# Patient Record
Sex: MALE | Race: WHITE | NOT HISPANIC OR LATINO | Employment: OTHER | URBAN - METROPOLITAN AREA
[De-identification: names, ages, dates, MRNs, and addresses within clinical notes are randomized per-mention and may not be internally consistent; named-entity substitution may affect disease eponyms.]

---

## 2022-10-13 ENCOUNTER — HOSPITAL ENCOUNTER (EMERGENCY)
Facility: HOSPITAL | Age: 82
Discharge: HOME | End: 2022-10-13
Attending: EMERGENCY MEDICINE | Admitting: EMERGENCY MEDICINE

## 2022-10-13 ENCOUNTER — APPOINTMENT (OUTPATIENT)
Dept: GENERAL RADIOLOGY | Facility: HOSPITAL | Age: 82
End: 2022-10-13

## 2022-10-13 VITALS
HEART RATE: 62 BPM | TEMPERATURE: 97.9 F | WEIGHT: 160 LBS | OXYGEN SATURATION: 94 % | HEIGHT: 65 IN | BODY MASS INDEX: 26.66 KG/M2 | RESPIRATION RATE: 20 BRPM | SYSTOLIC BLOOD PRESSURE: 175 MMHG | DIASTOLIC BLOOD PRESSURE: 88 MMHG

## 2022-10-13 DIAGNOSIS — M54.16 LUMBAR RADICULOPATHY: Primary | ICD-10-CM

## 2022-10-13 LAB
HOLD SPECIMEN: NORMAL
WHOLE BLOOD HOLD COAG: NORMAL
WHOLE BLOOD HOLD SPECIMEN: NORMAL

## 2022-10-13 PROCEDURE — 72110 X-RAY EXAM L-2 SPINE 4/>VWS: CPT

## 2022-10-13 PROCEDURE — 96375 TX/PRO/DX INJ NEW DRUG ADDON: CPT

## 2022-10-13 PROCEDURE — 25010000002 KETOROLAC TROMETHAMINE PER 15 MG: Performed by: EMERGENCY MEDICINE

## 2022-10-13 PROCEDURE — 25010000002 ONDANSETRON PER 1 MG: Performed by: EMERGENCY MEDICINE

## 2022-10-13 PROCEDURE — 25010000002 METHYLPREDNISOLONE PER 125 MG: Performed by: EMERGENCY MEDICINE

## 2022-10-13 PROCEDURE — 96374 THER/PROPH/DIAG INJ IV PUSH: CPT

## 2022-10-13 PROCEDURE — 25010000002 MORPHINE PER 10 MG: Performed by: EMERGENCY MEDICINE

## 2022-10-13 PROCEDURE — 99283 EMERGENCY DEPT VISIT LOW MDM: CPT

## 2022-10-13 RX ORDER — HYDROCHLOROTHIAZIDE 25 MG/1
25 TABLET ORAL DAILY
COMMUNITY
End: 2022-10-25 | Stop reason: SDUPTHER

## 2022-10-13 RX ORDER — ATORVASTATIN CALCIUM 80 MG/1
40 TABLET, FILM COATED ORAL DAILY
COMMUNITY

## 2022-10-13 RX ORDER — ATENOLOL 50 MG/1
50 TABLET ORAL DAILY
COMMUNITY
End: 2022-11-28 | Stop reason: SDUPTHER

## 2022-10-13 RX ORDER — MORPHINE SULFATE 4 MG/ML
4 INJECTION, SOLUTION INTRAMUSCULAR; INTRAVENOUS ONCE
Status: COMPLETED | OUTPATIENT
Start: 2022-10-13 | End: 2022-10-13

## 2022-10-13 RX ORDER — KETOROLAC TROMETHAMINE 15 MG/ML
15 INJECTION, SOLUTION INTRAMUSCULAR; INTRAVENOUS ONCE
Status: COMPLETED | OUTPATIENT
Start: 2022-10-13 | End: 2022-10-13

## 2022-10-13 RX ORDER — LIDOCAINE 50 MG/G
1 PATCH TOPICAL EVERY 24 HOURS
Qty: 6 PATCH | Refills: 0 | Status: SHIPPED | OUTPATIENT
Start: 2022-10-13 | End: 2023-02-08

## 2022-10-13 RX ORDER — ONDANSETRON 2 MG/ML
4 INJECTION INTRAMUSCULAR; INTRAVENOUS ONCE
Status: COMPLETED | OUTPATIENT
Start: 2022-10-13 | End: 2022-10-13

## 2022-10-13 RX ORDER — ASPIRIN 325 MG
325 TABLET ORAL DAILY
COMMUNITY
End: 2022-10-20

## 2022-10-13 RX ORDER — LISINOPRIL 40 MG/1
40 TABLET ORAL DAILY
COMMUNITY
End: 2022-11-28 | Stop reason: SDUPTHER

## 2022-10-13 RX ORDER — SUCRALFATE 1 G/1
1 TABLET ORAL 4 TIMES DAILY
COMMUNITY
End: 2023-02-08

## 2022-10-13 RX ORDER — ISOSORBIDE MONONITRATE 60 MG/1
60 TABLET, EXTENDED RELEASE ORAL DAILY
COMMUNITY
End: 2022-10-25 | Stop reason: SDUPTHER

## 2022-10-13 RX ORDER — SODIUM CHLORIDE 0.9 % (FLUSH) 0.9 %
10 SYRINGE (ML) INJECTION AS NEEDED
Status: DISCONTINUED | OUTPATIENT
Start: 2022-10-13 | End: 2022-10-13 | Stop reason: HOSPADM

## 2022-10-13 RX ORDER — METHYLPREDNISOLONE SODIUM SUCCINATE 125 MG/2ML
125 INJECTION, POWDER, LYOPHILIZED, FOR SOLUTION INTRAMUSCULAR; INTRAVENOUS ONCE
Status: COMPLETED | OUTPATIENT
Start: 2022-10-13 | End: 2022-10-13

## 2022-10-13 RX ORDER — CYCLOBENZAPRINE HCL 10 MG
10 TABLET ORAL 3 TIMES DAILY PRN
Qty: 15 TABLET | Refills: 0 | Status: SHIPPED | OUTPATIENT
Start: 2022-10-13 | End: 2022-10-19 | Stop reason: SDUPTHER

## 2022-10-13 RX ORDER — TAMSULOSIN HYDROCHLORIDE 0.4 MG/1
1 CAPSULE ORAL DAILY
COMMUNITY
End: 2023-02-08

## 2022-10-13 RX ORDER — HYDROCODONE BITARTRATE AND ACETAMINOPHEN 5; 325 MG/1; MG/1
1 TABLET ORAL EVERY 6 HOURS PRN
Qty: 15 TABLET | Refills: 0 | Status: SHIPPED | OUTPATIENT
Start: 2022-10-13 | End: 2022-10-19

## 2022-10-13 RX ADMIN — MORPHINE SULFATE 4 MG: 4 INJECTION, SOLUTION INTRAMUSCULAR; INTRAVENOUS at 07:39

## 2022-10-13 RX ADMIN — ONDANSETRON 4 MG: 2 INJECTION INTRAMUSCULAR; INTRAVENOUS at 07:39

## 2022-10-13 RX ADMIN — KETOROLAC TROMETHAMINE 15 MG: 15 INJECTION, SOLUTION INTRAMUSCULAR; INTRAVENOUS at 07:40

## 2022-10-13 RX ADMIN — METHYLPREDNISOLONE SODIUM SUCCINATE 125 MG: 125 INJECTION, POWDER, FOR SOLUTION INTRAMUSCULAR; INTRAVENOUS at 07:40

## 2022-10-13 NOTE — ED PROVIDER NOTES
Subjective   History of Present Illness  Pt is a 81 yo male presnting to ED with complaints of left leg pain. PMHx significant for CAD, DM (non insulin), HTN and HLD. Pt reports lower back pain radiating down left leg that began about 2 weeks ago prior to moving to [place] from [place]. He reports sharp shooting pain from left lower back down posterior left leg. Pain worse with sitting and standing. He denies weakness, numbness or loss of bladder / bowel. He denies swelling to legs and reports pain initially started prior to driving to [place].  He denies prior hx of back problems. He denies specific fall or injury. He hasn't taken any medications for pain. He has not established a PCP in [place] yet and will be living with family. He has not taken his BP meds today. He denies tobacco, drug or ETOH use.     History provided by:  Medical records and patient      Review of Systems   Constitutional: Negative for chills and fever.   HENT: Negative for congestion.    Eyes: Negative for visual disturbance.   Respiratory: Negative for cough and shortness of breath.    Cardiovascular: Negative for chest pain and leg swelling.   Gastrointestinal: Negative for abdominal pain, diarrhea, nausea and vomiting.   Genitourinary: Negative for difficulty urinating, dysuria and flank pain.   Musculoskeletal: Positive for back pain and myalgias. Negative for arthralgias and joint swelling.   Skin: Negative.  Negative for rash and wound.   Allergic/Immunologic: Negative.    Neurological: Negative for dizziness, syncope, weakness, numbness and headaches.   Psychiatric/Behavioral: Negative for confusion.   All other systems reviewed and are negative.      No past medical history on file.    No Known Allergies    No past surgical history on file.    No family history on file.    Social History     Socioeconomic History   • Marital status:            Objective   Physical Exam  Vitals and nursing note reviewed.   Constitutional:       Appearance:  He is well-developed.   HENT:      Head: Atraumatic.      Nose: Nose normal.   Eyes:      General: Lids are normal.      Conjunctiva/sclera: Conjunctivae normal.      Pupils: Pupils are equal, round, and reactive to light.   Cardiovascular:      Rate and Rhythm: Normal rate and regular rhythm.      Heart sounds: Normal heart sounds.   Pulmonary:      Effort: Pulmonary effort is normal.      Breath sounds: Normal breath sounds.   Abdominal:      General: There is no distension.      Palpations: Abdomen is soft.      Tenderness: There is no abdominal tenderness. There is no guarding or rebound.   Musculoskeletal:         General: No deformity.      Cervical back: Normal range of motion and neck supple. No tenderness. Normal range of motion.      Thoracic back: No tenderness. Normal range of motion.      Lumbar back: Tenderness present. Normal range of motion. Positive right straight leg raise test and positive left straight leg raise test.   Skin:     General: Skin is warm and dry.   Neurological:      Mental Status: He is alert and oriented to person, place, and time.      Sensory: Sensation is intact. No sensory deficit.      Motor: Motor function is intact.      Deep Tendon Reflexes:      Reflex Scores:       Patellar reflexes are 2+ on the right side and 2+ on the left side.  Psychiatric:         Behavior: Behavior normal.         Procedures           ED Course      Re-examined patient several times in ED. Pt resting and pain has improved. Discussed results and tx plan. Will dc home with course of Flexeril, Lidoderm patches and Norco. Discussed ice / heat. Provided info for him to establish a PCP. Discussed new / worse sx to return to ED.     Discussed patient with Dr. Rodriguez who is agreeable with ED course and tx plan.     Recent Results (from the past 24 hour(s))   Green Top (Gel)    Collection Time: 10/13/22  5:45 AM   Result Value Ref Range    Extra Tube Hold for add-ons.    Lavender Top    Collection Time:  10/13/22  5:45 AM   Result Value Ref Range    Extra Tube hold for add-on    Gold Top - SST    Collection Time: 10/13/22  5:45 AM   Result Value Ref Range    Extra Tube Hold for add-ons.    Gray Top    Collection Time: 10/13/22  5:45 AM   Result Value Ref Range    Extra Tube Hold for add-ons.    Light Blue Top    Collection Time: 10/13/22  5:45 AM   Result Value Ref Range    Extra Tube Hold for add-ons.      Note: In addition to lab results from this visit, the labs listed above may include labs taken at another facility or during a different encounter within the last 24 hours. Please correlate lab times with ED admission and discharge times for further clarification of the services performed during this visit.    XR Spine Lumbar Complete 4+VW   Final Result   Moderate to advanced multilevel degenerative changes of the spine       This report was finalized on 10/13/2022 8:16 AM by Hollis Soto.            Vitals:    10/13/22 0630 10/13/22 0729 10/13/22 0915 10/13/22 0930   BP: (!) 197/106 (!) 191/97 (!) 183/90 175/88   BP Location:       Patient Position:       Pulse:       Resp:       Temp:       TempSrc:       SpO2: 100% 97% 90% 94%   Weight:       Height:         Medications   methylPREDNISolone sodium succinate (SOLU-Medrol) injection 125 mg (125 mg Intravenous Given 10/13/22 0740)   ketorolac (TORADOL) injection 15 mg (15 mg Intravenous Given 10/13/22 0740)   ondansetron (ZOFRAN) injection 4 mg (4 mg Intravenous Given 10/13/22 0739)   Morphine sulfate (PF) injection 4 mg (4 mg Intravenous Given 10/13/22 0739)     ECG/EMG Results (last 24 hours)     ** No results found for the last 24 hours. **        No orders to display       DISCHARGE    Patient discharged in stable condition.    Reviewed implications of results, diagnosis, meds, responsibility to follow up, warning signs and symptoms of possible worsening, potential complications and reasons to return to ER.    Patient/Family voiced understanding of above  instructions.    Discussed plan for discharge, as there is no emergent indication for admission.  Pt/family is agreeable and understands need for follow up and possible repeat testing.  Pt/family is aware that discharge does not mean that nothing is wrong but that it indicates no emergency is currently present that requires admission and they must continue care with follow-up as given below or with a physician of their choice.     FOLLOW-UP  PATIENT CONNECTION - [place] [address]  [phone]    Call and establish a primary care doctor.    [facility] Emergency Department  [address] [address] [phone]    If symptoms worsen         Medication List      New Prescriptions    cyclobenzaprine 10 MG tablet  Commonly known as: FLEXERIL  Take 1 tablet by mouth 3 (Three) Times a Day As Needed for Muscle Spasms for up to 15 doses.     HYDROcodone-acetaminophen 5-325 MG per tablet  Commonly known as: NORCO  Take 1 tablet by mouth Every 6 (Six) Hours As Needed for Moderate Pain for up to 15 doses.     lidocaine 5 %  Commonly known as: LIDODERM  Place 1 patch on the skin as directed by provider Daily. Remove & Discard patch within 12 hours or as directed by MD           Where to Get Your Medications      These medications were sent to [facility] [id number] - [place], [place] - [address] - [phone]  - [phone]   [address] [address]    Phone: [phone]   · cyclobenzaprine 10 MG tablet  · HYDROcodone-acetaminophen 5-325 MG per tablet  · lidocaine 5 %                                    MIKHAIL reviewed by Jesse Rodriguez MD       [facility]    Final diagnoses:   Lumbar radiculopathy       ED Disposition  ED Disposition     ED Disposition   Discharge    Condition   Stable    Comment   --             PATIENT CONNECTION - [place] [address]  [phone]    Call and establish a primary  care doctor.    [facility] Emergency Department  [address] [address] [phone]    If symptoms worsen         Medication List      New Prescriptions    cyclobenzaprine 10 MG tablet  Commonly known as: FLEXERIL  Take 1 tablet by mouth 3 (Three) Times a Day As Needed for Muscle Spasms for up to 15 doses.     HYDROcodone-acetaminophen 5-325 MG per tablet  Commonly known as: NORCO  Take 1 tablet by mouth Every 6 (Six) Hours As Needed for Moderate Pain for up to 15 doses.     lidocaine 5 %  Commonly known as: LIDODERM  Place 1 patch on the skin as directed by provider Daily. Remove & Discard patch within 12 hours or as directed by MD           Where to Get Your Medications      These medications were sent to [facility] [id number] - [place], [place] - [address] [facility] - [phone]  - [phone] FX  [address] [address]    Phone: [phone]   · cyclobenzaprine 10 MG tablet  · HYDROcodone-acetaminophen 5-325 MG per tablet  · lidocaine 5 %          Maureen Colbert PA  10/13/22 6175

## 2022-10-15 ENCOUNTER — OFFICE VISIT (OUTPATIENT)
Dept: INTERNAL MEDICINE | Facility: CLINIC | Age: 82
End: 2022-10-15

## 2022-10-15 VITALS
OXYGEN SATURATION: 99 % | HEIGHT: 65 IN | TEMPERATURE: 96.9 F | SYSTOLIC BLOOD PRESSURE: 140 MMHG | BODY MASS INDEX: 25.83 KG/M2 | DIASTOLIC BLOOD PRESSURE: 80 MMHG | WEIGHT: 155 LBS | HEART RATE: 55 BPM

## 2022-10-15 DIAGNOSIS — I25.10 CAD, MULTIPLE VESSEL: ICD-10-CM

## 2022-10-15 DIAGNOSIS — M54.42 ACUTE LOW BACK PAIN WITH LEFT-SIDED SCIATICA, UNSPECIFIED BACK PAIN LATERALITY: ICD-10-CM

## 2022-10-15 DIAGNOSIS — Z76.89 ENCOUNTER TO ESTABLISH CARE: Primary | ICD-10-CM

## 2022-10-15 DIAGNOSIS — M19.90 ARTHRITIS: ICD-10-CM

## 2022-10-15 DIAGNOSIS — R21 RASH: ICD-10-CM

## 2022-10-15 PROCEDURE — 99204 OFFICE O/P NEW MOD 45 MIN: CPT | Performed by: NURSE PRACTITIONER

## 2022-10-15 NOTE — PROGRESS NOTES
Office Note     Name: Hal Byrnes    : 1940     MRN: [id number]     Chief Complaint  Hypertension, Establish Care, and Leg Pain (Left leg pain)    Subjective     History of Present Illness:  Hal Byrnes is a 82 y.o. male who presents today to establish care and for an ED follow-up.  Patient went to [facility] ED on 10/13 with complaints of lumbar radiculopathy with sciatica.  Lumbar spine x-ray showed diffuse osteopenia and moderate to advanced multilevel degenerative changes of the spine.  Patient was sent home with prescriptions for hydrocodone, cyclobenzaprine, and Lidoderm patches.  Patient did just relocated from [place].  He reports that his daughter drove him to [place] from [place] over a 3-day time span.  He reports the back pain is acute and started while they were traveling.  He denies previous back problems.  He does report radiation down the left lower extremity.  He was seeing a number of specialist in [place] and the language barrier does make collecting his health history information difficult.  He also presented alone today so there is no family to help with communication.  Also, I do not have records from his previous provider in [place] to review his past medical history.  It appears that he was seeing otolaryngology for throat and ear complaints, GI for pancreatitis, rheumatology for arthritis, cardiology for CAD and cardiac stents.  He was also seeing Ortho, nephrology, and dermatology at some point but he was unable to tell me why.  He does report a past medical history of hypertension, cardiac stents with coronary artery disease, hyperlipidemia, and pancreatitis.  He denies reports a 50-year history of tobacco use, but quit smoking about 10 years ago.  He does deny alcohol use, he admits to daily marijuana use.  He is up-to-date on his flu vaccine.  He denies further complaints at this time.    Review of Systems   Musculoskeletal: Positive for back pain.  "  Skin: Positive for rash.       Objective     Vital Signs  /80   Pulse 55   Temp 96.9 °F (36.1 °C)   Ht 165.1 cm (65\")   Wt 70.3 kg (155 lb)   SpO2 99%   BMI 25.79 kg/m²   Estimated body mass index is 25.79 kg/m² as calculated from the following:    Height as of this encounter: 165.1 cm (65\").    Weight as of this encounter: 70.3 kg (155 lb).          Physical Exam  Constitutional:       Appearance: Normal appearance.   HENT:      Head: Normocephalic and atraumatic.      Nose: Nose normal.   Eyes:      Extraocular Movements: Extraocular movements intact.      Conjunctiva/sclera: Conjunctivae normal.      Pupils: Pupils are equal, round, and reactive to light.   Cardiovascular:      Rate and Rhythm: Normal rate and regular rhythm.   Pulmonary:      Effort: Pulmonary effort is normal.      Breath sounds: Normal breath sounds.   Abdominal:      General: Abdomen is flat.      Palpations: Abdomen is soft.   Musculoskeletal:      Cervical back: Normal range of motion and neck supple.      Comments: Tenderness to low back, decreased range of motion noted to left lower extremity   Skin:     General: Skin is warm and dry.      Findings: Rash present.      Comments: Rash to bilateral hands   Neurological:      General: No focal deficit present.      Mental Status: He is alert and oriented to person, place, and time. Mental status is at baseline.   Psychiatric:         Mood and Affect: Mood normal.         Behavior: Behavior normal.         Thought Content: Thought content normal.         Judgment: Judgment normal.          Assessment and Plan     Diagnoses and all orders for this visit:    1. Encounter to establish care (Primary)  Patient just recently relocated from [place] to [place].  He presents to establish care with a new provider.  He is up-to-date on his flu vaccine.  He has been following with a number of specialist in [place] and is requesting referrals.  Encourage patient to schedule appointment " for annual physical exam with labs.  Did not discuss health maintenance with patient today due to extent of referrals requested.    2. Acute low back pain with left-sided sciatica, unspecified back pain laterality  May continue with Flexeril and hydrocodone as needed.  May continue with Lidoderm patches.  Will refer to Ortho for further evaluation and treatment.  Patient declined physical therapy.    -     Ambulatory Referral to Orthopedic Surgery    3. Arthritis  Patient was following with rheumatology in [place].  Will refer to rheumatology.    -     Ambulatory Referral to Rheumatology    4. CAD, multiple vessel  Patient is unsure whether he has 3 or 4 cardiac stents.  He does admit to a history of hyperlipidemia, CAD, and hypertension.  He was following with cardiology in [place] and requests a referral.    -     Ambulatory Referral to Cardiology    5. Rash  Dry, scaly rash noted to bilateral hands.  Patient reports rash is also to his back but declined to show me in office today.  Will refer to dermatology.    -     Ambulatory Referral to Dermatology        Follow Up  Return in about 2 months (around 12/15/2022) for Annual.    JOURDAN Vernon

## 2022-10-19 ENCOUNTER — OFFICE VISIT (OUTPATIENT)
Dept: INTERNAL MEDICINE | Facility: CLINIC | Age: 82
End: 2022-10-19

## 2022-10-19 VITALS
WEIGHT: 152 LBS | SYSTOLIC BLOOD PRESSURE: 160 MMHG | BODY MASS INDEX: 25.29 KG/M2 | TEMPERATURE: 97 F | OXYGEN SATURATION: 98 % | HEART RATE: 50 BPM | DIASTOLIC BLOOD PRESSURE: 78 MMHG

## 2022-10-19 DIAGNOSIS — Z79.4 TYPE 2 DIABETES MELLITUS WITH CHRONIC KIDNEY DISEASE, WITH LONG-TERM CURRENT USE OF INSULIN, UNSPECIFIED CKD STAGE: ICD-10-CM

## 2022-10-19 DIAGNOSIS — N18.32 STAGE 3B CHRONIC KIDNEY DISEASE: ICD-10-CM

## 2022-10-19 DIAGNOSIS — Z12.5 PROSTATE CANCER SCREENING: ICD-10-CM

## 2022-10-19 DIAGNOSIS — Z13.21 ENCOUNTER FOR VITAMIN DEFICIENCY SCREENING: ICD-10-CM

## 2022-10-19 DIAGNOSIS — Z09 FOLLOW-UP EXAM: Primary | ICD-10-CM

## 2022-10-19 DIAGNOSIS — I10 HYPERTENSION, UNSPECIFIED TYPE: ICD-10-CM

## 2022-10-19 DIAGNOSIS — M54.16 LUMBAR RADICULOPATHY: ICD-10-CM

## 2022-10-19 DIAGNOSIS — E78.5 HYPERLIPIDEMIA, UNSPECIFIED HYPERLIPIDEMIA TYPE: ICD-10-CM

## 2022-10-19 DIAGNOSIS — E55.9 VITAMIN D DEFICIENCY: ICD-10-CM

## 2022-10-19 DIAGNOSIS — Z79.899 ENCOUNTER FOR LONG-TERM (CURRENT) USE OF OTHER MEDICATIONS: ICD-10-CM

## 2022-10-19 DIAGNOSIS — E11.22 TYPE 2 DIABETES MELLITUS WITH CHRONIC KIDNEY DISEASE, WITH LONG-TERM CURRENT USE OF INSULIN, UNSPECIFIED CKD STAGE: ICD-10-CM

## 2022-10-19 PROCEDURE — 99214 OFFICE O/P EST MOD 30 MIN: CPT | Performed by: NURSE PRACTITIONER

## 2022-10-19 RX ORDER — HYDROCODONE BITARTRATE AND ACETAMINOPHEN 7.5; 325 MG/1; MG/1
1 TABLET ORAL EVERY 6 HOURS PRN
Qty: 48 TABLET | Refills: 0 | Status: SHIPPED | OUTPATIENT
Start: 2022-10-19 | End: 2022-10-31

## 2022-10-19 RX ORDER — HYDROCODONE BITARTRATE AND ACETAMINOPHEN 5; 325 MG/1; MG/1
1 TABLET ORAL EVERY 6 HOURS PRN
Qty: 15 TABLET | Refills: 0 | Status: CANCELLED | OUTPATIENT
Start: 2022-10-19

## 2022-10-19 RX ORDER — CYCLOBENZAPRINE HCL 10 MG
10 TABLET ORAL 3 TIMES DAILY PRN
Qty: 40 TABLET | Refills: 0 | Status: SHIPPED | OUTPATIENT
Start: 2022-10-19 | End: 2023-02-08

## 2022-10-20 PROBLEM — N40.0 BPH (BENIGN PROSTATIC HYPERPLASIA): Status: ACTIVE | Noted: 2022-10-20

## 2022-10-20 PROBLEM — G62.9 PERIPHERAL NEUROPATHY: Status: ACTIVE | Noted: 2022-10-20

## 2022-10-20 PROBLEM — I50.32 CHRONIC DIASTOLIC (CONGESTIVE) HEART FAILURE: Status: ACTIVE | Noted: 2022-10-20

## 2022-10-20 PROBLEM — M85.80 OSTEOPENIA: Status: ACTIVE | Noted: 2022-10-20

## 2022-10-20 PROBLEM — E11.22 TYPE 2 DIABETES MELLITUS WITH CHRONIC KIDNEY DISEASE: Status: ACTIVE | Noted: 2022-10-20

## 2022-10-20 PROBLEM — M47.816 LUMBAR SPONDYLOSIS: Status: ACTIVE | Noted: 2022-10-20

## 2022-10-20 PROBLEM — I20.8 STABLE ANGINA: Status: ACTIVE | Noted: 2022-10-20

## 2022-10-20 PROBLEM — I20.89 STABLE ANGINA: Status: ACTIVE | Noted: 2022-10-20

## 2022-10-20 PROBLEM — I35.0 AORTIC STENOSIS: Status: ACTIVE | Noted: 2022-10-20

## 2022-10-20 PROBLEM — I21.9 MYOCARDIAL INFARCTION: Status: ACTIVE | Noted: 2022-10-20

## 2022-10-20 PROBLEM — L40.9 PSORIASIS: Status: ACTIVE | Noted: 2022-10-20

## 2022-10-20 PROBLEM — I10 HYPERTENSION: Status: ACTIVE | Noted: 2022-10-20

## 2022-10-20 PROBLEM — K21.9 GERD (GASTROESOPHAGEAL REFLUX DISEASE): Status: ACTIVE | Noted: 2022-10-20

## 2022-10-20 PROBLEM — M47.812 CERVICAL SPONDYLOSIS: Status: ACTIVE | Noted: 2022-10-20

## 2022-10-20 PROBLEM — I25.10 CORONARY ARTERY DISEASE: Status: ACTIVE | Noted: 2022-10-20

## 2022-10-20 PROBLEM — K85.90 PANCREATITIS: Status: ACTIVE | Noted: 2022-10-20

## 2022-10-20 RX ORDER — CLOBETASOL PROPIONATE 0.5 MG/G
1 OINTMENT TOPICAL 2 TIMES DAILY
COMMUNITY

## 2022-10-20 RX ORDER — ERGOCALCIFEROL 1.25 MG/1
50000 CAPSULE ORAL WEEKLY
COMMUNITY

## 2022-10-20 RX ORDER — PREDNISONE 1 MG/1
5 TABLET ORAL DAILY
COMMUNITY
End: 2022-11-03

## 2022-10-20 RX ORDER — LACTULOSE 10 G/15ML
10 SOLUTION ORAL 2 TIMES DAILY PRN
COMMUNITY

## 2022-10-20 RX ORDER — OMEPRAZOLE 20 MG/1
20 CAPSULE, DELAYED RELEASE ORAL DAILY
COMMUNITY
End: 2022-11-03

## 2022-10-20 NOTE — PROGRESS NOTES
Office Note     Name: Hal Byrnes    : 1940     MRN: [id number]     Chief Complaint  Med Refill (Was in ER on thursday), Back Pain, and Knee Pain    Subjective     History of Present Illness:  Hal Byrnes is a 82 y.o. male who presents today for a follow-up visit.  Patient presented to the clinic on Saturday with no family present and past medical history and current medications were difficult to obtain.  Daughter is with the patient today and is able to give me some more information.  Patient went to [facility] ED on 10/13 with complaints of lumbar radiculopathy with sciatica.  Lumbar spine x-ray showed diffuse osteopenia and moderate to advanced multilevel degenerative changes of the spine.  Patient was sent home with prescriptions for hydrocodone, cyclobenzaprine, and Lidoderm patches.  Patient did just relocated from [place].  He reports that his daughter drove him to [place] from [place] over a 3-day time span.  Patient has ran out of medications provided by the ED and is still complaining of significant pain.  He does have an upcoming appointment with Ortho for evaluation of lumbar radiculopathy on .  Patient daughter requesting enough medication to get him to that appointment.  I would also like to try to get an MRI.  Past medical history and current home medications have been reviewed and updated.  His wife usually comes to the appointments with him but she is still living in [place] and will be relocating next week.  Referrals for Ortho, cardiology, rheumatology, and dermatology previously placed in progress.  Denies further complaints at this time.    Review of Systems   Musculoskeletal: Positive for back pain.       Past Medical History:   Diagnosis Date   • Arthritis    • Coronary artery disease    • Hyperlipidemia    • Hypertension    • Low back pain        Past Surgical History:   Procedure Laterality Date   • CORONARY STENT PLACEMENT         Social History      Socioeconomic History   • Marital status:    Tobacco Use   • Smoking status: Never   • Smokeless tobacco: Never   Substance and Sexual Activity   • Alcohol use: Never   • Drug use: Yes     Types: Marijuana         Current Outpatient Medications:   •  atenolol (TENORMIN) 25 MG tablet, Take 2 tablets by mouth Daily., Disp: , Rfl:   •  atorvastatin (LIPITOR) 80 MG tablet, Take 1 tablet by mouth Daily., Disp: , Rfl:   •  clobetasol (TEMOVATE) 0.05 % ointment, Apply 1 application topically to the appropriate area as directed 2 (Two) Times a Day., Disp: , Rfl:   •  cyclobenzaprine (FLEXERIL) 10 MG tablet, Take 1 tablet by mouth 3 (Three) Times a Day As Needed for Muscle Spasms., Disp: 40 tablet, Rfl: 0  •  hydroCHLOROthiazide (HYDRODIURIL) 25 MG tablet, Take 1 tablet by mouth Daily., Disp: , Rfl:   •  isosorbide mononitrate (IMDUR) 60 MG 24 hr tablet, Take 1 tablet by mouth Daily., Disp: , Rfl:   •  lactulose (CHRONULAC) 10 GM/15ML solution, Take 15 mL by mouth 2 (Two) Times a Day As Needed., Disp: , Rfl:   •  lidocaine (LIDODERM) 5 %, Place 1 patch on the skin as directed by provider Daily. Remove & Discard patch within 12 hours or as directed by MD, Disp: 6 patch, Rfl: 0  •  lisinopril (PRINIVIL,ZESTRIL) 40 MG tablet, Take 1 tablet by mouth Daily., Disp: , Rfl:   •  omeprazole (priLOSEC) 20 MG capsule, Take 1 capsule by mouth Daily., Disp: , Rfl:   •  Pancrelipase, Lip-Prot-Amyl, (ZENPEP PO), Take  by mouth., Disp: , Rfl:   •  predniSONE (DELTASONE) 5 MG tablet, Take 1 tablet by mouth Daily., Disp: , Rfl:   •  sucralfate (CARAFATE) 1 g tablet, Take 1 tablet by mouth 4 (Four) Times a Day., Disp: , Rfl:   •  tamsulosin (FLOMAX) 0.4 MG capsule 24 hr capsule, Take 1 capsule by mouth Daily., Disp: , Rfl:   •  vitamin D (ERGOCALCIFEROL) 1.25 MG (95064 UT) capsule capsule, Take 1 capsule by mouth 1 (One) Time Per Week., Disp: , Rfl:   •  HYDROcodone-acetaminophen (NORCO) 7.5-325 MG per tablet, Take 1 tablet by mouth  "Every 6 (Six) Hours As Needed for Moderate Pain for up to 12 days., Disp: 48 tablet, Rfl: 0    Objective     Vital Signs  /78   Pulse 50   Temp 97 °F (36.1 °C)   Wt 68.9 kg (152 lb)   SpO2 98%   BMI 25.29 kg/m²   Estimated body mass index is 25.29 kg/m² as calculated from the following:    Height as of 10/15/22: 165.1 cm (65\").    Weight as of this encounter: 68.9 kg (152 lb).    BMI is >= 25 and <30. (Overweight) The following options were offered after discussion;: Will address at follow-up appointment.      Physical Exam  Constitutional:       Appearance: Normal appearance.   HENT:      Head: Normocephalic and atraumatic.   Eyes:      Extraocular Movements: Extraocular movements intact.      Conjunctiva/sclera: Conjunctivae normal.      Pupils: Pupils are equal, round, and reactive to light.      Comments: Glasses in place   Cardiovascular:      Rate and Rhythm: Normal rate and regular rhythm.   Pulmonary:      Effort: Pulmonary effort is normal.      Breath sounds: Normal breath sounds.   Musculoskeletal:         General: Tenderness present.      Cervical back: Normal range of motion and neck supple.      Comments: Decreased range of motion to left lower extremity.  Tenderness to lumbar area to palpation.  Gait is slightly unsteady when ambulating.   Skin:     General: Skin is warm and dry.   Neurological:      General: No focal deficit present.      Mental Status: He is alert and oriented to person, place, and time. Mental status is at baseline.   Psychiatric:         Mood and Affect: Mood normal.         Behavior: Behavior normal.         Thought Content: Thought content normal.         Judgment: Judgment normal.          Assessment and Plan     Diagnoses and all orders for this visit:    1. Follow-up exam (Primary)  Patient presents for follow-up visit.    2. Lumbar radiculopathy  Patient has upcoming Ortho appointment on October 31.  He has ran out of the hydrocodone and cyclobenzaprine provided " by the ED.  Patient and daughter requesting enough medication to get him to his Ortho appointment.  Will obtain urine UDS in office today.  Unable to run a Saqib due to patient just relocating from [place].  I feel comfortable prescribing these medications as the patient is visibly in pain.  Patient did sign patient provider controlled substance agreement.  Patient and daughter informed that there will be no refills on these medications.  Will order MRI of lumbar spine.    -     cyclobenzaprine (FLEXERIL) 10 MG tablet; Take 1 tablet by mouth 3 (Three) Times a Day As Needed for Muscle Spasms.  Dispense: 40 tablet; Refill: 0  -     HYDROcodone-acetaminophen (NORCO) 7.5-325 MG per tablet; Take 1 tablet by mouth Every 6 (Six) Hours As Needed for Moderate Pain for up to 12 days.  Dispense: 48 tablet; Refill: 0    3. Encounter for long-term (current) use of other medications  -     Cancel: Compliance Drug Analysis, Ur - Urine, Clean Catch; Future  -     Compliance Drug Analysis, Ur - Urine, Clean Catch  -     Compliance Drug Analysis, Ur - Urine, Clean Catch; Future  -     Compliance Drug Analysis, Ur - Urine, Clean Catch    4. Hypertension, unspecified type  Blood pressure elevated in office today secondary to pain.  Continue with atenolol, isosorbide, lisinopril, and HCTZ.  Monitor blood pressure at home.  Low-sodium diet.    5. Type 2 diabetes mellitus with chronic kidney disease, with long-term current use of insulin, unspecified CKD stage (HCC)  No current diabetes medications on his home medicine list.  Will check renal panel, A1c, and urine protein to creatinine ratio.  Patient and daughter requesting referral to nephrology.  Will need to wait and see what GFR is prior to referral.    6. Hyperlipidemia, unspecified hyperlipidemia type  Currently on atorvastatin.  Will check fasting lipid panel.    7. Prostate cancer screening  Will check PSA.    8. Vitamin D deficiency  Patient on ergo 50,000 units  weekly.  Will check vitamin D level.    9. Encounter for vitamin deficiency screening  Will check B12 level.      Follow Up  Return in about 1 month (around 11/19/2022) for Recheck.    JOURDAN Vernon    Part of this note may be an electronic transcription/translation of spoken language to printed text using the Dragon Dictation System.

## 2022-10-21 ENCOUNTER — LAB (OUTPATIENT)
Dept: LAB | Facility: HOSPITAL | Age: 82
End: 2022-10-21

## 2022-10-21 DIAGNOSIS — E11.22 TYPE 2 DIABETES MELLITUS WITH CHRONIC KIDNEY DISEASE, WITH LONG-TERM CURRENT USE OF INSULIN, UNSPECIFIED CKD STAGE: ICD-10-CM

## 2022-10-21 DIAGNOSIS — Z13.21 ENCOUNTER FOR VITAMIN DEFICIENCY SCREENING: ICD-10-CM

## 2022-10-21 DIAGNOSIS — Z12.5 PROSTATE CANCER SCREENING: ICD-10-CM

## 2022-10-21 DIAGNOSIS — Z79.4 TYPE 2 DIABETES MELLITUS WITH CHRONIC KIDNEY DISEASE, WITH LONG-TERM CURRENT USE OF INSULIN, UNSPECIFIED CKD STAGE: ICD-10-CM

## 2022-10-21 DIAGNOSIS — E78.5 HYPERLIPIDEMIA, UNSPECIFIED HYPERLIPIDEMIA TYPE: ICD-10-CM

## 2022-10-21 DIAGNOSIS — E55.9 VITAMIN D DEFICIENCY: ICD-10-CM

## 2022-10-21 LAB
25(OH)D3 SERPL-MCNC: 54.7 NG/ML (ref 30–100)
ALBUMIN SERPL-MCNC: 4.2 G/DL (ref 3.5–5.2)
ANION GAP SERPL CALCULATED.3IONS-SCNC: 11 MMOL/L (ref 5–15)
BASOPHILS # BLD AUTO: 0.02 10*3/MM3 (ref 0–0.2)
BASOPHILS NFR BLD AUTO: 0.3 % (ref 0–1.5)
BUN SERPL-MCNC: 27 MG/DL (ref 8–23)
BUN/CREAT SERPL: 15 (ref 7–25)
CALCIUM SPEC-SCNC: 10.1 MG/DL (ref 8.6–10.5)
CHLORIDE SERPL-SCNC: 100 MMOL/L (ref 98–107)
CHOLEST SERPL-MCNC: 282 MG/DL (ref 0–200)
CO2 SERPL-SCNC: 28 MMOL/L (ref 22–29)
CREAT SERPL-MCNC: 1.8 MG/DL (ref 0.76–1.27)
DEPRECATED RDW RBC AUTO: 42.4 FL (ref 37–54)
EGFRCR SERPLBLD CKD-EPI 2021: 37.3 ML/MIN/1.73
EOSINOPHIL # BLD AUTO: 0.09 10*3/MM3 (ref 0–0.4)
EOSINOPHIL NFR BLD AUTO: 1.1 % (ref 0.3–6.2)
ERYTHROCYTE [DISTWIDTH] IN BLOOD BY AUTOMATED COUNT: 13.1 % (ref 12.3–15.4)
GLUCOSE SERPL-MCNC: 85 MG/DL (ref 65–99)
HBA1C MFR BLD: 5.7 % (ref 4.8–5.6)
HCT VFR BLD AUTO: 44.6 % (ref 37.5–51)
HDLC SERPL-MCNC: 41 MG/DL (ref 40–60)
HGB BLD-MCNC: 14.9 G/DL (ref 13–17.7)
IMM GRANULOCYTES # BLD AUTO: 0.04 10*3/MM3 (ref 0–0.05)
IMM GRANULOCYTES NFR BLD AUTO: 0.5 % (ref 0–0.5)
IRON 24H UR-MRATE: 107 MCG/DL (ref 59–158)
IRON SATN MFR SERPL: 27 % (ref 20–50)
LDLC SERPL CALC-MCNC: 213 MG/DL (ref 0–100)
LDLC/HDLC SERPL: 5.16 {RATIO}
LYMPHOCYTES # BLD AUTO: 2.27 10*3/MM3 (ref 0.7–3.1)
LYMPHOCYTES NFR BLD AUTO: 28.4 % (ref 19.6–45.3)
MCH RBC QN AUTO: 29.7 PG (ref 26.6–33)
MCHC RBC AUTO-ENTMCNC: 33.4 G/DL (ref 31.5–35.7)
MCV RBC AUTO: 89 FL (ref 79–97)
MONOCYTES # BLD AUTO: 0.63 10*3/MM3 (ref 0.1–0.9)
MONOCYTES NFR BLD AUTO: 7.9 % (ref 5–12)
NEUTROPHILS NFR BLD AUTO: 4.94 10*3/MM3 (ref 1.7–7)
NEUTROPHILS NFR BLD AUTO: 61.8 % (ref 42.7–76)
NRBC BLD AUTO-RTO: 0 /100 WBC (ref 0–0.2)
PHOSPHATE SERPL-MCNC: 3.3 MG/DL (ref 2.5–4.5)
PLATELET # BLD AUTO: 282 10*3/MM3 (ref 140–450)
PMV BLD AUTO: 10.4 FL (ref 6–12)
POTASSIUM SERPL-SCNC: 3.8 MMOL/L (ref 3.5–5.2)
PSA SERPL-MCNC: 0.83 NG/ML (ref 0–4)
RBC # BLD AUTO: 5.01 10*6/MM3 (ref 4.14–5.8)
SODIUM SERPL-SCNC: 139 MMOL/L (ref 136–145)
TIBC SERPL-MCNC: 392 MCG/DL (ref 298–536)
TRANSFERRIN SERPL-MCNC: 263 MG/DL (ref 200–360)
TRIGL SERPL-MCNC: 148 MG/DL (ref 0–150)
TSH SERPL DL<=0.05 MIU/L-ACNC: 1.6 UIU/ML (ref 0.27–4.2)
VIT B12 BLD-MCNC: 689 PG/ML (ref 211–946)
VLDLC SERPL-MCNC: 28 MG/DL (ref 5–40)
WBC NRBC COR # BLD: 7.99 10*3/MM3 (ref 3.4–10.8)

## 2022-10-21 PROCEDURE — 85025 COMPLETE CBC W/AUTO DIFF WBC: CPT

## 2022-10-21 PROCEDURE — 80061 LIPID PANEL: CPT

## 2022-10-21 PROCEDURE — 82306 VITAMIN D 25 HYDROXY: CPT

## 2022-10-21 PROCEDURE — 83036 HEMOGLOBIN GLYCOSYLATED A1C: CPT

## 2022-10-21 PROCEDURE — 80069 RENAL FUNCTION PANEL: CPT

## 2022-10-21 PROCEDURE — 82607 VITAMIN B-12: CPT

## 2022-10-21 PROCEDURE — 84443 ASSAY THYROID STIM HORMONE: CPT

## 2022-10-21 PROCEDURE — 83540 ASSAY OF IRON: CPT

## 2022-10-21 PROCEDURE — 36415 COLL VENOUS BLD VENIPUNCTURE: CPT

## 2022-10-21 PROCEDURE — 84466 ASSAY OF TRANSFERRIN: CPT

## 2022-10-21 PROCEDURE — G0103 PSA SCREENING: HCPCS

## 2022-10-25 ENCOUNTER — TELEPHONE (OUTPATIENT)
Dept: INTERNAL MEDICINE | Facility: CLINIC | Age: 82
End: 2022-10-25

## 2022-10-25 DIAGNOSIS — I10 HYPERTENSION, UNSPECIFIED TYPE: Primary | ICD-10-CM

## 2022-10-25 RX ORDER — ISOSORBIDE MONONITRATE 60 MG/1
60 TABLET, EXTENDED RELEASE ORAL DAILY
Qty: 30 TABLET | Refills: 2 | Status: SHIPPED | OUTPATIENT
Start: 2022-10-25 | End: 2023-02-08

## 2022-10-25 RX ORDER — HYDROCHLOROTHIAZIDE 25 MG/1
25 TABLET ORAL DAILY
Qty: 30 TABLET | Refills: 2 | Status: SHIPPED | OUTPATIENT
Start: 2022-10-25 | End: 2023-01-29

## 2022-10-25 NOTE — TELEPHONE ENCOUNTER
----- Message from JOURDAN Vernon sent at 10/25/2022 10:22 AM EDT -----  Please let the patient know I have reviewed his results.  Vitamin D and vitamin B12 levels are normal.  Prostate screening was normal.  No anemia or infection noted on blood count panel.  Iron levels are normal.  On his metabolic panel, his creatinine was 1.8 and GFR is 37, which does indicate some chronic kidney disease stage III.  Will refer to a nephrologist.  Total cholesterol is elevated at 282, which we would like to see under 200.  Triglycerides look good.  His bad cholesterol is elevated at 213, which we would like to see under 100.  Continue with atorvastatin 80 mg nightly at bedtime.  Would also recommend adding fish oil 1000 mg twice daily and to reduce high fat food intake in his diet.  Thyroid level is normal.  His hemoglobin A1c is 5.7, which is very good.  Please let me know if he has any questions.  ThanksHola  
Spoke with pt regarding his lab results.    Pt needs refill for:      isosorbide mononitrate (IMDUR) 60 MG 24 hr tablet     Last office visit              10/19/22  Next office visit              11/22/22    Lab completed in past 6 months? Yes  Labs completed in past year? N/A    Last refill Date:               N/A  Quantity:                         N/A  Prescribed by Historical    Pt stated he is taking two times a day.     hydroCHLOROthiazide (HYDRODIURIL) 25 MG tablet     Last refill Date:               N/A  Quantity:                         N/A  Prescribed by Historical  Pharmacy:     [facility] [id number] - [place], [place] - [address] [facility] [address] [facility] [address] [facility] - [phone]  - [phone] FX   [address] [address]   Phone: [phone] Fax: [phone]         Please review pended refill request for any changes needed on refills or quantities.  Thank you!              
170

## 2022-10-26 LAB — DRUGS UR: NORMAL

## 2022-11-02 PROBLEM — I10 ESSENTIAL HYPERTENSION: Status: ACTIVE | Noted: 2022-11-02

## 2022-11-02 PROBLEM — I21.9 MYOCARDIAL INFARCTION: Status: RESOLVED | Noted: 2022-10-20 | Resolved: 2022-11-02

## 2022-11-02 PROBLEM — E78.5 DYSLIPIDEMIA: Status: ACTIVE | Noted: 2022-11-02

## 2022-11-02 PROBLEM — I20.89 STABLE ANGINA: Status: RESOLVED | Noted: 2022-10-20 | Resolved: 2022-11-02

## 2022-11-02 PROBLEM — I10 HYPERTENSION: Status: RESOLVED | Noted: 2022-10-20 | Resolved: 2022-11-02

## 2022-11-02 PROBLEM — I20.8 STABLE ANGINA: Status: RESOLVED | Noted: 2022-10-20 | Resolved: 2022-11-02

## 2022-11-02 NOTE — PROGRESS NOTES
New Cardiology Patient Office Visit      Date: 2022  Patient Name: Hal Byrnes  : 1940   MRN: [id number]   PCP: Fariha Cruz APRN   Referring Provider: Fariha Cruz APRN     Chief Complaint:    Chief Complaint   Patient presents with   • Establish Care     CAD       History of Present Illness: Hal Byrnes is a 82 y.o. male who is here today for  ***      Problem List     1. CARDIAC  a. Coronary Artery Disease:   i. NSTEMI with C : PCI to RCA and left circumflex  ii. Repeat C with nonobstructive coronary artery disease in , , and   iii. Lexiscan : LV function at rest.  Normal myocardial perfusion.    b. Myocardium:   i. Stress echo : Normal EF.  Mild diastolic dysfunction.  Mild posterior wall hypokinesis.  ii. Echo : EF 70%, diastolic dysfunction, mildly dilated LA    c. Valvular:   i. Echo : Moderate AS, mean gradient 17, trace TR, trace MR  ii. Echo : Moderate AAS, mean gradient 19, mild MR, mild TR    d. Electrical:   i. No history    e. Percardium:   i. Normal on previous echoes      2. CARDIAC RISK FACTORS:  a.        Hypertension  b.        Dyslipidemia  c.        Physical Inactivity  d.        Obstructive Sleep Apnea    3. NON-CARDIAC:  a. GERD  b. Chronic pancreatitis  c. BPH  d. Osteopenia  e. Peripheral neuropathy  f. Cervical and lumbar spondylosis  g. Psoriasis    4. SURGERIES:  a. None reported          Subjective      Review of Systems:   Review of Systems    Medications:   Current Outpatient Medications   Medication Sig Dispense Refill   • aspirin 325 MG tablet Take 1 tablet by mouth Daily.     • atenolol (TENORMIN) 50 MG tablet Take 1 tablet by mouth Daily.     • atorvastatin (LIPITOR) 80 MG tablet Take 40 mg by mouth Daily.     • clobetasol (TEMOVATE) 0.05 % ointment Apply 1 application topically to the appropriate area as directed 2 (Two) Times a Day.     • cyclobenzaprine (FLEXERIL) 10 MG tablet Take 1 tablet by mouth 3 (Three)  "Times a Day As Needed for Muscle Spasms. 40 tablet 0   • hydroCHLOROthiazide (HYDRODIURIL) 25 MG tablet Take 1 tablet by mouth Daily. 30 tablet 2   • isosorbide mononitrate (IMDUR) 60 MG 24 hr tablet Take 1 tablet by mouth Daily. 30 tablet 2   • lactulose (CHRONULAC) 10 GM/15ML solution Take 15 mL by mouth 2 (Two) Times a Day As Needed.     • lidocaine (LIDODERM) 5 % Place 1 patch on the skin as directed by provider Daily. Remove & Discard patch within 12 hours or as directed by MD 6 patch 0   • lisinopril (PRINIVIL,ZESTRIL) 40 MG tablet Take 1 tablet by mouth Daily.     • Pancrelipase, Lip-Prot-Amyl, (ZENPEP PO) Take 2 capsules by mouth 2 (Two) Times a Day With Meals.     • sucralfate (CARAFATE) 1 g tablet Take 1 tablet by mouth 4 (Four) Times a Day.     • tamsulosin (FLOMAX) 0.4 MG capsule 24 hr capsule Take 1 capsule by mouth Daily.     • vitamin D (ERGOCALCIFEROL) 1.25 MG (96189 UT) capsule capsule Take 1 capsule by mouth 1 (One) Time Per Week.     • amLODIPine (NORVASC) 10 MG tablet Take 1 tablet by mouth Daily. 90 tablet 3   • ezetimibe (ZETIA) 10 MG tablet Take 1 tablet by mouth Daily. 90 tablet 3     No current facility-administered medications for this visit.       Allergies:   Allergies   Allergen Reactions   • Sulfa Antibiotics Irritability       The following portions of the patient's history were reviewed and updated as appropriate: allergies, current medications, past family history, past medical history, past social history, past surgical history and problem list.    Objective     Physical Exam:  Vital Signs:   Vitals:    11/03/22 0907 11/03/22 0913   BP: 172/98 180/80  Comment: Rechecked after EKG   BP Location: Right arm Right arm   Patient Position: Sitting Sitting   Cuff Size: Adult Adult   Pulse: 66    SpO2: 97%    Weight: 69.9 kg (154 lb)    Height: 165.1 cm (65\")      Body mass index is 25.63 kg/m².   Constitutional:       General: Not in acute distress.     Appearance: Healthy appearance. Not " in distress.   Pulmonary:      Effort: Pulmonary effort is normal.      Breath sounds: Normal breath sounds. No wheezing. No rhonchi. No rales.   Cardiovascular:      Normal rate. Regular rhythm. Normal S1. Normal S2.      Murmurs: There is no murmur.      No gallop. No click. No rub.   Abdominal:      General: Bowel sounds are normal.      Palpations: Abdomen is soft.      Tenderness: There is no abdominal tenderness.  Extremities:     Pulses     Intact distal pulses.     No Edema    Labs:  Lab Results   Component Value Date    GLUCOSE 85 10/21/2022    BUN 27 (H) 10/21/2022    CREATININE 1.80 (H) 10/21/2022    BCR 15.0 10/21/2022    K 3.8 10/21/2022    CO2 28.0 10/21/2022    CALCIUM 10.1 10/21/2022    ALBUMIN 4.20 10/21/2022     Lab Results   Component Value Date    WBC 7.99 10/21/2022    HGB 14.9 10/21/2022    HCT 44.6 10/21/2022    MCV 89.0 10/21/2022     10/21/2022     Lab Results   Component Value Date    CHOL 282 (H) 10/21/2022    TRIG 148 10/21/2022    HDL 41 10/21/2022     (H) 10/21/2022     Lab Results   Component Value Date    TSH 1.600 10/21/2022     Lab Results   Component Value Date    HGBA1C 5.70 (H) 10/21/2022           Procedures  Smoking Cessation:   {time:79374}    Assessment / Plan      Assessment:   Diagnosis Plan   1. Coronary artery disease involving native heart, unspecified vessel or lesion type, unspecified whether angina present  ECG 12 Lead    Lipid Panel    Lipoprotein A (LPA)      2. Dyslipidemia        3. Essential hypertension        4. Stage 3 chronic kidney disease, unspecified whether stage 3a or 3b CKD (HCC)             Plan:    ***          Follow Up:   Return in about 3 months (around 2/3/2023).    Scribed for Carl Sainz MD by Sofia Herring PA-C. 11/3/2022  09:58 EDT

## 2022-11-03 ENCOUNTER — OFFICE VISIT (OUTPATIENT)
Dept: CARDIOLOGY | Facility: CLINIC | Age: 82
End: 2022-11-03

## 2022-11-03 VITALS
HEART RATE: 66 BPM | OXYGEN SATURATION: 97 % | DIASTOLIC BLOOD PRESSURE: 80 MMHG | HEIGHT: 65 IN | SYSTOLIC BLOOD PRESSURE: 180 MMHG | BODY MASS INDEX: 25.66 KG/M2 | WEIGHT: 154 LBS

## 2022-11-03 DIAGNOSIS — I10 ESSENTIAL HYPERTENSION: ICD-10-CM

## 2022-11-03 DIAGNOSIS — N18.30 STAGE 3 CHRONIC KIDNEY DISEASE, UNSPECIFIED WHETHER STAGE 3A OR 3B CKD: ICD-10-CM

## 2022-11-03 DIAGNOSIS — E78.5 DYSLIPIDEMIA: ICD-10-CM

## 2022-11-03 DIAGNOSIS — I25.10 CORONARY ARTERY DISEASE INVOLVING NATIVE HEART, UNSPECIFIED VESSEL OR LESION TYPE, UNSPECIFIED WHETHER ANGINA PRESENT: Primary | ICD-10-CM

## 2022-11-03 PROCEDURE — 93000 ELECTROCARDIOGRAM COMPLETE: CPT | Performed by: INTERNAL MEDICINE

## 2022-11-03 PROCEDURE — 99204 OFFICE O/P NEW MOD 45 MIN: CPT | Performed by: INTERNAL MEDICINE

## 2022-11-03 RX ORDER — AMLODIPINE BESYLATE 10 MG/1
10 TABLET ORAL DAILY
Qty: 90 TABLET | Refills: 3 | Status: SHIPPED | OUTPATIENT
Start: 2022-11-03

## 2022-11-03 RX ORDER — EZETIMIBE 10 MG/1
10 TABLET ORAL DAILY
Qty: 90 TABLET | Refills: 3 | Status: SHIPPED | OUTPATIENT
Start: 2022-11-03

## 2022-11-03 RX ORDER — ASPIRIN 325 MG
325 TABLET ORAL DAILY
COMMUNITY

## 2022-11-03 NOTE — PROGRESS NOTES
New Patient Office Visit      Date: 2022  Patient Name: Hal Byrnes  : 1940   MRN: [id number]     Chief Complaint:    Chief Complaint   Patient presents with   • Establish Care     CAD       History of Present Illness: Hal Byrnes is a 82 y.o. male who is here today for evaluation of coronary artery disease and settled down here in the [place].  He had an extensive cardiac history in [place] which includes multiple stents along with multiple risk factors.  He starting to have occasional chest pain off and on and he had a stress test in 2021 which was normal.  Most of his problem I believe is related to his high blood pressure.      Problem List          1. CARDIAC  a. Coronary Artery Disease:   i. NSTEMI with [facility] : PCI to RCA and left circumflex  ii. Repeat [facility] with nonobstructive coronary artery disease in , , and   iii. Lexiscan : LV function at rest.  Normal myocardial perfusion.     b. Myocardium:   i. Stress echo : Normal EF.  Mild diastolic dysfunction.  Mild posterior wall hypokinesis.  ii. Echo : EF 70%, diastolic dysfunction, mildly dilated LA     c. Valvular:   i. Echo : Moderate AS, mean gradient 17, trace TR, trace MR  ii. Echo : Moderate AAS, mean gradient 19, mild MR, mild TR     d. Electrical:   i. No history     e. Percardium:   i. Normal on previous echoes       2. CARDIAC RISK FACTORS:  a.        Hypertension  b.        Dyslipidemia  c.        Physical Inactivity  d.        Obstructive Sleep Apnea     3. NON-CARDIAC:  a. GERD  b. Chronic pancreatitis  c. BPH  d. Osteopenia  e. Peripheral neuropathy  f. Cervical and lumbar spondylosis  g. Psoriasis     4. SURGERIES:  a. None reported    Subjective      Review of Systems:   Review of Systems   Respiratory: Positive for shortness of breath.    Cardiovascular: Positive for chest pain.       Medications:     Current Outpatient Medications:   •  aspirin 325 MG tablet, Take 1  tablet by mouth Daily., Disp: , Rfl:   •  atenolol (TENORMIN) 50 MG tablet, Take 1 tablet by mouth Daily., Disp: , Rfl:   •  atorvastatin (LIPITOR) 80 MG tablet, Take 40 mg by mouth Daily., Disp: , Rfl:   •  clobetasol (TEMOVATE) 0.05 % ointment, Apply 1 application topically to the appropriate area as directed 2 (Two) Times a Day., Disp: , Rfl:   •  cyclobenzaprine (FLEXERIL) 10 MG tablet, Take 1 tablet by mouth 3 (Three) Times a Day As Needed for Muscle Spasms., Disp: 40 tablet, Rfl: 0  •  hydroCHLOROthiazide (HYDRODIURIL) 25 MG tablet, Take 1 tablet by mouth Daily., Disp: 30 tablet, Rfl: 2  •  isosorbide mononitrate (IMDUR) 60 MG 24 hr tablet, Take 1 tablet by mouth Daily., Disp: 30 tablet, Rfl: 2  •  lactulose (CHRONULAC) 10 GM/15ML solution, Take 15 mL by mouth 2 (Two) Times a Day As Needed., Disp: , Rfl:   •  lidocaine (LIDODERM) 5 %, Place 1 patch on the skin as directed by provider Daily. Remove & Discard patch within 12 hours or as directed by MD, Disp: 6 patch, Rfl: 0  •  lisinopril (PRINIVIL,ZESTRIL) 40 MG tablet, Take 1 tablet by mouth Daily., Disp: , Rfl:   •  Pancrelipase, Lip-Prot-Amyl, (ZENPEP PO), Take 2 capsules by mouth 2 (Two) Times a Day With Meals., Disp: , Rfl:   •  sucralfate (CARAFATE) 1 g tablet, Take 1 tablet by mouth 4 (Four) Times a Day., Disp: , Rfl:   •  tamsulosin (FLOMAX) 0.4 MG capsule 24 hr capsule, Take 1 capsule by mouth Daily., Disp: , Rfl:   •  vitamin D (ERGOCALCIFEROL) 1.25 MG (66299 UT) capsule capsule, Take 1 capsule by mouth 1 (One) Time Per Week., Disp: , Rfl:   •  amLODIPine (NORVASC) 10 MG tablet, Take 1 tablet by mouth Daily., Disp: 90 tablet, Rfl: 3  •  ezetimibe (ZETIA) 10 MG tablet, Take 1 tablet by mouth Daily., Disp: 90 tablet, Rfl: 3    Allergies:   Allergies   Allergen Reactions   • Sulfa Antibiotics Irritability       Objective     Physical Exam:  Vital Signs:   Vitals:    11/03/22 0907 11/03/22 0913   BP: 172/98 180/80  Comment: Rechecked after EKG   BP  "Location: Right arm Right arm   Patient Position: Sitting Sitting   Cuff Size: Adult Adult   Pulse: 66    SpO2: 97%    Weight: 69.9 kg (154 lb)    Height: 165.1 cm (65\")      Body mass index is 25.63 kg/m².   Constitutional:       General: Not in acute distress.     Appearance: Healthy appearance. Not in distress.   Pulmonary:      Effort: Pulmonary effort is normal.      Breath sounds: Normal breath sounds. No wheezing. No rhonchi. No rales.   Cardiovascular:      Normal rate. Regular rhythm. Normal S1. Normal S2.      Murmurs: There is mild systolic murmur of aortic stenosis     No gallop. No click. No rub.   Abdominal:      General: Bowel sounds are normal.      Palpations: Abdomen is soft.      Tenderness: There is no abdominal tenderness.  Extremities:     Pulses     Intact distal pulses.     No Edema    Labs:  Lab Results   Component Value Date    GLUCOSE 85 10/21/2022    BUN 27 (H) 10/21/2022    CREATININE 1.80 (H) 10/21/2022    BCR 15.0 10/21/2022    K 3.8 10/21/2022    CO2 28.0 10/21/2022    CALCIUM 10.1 10/21/2022    ALBUMIN 4.20 10/21/2022     Lab Results   Component Value Date    WBC 7.99 10/21/2022    HGB 14.9 10/21/2022    HCT 44.6 10/21/2022    MCV 89.0 10/21/2022     10/21/2022     Lab Results   Component Value Date    CHOL 282 (H) 10/21/2022    TRIG 148 10/21/2022    HDL 41 10/21/2022     (H) 10/21/2022     Lab Results   Component Value Date    TSH 1.600 10/21/2022     Lab Results   Component Value Date    HGBA1C 5.70 (H) 10/21/2022             ECG 12 Lead    Date/Time: 11/3/2022 9:24 AM  Performed by: Carl Sainz MD  Authorized by: Carl Sainz MD   Previous ECG: no previous ECG available  Rhythm: sinus bradycardia              Assessment / Plan      Assessment:   Diagnosis Plan   1. Coronary artery disease involving native heart, unspecified vessel or lesion type, unspecified whether angina present  ECG 12 Lead    Lipid Panel    Lipoprotein A (LPA)      2. Dyslipidemia      "   3. Essential hypertension        4. Stage 3 chronic kidney disease, unspecified whether stage 3a or 3b CKD (HCC)             Plan:    1.  Patient has multiple medical problems and I believe most of his symptoms are related to high blood pressure.  We will add Norvasc 10 mg to his regimen and he is supposed to check his blood pressure and give us a call with blood pressure next numbers.    2.  His cholesterol is not under good control at all.  We will start him on Zetia.  I do not believe that will do him any good.  He will need PCSK9.    3.  If his blood pressure remains controlled and he continues to have occasional chest pain we will schedule him for a stress test.          Follow Up:   Return in about 3 months (around 2/3/2023).    Carl Sainz MD

## 2022-11-22 ENCOUNTER — OFFICE VISIT (OUTPATIENT)
Dept: INTERNAL MEDICINE | Facility: CLINIC | Age: 82
End: 2022-11-22

## 2022-11-22 VITALS
HEIGHT: 65 IN | BODY MASS INDEX: 26.33 KG/M2 | OXYGEN SATURATION: 98 % | HEART RATE: 56 BPM | SYSTOLIC BLOOD PRESSURE: 122 MMHG | WEIGHT: 158 LBS | DIASTOLIC BLOOD PRESSURE: 70 MMHG | TEMPERATURE: 97 F

## 2022-11-22 DIAGNOSIS — N40.1 BENIGN PROSTATIC HYPERPLASIA WITH LOWER URINARY TRACT SYMPTOMS, SYMPTOM DETAILS UNSPECIFIED: ICD-10-CM

## 2022-11-22 DIAGNOSIS — K86.1 CHRONIC PANCREATITIS, UNSPECIFIED PANCREATITIS TYPE: ICD-10-CM

## 2022-11-22 DIAGNOSIS — Z09 FOLLOW-UP EXAM: Primary | ICD-10-CM

## 2022-11-22 DIAGNOSIS — M85.80 OSTEOPENIA, UNSPECIFIED LOCATION: ICD-10-CM

## 2022-11-22 DIAGNOSIS — N18.32 STAGE 3B CHRONIC KIDNEY DISEASE: ICD-10-CM

## 2022-11-22 PROCEDURE — 99213 OFFICE O/P EST LOW 20 MIN: CPT | Performed by: NURSE PRACTITIONER

## 2022-11-22 RX ORDER — TAMSULOSIN HYDROCHLORIDE 0.4 MG/1
1 CAPSULE ORAL DAILY
Qty: 30 CAPSULE | Status: CANCELLED | OUTPATIENT
Start: 2022-11-22

## 2022-11-22 RX ORDER — PANCRELIPASE LIPASE, PANCRELIPASE PROTEASE, PANCRELIPASE AMYLASE 20000; 63000; 84000 [USP'U]/1; [USP'U]/1; [USP'U]/1
CAPSULE, DELAYED RELEASE ORAL
Status: CANCELLED | OUTPATIENT
Start: 2022-11-22

## 2022-11-22 RX ORDER — ERGOCALCIFEROL 1.25 MG/1
50000 CAPSULE ORAL WEEKLY
Qty: 5 CAPSULE | Status: CANCELLED | OUTPATIENT
Start: 2022-11-22

## 2022-11-26 NOTE — PROGRESS NOTES
Office Note     Name: Hal Byrnes    : 1940     MRN: [id number]     Chief Complaint  Hypertension, Diabetes, Hyperlipidemia (One month F/U), and Leg Pain (Left leg pain)    Subjective     History of Present Illness:  Hal Byrnes is a 82 y.o. male who presents today for a follow up visit for hypertension, CKD III, back pain, and medication refills. He has been seen by Ortho and had an MRI of his back. He has a referral from Ortho for physical therapy. He has also been seen by Cardiology. His blood pressure is much better controlled. He is now on norvasc. He has not yet been seen by Nephrology. He is requesting refills on flomax, Vit D, and zenpep. His back pain is improved along with his mobility. His wife is with him today. They have both relocated to [place] from [facility]. He denies new complaints or concerns at this time.    Review of Systems   Constitutional: Negative.    HENT: Positive for ear pain.    Respiratory: Negative.    Cardiovascular: Negative.    Gastrointestinal: Negative.    Musculoskeletal: Positive for back pain.       Past Medical History:   Diagnosis Date   • Arthritis    • Coronary artery disease    • Hyperlipidemia    • Hypertension    • Low back pain        Past Surgical History:   Procedure Laterality Date   • CORONARY STENT PLACEMENT         Social History     Socioeconomic History   • Marital status:    Tobacco Use   • Smoking status: Never   • Smokeless tobacco: Never   Vaping Use   • Vaping Use: Never used   Substance and Sexual Activity   • Alcohol use: Never   • Drug use: Yes     Types: Marijuana   • Sexual activity: Defer         Current Outpatient Medications:   •  amLODIPine (NORVASC) 10 MG tablet, Take 1 tablet by mouth Daily., Disp: 90 tablet, Rfl: 3  •  aspirin 325 MG tablet, Take 1 tablet by mouth Daily., Disp: , Rfl:   •  atenolol (TENORMIN) 50 MG tablet, Take 1 tablet by mouth Daily., Disp: , Rfl:   •  atorvastatin (LIPITOR) 80 MG tablet, Take 40 mg by mouth  "Daily., Disp: , Rfl:   •  clobetasol (TEMOVATE) 0.05 % ointment, Apply 1 application topically to the appropriate area as directed 2 (Two) Times a Day., Disp: , Rfl:   •  cyclobenzaprine (FLEXERIL) 10 MG tablet, Take 1 tablet by mouth 3 (Three) Times a Day As Needed for Muscle Spasms., Disp: 40 tablet, Rfl: 0  •  ezetimibe (ZETIA) 10 MG tablet, Take 1 tablet by mouth Daily., Disp: 90 tablet, Rfl: 3  •  hydroCHLOROthiazide (HYDRODIURIL) 25 MG tablet, Take 1 tablet by mouth Daily., Disp: 30 tablet, Rfl: 2  •  isosorbide mononitrate (IMDUR) 60 MG 24 hr tablet, Take 1 tablet by mouth Daily., Disp: 30 tablet, Rfl: 2  •  lactulose (CHRONULAC) 10 GM/15ML solution, Take 15 mL by mouth 2 (Two) Times a Day As Needed., Disp: , Rfl:   •  lidocaine (LIDODERM) 5 %, Place 1 patch on the skin as directed by provider Daily. Remove & Discard patch within 12 hours or as directed by MD, Disp: 6 patch, Rfl: 0  •  lisinopril (PRINIVIL,ZESTRIL) 40 MG tablet, Take 1 tablet by mouth Daily., Disp: , Rfl:   •  Pancrelipase, Lip-Prot-Amyl, (ZENPEP PO), Take 2 capsules by mouth 3 (Three) Times a Day With Meals. Lipase 20,000 units Protease 63,000 units Amylase 84,000 units, Disp: , Rfl:   •  sucralfate (CARAFATE) 1 g tablet, Take 1 tablet by mouth 4 (Four) Times a Day., Disp: , Rfl:   •  tamsulosin (FLOMAX) 0.4 MG capsule 24 hr capsule, Take 1 capsule by mouth Daily., Disp: , Rfl:   •  vitamin D (ERGOCALCIFEROL) 1.25 MG (27071 UT) capsule capsule, Take 1 capsule by mouth 1 (One) Time Per Week., Disp: , Rfl:     Objective     Vital Signs  /70   Pulse 56   Temp 97 °F (36.1 °C)   Ht 165.1 cm (65\")   Wt 71.7 kg (158 lb)   SpO2 98%   BMI 26.29 kg/m²   Estimated body mass index is 26.29 kg/m² as calculated from the following:    Height as of this encounter: 165.1 cm (65\").    Weight as of this encounter: 71.7 kg (158 lb).    BMI is >= 25 and <30. (Overweight) The following options were offered after discussion;: Not addressed at this " visit.      Physical Exam  Constitutional:       Appearance: Normal appearance.   HENT:      Head: Normocephalic and atraumatic.      Right Ear: Tympanic membrane, ear canal and external ear normal.      Left Ear: Tympanic membrane, ear canal and external ear normal.      Nose: Nose normal.   Eyes:      Extraocular Movements: Extraocular movements intact.      Conjunctiva/sclera: Conjunctivae normal.      Pupils: Pupils are equal, round, and reactive to light.   Cardiovascular:      Rate and Rhythm: Normal rate and regular rhythm.      Heart sounds: Murmur heard.   Pulmonary:      Effort: Pulmonary effort is normal.      Breath sounds: Normal breath sounds.   Musculoskeletal:         General: Normal range of motion.      Cervical back: Normal range of motion and neck supple.   Skin:     General: Skin is warm and dry.   Neurological:      General: No focal deficit present.      Mental Status: He is alert and oriented to person, place, and time. Mental status is at baseline.   Psychiatric:         Mood and Affect: Mood normal.         Behavior: Behavior normal.         Thought Content: Thought content normal.         Judgment: Judgment normal.          Assessment and Plan     Diagnoses and all orders for this visit:    1. Follow-up exam (Primary)    2. Chronic pancreatitis, unspecified pancreatitis type (HCC)    3. Benign prostatic hyperplasia with lower urinary tract symptoms, symptom details unspecified    4. Osteopenia, unspecified location    5. Stage 3b chronic kidney disease (HCC)    Plan:  Will reorder flomax, zenpep, and Vit D.  Will check on Nephrology referral.  Continue with PT recommendations per Ortho.  Restart atorvastatin.  Continue with current blood pressure medications.  Low sodium diet.  Keep scheduled follow up appointment.    Follow Up  No follow-ups on file.    JOURDAN Vernon    Part of this note may be an electronic transcription/translation of spoken language to printed text using the Dragon  Dictation System.

## 2022-11-29 RX ORDER — ATENOLOL 50 MG/1
50 TABLET ORAL DAILY
Qty: 30 TABLET | Refills: 2 | Status: SHIPPED | OUTPATIENT
Start: 2022-11-29 | End: 2022-12-29

## 2022-11-29 RX ORDER — LISINOPRIL 40 MG/1
40 TABLET ORAL DAILY
Qty: 30 TABLET | Refills: 2 | Status: SHIPPED | OUTPATIENT
Start: 2022-11-29 | End: 2023-02-08

## 2022-12-28 ENCOUNTER — TELEPHONE (OUTPATIENT)
Dept: INTERNAL MEDICINE | Facility: CLINIC | Age: 82
End: 2022-12-28

## 2022-12-28 NOTE — TELEPHONE ENCOUNTER
**HUB TO READ**    CALLING PATIENT TO RESCHEDULE THE FOLLOWING APPOINTMENT WITH DR. MCGOWAN:  01/16/2023 AT 12:45PM    NO ANSWER - NO VM - SENT LETTER    RESCHEDULED PATIENT WITH DR. MCGOWAN NEXT AVAILABLE EST CARE APPT ON 02/08/2023 AT 10:30AM.    PLEASE RESCHEDULE IF NEEDED.    THANKS

## 2023-01-28 DIAGNOSIS — I10 HYPERTENSION, UNSPECIFIED TYPE: ICD-10-CM

## 2023-01-29 RX ORDER — HYDROCHLOROTHIAZIDE 25 MG/1
TABLET ORAL
Qty: 30 TABLET | Refills: 2 | Status: SHIPPED | OUTPATIENT
Start: 2023-01-29

## 2023-02-03 NOTE — PROGRESS NOTES
Follow-up Visit      Date: 2023  Patient Name: Hal Byrnes  : 1940   MRN: [id number]     Chief Complaint:    Chief Complaint   Patient presents with   • Coronary Artery Disease       History of Present Illness: Hal Byrnes is a 82 y.o. male who is here today for follow-up for hypertension, hyperlipidemia, and coronary artery disease.  Last visit patient had uncontrolled hypertension and amlodipine 10 mg was added as well as Zetia was added for cholesterol.  Patient blood pressure is still fluctuating up and down.  On repeat blood pressure in the office it was 140/89.  Patient occasionally have chest discomfort.  Patient was started on Zetia and his cholesterol is much better but still not under good control.      Problem List     Problem list  CARDIAC    Coronary Artery Disease:   NSTEMI with LHC : PCI to RCA and left circumflex  Repeat LHC with nonobstructive coronary artery disease in , , and   Lexiscan : LV function at rest.  Normal myocardial perfusion.    Myocardium:   Stress echo : Normal EF.  Mild diastolic dysfunction.  Mild posterior wall hypokinesis.  Echo : EF 70%, diastolic dysfunction, mildly dilated LA    Valvular:   Echo : Moderate AS, mean gradient 17, trace TR, trace MR  Echo : Moderate AAS, mean gradient 19, mild MR, mild TR    Electrical:   No history    Percardium:   Normal on previous echoes      CARDIAC RISK FACTORS:  Hypertension  Dyslipidemia  Physical Inactivity  Obstructive Sleep Apnea    NON-CARDIAC:  GERD  Chronic pancreatitis  BPH  Osteopenia  Peripheral neuropathy  Cervical and lumbar spondylosis  Psoriasis    SURGERIES:          Subjective      Review of Systems:   Review of Systems   Respiratory: Positive for chest tightness.        Medications:     Current Outpatient Medications:   •  amLODIPine (NORVASC) 10 MG tablet, Take 1 tablet by mouth Daily., Disp: 90 tablet, Rfl: 3  •  aspirin 325 MG tablet, Take 1 tablet by  "mouth Daily., Disp: , Rfl:   •  atorvastatin (LIPITOR) 80 MG tablet, Take 40 mg by mouth Daily., Disp: , Rfl:   •  clobetasol (TEMOVATE) 0.05 % ointment, Apply 1 application topically to the appropriate area as directed 2 (Two) Times a Day., Disp: , Rfl:   •  ezetimibe (ZETIA) 10 MG tablet, Take 1 tablet by mouth Daily., Disp: 90 tablet, Rfl: 3  •  fluticasone (FLONASE) 50 MCG/ACT nasal spray, 1-2 sprays into the nostril(s) as directed by provider Daily., Disp: 16 g, Rfl: 5  •  hydroCHLOROthiazide (HYDRODIURIL) 25 MG tablet, TAKE ONE TABLET BY MOUTH DAILY, Disp: 30 tablet, Rfl: 2  •  isosorbide mononitrate (IMDUR) 60 MG 24 hr tablet, Take 1 tablet by mouth 2 (Two) Times a Day., Disp: 180 tablet, Rfl: 3  •  lactulose (CHRONULAC) 10 GM/15ML solution, Take 15 mL by mouth 2 (Two) Times a Day As Needed., Disp: , Rfl:   •  Pancrelipase, Lip-Prot-Amyl, (ZENPEP PO), Take 2 capsules by mouth 3 (Three) Times a Day With Meals. Lipase 20,000 units Protease 63,000 units Amylase 84,000 units, Disp: , Rfl:   •  vitamin D (ERGOCALCIFEROL) 1.25 MG (33014 UT) capsule capsule, Take 1 capsule by mouth 1 (One) Time Per Week., Disp: , Rfl:   •  lisinopril (PRINIVIL,ZESTRIL) 40 MG tablet, Take 1 tablet by mouth Daily for 30 days., Disp: 30 tablet, Rfl: 2    Allergies:   Allergies   Allergen Reactions   • Sulfa Antibiotics Irritability       Objective     Physical Exam:  Vitals:    02/09/23 0945   Pulse: 60   SpO2: 97%   Weight: 72.1 kg (159 lb)   Height: 165.1 cm (65\")     Body mass index is 26.46 kg/m².      Constitutional:       General: Not in acute distress.     Appearance: Healthy appearance. Not in distress.     Neck:     JVP: Not elevated     Carotid artery: No carotid bruit    Pulmonary:      Effort: Pulmonary effort is normal.      Breath sounds: Normal breath sounds. No wheezing. No rhonchi. No rales.     Cardiovascular:      Normal rate. Regular rhythm. Normal S1. Normal S2.      Murmurs: There is no murmur.      No gallop. No " click. No rub.     Abdominal:      General: Bowel sounds are normal.      Palpations: Abdomen is soft.      Tenderness: There is no abdominal tenderness.    Extremities:     Pulses: Good distal pulses     Edema: No edema    Smoking Cessation:  He never smoked    Lab Review:   Lab Results   Component Value Date    GLUCOSE 85 10/21/2022    BUN 27 (H) 10/21/2022    CREATININE 1.80 (H) 10/21/2022    BCR 15.0 10/21/2022    K 3.8 10/21/2022    CO2 28.0 10/21/2022    CALCIUM 10.1 10/21/2022    ALBUMIN 4.20 10/21/2022     Lab Results   Component Value Date    WBC 7.99 10/21/2022    HGB 14.9 10/21/2022    HCT 44.6 10/21/2022    MCV 89.0 10/21/2022     10/21/2022     Lab Results   Component Value Date    CHOL 219 (H) 02/07/2023    TRIG 169 (H) 02/07/2023    HDL 41 02/07/2023     (H) 02/07/2023     Lab Results   Component Value Date    TSH 1.600 10/21/2022     Lab Results   Component Value Date    HGBA1C 5.5 02/08/2023       Assessment / Plan      Assessment:   Diagnosis Plan   1. Primary hypertension        2. Coronary artery disease involving native coronary artery of native heart without angina pectoris        3. Dyslipidemia             Plan:  1.  For better control of his blood pressure we will stop atenolol at this time.  We will start him on Cardizem 120 mg daily.  I have also started him on spironolactone 12.5 mg every other day.  He is going check his BMP in a week or so.    2.  His cholesterol is still high we will start him on Cara.  We have sent a PA for that.      Follow Up:       Return in about 3 months (around 5/9/2023).    Carl Sainz MD

## 2023-02-07 ENCOUNTER — LAB (OUTPATIENT)
Dept: LAB | Facility: HOSPITAL | Age: 83
End: 2023-02-07
Payer: MEDICARE

## 2023-02-07 DIAGNOSIS — I25.10 CORONARY ARTERY DISEASE INVOLVING NATIVE HEART, UNSPECIFIED VESSEL OR LESION TYPE, UNSPECIFIED WHETHER ANGINA PRESENT: ICD-10-CM

## 2023-02-07 LAB
CHOLEST SERPL-MCNC: 219 MG/DL (ref 0–200)
HDLC SERPL-MCNC: 41 MG/DL (ref 40–60)
LDLC SERPL CALC-MCNC: 147 MG/DL (ref 0–100)
LDLC/HDLC SERPL: 3.52 {RATIO}
TRIGL SERPL-MCNC: 169 MG/DL (ref 0–150)
VLDLC SERPL-MCNC: 31 MG/DL (ref 5–40)

## 2023-02-07 PROCEDURE — 83695 ASSAY OF LIPOPROTEIN(A): CPT

## 2023-02-07 PROCEDURE — 80061 LIPID PANEL: CPT

## 2023-02-07 PROCEDURE — 36415 COLL VENOUS BLD VENIPUNCTURE: CPT

## 2023-02-08 ENCOUNTER — OFFICE VISIT (OUTPATIENT)
Dept: INTERNAL MEDICINE | Facility: CLINIC | Age: 83
End: 2023-02-08
Payer: MEDICARE

## 2023-02-08 VITALS
DIASTOLIC BLOOD PRESSURE: 66 MMHG | SYSTOLIC BLOOD PRESSURE: 148 MMHG | BODY MASS INDEX: 26.49 KG/M2 | OXYGEN SATURATION: 96 % | TEMPERATURE: 98.1 F | HEART RATE: 56 BPM | HEIGHT: 65 IN | WEIGHT: 159 LBS

## 2023-02-08 DIAGNOSIS — E78.5 DYSLIPIDEMIA: ICD-10-CM

## 2023-02-08 DIAGNOSIS — M54.16 LUMBAR RADICULOPATHY: ICD-10-CM

## 2023-02-08 DIAGNOSIS — K86.89 PANCREATIC INSUFFICIENCY: ICD-10-CM

## 2023-02-08 DIAGNOSIS — N18.32 STAGE 3B CHRONIC KIDNEY DISEASE: ICD-10-CM

## 2023-02-08 DIAGNOSIS — I35.0 AORTIC VALVE STENOSIS, ETIOLOGY OF CARDIAC VALVE DISEASE UNSPECIFIED: ICD-10-CM

## 2023-02-08 DIAGNOSIS — N40.1 BENIGN PROSTATIC HYPERPLASIA WITH LOWER URINARY TRACT SYMPTOMS, SYMPTOM DETAILS UNSPECIFIED: ICD-10-CM

## 2023-02-08 DIAGNOSIS — Q61.02 MULTIPLE RENAL CYSTS: ICD-10-CM

## 2023-02-08 DIAGNOSIS — H69.83 EUSTACHIAN TUBE DYSFUNCTION, BILATERAL: ICD-10-CM

## 2023-02-08 DIAGNOSIS — E08.22 DIABETES MELLITUS DUE TO UNDERLYING CONDITION WITH STAGE 3B CHRONIC KIDNEY DISEASE, WITHOUT LONG-TERM CURRENT USE OF INSULIN: Primary | ICD-10-CM

## 2023-02-08 DIAGNOSIS — I77.811 ECTATIC ABDOMINAL AORTA: ICD-10-CM

## 2023-02-08 DIAGNOSIS — L40.8 INVERSE PSORIASIS: ICD-10-CM

## 2023-02-08 DIAGNOSIS — I10 PRIMARY HYPERTENSION: ICD-10-CM

## 2023-02-08 DIAGNOSIS — L40.9 PSORIASIS: ICD-10-CM

## 2023-02-08 DIAGNOSIS — I25.10 CORONARY ARTERY DISEASE INVOLVING NATIVE CORONARY ARTERY OF NATIVE HEART WITHOUT ANGINA PECTORIS: ICD-10-CM

## 2023-02-08 DIAGNOSIS — I50.32 CHRONIC DIASTOLIC (CONGESTIVE) HEART FAILURE: ICD-10-CM

## 2023-02-08 DIAGNOSIS — N18.32 DIABETES MELLITUS DUE TO UNDERLYING CONDITION WITH STAGE 3B CHRONIC KIDNEY DISEASE, WITHOUT LONG-TERM CURRENT USE OF INSULIN: Primary | ICD-10-CM

## 2023-02-08 DIAGNOSIS — H61.21 IMPACTED CERUMEN OF RIGHT EAR: ICD-10-CM

## 2023-02-08 DIAGNOSIS — R09.82 POST-NASAL DRAINAGE: ICD-10-CM

## 2023-02-08 PROBLEM — K85.90 PANCREATITIS: Status: RESOLVED | Noted: 2022-10-20 | Resolved: 2023-02-08

## 2023-02-08 PROBLEM — M85.80 OSTEOPENIA: Status: RESOLVED | Noted: 2022-10-20 | Resolved: 2023-02-08

## 2023-02-08 PROBLEM — E08.9 DIABETES MELLITUS DUE TO UNDERLYING CONDITION, WITHOUT LONG-TERM CURRENT USE OF INSULIN: Status: ACTIVE | Noted: 2022-10-20

## 2023-02-08 LAB
EXPIRATION DATE: NORMAL
HBA1C MFR BLD: 5.5 %
LPA SERPL-SCNC: 62.8 NMOL/L
Lab: NORMAL

## 2023-02-08 PROCEDURE — 3044F HG A1C LEVEL LT 7.0%: CPT | Performed by: INTERNAL MEDICINE

## 2023-02-08 PROCEDURE — 83036 HEMOGLOBIN GLYCOSYLATED A1C: CPT | Performed by: INTERNAL MEDICINE

## 2023-02-08 PROCEDURE — 99214 OFFICE O/P EST MOD 30 MIN: CPT | Performed by: INTERNAL MEDICINE

## 2023-02-08 RX ORDER — ATENOLOL 50 MG/1
50 TABLET ORAL DAILY
COMMUNITY
End: 2023-02-09

## 2023-02-08 RX ORDER — ISOSORBIDE MONONITRATE 60 MG/1
60 TABLET, EXTENDED RELEASE ORAL 2 TIMES DAILY
Qty: 180 TABLET | Refills: 3 | Status: SHIPPED | OUTPATIENT
Start: 2023-02-08

## 2023-02-08 RX ORDER — FLUTICASONE PROPIONATE 50 MCG
1-2 SPRAY, SUSPENSION (ML) NASAL DAILY
Qty: 16 G | Refills: 5 | Status: SHIPPED | OUTPATIENT
Start: 2023-02-08

## 2023-02-09 ENCOUNTER — TELEPHONE (OUTPATIENT)
Dept: CARDIOLOGY | Facility: CLINIC | Age: 83
End: 2023-02-09

## 2023-02-09 ENCOUNTER — OFFICE VISIT (OUTPATIENT)
Dept: CARDIOLOGY | Facility: CLINIC | Age: 83
End: 2023-02-09
Payer: MEDICARE

## 2023-02-09 VITALS
HEART RATE: 60 BPM | OXYGEN SATURATION: 97 % | SYSTOLIC BLOOD PRESSURE: 140 MMHG | DIASTOLIC BLOOD PRESSURE: 89 MMHG | BODY MASS INDEX: 26.49 KG/M2 | HEIGHT: 65 IN | WEIGHT: 159 LBS

## 2023-02-09 DIAGNOSIS — I10 PRIMARY HYPERTENSION: Primary | ICD-10-CM

## 2023-02-09 DIAGNOSIS — E78.5 DYSLIPIDEMIA: ICD-10-CM

## 2023-02-09 DIAGNOSIS — I25.10 CORONARY ARTERY DISEASE INVOLVING NATIVE CORONARY ARTERY OF NATIVE HEART WITHOUT ANGINA PECTORIS: ICD-10-CM

## 2023-02-09 PROCEDURE — 99213 OFFICE O/P EST LOW 20 MIN: CPT | Performed by: INTERNAL MEDICINE

## 2023-02-09 RX ORDER — MEPERIDINE HYDROCHLORIDE 25 MG/ML
25 INJECTION INTRAMUSCULAR; INTRAVENOUS; SUBCUTANEOUS AS NEEDED
Status: CANCELLED | OUTPATIENT
Start: 2023-02-23

## 2023-02-09 RX ORDER — DILTIAZEM HYDROCHLORIDE 120 MG/1
120 CAPSULE, COATED, EXTENDED RELEASE ORAL DAILY
Qty: 30 CAPSULE | Refills: 11 | Status: SHIPPED | OUTPATIENT
Start: 2023-02-09

## 2023-02-09 RX ORDER — DIPHENHYDRAMINE HYDROCHLORIDE 50 MG/ML
50 INJECTION INTRAMUSCULAR; INTRAVENOUS AS NEEDED
Status: CANCELLED | OUTPATIENT
Start: 2023-02-23

## 2023-02-09 RX ORDER — FAMOTIDINE 10 MG/ML
20 INJECTION, SOLUTION INTRAVENOUS AS NEEDED
Status: CANCELLED | OUTPATIENT
Start: 2023-02-23

## 2023-02-09 RX ORDER — SPIRONOLACTONE 25 MG/1
12.5 TABLET ORAL EVERY OTHER DAY
Qty: 90 TABLET | Refills: 3 | Status: SHIPPED | OUTPATIENT
Start: 2023-02-09

## 2023-02-09 NOTE — TELEPHONE ENCOUNTER
Called pt and notified him, he verbalized understanding and asked me to send him a MyCVestiaget message with the details of the appt.     MyChart message sent.

## 2023-02-09 NOTE — TELEPHONE ENCOUNTER
Faxed Signed order form to [facility] for Leqvio.     Called [facility] to schedule pt for 2/23/23 1:30pm.

## 2023-02-13 ENCOUNTER — TELEPHONE (OUTPATIENT)
Dept: CARDIOLOGY | Facility: CLINIC | Age: 83
End: 2023-02-13
Payer: MEDICARE

## 2023-02-13 NOTE — TELEPHONE ENCOUNTER
Pt notified of high cholesterol per MA and to start Leqvio. Pt verbalized understanding and confirmed date and time of his Leqvio appt.

## 2023-02-13 NOTE — TELEPHONE ENCOUNTER
----- Message from Carl Sainz MD sent at 2/10/2023  4:42 PM EST -----  I think we already sent him for Leqvio

## 2023-02-22 ENCOUNTER — LAB (OUTPATIENT)
Dept: LAB | Facility: HOSPITAL | Age: 83
End: 2023-02-22
Payer: MEDICARE

## 2023-02-22 DIAGNOSIS — I25.10 CORONARY ARTERY DISEASE INVOLVING NATIVE CORONARY ARTERY OF NATIVE HEART WITHOUT ANGINA PECTORIS: ICD-10-CM

## 2023-02-22 DIAGNOSIS — I10 PRIMARY HYPERTENSION: ICD-10-CM

## 2023-02-22 PROCEDURE — 36415 COLL VENOUS BLD VENIPUNCTURE: CPT

## 2023-02-22 PROCEDURE — 80048 BASIC METABOLIC PNL TOTAL CA: CPT

## 2023-02-23 ENCOUNTER — INFUSION (OUTPATIENT)
Dept: ONCOLOGY | Facility: HOSPITAL | Age: 83
End: 2023-02-23
Payer: MEDICARE

## 2023-02-23 VITALS
HEART RATE: 90 BPM | RESPIRATION RATE: 24 BRPM | SYSTOLIC BLOOD PRESSURE: 155 MMHG | DIASTOLIC BLOOD PRESSURE: 85 MMHG | TEMPERATURE: 98 F

## 2023-02-23 DIAGNOSIS — E78.5 HYPERLIPIDEMIA, UNSPECIFIED HYPERLIPIDEMIA TYPE: Primary | ICD-10-CM

## 2023-02-23 LAB
ANION GAP SERPL CALCULATED.3IONS-SCNC: 7 MMOL/L (ref 5–15)
BUN SERPL-MCNC: 30 MG/DL (ref 8–23)
BUN/CREAT SERPL: 15.8 (ref 7–25)
CALCIUM SPEC-SCNC: 9.8 MG/DL (ref 8.6–10.5)
CHLORIDE SERPL-SCNC: 102 MMOL/L (ref 98–107)
CO2 SERPL-SCNC: 24 MMOL/L (ref 22–29)
CREAT SERPL-MCNC: 1.9 MG/DL (ref 0.76–1.27)
EGFRCR SERPLBLD CKD-EPI 2021: 35 ML/MIN/1.73
GLUCOSE SERPL-MCNC: 93 MG/DL (ref 65–99)
POTASSIUM SERPL-SCNC: 4.4 MMOL/L (ref 3.5–5.2)
SODIUM SERPL-SCNC: 133 MMOL/L (ref 136–145)

## 2023-02-23 PROCEDURE — 96372 THER/PROPH/DIAG INJ SC/IM: CPT

## 2023-02-23 PROCEDURE — 25010000002 INCLISIRAN SODIUM 284 MG/1.5ML SOLUTION PREFILLED SYRINGE: Performed by: INTERNAL MEDICINE

## 2023-02-23 RX ORDER — MEPERIDINE HYDROCHLORIDE 25 MG/ML
25 INJECTION INTRAMUSCULAR; INTRAVENOUS; SUBCUTANEOUS AS NEEDED
OUTPATIENT
Start: 2023-05-23

## 2023-02-23 RX ORDER — DIPHENHYDRAMINE HYDROCHLORIDE 50 MG/ML
50 INJECTION INTRAMUSCULAR; INTRAVENOUS AS NEEDED
OUTPATIENT
Start: 2023-05-23

## 2023-02-23 RX ORDER — FAMOTIDINE 10 MG/ML
20 INJECTION, SOLUTION INTRAVENOUS AS NEEDED
OUTPATIENT
Start: 2023-05-23

## 2023-02-23 RX ADMIN — INCLISIRAN 284 MG: 284 INJECTION, SOLUTION SUBCUTANEOUS at 13:37

## 2023-03-03 ENCOUNTER — TELEPHONE (OUTPATIENT)
Dept: CARDIOLOGY | Facility: CLINIC | Age: 83
End: 2023-03-03
Payer: MEDICARE

## 2023-03-03 NOTE — TELEPHONE ENCOUNTER
----- Message from Carl Sainz MD sent at 3/3/2023 11:56 AM EST -----  Let him know his kidney function has been stable as before

## 2023-03-09 ENCOUNTER — TELEPHONE (OUTPATIENT)
Dept: INTERNAL MEDICINE | Facility: CLINIC | Age: 83
End: 2023-03-09
Payer: MEDICARE

## 2023-03-09 DIAGNOSIS — H91.90 HEARING LOSS, UNSPECIFIED HEARING LOSS TYPE, UNSPECIFIED LATERALITY: Primary | ICD-10-CM

## 2023-03-09 NOTE — TELEPHONE ENCOUNTER
Caller: [facility]    Relationship:     Best call back number: [phone]; [phone] (FOR PATIENT)    What is the medical concern/diagnosis: FOR A HEARING EXAM    What specialty or service is being requested: ENT     What is the provider, practice or medical service name: PER PCP    What is the office location:     What is the office phone number:     Any additional details:

## 2023-05-01 DIAGNOSIS — I10 HYPERTENSION, UNSPECIFIED TYPE: ICD-10-CM

## 2023-05-01 RX ORDER — HYDROCHLOROTHIAZIDE 25 MG/1
TABLET ORAL
Qty: 30 TABLET | Refills: 0 | Status: SHIPPED | OUTPATIENT
Start: 2023-05-01

## 2023-05-08 ENCOUNTER — LAB (OUTPATIENT)
Dept: LAB | Facility: HOSPITAL | Age: 83
End: 2023-05-08
Payer: MEDICARE

## 2023-05-08 ENCOUNTER — OFFICE VISIT (OUTPATIENT)
Dept: INTERNAL MEDICINE | Facility: CLINIC | Age: 83
End: 2023-05-08
Payer: MEDICARE

## 2023-05-08 VITALS
HEIGHT: 65 IN | DIASTOLIC BLOOD PRESSURE: 64 MMHG | TEMPERATURE: 97.7 F | HEART RATE: 71 BPM | WEIGHT: 169.6 LBS | OXYGEN SATURATION: 93 % | SYSTOLIC BLOOD PRESSURE: 156 MMHG | BODY MASS INDEX: 28.26 KG/M2

## 2023-05-08 DIAGNOSIS — E78.5 DYSLIPIDEMIA: ICD-10-CM

## 2023-05-08 DIAGNOSIS — N18.32 DIABETES MELLITUS DUE TO UNDERLYING CONDITION WITH STAGE 3B CHRONIC KIDNEY DISEASE, WITHOUT LONG-TERM CURRENT USE OF INSULIN: ICD-10-CM

## 2023-05-08 DIAGNOSIS — I10 PRIMARY HYPERTENSION: Primary | ICD-10-CM

## 2023-05-08 DIAGNOSIS — Z79.4 TYPE 2 DIABETES MELLITUS WITH CHRONIC KIDNEY DISEASE, WITH LONG-TERM CURRENT USE OF INSULIN, UNSPECIFIED CKD STAGE: ICD-10-CM

## 2023-05-08 DIAGNOSIS — R25.1 TREMOR: ICD-10-CM

## 2023-05-08 DIAGNOSIS — J02.9 SORE THROAT: ICD-10-CM

## 2023-05-08 DIAGNOSIS — N18.32 STAGE 3B CHRONIC KIDNEY DISEASE: ICD-10-CM

## 2023-05-08 DIAGNOSIS — Z23 NEED FOR VACCINATION: ICD-10-CM

## 2023-05-08 DIAGNOSIS — E11.22 TYPE 2 DIABETES MELLITUS WITH CHRONIC KIDNEY DISEASE, WITH LONG-TERM CURRENT USE OF INSULIN, UNSPECIFIED CKD STAGE: ICD-10-CM

## 2023-05-08 DIAGNOSIS — K21.9 GASTROESOPHAGEAL REFLUX DISEASE WITHOUT ESOPHAGITIS: ICD-10-CM

## 2023-05-08 DIAGNOSIS — E08.22 DIABETES MELLITUS DUE TO UNDERLYING CONDITION WITH STAGE 3B CHRONIC KIDNEY DISEASE, WITHOUT LONG-TERM CURRENT USE OF INSULIN: ICD-10-CM

## 2023-05-08 LAB
A/C: >300
BACTERIA UR QL AUTO: NORMAL /HPF
BILIRUB UR QL STRIP: NEGATIVE
CLARITY UR: CLEAR
COLOR UR: YELLOW
CREAT UR-MCNC: 39.1 MG/DL
EXPIRATION DATE: NORMAL
GLUCOSE UR STRIP-MCNC: NEGATIVE MG/DL
HGB UR QL STRIP.AUTO: NEGATIVE
HYALINE CASTS UR QL AUTO: NORMAL /LPF
INTERNAL CONTROL: NORMAL
KETONES UR QL STRIP: NEGATIVE
LEUKOCYTE ESTERASE UR QL STRIP.AUTO: NEGATIVE
Lab: NORMAL
NITRITE UR QL STRIP: NEGATIVE
PH UR STRIP.AUTO: 5.5 [PH] (ref 5–8)
POC CREATININE URINE: 50
POC MICROALBUMIN URINE: 150
PROT ?TM UR-MCNC: 108.7 MG/DL
PROT UR QL STRIP: ABNORMAL
PROT/CREAT UR: 2780.1 MG/G CREA (ref 0–200)
RBC # UR STRIP: NORMAL /HPF
REF LAB TEST METHOD: NORMAL
S PYO AG THROAT QL: NEGATIVE
SP GR UR STRIP: 1.01 (ref 1–1.03)
SQUAMOUS #/AREA URNS HPF: NORMAL /HPF
UROBILINOGEN UR QL STRIP: ABNORMAL
WBC # UR STRIP: NORMAL /HPF

## 2023-05-08 PROCEDURE — 81001 URINALYSIS AUTO W/SCOPE: CPT

## 2023-05-08 PROCEDURE — 84156 ASSAY OF PROTEIN URINE: CPT

## 2023-05-08 PROCEDURE — 82607 VITAMIN B-12: CPT

## 2023-05-08 PROCEDURE — 82746 ASSAY OF FOLIC ACID SERUM: CPT

## 2023-05-08 PROCEDURE — 82570 ASSAY OF URINE CREATININE: CPT

## 2023-05-08 PROCEDURE — 84443 ASSAY THYROID STIM HORMONE: CPT

## 2023-05-08 RX ORDER — FAMOTIDINE 40 MG/1
40 TABLET, FILM COATED ORAL DAILY
Qty: 90 TABLET | Refills: 3 | Status: SHIPPED | OUTPATIENT
Start: 2023-05-08

## 2023-05-08 NOTE — PROGRESS NOTES
Internal Medicine Follow Up    Chief Complaint  Hal Byrnes is a 82 y.o. male who presents today for follow up of chronic medical conditions outlined below.    Chief Complaint   Patient presents with   • Diabetes   • Tremors   • Sore Throat     With a bitter taste        HPI  Mr. Byrnes comes in today for follow up. Has had several medication adjustments since last visit. Reports BP controlled on home readings. Has received leqvio infusion. He does not think he is taking his atorvastatin. He is not checking blood sugars. He notes sore throat with bad taste in his mouth. He also notes tremor for the last several months. Affects his ability to write. He did establish with nephrology but does not expand on outcomes of the visit. He does have follow up with cardiology and nephrology upcoming.     Diabetes  Hypoglycemia symptoms include tremors.   Sore Throat          Review of Systems  Review of Systems   Constitutional: Negative.    HENT: Positive for sore throat.    Respiratory: Negative.    Cardiovascular: Negative.    Gastrointestinal: Positive for indigestion. Negative for GERD.   Genitourinary: Negative.    Musculoskeletal: Positive for arthralgias and back pain.   Neurological: Positive for tremors.        Current Medications  Current Outpatient Medications on File Prior to Visit   Medication Sig Dispense Refill   • amLODIPine (NORVASC) 10 MG tablet Take 1 tablet by mouth Daily. 90 tablet 3   • aspirin 81 MG chewable tablet Chew 1 tablet Daily.     • clobetasol (TEMOVATE) 0.05 % ointment Apply 1 application topically to the appropriate area as directed 2 (Two) Times a Day.     • dilTIAZem CD (CARDIZEM CD) 120 MG 24 hr capsule Take 1 capsule by mouth Daily. 30 capsule 11   • ezetimibe (ZETIA) 10 MG tablet Take 1 tablet by mouth Daily. 90 tablet 3   • fluticasone (FLONASE) 50 MCG/ACT nasal spray 1-2 sprays into the nostril(s) as directed by provider Daily. 16 g 5   • hydroCHLOROthiazide (HYDRODIURIL) 25 MG  "tablet TAKE ONE TABLET BY MOUTH DAILY 30 tablet 0   • isosorbide mononitrate (IMDUR) 60 MG 24 hr tablet Take 1 tablet by mouth 2 (Two) Times a Day. 180 tablet 3   • lactulose (CHRONULAC) 10 GM/15ML solution Take 15 mL by mouth 2 (Two) Times a Day As Needed.     • Pancrelipase, Lip-Prot-Amyl, (ZENPEP PO) Take 2 capsules by mouth 3 (Three) Times a Day With Meals. Lipase 20,000 units Protease 63,000 units Amylase 84,000 units     • spironolactone (ALDACTONE) 25 MG tablet Take 0.5 tablets by mouth Every Other Day. 90 tablet 3   • vitamin D (ERGOCALCIFEROL) 1.25 MG (32125 UT) capsule capsule Take 1 capsule by mouth 1 (One) Time Per Week.     • lisinopril (PRINIVIL,ZESTRIL) 40 MG tablet Take 1 tablet by mouth Daily for 30 days. 30 tablet 2   • [DISCONTINUED] atorvastatin (LIPITOR) 80 MG tablet Take 40 mg by mouth Daily.       No current facility-administered medications on file prior to visit.       Allergies  Allergies   Allergen Reactions   • Sulfa Antibiotics Irritability       Objective  Visit Vitals  /64   Pulse 71   Temp 97.7 °F (36.5 °C)   Ht 165.1 cm (65\")   Wt 76.9 kg (169 lb 9.6 oz)   SpO2 93% Comment: ra   BMI 28.22 kg/m²        Physical Exam  Physical Exam  Vitals and nursing note reviewed.   Constitutional:       General: He is not in acute distress.     Appearance: He is well-developed.   HENT:      Head: Normocephalic and atraumatic.   Eyes:      Conjunctiva/sclera: Conjunctivae normal.   Cardiovascular:      Rate and Rhythm: Normal rate and regular rhythm.      Heart sounds: Murmur (systolic) heard.   Pulmonary:      Effort: Pulmonary effort is normal. No respiratory distress.      Breath sounds: Normal breath sounds.   Skin:     General: Skin is warm and dry.   Neurological:      Mental Status: He is alert and oriented to person, place, and time.      Gait: Gait normal.      Comments: Resting tremor         Results  Results for orders placed or performed in visit on 05/08/23   POCT rapid strep A    " Specimen: Swab   Result Value Ref Range    Rapid Strep A Screen Negative Negative, VALID, INVALID, Not Performed    Internal Control Passed Passed    Lot Number [id number]     Expiration Date 12/16/2025         Assessment and Plan  Diagnoses and all orders for this visit:    Primary hypertension  - BP above goal today in office but he reports home readings at goal  - on lisinopril 40mg daily, amlodipine 10mg daily, imdur 60mg BID, HCTZ 25mg daily, diltiazem 120mg daily, and aldactone 12.5mg every other day    Dyslipidemia   - uncontrolled despite both atorvastatin 80mg daily and zetia so cardiology started leqvio. He needs to clarify if still on atorvastatin.    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, without long-term current use of insulin  - has been diet controlled, too soon for A1c today but will plan to repeat next visit  - has nephropathy and follows with nephrology    Sore throat  Gastroesophageal reflux disease without esophagitis  - sore throat with bitter taste in mouth likely due to GERD  - strep negative  - will start on pepcid 40mg daily and discussed GERD diet    Tremor  - TSH, B12, folate ordered  - referral to neurology    Stage 3b chronic kidney disease  - gfr ~35, due to HTN and DM  - he has multiple renal cysts bilaterally   - established with nephrology    Health Maintenance  - Colonoscopy: discuss next visit  - Immunizations: COVID booster today. Discuss PCV20, tdap, shingrix next visit.  - Depression screening: negative 10/2022    Return in about 3 months (around 8/8/2023) for Follow up HTN, GERD, CKD, tremors 30 minutes.  Answers for HPI/ROS submitted by the patient on 5/7/2023  What is the primary reason for your visit?: Other  Please describe your symptoms.: Back pain and sore throat  Have you had these symptoms before?: Yes  How long have you been having these symptoms?: Greater than 2 weeks

## 2023-05-09 LAB
FOLATE SERPL-MCNC: 9.69 NG/ML (ref 4.78–24.2)
TSH SERPL DL<=0.05 MIU/L-ACNC: 2.01 UIU/ML (ref 0.27–4.2)
VIT B12 BLD-MCNC: 432 PG/ML (ref 211–946)

## 2023-05-11 ENCOUNTER — OFFICE VISIT (OUTPATIENT)
Dept: CARDIOLOGY | Facility: CLINIC | Age: 83
End: 2023-05-11
Payer: MEDICARE

## 2023-05-11 VITALS
WEIGHT: 168 LBS | SYSTOLIC BLOOD PRESSURE: 136 MMHG | HEIGHT: 65 IN | OXYGEN SATURATION: 97 % | BODY MASS INDEX: 27.99 KG/M2 | DIASTOLIC BLOOD PRESSURE: 72 MMHG | HEART RATE: 69 BPM

## 2023-05-11 DIAGNOSIS — I25.10 CORONARY ARTERY DISEASE INVOLVING NATIVE CORONARY ARTERY OF NATIVE HEART WITHOUT ANGINA PECTORIS: Primary | ICD-10-CM

## 2023-05-11 DIAGNOSIS — I10 PRIMARY HYPERTENSION: ICD-10-CM

## 2023-05-11 DIAGNOSIS — N18.30 STAGE 3 CHRONIC KIDNEY DISEASE, UNSPECIFIED WHETHER STAGE 3A OR 3B CKD: ICD-10-CM

## 2023-05-11 DIAGNOSIS — E78.2 MIXED HYPERLIPIDEMIA: ICD-10-CM

## 2023-05-11 NOTE — PROGRESS NOTES
Follow-up Visit      Date: 2023  Patient Name: Hal Byrnes  : 1940   MRN: [id number]     Chief Complaint:    Chief Complaint   Patient presents with   • Primary hypertension   • Coronary Artery Disease       History of Present Illness: Hal Byrnes is a 82 y.o. male who is here today for follow-up on his coronary artery disease.  Patient has been doing fine but his muscle pain is getting worse.  We tried the last results that he and he cannot tolerate that also.  He had kidney problems also for which he sees his nephrologist.  He denies any shortness of breath any lower extremity edema or any other symptoms at this time.      Problem List     Problem list  CARDIAC    Coronary Artery Disease:   NSTEMI with C : PCI to RCA and left circumflex  Repeat C with nonobstructive coronary artery disease in , , and   Lexiscan : LV function at rest.  Normal myocardial perfusion.    Myocardium:   Stress echo : Normal EF.  Mild diastolic dysfunction.  Mild posterior wall hypokinesis.  Echo : EF 70%, diastolic dysfunction, mildly dilated LA    Valvular:   Echo : Moderate AS, mean gradient 17, trace TR, trace MR  Echo : Moderate AAS, mean gradient 19, mild MR, mild TR    Electrical:   No history    Percardium:   Normal on previous echoes      CARDIAC RISK FACTORS:  Hypertension  Dyslipidemia  Physical Inactivity  Obstructive Sleep Apnea    NON-CARDIAC:  GERD  Chronic pancreatitis  BPH  Osteopenia  Peripheral neuropathy  Cervical and lumbar spondylosis  Psoriasis    SURGERIES:  None reported      Subjective      Review of Systems:   Review of Systems   Musculoskeletal: Positive for myalgias.       Medications:     Current Outpatient Medications:   •  amLODIPine (NORVASC) 10 MG tablet, Take 1 tablet by mouth Daily., Disp: 90 tablet, Rfl: 3  •  aspirin 81 MG chewable tablet, Chew 1 tablet Daily., Disp: , Rfl:   •  clobetasol (TEMOVATE) 0.05 % ointment, Apply 1  "application topically to the appropriate area as directed 2 (Two) Times a Day., Disp: , Rfl:   •  dilTIAZem CD (CARDIZEM CD) 120 MG 24 hr capsule, Take 1 capsule by mouth Daily., Disp: 30 capsule, Rfl: 11  •  famotidine (Pepcid) 40 MG tablet, Take 1 tablet by mouth Daily., Disp: 90 tablet, Rfl: 3  •  fluticasone (FLONASE) 50 MCG/ACT nasal spray, 1-2 sprays into the nostril(s) as directed by provider Daily., Disp: 16 g, Rfl: 5  •  hydroCHLOROthiazide (HYDRODIURIL) 25 MG tablet, TAKE ONE TABLET BY MOUTH DAILY, Disp: 30 tablet, Rfl: 0  •  isosorbide mononitrate (IMDUR) 60 MG 24 hr tablet, Take 1 tablet by mouth 2 (Two) Times a Day., Disp: 180 tablet, Rfl: 3  •  lactulose (CHRONULAC) 10 GM/15ML solution, Take 15 mL by mouth 2 (Two) Times a Day As Needed., Disp: , Rfl:   •  lisinopril (PRINIVIL,ZESTRIL) 40 MG tablet, Take 1 tablet by mouth Daily for 30 days., Disp: 30 tablet, Rfl: 2  •  Pancrelipase, Lip-Prot-Amyl, (ZENPEP PO), Take 2 capsules by mouth 3 (Three) Times a Day With Meals. Lipase 20,000 units Protease 63,000 units Amylase 84,000 units, Disp: , Rfl:   •  spironolactone (ALDACTONE) 25 MG tablet, Take 0.5 tablets by mouth Every Other Day., Disp: 90 tablet, Rfl: 3  •  vitamin D (ERGOCALCIFEROL) 1.25 MG (11930 UT) capsule capsule, Take 1 capsule by mouth 1 (One) Time Per Week., Disp: , Rfl:     Allergies:   Allergies   Allergen Reactions   • Sulfa Antibiotics Irritability       Objective     Physical Exam:  Vitals:    05/11/23 1439   BP: 136/72   BP Location: Right arm   Patient Position: Sitting   Pulse: 69   SpO2: 97%   Weight: 76.2 kg (168 lb)   Height: 165.1 cm (65\")     Body mass index is 27.96 kg/m².      Constitutional:       General: Not in acute distress.     Appearance: Healthy appearance. Not in distress.     Neck:     JVP: Not elevated     Carotid artery: No carotid bruit    Pulmonary:      Effort: Pulmonary effort is normal.      Breath sounds: Normal breath sounds. No wheezing. No rhonchi. No rales. "     Cardiovascular:      Normal rate. Regular rhythm. Normal S1. Normal S2.      Murmurs: There is mild systolic murmur.      No gallop. No click. No rub.     Abdominal:      General: Bowel sounds are normal.      Palpations: Abdomen is soft.      Tenderness: There is no abdominal tenderness.    Extremities:     Pulses: Good distal pulses     Edema: No edema    Smoking Cessation:   He never smoked    Lab Review:   Lab Results   Component Value Date    GLUCOSE 93 02/22/2023    BUN 30 (H) 02/22/2023    CREATININE 1.90 (H) 02/22/2023    BCR 15.8 02/22/2023    K 4.4 02/22/2023    CO2 24.0 02/22/2023    CALCIUM 9.8 02/22/2023    ALBUMIN 4.20 10/21/2022     Lab Results   Component Value Date    WBC 7.99 10/21/2022    HGB 14.9 10/21/2022    HCT 44.6 10/21/2022    MCV 89.0 10/21/2022     10/21/2022     Lab Results   Component Value Date    CHOL 219 (H) 02/07/2023    TRIG 169 (H) 02/07/2023    HDL 41 02/07/2023     (H) 02/07/2023     Lab Results   Component Value Date    TSH 2.010 05/08/2023     Lab Results   Component Value Date    HGBA1C 5.5 02/08/2023            Assessment / Plan      Assessment:   Diagnosis Plan   1. Coronary artery disease involving native coronary artery of native heart without angina pectoris        2. Primary hypertension        3. Stage 3 chronic kidney disease, unspecified whether stage 3a or 3b CKD        4. Mixed hyperlipidemia             Plan:  1.  Patient has been stable on his coronary artery disease.  We will continue him on his current medications.    2.  He is unable to tolerate any statins or Zetia.  We will try to enroll him for Leqvio.    3.  His blood pressure has been under good control.    4.  Patient kidney is being followed by his nephrologist and he is on the lisinopril hydrochlorothiazide and spironolactone for his blood pressure control.      Follow Up:       Return in about 6 months (around 11/11/2023).    Carl Sainz MD

## 2023-05-23 ENCOUNTER — INFUSION (OUTPATIENT)
Dept: ONCOLOGY | Facility: HOSPITAL | Age: 83
End: 2023-05-23
Payer: MEDICARE

## 2023-05-23 VITALS
HEART RATE: 75 BPM | SYSTOLIC BLOOD PRESSURE: 131 MMHG | DIASTOLIC BLOOD PRESSURE: 73 MMHG | RESPIRATION RATE: 18 BRPM | TEMPERATURE: 97.9 F

## 2023-05-23 DIAGNOSIS — E78.5 HYPERLIPIDEMIA, UNSPECIFIED HYPERLIPIDEMIA TYPE: Primary | ICD-10-CM

## 2023-05-23 PROCEDURE — 96372 THER/PROPH/DIAG INJ SC/IM: CPT

## 2023-05-23 PROCEDURE — 25010000002 INCLISIRAN SODIUM 284 MG/1.5ML SOLUTION PREFILLED SYRINGE: Performed by: INTERNAL MEDICINE

## 2023-05-23 RX ORDER — DIPHENHYDRAMINE HYDROCHLORIDE 50 MG/ML
50 INJECTION INTRAMUSCULAR; INTRAVENOUS AS NEEDED
OUTPATIENT
Start: 2023-11-19

## 2023-05-23 RX ORDER — FAMOTIDINE 10 MG/ML
20 INJECTION, SOLUTION INTRAVENOUS AS NEEDED
OUTPATIENT
Start: 2023-11-19

## 2023-05-23 RX ORDER — MEPERIDINE HYDROCHLORIDE 25 MG/ML
25 INJECTION INTRAMUSCULAR; INTRAVENOUS; SUBCUTANEOUS AS NEEDED
OUTPATIENT
Start: 2023-11-19

## 2023-05-23 RX ADMIN — INCLISIRAN 284 MG: 284 INJECTION, SOLUTION SUBCUTANEOUS at 13:32

## 2023-05-31 DIAGNOSIS — I10 HYPERTENSION, UNSPECIFIED TYPE: ICD-10-CM

## 2023-05-31 RX ORDER — HYDROCHLOROTHIAZIDE 25 MG/1
25 TABLET ORAL DAILY
Qty: 30 TABLET | Refills: 0 | Status: SHIPPED | OUTPATIENT
Start: 2023-05-31

## 2023-06-06 ENCOUNTER — TELEPHONE (OUTPATIENT)
Dept: INTERNAL MEDICINE | Facility: CLINIC | Age: 83
End: 2023-06-06
Payer: MEDICARE

## 2023-06-06 DIAGNOSIS — K86.89 PANCREATIC INSUFFICIENCY: Primary | ICD-10-CM

## 2023-06-06 RX ORDER — PANCRELIPASE LIPASE, PANCRELIPASE PROTEASE, PANCRELIPASE AMYLASE 20000; 63000; 84000 [USP'U]/1; [USP'U]/1; [USP'U]/1
2 CAPSULE, DELAYED RELEASE ORAL
Qty: 540 CAPSULE | Refills: 3 | Status: SHIPPED | OUTPATIENT
Start: 2023-06-06

## 2023-06-06 NOTE — TELEPHONE ENCOUNTER
Caller: FitzHal    Relationship: Self    Best call back number: [phone]    Requested Prescriptions: Pancrelipase, Lip-Prot-Amyl, (ZENPEP PO)       Pharmacy where request should be sent:      [facility] [id number] - [phone]  - [phone] FX     Last office visit with prescribing clinician: 5/8/2023   Last telemedicine visit with prescribing clinician: Visit date not found   Next office visit with prescribing clinician: 8/9/2023     Additional details provided by patient:     Does the patient have less than a 3 day supply:  [x] Yes  [] No    Would you like a call back once the refill request has been completed: [x] Yes [] No    If the office needs to give you a call back, can they leave a voicemail: [x] Yes [] No    Jessie Guerrero   06/06/23 09:47 EDT

## 2023-06-06 NOTE — TELEPHONE ENCOUNTER
It is listed as historical provider. When I click the renew option it gives me a list to choose from for dosages. Please review for refill.      Pancrelipase, Lip-Prot-Amyl, (ZENPEP PO)

## 2023-06-06 NOTE — TELEPHONE ENCOUNTER
Pt states his Zenpep is 20,000 units, and he takes 2 capsules TID. He states that the capsules are  half white and half green.

## 2023-08-03 ENCOUNTER — OFFICE VISIT (OUTPATIENT)
Dept: NEUROLOGY | Facility: CLINIC | Age: 83
End: 2023-08-03
Payer: MEDICARE

## 2023-08-03 VITALS
OXYGEN SATURATION: 95 % | SYSTOLIC BLOOD PRESSURE: 128 MMHG | BODY MASS INDEX: 28.32 KG/M2 | HEIGHT: 65 IN | DIASTOLIC BLOOD PRESSURE: 78 MMHG | WEIGHT: 170 LBS | HEART RATE: 71 BPM

## 2023-08-03 DIAGNOSIS — R25.1 TREMOR: Primary | ICD-10-CM

## 2023-08-03 PROCEDURE — 3078F DIAST BP <80 MM HG: CPT | Performed by: PSYCHIATRY & NEUROLOGY

## 2023-08-03 PROCEDURE — 99204 OFFICE O/P NEW MOD 45 MIN: CPT | Performed by: PSYCHIATRY & NEUROLOGY

## 2023-08-03 PROCEDURE — 3074F SYST BP LT 130 MM HG: CPT | Performed by: PSYCHIATRY & NEUROLOGY

## 2023-08-03 PROCEDURE — 1159F MED LIST DOCD IN RCRD: CPT | Performed by: PSYCHIATRY & NEUROLOGY

## 2023-08-03 PROCEDURE — 1160F RVW MEDS BY RX/DR IN RCRD: CPT | Performed by: PSYCHIATRY & NEUROLOGY

## 2023-08-03 RX ORDER — DILTIAZEM HYDROCHLORIDE 240 MG/1
CAPSULE, EXTENDED RELEASE ORAL
COMMUNITY
Start: 2023-07-06

## 2023-08-03 RX ORDER — PRIMIDONE 50 MG/1
50 TABLET ORAL NIGHTLY
Qty: 30 TABLET | Refills: 11 | Status: SHIPPED | OUTPATIENT
Start: 2023-08-03 | End: 2024-08-02

## 2023-08-03 RX ORDER — PERMETHRIN 50 MG/G
CREAM TOPICAL
COMMUNITY
Start: 2023-07-18

## 2023-08-09 ENCOUNTER — OFFICE VISIT (OUTPATIENT)
Dept: INTERNAL MEDICINE | Facility: CLINIC | Age: 83
End: 2023-08-09
Payer: MEDICARE

## 2023-08-09 VITALS
TEMPERATURE: 97.2 F | HEART RATE: 76 BPM | HEIGHT: 65 IN | OXYGEN SATURATION: 96 % | WEIGHT: 171 LBS | SYSTOLIC BLOOD PRESSURE: 118 MMHG | DIASTOLIC BLOOD PRESSURE: 64 MMHG | BODY MASS INDEX: 28.49 KG/M2

## 2023-08-09 DIAGNOSIS — E78.5 HYPERLIPIDEMIA, UNSPECIFIED HYPERLIPIDEMIA TYPE: ICD-10-CM

## 2023-08-09 DIAGNOSIS — N18.32 DIABETES MELLITUS DUE TO UNDERLYING CONDITION WITH STAGE 3B CHRONIC KIDNEY DISEASE, WITHOUT LONG-TERM CURRENT USE OF INSULIN: Primary | ICD-10-CM

## 2023-08-09 DIAGNOSIS — I10 PRIMARY HYPERTENSION: ICD-10-CM

## 2023-08-09 DIAGNOSIS — K59.04 CHRONIC IDIOPATHIC CONSTIPATION: ICD-10-CM

## 2023-08-09 DIAGNOSIS — N18.32 STAGE 3B CHRONIC KIDNEY DISEASE: ICD-10-CM

## 2023-08-09 DIAGNOSIS — E08.22 DIABETES MELLITUS DUE TO UNDERLYING CONDITION WITH STAGE 3B CHRONIC KIDNEY DISEASE, WITHOUT LONG-TERM CURRENT USE OF INSULIN: Primary | ICD-10-CM

## 2023-08-09 DIAGNOSIS — L30.9 DERMATITIS: ICD-10-CM

## 2023-08-09 LAB
EXPIRATION DATE: NORMAL
HBA1C MFR BLD: 6 %
Lab: NORMAL

## 2023-08-09 RX ORDER — CLOBETASOL PROPIONATE 0.5 MG/G
1 OINTMENT TOPICAL 2 TIMES DAILY
Qty: 60 G | Refills: 0 | Status: SHIPPED | OUTPATIENT
Start: 2023-08-09

## 2023-08-09 RX ORDER — ATENOLOL 50 MG/1
50 TABLET ORAL DAILY
COMMUNITY

## 2023-08-09 RX ORDER — LACTULOSE 10 G/15ML
10 SOLUTION ORAL 2 TIMES DAILY PRN
Qty: 473 ML | Refills: 0 | Status: SHIPPED | OUTPATIENT
Start: 2023-08-09

## 2023-08-09 RX ORDER — SPIRONOLACTONE 25 MG/1
12.5 TABLET ORAL DAILY
COMMUNITY

## 2023-08-09 NOTE — PROGRESS NOTES
Internal Medicine Follow Up    Chief Complaint  Hal Byrnes is a 83 y.o. male who presents today for follow up of chronic medical conditions outlined below.    Chief Complaint   Patient presents with    Hypertension    Heartburn    Chronic Kidney Disease    Rash     18        HPI  Mr. Byrnes comes in today for follow up. He is here with his wife. Has had several medication adjustments since last visit. Reports BP controlled. Has received leqvio infusion but this cost $700. Next in November. Now on primidone with improved tremor. Has been constipated. Does not have lactulose at home which he has used in the past. Notes rash on lower abdomen which is painful and pruritic. Has had this for some time. He used premethrin cream previously but no improvement. Back is pruritic as well but no rash noted by wife.    Hypertension    Heartburn    Chronic Kidney Disease  Associated symptoms include a rash.   Rash    Diabetes  Hypoglycemia symptoms include tremors.   Sore Throat        Review of Systems  Review of Systems   Constitutional:  Positive for unexpected weight gain.   Respiratory: Negative.     Cardiovascular: Negative.    Gastrointestinal:  Positive for constipation.   Skin:  Positive for rash.   Neurological:  Positive for tremors.      Current Medications  Current Outpatient Medications on File Prior to Visit   Medication Sig Dispense Refill    aspirin 81 MG chewable tablet Chew 1 tablet Daily.      atenolol (TENORMIN) 50 MG tablet Take 1 tablet by mouth Daily.      dilTIAZem (TIAZAC) 240 MG 24 hr capsule       famotidine (Pepcid) 40 MG tablet Take 1 tablet by mouth Daily. 90 tablet 3    fluticasone (FLONASE) 50 MCG/ACT nasal spray 1-2 sprays into the nostril(s) as directed by provider Daily. 16 g 5    hydroCHLOROthiazide (HYDRODIURIL) 25 MG tablet TAKE 1 TABLET BY MOUTH DAILY 90 tablet 1    isosorbide mononitrate (IMDUR) 60 MG 24 hr tablet Take 1 tablet by mouth 2 (Two) Times a Day. 180 tablet 3    lactulose  "(CHRONULAC) 10 GM/15ML solution Take 15 mL by mouth 2 (Two) Times a Day As Needed.      lisinopril (PRINIVIL,ZESTRIL) 40 MG tablet Take 1 tablet by mouth Daily for 30 days. 30 tablet 2    Pancrelipase, Lip-Prot-Amyl, (Zenpep) 26512-29931 units capsule delayed-release particles Take 2 capsules by mouth 3 (Three) Times a Day With Meals. 540 capsule 3    primidone (MYSOLINE) 50 MG tablet Take 1 tablet by mouth Every Night. 30 tablet 11    spironolactone (ALDACTONE) 25 MG tablet Take 0.5 tablets by mouth Daily.      vitamin D (ERGOCALCIFEROL) 1.25 MG (09578 UT) capsule capsule Take 1 capsule by mouth 1 (One) Time Per Week.      [DISCONTINUED] permethrin (ELIMITE) 5 % cream       [DISCONTINUED] spironolactone (ALDACTONE) 25 MG tablet Take 0.5 tablets by mouth Every Other Day. 90 tablet 3     No current facility-administered medications on file prior to visit.       Allergies  Allergies   Allergen Reactions    Sulfa Antibiotics Irritability       Objective  Visit Vitals  /64   Pulse 76   Temp 97.2 øF (36.2 øC)   Ht 165.1 cm (65\")   Wt 77.6 kg (171 lb)   SpO2 96%   BMI 28.46 kg/mý        Physical Exam  Physical Exam  Vitals and nursing note reviewed.   Constitutional:       General: He is not in acute distress.     Appearance: He is well-developed. He is not ill-appearing or toxic-appearing.   HENT:      Head: Normocephalic and atraumatic.   Eyes:      Conjunctiva/sclera: Conjunctivae normal.   Cardiovascular:      Rate and Rhythm: Normal rate and regular rhythm.      Heart sounds: Murmur (systolic) heard.   Pulmonary:      Effort: Pulmonary effort is normal. No respiratory distress.      Breath sounds: Normal breath sounds.   Musculoskeletal:      Right lower leg: No edema.      Left lower leg: No edema.   Skin:     General: Skin is warm and dry.      Findings: Rash (annular erythematous patches on R lower abdoment with thick scale) present.   Neurological:      Mental Status: He is alert and oriented to person, " place, and time. Mental status is at baseline.      Gait: Gait normal.      Comments: No tremor noted.       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, without long-term current use of insulin  - A1c 6.0, diet controlled    Dermatitis  - clobetasol ointment BID and referral to dermatology    Chronic idiopathic constipation  - likely worse due to lasix  - lactulose PRN refilled    Stage 3b chronic kidney disease  - gfr ~35, due to HTN and DM  - he has multiple renal cysts bilaterally   - established with nephrology    Primary hypertension  - BP at goal on lisinopril 40mg daily, imdur 60mg BID, HCTZ 25mg daily, diltiazem 240mg daily, lasix 40mg daily, and aldactone 12.5mg daily    Hyperlipidemia, unspecified hyperlipidemia type  - previously uncontrolled on high intensity statin and zetia so now receiving leqvio injections through cardiology    Health Maintenance  - Colonoscopy: discuss next visit  - Immunizations: COVID UTD. Discuss PCV20, tdap, shingrix next visit.  - Depression screening: negative 10/2022    Return for Next scheduled follow up.

## 2023-10-04 ENCOUNTER — LAB (OUTPATIENT)
Dept: LAB | Facility: HOSPITAL | Age: 83
End: 2023-10-04
Payer: MEDICARE

## 2023-10-04 ENCOUNTER — OFFICE VISIT (OUTPATIENT)
Dept: INTERNAL MEDICINE | Facility: CLINIC | Age: 83
End: 2023-10-04
Payer: MEDICARE

## 2023-10-04 ENCOUNTER — TRANSCRIBE ORDERS (OUTPATIENT)
Dept: LAB | Facility: HOSPITAL | Age: 83
End: 2023-10-04
Payer: MEDICARE

## 2023-10-04 VITALS
HEIGHT: 65 IN | BODY MASS INDEX: 29.16 KG/M2 | DIASTOLIC BLOOD PRESSURE: 72 MMHG | OXYGEN SATURATION: 96 % | HEART RATE: 78 BPM | WEIGHT: 175 LBS | TEMPERATURE: 97.9 F | SYSTOLIC BLOOD PRESSURE: 144 MMHG

## 2023-10-04 DIAGNOSIS — Z00.00 MEDICARE ANNUAL WELLNESS VISIT, SUBSEQUENT: Primary | ICD-10-CM

## 2023-10-04 DIAGNOSIS — L40.9 PSORIASIS: ICD-10-CM

## 2023-10-04 DIAGNOSIS — N18.32 DIABETES MELLITUS DUE TO UNDERLYING CONDITION WITH STAGE 3B CHRONIC KIDNEY DISEASE, WITHOUT LONG-TERM CURRENT USE OF INSULIN: ICD-10-CM

## 2023-10-04 DIAGNOSIS — N40.1 BENIGN PROSTATIC HYPERPLASIA WITH LOWER URINARY TRACT SYMPTOMS, SYMPTOM DETAILS UNSPECIFIED: ICD-10-CM

## 2023-10-04 DIAGNOSIS — I10 PRIMARY HYPERTENSION: ICD-10-CM

## 2023-10-04 DIAGNOSIS — I25.10 CORONARY ARTERY DISEASE INVOLVING NATIVE CORONARY ARTERY OF NATIVE HEART WITHOUT ANGINA PECTORIS: ICD-10-CM

## 2023-10-04 DIAGNOSIS — K86.89 PANCREATIC INSUFFICIENCY: ICD-10-CM

## 2023-10-04 DIAGNOSIS — Q61.02 MULTIPLE RENAL CYSTS: ICD-10-CM

## 2023-10-04 DIAGNOSIS — N18.32 STAGE 3B CHRONIC KIDNEY DISEASE: ICD-10-CM

## 2023-10-04 DIAGNOSIS — I50.32 CHRONIC DIASTOLIC (CONGESTIVE) HEART FAILURE: ICD-10-CM

## 2023-10-04 DIAGNOSIS — R25.1 TREMOR: Chronic | ICD-10-CM

## 2023-10-04 DIAGNOSIS — I35.0 AORTIC VALVE STENOSIS, ETIOLOGY OF CARDIAC VALVE DISEASE UNSPECIFIED: ICD-10-CM

## 2023-10-04 DIAGNOSIS — E08.22 DIABETES MELLITUS DUE TO UNDERLYING CONDITION WITH STAGE 3B CHRONIC KIDNEY DISEASE, WITHOUT LONG-TERM CURRENT USE OF INSULIN: ICD-10-CM

## 2023-10-04 DIAGNOSIS — M47.816 LUMBAR SPONDYLOSIS: ICD-10-CM

## 2023-10-04 DIAGNOSIS — L40.8 INVERSE PSORIASIS: ICD-10-CM

## 2023-10-04 DIAGNOSIS — E78.5 DYSLIPIDEMIA: ICD-10-CM

## 2023-10-04 DIAGNOSIS — K21.9 GASTROESOPHAGEAL REFLUX DISEASE WITHOUT ESOPHAGITIS: ICD-10-CM

## 2023-10-04 DIAGNOSIS — I77.811 ECTATIC ABDOMINAL AORTA: ICD-10-CM

## 2023-10-04 DIAGNOSIS — Z23 NEED FOR INFLUENZA VACCINATION: ICD-10-CM

## 2023-10-04 DIAGNOSIS — N18.32 CHRONIC KIDNEY DISEASE (CKD) STAGE G3B/A1, MODERATELY DECREASED GLOMERULAR FILTRATION RATE (GFR) BETWEEN 30-44 ML/MIN/1.73 SQUARE METER AND ALBUMINURIA CREATININE RATIO LESS THAN 30 MG/G (CMS/H*: Primary | ICD-10-CM

## 2023-10-04 DIAGNOSIS — N18.32 CHRONIC KIDNEY DISEASE (CKD) STAGE G3B/A1, MODERATELY DECREASED GLOMERULAR FILTRATION RATE (GFR) BETWEEN 30-44 ML/MIN/1.73 SQUARE METER AND ALBUMINURIA CREATININE RATIO LESS THAN 30 MG/G (CMS/H*: ICD-10-CM

## 2023-10-04 LAB
25(OH)D3 SERPL-MCNC: 72 NG/ML (ref 30–100)
BASOPHILS # BLD AUTO: 0.02 10*3/MM3 (ref 0–0.2)
BASOPHILS NFR BLD AUTO: 0.3 % (ref 0–1.5)
DEPRECATED RDW RBC AUTO: 44.3 FL (ref 37–54)
EOSINOPHIL # BLD AUTO: 0.14 10*3/MM3 (ref 0–0.4)
EOSINOPHIL NFR BLD AUTO: 1.8 % (ref 0.3–6.2)
ERYTHROCYTE [DISTWIDTH] IN BLOOD BY AUTOMATED COUNT: 13.2 % (ref 12.3–15.4)
HCT VFR BLD AUTO: 39.5 % (ref 37.5–51)
HGB BLD-MCNC: 13.4 G/DL (ref 13–17.7)
IMM GRANULOCYTES # BLD AUTO: 0.05 10*3/MM3 (ref 0–0.05)
IMM GRANULOCYTES NFR BLD AUTO: 0.6 % (ref 0–0.5)
LYMPHOCYTES # BLD AUTO: 2.17 10*3/MM3 (ref 0.7–3.1)
LYMPHOCYTES NFR BLD AUTO: 27.6 % (ref 19.6–45.3)
MCH RBC QN AUTO: 31.4 PG (ref 26.6–33)
MCHC RBC AUTO-ENTMCNC: 33.9 G/DL (ref 31.5–35.7)
MCV RBC AUTO: 92.5 FL (ref 79–97)
MONOCYTES # BLD AUTO: 0.65 10*3/MM3 (ref 0.1–0.9)
MONOCYTES NFR BLD AUTO: 8.3 % (ref 5–12)
NEUTROPHILS NFR BLD AUTO: 4.83 10*3/MM3 (ref 1.7–7)
NEUTROPHILS NFR BLD AUTO: 61.4 % (ref 42.7–76)
NRBC BLD AUTO-RTO: 0 /100 WBC (ref 0–0.2)
PLATELET # BLD AUTO: 295 10*3/MM3 (ref 140–450)
PMV BLD AUTO: 10.3 FL (ref 6–12)
RBC # BLD AUTO: 4.27 10*6/MM3 (ref 4.14–5.8)
WBC NRBC COR # BLD: 7.86 10*3/MM3 (ref 3.4–10.8)

## 2023-10-04 PROCEDURE — 36415 COLL VENOUS BLD VENIPUNCTURE: CPT

## 2023-10-04 PROCEDURE — 80069 RENAL FUNCTION PANEL: CPT

## 2023-10-04 PROCEDURE — 82306 VITAMIN D 25 HYDROXY: CPT

## 2023-10-04 PROCEDURE — 85025 COMPLETE CBC W/AUTO DIFF WBC: CPT

## 2023-10-04 RX ORDER — FUROSEMIDE 20 MG/1
20 TABLET ORAL DAILY
COMMUNITY

## 2023-10-04 NOTE — PROGRESS NOTES
The ABCs of the Annual Wellness Visit  Subsequent Medicare Wellness Visit    Chief Complaint   Patient presents with    Medicare Wellness-subsequent       Subjective   History of Present Illness:  Hal Byrnes is a 83 y.o. male who presents for a Subsequent Medicare Wellness Visit.    HEALTH RISK ASSESSMENT    Recent Hospitalizations:  No hospitalization(s) within the last year.    Current Medical Providers:  Patient Care Team:  Pia De La Garza MD as PCP - General (Internal Medicine)  Carl Sainz MD as Cardiologist (Cardiology)    Smoking Status:  Social History     Tobacco Use   Smoking Status Never    Passive exposure: Never   Smokeless Tobacco Never       Alcohol Consumption:  Social History     Substance and Sexual Activity   Alcohol Use Never       Depression Screen:       10/4/2023     8:21 AM   PHQ-2/PHQ-9 Depression Screening   Little Interest or Pleasure in Doing Things 0-->not at all   Feeling Down, Depressed or Hopeless 0-->not at all   PHQ-9: Brief Depression Severity Measure Score 0       Fall Risk Screen:  REN Fall Risk Assessment was completed, and patient is at LOW risk for falls.Assessment completed on:10/4/2023    Health Habits and Functional and Cognitive Screening:      10/4/2023     8:21 AM   Functional & Cognitive Status   Do you have difficulty preparing food and eating? No   Do you have difficulty bathing yourself, getting dressed or grooming yourself? No   Do you have difficulty using the toilet? No   Do you have difficulty moving around from place to place? No   Do you have trouble with steps or getting out of a bed or a chair? No   Current Diet Well Balanced Diet   Dental Exam Not up to date   Eye Exam Not up to date   Exercise (times per week) 0 times per week   Current Exercises Include No Regular Exercise   Do you need help using the phone?  No   Are you deaf or do you have serious difficulty hearing?  No   Do you need help to go to places out of walking distance? No    Do you need help shopping? No   Do you need help preparing meals?  No   Do you need help with housework?  No   Do you need help with laundry? No   Do you need help taking your medications? No   Do you need help managing money? No   Do you ever drive or ride in a car without wearing a seat belt? No   Have you felt unusual stress, anger or loneliness in the last month? No   Who do you live with? Alone   If you need help, do you have trouble finding someone available to you? No   Have you been bothered in the last four weeks by sexual problems? No   Do you have difficulty concentrating, remembering or making decisions? No         Does the patient have evidence of cognitive impairment? No    Asprin use counseling:Taking ASA appropriately as indicated    Age-appropriate Screening Schedule:  Refer to the list below for future screening recommendations based on patient's age, sex and/or medical conditions. Orders for these recommended tests are listed in the plan section. The patient has been provided with a written plan.    Health Maintenance   Topic Date Due    DXA SCAN  Never done    Pneumococcal Vaccine 65+ (1 - PCV) Never done    TDAP/TD VACCINES (1 - Tdap) Never done    ZOSTER VACCINE (1 of 2) Never done    DIABETIC EYE EXAM  Never done    INFLUENZA VACCINE  Never done    COVID-19 Vaccine (5 - 2023-24 season) 09/01/2023    LIPID PANEL  02/07/2024    HEMOGLOBIN A1C  02/09/2024    BMI FOLLOWUP  06/26/2024    URINE MICROALBUMIN  06/29/2024    ANNUAL WELLNESS VISIT  10/04/2024          The following portions of the patient's history were reviewed and updated as appropriate: allergies, current medications, past family history, past medical history, past social history, past surgical history, and problem list.    Outpatient Medications Prior to Visit   Medication Sig Dispense Refill    aspirin 81 MG chewable tablet Chew 1 tablet Daily.      atenolol (TENORMIN) 50 MG tablet Take 1 tablet by mouth Daily.      clobetasol  (TEMOVATE) 0.05 % ointment Apply 1 application  topically to the appropriate area as directed 2 (Two) Times a Day. 60 g 0    dilTIAZem (TIAZAC) 240 MG 24 hr capsule       famotidine (Pepcid) 40 MG tablet Take 1 tablet by mouth Daily. 90 tablet 3    fluticasone (FLONASE) 50 MCG/ACT nasal spray 1-2 sprays into the nostril(s) as directed by provider Daily. 16 g 5    hydroCHLOROthiazide (HYDRODIURIL) 25 MG tablet TAKE 1 TABLET BY MOUTH DAILY 90 tablet 1    isosorbide mononitrate (IMDUR) 60 MG 24 hr tablet Take 1 tablet by mouth 2 (Two) Times a Day. 180 tablet 3    lactulose (CHRONULAC) 10 GM/15ML solution Take 15 mL by mouth 2 (Two) Times a Day As Needed (constipation). 473 mL 0    Pancrelipase, Lip-Prot-Amyl, (Zenpep) 69633-01976 units capsule delayed-release particles Take 2 capsules by mouth 3 (Three) Times a Day With Meals. 540 capsule 3    primidone (MYSOLINE) 50 MG tablet Take 1 tablet by mouth Every Night. 30 tablet 11    spironolactone (ALDACTONE) 25 MG tablet Take 0.5 tablets by mouth Daily.      vitamin D (ERGOCALCIFEROL) 1.25 MG (98820 UT) capsule capsule Take 1 capsule by mouth 1 (One) Time Per Week.      lisinopril (PRINIVIL,ZESTRIL) 40 MG tablet Take 1 tablet by mouth Daily for 30 days. 30 tablet 2     No facility-administered medications prior to visit.       Patient Active Problem List   Diagnosis    Primary hypertension    Coronary artery disease    GERD (gastroesophageal reflux disease)    Diabetes mellitus due to underlying condition, without long-term current use of insulin    Benign prostatic hyperplasia with lower urinary tract symptoms    Peripheral neuropathy    Chronic diastolic (congestive) heart failure    Psoriasis    Aortic stenosis    Lumbar spondylosis    Cervical spondylosis    Dyslipidemia    Inverse psoriasis    Ectatic abdominal aorta    Pancreatic insufficiency    Stage 3b chronic kidney disease    Lumbar radiculopathy    Multiple renal cysts    Hyperlipemia    Tremor       Advanced  "Care Planning:  ACP discussion was held with the patient during this visit. Patient has an advance directive (not in EMR), copy requested.    Review of Systems   Constitutional:  Positive for fatigue and unexpected weight gain.   Eyes:  Positive for visual disturbance.   Musculoskeletal:  Positive for back pain and myalgias (LLE).   All other systems reviewed and are negative.    Compared to one year ago, the patient feels his physical health is worse. Increase in generalized fatigue.  Compared to one year ago, the patient feels his mental health is the same.    Reviewed chart for potential of high risk medication in the elderly: yes  Reviewed chart for potential of harmful drug interactions in the elderly:yes    Objective         Vitals:    10/04/23 0815   BP: 144/72   Pulse: 78   Temp: 97.9 °F (36.6 °C)   SpO2: 96%   Weight: 79.4 kg (175 lb)   Height: 165.1 cm (65\")       Body mass index is 29.12 kg/m².  Discussed the patient's BMI with him. The BMI is above average; BMI management plan is completed.    Physical Exam  Vitals and nursing note reviewed.   Constitutional:       General: He is not in acute distress.     Appearance: He is well-developed. He is not ill-appearing, toxic-appearing or diaphoretic.   HENT:      Head: Normocephalic and atraumatic.      Right Ear: Tympanic membrane, ear canal and external ear normal.      Left Ear: Tympanic membrane, ear canal and external ear normal.      Nose: Nose normal.   Eyes:      General: No scleral icterus.     Conjunctiva/sclera: Conjunctivae normal.   Cardiovascular:      Rate and Rhythm: Normal rate and regular rhythm.      Heart sounds: Normal heart sounds.   Pulmonary:      Effort: Pulmonary effort is normal. No respiratory distress.      Breath sounds: Normal breath sounds.   Abdominal:      General: There is no distension.      Palpations: Abdomen is soft. There is no mass.      Tenderness: There is no abdominal tenderness.   Musculoskeletal:         General: " No deformity.      Cervical back: Neck supple.      Right lower leg: No edema.      Left lower leg: No edema.   Skin:     General: Skin is warm and dry.      Findings: No rash.   Neurological:      Mental Status: He is alert and oriented to person, place, and time. Mental status is at baseline.      Gait: Gait normal.   Psychiatric:         Mood and Affect: Mood normal.         Behavior: Behavior normal.         Thought Content: Thought content normal.         Judgment: Judgment normal.       Lab Results   Component Value Date    HGBA1C 6.0 08/09/2023        Assessment & Plan   Medicare Risks and Personalized Health Plan  CMS Preventative Services Quick Reference  Advance Directive Discussion  Chronic Pain   Immunizations Discussed/Encouraged (specific immunizations; Tdap, Influenza, Prevnar 20 (Pneumococcal 20-valent conjugate), Shingrix, COVID19, and RSV )  Inactivity/Sedentary  Obesity/Overweight     The above risks/problems have been discussed with the patient.  Pertinent information has been shared with the patient in the After Visit Summary.  Follow up plans and orders are seen below in the Assessment/Plan Section.    Diagnoses and all orders for this visit:    Medicare annual wellness visit, subsequent  - Counseling was given to patient for the following topics:  appropriate exercise, disease prevention, and importance of immunizations, including risks and benefits. Also discussed the importance of regular dental and vision care, as well recommendation for a yearly screening skin exam.    Benign prostatic hyperplasia with lower urinary tract symptoms, symptom details unspecified   - s/p minor prostate surgery (TURP, greenlight laser?)   - does not require any medications    Chronic diastolic (congestive) heart failure  - on antihypertensives and diuretics as noted  - euvolemic    Coronary artery disease involving native coronary artery of native heart without angina pectoris  - following with cardiology  - hx  of NSTEMI in 2002 with PCI to RCA and LCx. Had LHC in 2005, 2009, 2018 with nonobstructive disease  - Lexiscan stress test 2021 normal.  - on imdur 60mg BID, ASA. HLD managed with leqvio.    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, without long-term current use of insulin  - A1c 6.0 in August, too soon to repeat  - diet controlled  - discussed getting eye exam    Dyslipidemia  - previously uncontrolled on high intensity statin and zetia so now receiving leqvio injections through cardiology. Notes high cost. Encouraged him to discuss cost and alternatives with cardiology at upcoming appt.    Ectatic abdominal aorta  - monitoring per cardiology    Gastroesophageal reflux disease without esophagitis  - no longer on pepcid, asymptomatic    Primary hypertension  - BP elevated today but reported to be at goal on home readings.  - continue lisinopril 40mg daily, imdur 60mg BID, HCTZ 25mg daily, diltiazem 240mg daily, lasix 20mg daily, and aldactone 25mg daily     Stage 3b chronic kidney disease  Multiple renal cysts  - gfr ~35, due to HTN and DM  - he has multiple renal cysts bilaterally   - following with nephrology and will have labs today prior to appt next week    Pancreatic insufficiency  - per patient secondary to pancreatitis  - on zenpep    Psoriasis  Inverse psoriasis  - following with dermatology, using topical steroid cream PRN    Lumbar spondylosis  - had MRI showing multilevel DDD  - Had injection by Dr. Kincaid and completed PT with improvement but pain returning. Advised to set up an appt for reassessment with Dr. Kincaid.    Tremor  - following with neurology. Was prescribed primidone however found it ineffective so discontinued.    Aortic valve stenosis, etiology of cardiac valve disease unspecified  - moderate on most recent echo  - asymptomatic  - on antihypertensives as noted     Health Maintenance  - Colonoscopy: discuss next visit  - Immunizations: Flu today.  COVID, RSV, PCV20, tdap, shingrix discussed.  - Depression screening: negative 10/2023    Follow Up:  Return in about 4 months (around 2/4/2024) for Follow up diabetes, 1 year for wellness 45 minutes.     An After Visit Summary and PPPS were given to the patient.

## 2023-10-05 LAB
ALBUMIN SERPL-MCNC: 4.4 G/DL (ref 3.5–5.2)
ANION GAP SERPL CALCULATED.3IONS-SCNC: 14.3 MMOL/L (ref 5–15)
BUN SERPL-MCNC: 45 MG/DL (ref 8–23)
BUN/CREAT SERPL: 17.6 (ref 7–25)
CALCIUM SPEC-SCNC: 10 MG/DL (ref 8.6–10.5)
CHLORIDE SERPL-SCNC: 101 MMOL/L (ref 98–107)
CO2 SERPL-SCNC: 22.7 MMOL/L (ref 22–29)
CREAT SERPL-MCNC: 2.55 MG/DL (ref 0.76–1.27)
EGFRCR SERPLBLD CKD-EPI 2021: 24.4 ML/MIN/1.73
GLUCOSE SERPL-MCNC: 76 MG/DL (ref 65–99)
PHOSPHATE SERPL-MCNC: 3.4 MG/DL (ref 2.5–4.5)
POTASSIUM SERPL-SCNC: 5.1 MMOL/L (ref 3.5–5.2)
SODIUM SERPL-SCNC: 138 MMOL/L (ref 136–145)

## 2023-10-27 RX ORDER — AMLODIPINE BESYLATE 10 MG/1
10 TABLET ORAL DAILY
Qty: 90 TABLET | Refills: 3 | OUTPATIENT
Start: 2023-10-27

## 2023-10-27 RX ORDER — EZETIMIBE 10 MG/1
10 TABLET ORAL DAILY
Qty: 90 TABLET | Refills: 3 | OUTPATIENT
Start: 2023-10-27

## 2023-11-16 ENCOUNTER — OFFICE VISIT (OUTPATIENT)
Dept: CARDIOLOGY | Facility: CLINIC | Age: 83
End: 2023-11-16
Payer: MEDICARE

## 2023-11-16 VITALS
OXYGEN SATURATION: 96 % | DIASTOLIC BLOOD PRESSURE: 70 MMHG | SYSTOLIC BLOOD PRESSURE: 142 MMHG | WEIGHT: 178 LBS | BODY MASS INDEX: 32.76 KG/M2 | HEIGHT: 62 IN | HEART RATE: 73 BPM

## 2023-11-16 DIAGNOSIS — N18.30 STAGE 3 CHRONIC KIDNEY DISEASE, UNSPECIFIED WHETHER STAGE 3A OR 3B CKD: ICD-10-CM

## 2023-11-16 DIAGNOSIS — E78.5 DYSLIPIDEMIA: ICD-10-CM

## 2023-11-16 DIAGNOSIS — I35.0 NONRHEUMATIC AORTIC VALVE STENOSIS: Primary | ICD-10-CM

## 2023-11-16 DIAGNOSIS — I10 PRIMARY HYPERTENSION: ICD-10-CM

## 2023-11-16 RX ORDER — LISINOPRIL 40 MG/1
40 TABLET ORAL DAILY
COMMUNITY

## 2023-11-16 NOTE — PROGRESS NOTES
Follow-up Visit      Date: 2023  Patient Name: Hal Byrnes  : 1940   MRN: [id number]     Chief Complaint:    Chief Complaint   Patient presents with    Coronary Artery Disease       History of Present Illness: Hal Byrnes is a 83 y.o. male who is here today for evaluation of his coronary artery disease and heart problem.  Sometime he did have chest discomfort off and on.  His main problem is his kidney function.  He is able to do most of his activities.  Most of the time he stays at home.  His kidney function has deteriorated a little bit.    He denies any weight gain or weight loss.  He denies any paroxysmal nocturnal dyspnea.  He does not have any lower extremity edema.      Problem List     CARDIAC:  Coronary Artery Disease:   NSTEMI with LHC : PCI to RCA and left circumflex  Repeat LHC with nonobstructive coronary artery disease in , , and   Lexiscan : LV function at rest.  Normal myocardial perfusion.    Myocardium:   Stress echo : Normal EF.  Mild diastolic dysfunction.  Mild posterior wall hypokinesis.  Echo : EF 70%, diastolic dysfunction, mildly dilated LA    Valvular:   Echo : Moderate AS, mean gradient 17, trace TR, trace MR  Echo : Moderate AS, mean gradient 19, mild MR, mild TR    Electrical:   Sinus rhythm    Percardium:   Normal       CARDIAC RISK FACTORS:  Hypertension  Dyslipidemia  2023   HDL 41   Physical Inactivity  Obstructive Sleep Apnea    NON-CARDIAC:  GERD  Chronic pancreatitis  BPH  Osteopenia  Peripheral neuropathy  Cervical and lumbar spondylosis  Psoriasis  Chronic kidney disease.    SURGERIES:  None reported      Subjective      Review of Systems:   Review of Systems   Cardiovascular:  Positive for palpitations.       Medications:     Current Outpatient Medications:     aspirin 81 MG chewable tablet, Chew 1 tablet Daily., Disp: , Rfl:     clobetasol (TEMOVATE) 0.05 % ointment, Apply 1 application   "topically to the appropriate area as directed 2 (Two) Times a Day., Disp: 60 g, Rfl: 0    dilTIAZem (TIAZAC) 240 MG 24 hr capsule, , Disp: , Rfl:     fluticasone (FLONASE) 50 MCG/ACT nasal spray, 1-2 sprays into the nostril(s) as directed by provider Daily., Disp: 16 g, Rfl: 5    furosemide (LASIX) 20 MG tablet, Take 1 tablet by mouth Daily., Disp: , Rfl:     hydroCHLOROthiazide (HYDRODIURIL) 25 MG tablet, TAKE 1 TABLET BY MOUTH DAILY, Disp: 90 tablet, Rfl: 1    isosorbide mononitrate (IMDUR) 60 MG 24 hr tablet, Take 1 tablet by mouth 2 (Two) Times a Day., Disp: 180 tablet, Rfl: 3    lisinopril (PRINIVIL,ZESTRIL) 40 MG tablet, Take 1 tablet by mouth Daily., Disp: , Rfl:     Pancrelipase, Lip-Prot-Amyl, (Zenpep) 38412-15540 units capsule delayed-release particles, Take 2 capsules by mouth 3 (Three) Times a Day With Meals., Disp: 540 capsule, Rfl: 3    spironolactone (ALDACTONE) 25 MG tablet, Take 1 tablet by mouth Daily., Disp: , Rfl:     vitamin D (ERGOCALCIFEROL) 1.25 MG (76995 UT) capsule capsule, Take 1 capsule by mouth 1 (One) Time Per Week., Disp: , Rfl:     Inclisiran Sodium 284 MG/1.5ML solution prefilled syringe, Inject 1.5 mL under the skin into the appropriate area as directed Take As Directed., Disp: , Rfl:     Allergies:   Allergies   Allergen Reactions    Sulfa Antibiotics Irritability       Objective     Physical Exam:  Vitals:    11/16/23 1532   BP: 142/70   BP Location: Right arm   Patient Position: Sitting   Cuff Size: Adult   Pulse: 73   SpO2: 96%   Weight: 80.7 kg (178 lb)   Height: 157.5 cm (62\")     Body mass index is 32.56 kg/m².      Constitutional:       General: Not in acute distress.     Appearance: Healthy appearance. Not in distress.     Neck:     JVP: Not elevated     Carotid artery: No carotid bruit    Pulmonary:      Effort: Pulmonary effort is normal.      Breath sounds: Normal breath sounds. No wheezing. No rhonchi. No rales.     Cardiovascular:      Normal rate. Regular rhythm. " Normal S1. Normal S2.      Murmurs: There is 3/6 systolic murmur.      No gallop. No click. No rub.     Abdominal:      General: Bowel sounds are normal.      Palpations: Abdomen is soft.      Tenderness: There is no abdominal tenderness.    Extremities:     Pulses: Good distal pulses     Edema: No edema    Smoking Cessation:   He never smoked    Lab Review:   Lab Results   Component Value Date    GLUCOSE 76 10/04/2023    BUN 45 (H) 10/04/2023    CREATININE 2.55 (H) 10/04/2023    BCR 17.6 10/04/2023    K 5.1 10/04/2023    CO2 22.7 10/04/2023    CALCIUM 10.0 10/04/2023    ALBUMIN 4.4 10/04/2023     Lab Results   Component Value Date    WBC 7.86 10/04/2023    HGB 13.4 10/04/2023    HCT 39.5 10/04/2023    MCV 92.5 10/04/2023     10/04/2023     Lab Results   Component Value Date    CHOL 219 (H) 02/07/2023    TRIG 169 (H) 02/07/2023    HDL 41 02/07/2023     (H) 02/07/2023     Lab Results   Component Value Date    TSH 2.010 05/08/2023     Lab Results   Component Value Date    HGBA1C 6.0 08/09/2023         ECG 12 Lead    Date/Time: 11/16/2023 4:41 PM  Performed by: Carl Sainz MD    Authorized by: Carl Sainz MD  Comparison: compared with previous ECG from 11/3/2022  Comparison to previous ECG: Sinus bradycardia has been replaced with sinus rhythm  Rhythm: sinus rhythm  QRS axis: left    Clinical impression: abnormal EKG           Assessment / Plan      Assessment:   Diagnosis Plan   1. Nonrheumatic aortic valve stenosis  ECG 12 Lead      2. Primary hypertension        3. Stage 3 chronic kidney disease, unspecified whether stage 3a or 3b CKD        4. Dyslipidemia             Plan:  His aortic valve has been stable.  We are going to continue to monitor his echo on a regular basis.  His cholesterol has been running high he is can get his third dose.  We will repeat his lipid profile soon for his next injection.  He continues to follow-up with his primary care for his kidney function.  His blood  pressure is borderline high.  We will keep his blood pressure around 120/80.      Follow Up:       Return in about 6 months (around 5/16/2024).    Carl Sainz MD

## 2023-11-20 ENCOUNTER — TELEPHONE (OUTPATIENT)
Dept: CARDIOLOGY | Facility: CLINIC | Age: 83
End: 2023-11-20
Payer: MEDICARE

## 2023-11-20 DIAGNOSIS — E78.5 DYSLIPIDEMIA: Primary | ICD-10-CM

## 2023-11-20 NOTE — TELEPHONE ENCOUNTER
Patient has had 2 doses of Leqvio, they had to cancel his appointment tomorrow because he has not had lipid panel drawn since starting the medication.     Lipid panel ordered.    Patient going to get blood work tomorrow, [facility] should be calling to reschedule appointment.     Patient verbalizes understanding.

## 2023-11-21 ENCOUNTER — LAB (OUTPATIENT)
Dept: LAB | Facility: HOSPITAL | Age: 83
End: 2023-11-21
Payer: MEDICARE

## 2023-11-21 DIAGNOSIS — E78.5 DYSLIPIDEMIA: ICD-10-CM

## 2023-11-21 LAB
CHOLEST SERPL-MCNC: 133 MG/DL (ref 0–200)
HDLC SERPL-MCNC: 41 MG/DL (ref 40–60)
LDLC SERPL CALC-MCNC: 64 MG/DL (ref 0–100)
LDLC/HDLC SERPL: 1.43 {RATIO}
TRIGL SERPL-MCNC: 167 MG/DL (ref 0–150)
VLDLC SERPL-MCNC: 28 MG/DL (ref 5–40)

## 2023-11-21 PROCEDURE — 36415 COLL VENOUS BLD VENIPUNCTURE: CPT

## 2023-11-21 PROCEDURE — 80061 LIPID PANEL: CPT

## 2023-11-22 ENCOUNTER — TELEPHONE (OUTPATIENT)
Dept: CARDIOLOGY | Facility: CLINIC | Age: 83
End: 2023-11-22
Payer: MEDICARE

## 2023-11-22 NOTE — TELEPHONE ENCOUNTER
Spoke with patient regarding results per Dr. Sainz. No questions at this time and agreeable to plan.

## 2023-11-22 NOTE — TELEPHONE ENCOUNTER
----- Message from Carl Sainz MD sent at 11/22/2023 10:18 AM EST -----  Please call the patient regarding his abnormal result.  Let him know about his results.  Send it also to [facility] so they can approve his next injection please

## 2023-12-04 ENCOUNTER — OFFICE VISIT (OUTPATIENT)
Dept: NEUROLOGY | Facility: CLINIC | Age: 83
End: 2023-12-04
Payer: MEDICARE

## 2023-12-04 VITALS
OXYGEN SATURATION: 94 % | DIASTOLIC BLOOD PRESSURE: 70 MMHG | BODY MASS INDEX: 32.74 KG/M2 | HEART RATE: 88 BPM | WEIGHT: 177.91 LBS | HEIGHT: 62 IN | SYSTOLIC BLOOD PRESSURE: 138 MMHG

## 2023-12-04 DIAGNOSIS — R25.1 TREMOR: Primary | Chronic | ICD-10-CM

## 2023-12-04 PROCEDURE — 3075F SYST BP GE 130 - 139MM HG: CPT | Performed by: PSYCHIATRY & NEUROLOGY

## 2023-12-04 PROCEDURE — 99213 OFFICE O/P EST LOW 20 MIN: CPT | Performed by: PSYCHIATRY & NEUROLOGY

## 2023-12-04 PROCEDURE — 1159F MED LIST DOCD IN RCRD: CPT | Performed by: PSYCHIATRY & NEUROLOGY

## 2023-12-04 PROCEDURE — 3078F DIAST BP <80 MM HG: CPT | Performed by: PSYCHIATRY & NEUROLOGY

## 2023-12-04 PROCEDURE — 1160F RVW MEDS BY RX/DR IN RCRD: CPT | Performed by: PSYCHIATRY & NEUROLOGY

## 2023-12-04 RX ORDER — TOPIRAMATE 25 MG/1
TABLET ORAL
Qty: 120 TABLET | Refills: 3 | Status: SHIPPED | OUTPATIENT
Start: 2023-12-04

## 2023-12-04 NOTE — PROGRESS NOTES
"Chief Complaint  Tremors    Subjective        Hal Byrnes presents to [facility] NEUROLOGY  History of Present Illness    83 y.o. male returns in follow up.  Last visit on 8/3/23 rx Primidone.     Primidone not improvement in tremors.       Left greater than right tremor in hands.      Trouble writing, using utensils, eating with a spoon and drinking.       Reviewed medical records:     Increasing tremor in hands for months.  Difficulty writing       TSH 2.01   B12 432  Cr 1.9   Objective   Vital Signs:  /70   Pulse 88   Ht 157.5 cm (62.01\")   Wt 80.7 kg (177 lb 14.6 oz)   SpO2 94%   BMI 32.53 kg/m²   Estimated body mass index is 32.53 kg/m² as calculated from the following:    Height as of this encounter: 157.5 cm (62.01\").    Weight as of this encounter: 80.7 kg (177 lb 14.6 oz).           Neurologic Exam     Mental Status   Oriented to person, place, and time.   Speech: speech is normal   Level of consciousness: alert  Knowledge: good and consistent with education.   Normal comprehension.     Cranial Nerves   Cranial nerves II through XII intact.     CN II   Visual fields full to confrontation.   Visual acuity: normal  Right visual field deficit: none  Left visual field deficit: none     CN III, IV, VI   Pupils are equal, round, and reactive to light.  Extraocular motions are normal.   Nystagmus: none   Diplopia: none  Ophthalmoparesis: none  Upgaze: normal  Downgaze: normal  Conjugate gaze: present    CN V   Facial sensation intact.   Right corneal reflex: normal  Left corneal reflex: normal    CN VII   Right facial weakness: none  Left facial weakness: none    CN VIII   Hearing: intact    CN IX, X   Palate: symmetric  Right gag reflex: normal  Left gag reflex: normal    CN XI   Right sternocleidomastoid strength: normal  Left sternocleidomastoid strength: normal    CN XII   Tongue: not atrophic  Fasciculations: absent  Tongue deviation: none    Motor Exam   Muscle bulk: " normal  Overall muscle tone: normal    Strength   Strength 5/5 throughout.     Sensory Exam   Light touch normal.     Gait, Coordination, and Reflexes     Gait  Gait: normal    Tremor   Resting tremor: absent  Intention tremor: absent  Action tremor: absent    Reflexes   Reflexes 2+ except as noted.        Physical Exam  Eyes:      Extraocular Movements: EOM normal.      Pupils: Pupils are equal, round, and reactive to light.   Neurological:      Mental Status: He is oriented to person, place, and time.      Cranial Nerves: Cranial nerves 2-12 are intact.      Motor: Motor strength is normal.     Gait: Gait is intact.   Psychiatric:         Speech: Speech normal.        Result Review :  The following data was reviewed by: Khanh Christian MD on 12/04/2023:  Common labs          8/9/2023    13:30 10/4/2023    14:53 11/21/2023    08:24   Common Labs   Glucose  76     BUN  45     Creatinine  2.55     Sodium  138     Potassium  5.1     Chloride  101     Calcium  10.0     Albumin  4.4     WBC  7.86     Hemoglobin  13.4     Hematocrit  39.5     Platelets  295     Total Cholesterol   133    Triglycerides   167    HDL Cholesterol   41    LDL Cholesterol    64    Hemoglobin A1C 6.0                     Assessment and Plan   Diagnoses and all orders for this visit:    1. Tremor (Primary)  Assessment & Plan:  Stop Primidone     Start TPM      Other orders  -     topiramate (Topamax) 25 MG tablet; Take one tablet twice a day for one week, then two tablets twice a day  Dispense: 120 tablet; Refill: 3             Follow Up   No follow-ups on file.  Patient was given instructions and counseling regarding his condition or for health maintenance advice. Please see specific information pulled into the AVS if appropriate.

## 2023-12-13 ENCOUNTER — INFUSION (OUTPATIENT)
Dept: ONCOLOGY | Facility: HOSPITAL | Age: 83
End: 2023-12-13
Payer: MEDICARE

## 2023-12-13 VITALS
HEART RATE: 74 BPM | SYSTOLIC BLOOD PRESSURE: 167 MMHG | DIASTOLIC BLOOD PRESSURE: 79 MMHG | RESPIRATION RATE: 18 BRPM | TEMPERATURE: 97.3 F

## 2023-12-13 DIAGNOSIS — E78.5 HYPERLIPIDEMIA, UNSPECIFIED HYPERLIPIDEMIA TYPE: Primary | ICD-10-CM

## 2023-12-13 PROCEDURE — 25010000002 INCLISIRAN SODIUM 284 MG/1.5ML SOLUTION PREFILLED SYRINGE: Performed by: INTERNAL MEDICINE

## 2023-12-13 PROCEDURE — 96372 THER/PROPH/DIAG INJ SC/IM: CPT

## 2023-12-13 RX ORDER — MEPERIDINE HYDROCHLORIDE 25 MG/ML
25 INJECTION INTRAMUSCULAR; INTRAVENOUS; SUBCUTANEOUS AS NEEDED
OUTPATIENT
Start: 2023-12-13

## 2023-12-13 RX ORDER — DIPHENHYDRAMINE HYDROCHLORIDE 50 MG/ML
50 INJECTION INTRAMUSCULAR; INTRAVENOUS AS NEEDED
OUTPATIENT
Start: 2023-12-13

## 2023-12-13 RX ORDER — FAMOTIDINE 10 MG/ML
20 INJECTION, SOLUTION INTRAVENOUS AS NEEDED
OUTPATIENT
Start: 2023-12-13

## 2023-12-13 RX ADMIN — INCLISIRAN 284 MG: 284 INJECTION, SOLUTION SUBCUTANEOUS at 10:02

## 2024-01-03 ENCOUNTER — OFFICE VISIT (OUTPATIENT)
Dept: INTERNAL MEDICINE | Facility: CLINIC | Age: 84
End: 2024-01-03
Payer: MEDICARE

## 2024-01-03 VITALS
OXYGEN SATURATION: 95 % | HEART RATE: 76 BPM | DIASTOLIC BLOOD PRESSURE: 78 MMHG | WEIGHT: 179 LBS | BODY MASS INDEX: 32.94 KG/M2 | TEMPERATURE: 97 F | HEIGHT: 62 IN | SYSTOLIC BLOOD PRESSURE: 154 MMHG

## 2024-01-03 DIAGNOSIS — K59.03 DRUG INDUCED CONSTIPATION: Primary | ICD-10-CM

## 2024-01-03 DIAGNOSIS — J06.9 VIRAL URI WITH COUGH: ICD-10-CM

## 2024-01-03 DIAGNOSIS — K86.89 PANCREATIC INSUFFICIENCY: ICD-10-CM

## 2024-01-03 LAB
EXPIRATION DATE: NORMAL
EXPIRATION DATE: NORMAL
FLUAV AG UPPER RESP QL IA.RAPID: NOT DETECTED
FLUBV AG UPPER RESP QL IA.RAPID: NOT DETECTED
INTERNAL CONTROL: NORMAL
INTERNAL CONTROL: NORMAL
Lab: NORMAL
Lab: NORMAL
S PYO AG THROAT QL: NEGATIVE
SARS-COV-2 AG UPPER RESP QL IA.RAPID: NOT DETECTED

## 2024-01-03 PROCEDURE — 3078F DIAST BP <80 MM HG: CPT | Performed by: INTERNAL MEDICINE

## 2024-01-03 PROCEDURE — 99214 OFFICE O/P EST MOD 30 MIN: CPT | Performed by: INTERNAL MEDICINE

## 2024-01-03 PROCEDURE — 87428 SARSCOV & INF VIR A&B AG IA: CPT | Performed by: INTERNAL MEDICINE

## 2024-01-03 PROCEDURE — 3077F SYST BP >= 140 MM HG: CPT | Performed by: INTERNAL MEDICINE

## 2024-01-03 PROCEDURE — 87880 STREP A ASSAY W/OPTIC: CPT | Performed by: INTERNAL MEDICINE

## 2024-01-03 NOTE — PROGRESS NOTES
Internal Medicine Acute Visit    Chief Complaint   Patient presents with    Cough    Sore Throat    Constipation        HPI  Mr. Byrnes comes in today due to constipation. He does also have cough, congestion x1 week but reports this is not an issue and is resolving. He is mostly concerned with constipation. Notes that this is a side effect of zenpep which he takes for pancreatic insufficiency. Constipation has been ongoing for months. He has tried miralax and OTC laxatives. He has a small nonbloody BM once every 3-4 days. He describes it as hard and difficult to pass. He denies abdominal pain. Reports colonoscopy last 2-3 years ago. Would like to see a pancreas specialist.    Cough  Associated symptoms include a sore throat.   Sore Throat   Associated symptoms include coughing. Pertinent negatives include no abdominal pain.   Constipation  Pertinent negatives include no abdominal pain.        Review of Systems  Review of Systems   Constitutional:  Positive for unexpected weight gain.   HENT:  Positive for sore throat.    Respiratory:  Positive for cough.    Gastrointestinal:  Positive for constipation and indigestion. Negative for abdominal pain, anal bleeding and blood in stool.        Medications  Current Outpatient Medications on File Prior to Visit   Medication Sig Dispense Refill    aspirin 81 MG chewable tablet Chew 1 tablet Daily.      dilTIAZem (TIAZAC) 240 MG 24 hr capsule       furosemide (LASIX) 20 MG tablet Take 1 tablet by mouth Daily.      hydroCHLOROthiazide (HYDRODIURIL) 25 MG tablet TAKE 1 TABLET BY MOUTH DAILY 90 tablet 1    Inclisiran Sodium 284 MG/1.5ML solution prefilled syringe Inject 1.5 mL under the skin into the appropriate area as directed Take As Directed.      isosorbide mononitrate (IMDUR) 60 MG 24 hr tablet Take 1 tablet by mouth 2 (Two) Times a Day. 180 tablet 3    lisinopril (PRINIVIL,ZESTRIL) 40 MG tablet Take 1 tablet by mouth Daily.      Pancrelipase, Lip-Prot-Amyl, (Zenpep)  "85522-82259 units capsule delayed-release particles Take 2 capsules by mouth 3 (Three) Times a Day With Meals. 540 capsule 3    spironolactone (ALDACTONE) 25 MG tablet Take 1 tablet by mouth Daily.      vitamin D (ERGOCALCIFEROL) 1.25 MG (28275 UT) capsule capsule Take 1 capsule by mouth 1 (One) Time Per Week.       No current facility-administered medications on file prior to visit.        Allergies  Allergies   Allergen Reactions    Sulfa Antibiotics Irritability       PMH  Past Medical History:   Diagnosis Date    Arthritis     Coronary artery disease     Difficulty walking     Hyperlipidemia     Hypertension     Low back pain        Objective  Visit Vitals  /78   Pulse 76   Temp 97 °F (36.1 °C)   Ht 157.5 cm (62\")   Wt 81.2 kg (179 lb)   SpO2 95%   BMI 32.74 kg/m²        Physical Exam  Physical Exam  Vitals and nursing note reviewed.   Constitutional:       General: He is not in acute distress.     Appearance: He is well-developed. He is obese. He is not ill-appearing or toxic-appearing.   HENT:      Head: Normocephalic and atraumatic.   Eyes:      Conjunctiva/sclera: Conjunctivae normal.   Pulmonary:      Effort: Pulmonary effort is normal. No respiratory distress.   Abdominal:      General: Bowel sounds are decreased. There is distension (obese).      Palpations: Abdomen is soft.      Tenderness: There is no abdominal tenderness. There is no guarding or rebound.   Skin:     General: Skin is warm and dry.   Neurological:      Mental Status: He is alert and oriented to person, place, and time. Mental status is at baseline.      Gait: Gait normal.         Results  Results for orders placed or performed in visit on 01/03/24   POCT rapid strep A    Specimen: Swab   Result Value Ref Range    Rapid Strep A Screen Negative Negative, VALID, INVALID, Not Performed    Internal Control Passed Passed    Lot Number [id number]     Expiration Date 1/18/2026    POCT SARS-CoV-2 Antigen RUBIO + Flu    Specimen: Swab   Result " Value Ref Range    SARS Antigen Not Detected Not Detected, Presumptive Negative    Influenza A Antigen RUBIO Not Detected Not Detected    Influenza B Antigen RUBIO Not Detected Not Detected    Internal Control Passed Passed    Lot Number [id number]     Expiration Date 11/10/2024         Assessment and Plan  Diagnoses and all orders for this visit:    Drug induced constipation  - likely side effect of zenpep  - tried OTC laxatives with no relief  - recommend bisacodyl suppository. Prefer this to enema given his CKD.  - will start linzess 72mcg daily    Viral URI with cough  - POC COVID, flu , strep negative  - reports sxs resolving and does not desire further workup or treatment    Pancreatic insufficiency  - per patient secondary to pancreatitis  - on zenpep  - referral to GI     Health Maintenance  - Colonoscopy: reports done within last 2-3 years (data deficit)  - Immunizations: Flu UTD. COVID UTD. RSV complete. PCV20, tdap, shingrix discussed.  - Depression screening: negative 10/2023    Return for Next scheduled follow up.

## 2024-01-04 ENCOUNTER — TRANSCRIBE ORDERS (OUTPATIENT)
Dept: LAB | Facility: HOSPITAL | Age: 84
End: 2024-01-04
Payer: MEDICARE

## 2024-01-04 ENCOUNTER — LAB (OUTPATIENT)
Dept: LAB | Facility: HOSPITAL | Age: 84
End: 2024-01-04
Payer: MEDICARE

## 2024-01-04 DIAGNOSIS — N18.32 CHRONIC KIDNEY DISEASE (CKD) STAGE G3B/A1, MODERATELY DECREASED GLOMERULAR FILTRATION RATE (GFR) BETWEEN 30-44 ML/MIN/1.73 SQUARE METER AND ALBUMINURIA CREATININE RATIO LESS THAN 30 MG/G (CMS/H*: ICD-10-CM

## 2024-01-04 DIAGNOSIS — E55.9 AVITAMINOSIS D: ICD-10-CM

## 2024-01-04 DIAGNOSIS — E13.69 OTHER SPECIFIED DIABETES MELLITUS WITH OTHER SPECIFIED COMPLICATION, UNSPECIFIED WHETHER LONG TERM INSULIN USE: ICD-10-CM

## 2024-01-04 DIAGNOSIS — N18.32 CHRONIC KIDNEY DISEASE (CKD) STAGE G3B/A1, MODERATELY DECREASED GLOMERULAR FILTRATION RATE (GFR) BETWEEN 30-44 ML/MIN/1.73 SQUARE METER AND ALBUMINURIA CREATININE RATIO LESS THAN 30 MG/G (CMS/H*: Primary | ICD-10-CM

## 2024-01-04 LAB
25(OH)D3 SERPL-MCNC: 89.1 NG/ML (ref 30–100)
HBA1C MFR BLD: 5.9 % (ref 4.8–5.6)
HCT VFR BLD AUTO: 39.5 % (ref 37.5–51)
HGB BLD-MCNC: 13.4 G/DL (ref 13–17.7)
PTH-INTACT SERPL-MCNC: 64.6 PG/ML (ref 15–65)
URATE SERPL-MCNC: 11 MG/DL (ref 3.4–7)

## 2024-01-04 PROCEDURE — 82570 ASSAY OF URINE CREATININE: CPT

## 2024-01-04 PROCEDURE — 82306 VITAMIN D 25 HYDROXY: CPT

## 2024-01-04 PROCEDURE — 83036 HEMOGLOBIN GLYCOSYLATED A1C: CPT

## 2024-01-04 PROCEDURE — 85018 HEMOGLOBIN: CPT

## 2024-01-04 PROCEDURE — 84550 ASSAY OF BLOOD/URIC ACID: CPT

## 2024-01-04 PROCEDURE — 36415 COLL VENOUS BLD VENIPUNCTURE: CPT

## 2024-01-04 PROCEDURE — 84156 ASSAY OF PROTEIN URINE: CPT

## 2024-01-04 PROCEDURE — 83970 ASSAY OF PARATHORMONE: CPT

## 2024-01-04 PROCEDURE — 80069 RENAL FUNCTION PANEL: CPT

## 2024-01-04 PROCEDURE — 85014 HEMATOCRIT: CPT

## 2024-01-05 LAB
ALBUMIN SERPL-MCNC: 4.4 G/DL (ref 3.5–5.2)
ANION GAP SERPL CALCULATED.3IONS-SCNC: 17.1 MMOL/L (ref 5–15)
BUN SERPL-MCNC: 54 MG/DL (ref 8–23)
BUN/CREAT SERPL: 21.3 (ref 7–25)
CALCIUM SPEC-SCNC: 9.6 MG/DL (ref 8.6–10.5)
CHLORIDE SERPL-SCNC: 100 MMOL/L (ref 98–107)
CO2 SERPL-SCNC: 18.9 MMOL/L (ref 22–29)
CREAT SERPL-MCNC: 2.53 MG/DL (ref 0.76–1.27)
CREAT UR-MCNC: 36.9 MG/DL
EGFRCR SERPLBLD CKD-EPI 2021: 24.7 ML/MIN/1.73
GLUCOSE SERPL-MCNC: 120 MG/DL (ref 65–99)
PHOSPHATE SERPL-MCNC: 3.7 MG/DL (ref 2.5–4.5)
POTASSIUM SERPL-SCNC: 4.7 MMOL/L (ref 3.5–5.2)
PROT ?TM UR-MCNC: 70.1 MG/DL
PROT/CREAT UR: 1899.7 MG/G CREA (ref 0–200)
SODIUM SERPL-SCNC: 136 MMOL/L (ref 136–145)

## 2024-01-11 DIAGNOSIS — I10 HYPERTENSION, UNSPECIFIED TYPE: ICD-10-CM

## 2024-01-11 RX ORDER — HYDROCHLOROTHIAZIDE 25 MG/1
25 TABLET ORAL DAILY
Qty: 90 TABLET | Refills: 1 | Status: SHIPPED | OUTPATIENT
Start: 2024-01-11

## 2024-01-30 DIAGNOSIS — I25.10 CORONARY ARTERY DISEASE INVOLVING NATIVE CORONARY ARTERY OF NATIVE HEART WITHOUT ANGINA PECTORIS: ICD-10-CM

## 2024-01-30 DIAGNOSIS — I10 PRIMARY HYPERTENSION: ICD-10-CM

## 2024-01-30 RX ORDER — ISOSORBIDE MONONITRATE 60 MG/1
60 TABLET, EXTENDED RELEASE ORAL 2 TIMES DAILY
Qty: 180 TABLET | Refills: 3 | Status: SHIPPED | OUTPATIENT
Start: 2024-01-30

## 2024-02-05 ENCOUNTER — LAB (OUTPATIENT)
Dept: LAB | Facility: HOSPITAL | Age: 84
End: 2024-02-05
Payer: MEDICARE

## 2024-02-05 ENCOUNTER — OFFICE VISIT (OUTPATIENT)
Dept: INTERNAL MEDICINE | Facility: CLINIC | Age: 84
End: 2024-02-05
Payer: MEDICARE

## 2024-02-05 VITALS
WEIGHT: 175.4 LBS | HEART RATE: 88 BPM | SYSTOLIC BLOOD PRESSURE: 122 MMHG | HEIGHT: 62 IN | DIASTOLIC BLOOD PRESSURE: 62 MMHG | OXYGEN SATURATION: 95 % | BODY MASS INDEX: 32.28 KG/M2

## 2024-02-05 DIAGNOSIS — E08.22 DIABETES MELLITUS DUE TO UNDERLYING CONDITION WITH STAGE 3B CHRONIC KIDNEY DISEASE, WITHOUT LONG-TERM CURRENT USE OF INSULIN: ICD-10-CM

## 2024-02-05 DIAGNOSIS — R53.83 FATIGUE, UNSPECIFIED TYPE: ICD-10-CM

## 2024-02-05 DIAGNOSIS — N18.32 DIABETES MELLITUS DUE TO UNDERLYING CONDITION WITH STAGE 3B CHRONIC KIDNEY DISEASE, WITHOUT LONG-TERM CURRENT USE OF INSULIN: ICD-10-CM

## 2024-02-05 DIAGNOSIS — N18.32 STAGE 3B CHRONIC KIDNEY DISEASE: Primary | ICD-10-CM

## 2024-02-05 DIAGNOSIS — K59.03 DRUG INDUCED CONSTIPATION: ICD-10-CM

## 2024-02-05 DIAGNOSIS — I10 PRIMARY HYPERTENSION: ICD-10-CM

## 2024-02-05 LAB
TSH SERPL DL<=0.05 MIU/L-ACNC: 1.78 UIU/ML (ref 0.27–4.2)
VIT B12 BLD-MCNC: 443 PG/ML (ref 211–946)

## 2024-02-05 PROCEDURE — 1160F RVW MEDS BY RX/DR IN RCRD: CPT | Performed by: INTERNAL MEDICINE

## 2024-02-05 PROCEDURE — 99214 OFFICE O/P EST MOD 30 MIN: CPT | Performed by: INTERNAL MEDICINE

## 2024-02-05 PROCEDURE — 1159F MED LIST DOCD IN RCRD: CPT | Performed by: INTERNAL MEDICINE

## 2024-02-05 PROCEDURE — 84443 ASSAY THYROID STIM HORMONE: CPT

## 2024-02-05 PROCEDURE — 3078F DIAST BP <80 MM HG: CPT | Performed by: INTERNAL MEDICINE

## 2024-02-05 PROCEDURE — 82607 VITAMIN B-12: CPT

## 2024-02-05 PROCEDURE — 3074F SYST BP LT 130 MM HG: CPT | Performed by: INTERNAL MEDICINE

## 2024-02-05 RX ORDER — FAMOTIDINE 40 MG/1
40 TABLET, FILM COATED ORAL DAILY
COMMUNITY
Start: 2024-01-30

## 2024-02-05 RX ORDER — TOPIRAMATE 25 MG/1
25 TABLET ORAL 2 TIMES DAILY
COMMUNITY
Start: 2024-01-30

## 2024-02-21 ENCOUNTER — OFFICE VISIT (OUTPATIENT)
Dept: GASTROENTEROLOGY | Facility: CLINIC | Age: 84
End: 2024-02-21
Payer: MEDICARE

## 2024-02-21 VITALS
HEIGHT: 62 IN | TEMPERATURE: 97.3 F | WEIGHT: 178 LBS | DIASTOLIC BLOOD PRESSURE: 70 MMHG | BODY MASS INDEX: 32.76 KG/M2 | SYSTOLIC BLOOD PRESSURE: 122 MMHG | HEART RATE: 64 BPM | OXYGEN SATURATION: 95 %

## 2024-02-21 DIAGNOSIS — K59.00 CONSTIPATION, UNSPECIFIED CONSTIPATION TYPE: Primary | ICD-10-CM

## 2024-02-21 PROCEDURE — 99204 OFFICE O/P NEW MOD 45 MIN: CPT | Performed by: INTERNAL MEDICINE

## 2024-02-21 PROCEDURE — 1159F MED LIST DOCD IN RCRD: CPT | Performed by: INTERNAL MEDICINE

## 2024-02-21 PROCEDURE — 1160F RVW MEDS BY RX/DR IN RCRD: CPT | Performed by: INTERNAL MEDICINE

## 2024-02-21 PROCEDURE — 3078F DIAST BP <80 MM HG: CPT | Performed by: INTERNAL MEDICINE

## 2024-02-21 PROCEDURE — 3074F SYST BP LT 130 MM HG: CPT | Performed by: INTERNAL MEDICINE

## 2024-02-21 NOTE — PROGRESS NOTES
PCP: Pia De La Garza MD    Chief Complaint   Patient presents with    Constipation    Diarrhea        History of Present Illness:   Hal Byrnes is a 83 y.o. male who presents to the GI clinic as a consultation for constipation.  He has gained wt and is not exercising. His last colonoscopy was in [place] around 3 years ago, [facility].  Constipation began around 6 months ago. Given linzess 72 ug which helped but caused liquid stool in the morning. 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 Given zenpep, 2 with meals.  Reports sore throat and bitterness in back of throat. No gib loss.    Past Medical History:   Diagnosis Date    Arthritis     Coronary artery disease     Difficulty walking     Hyperlipidemia     Hypertension     Low back pain        Past Surgical History:   Procedure Laterality Date    APPENDECTOMY      CAROTID STENT      CATARACT EXTRACTION, BILATERAL Bilateral     CORONARY STENT PLACEMENT      PROSTATE SURGERY      for BPH         Current Outpatient Medications:     aspirin 81 MG chewable tablet, Chew 1 tablet Daily., Disp: , Rfl:     dilTIAZem (TIAZAC) 240 MG 24 hr capsule, , Disp: , Rfl:     hydroCHLOROthiazide (HYDRODIURIL) 25 MG tablet, TAKE 1 TABLET BY MOUTH DAILY, Disp: 90 tablet, Rfl: 1    isosorbide mononitrate (IMDUR) 60 MG 24 hr tablet, TAKE ONE TABLET BY MOUTH TWICE A DAY, Disp: 180 tablet, Rfl: 3    lisinopril (PRINIVIL,ZESTRIL) 40 MG tablet, Take 0.5 tablets by mouth Daily., Disp: , Rfl:     Pancrelipase, Lip-Prot-Amyl, (Zenpep) 29081-94273 units capsule delayed-release particles, Take 2 capsules by mouth 3 (Three) Times a Day With Meals., Disp: 540 capsule, Rfl: 3    spironolactone (ALDACTONE) 25 MG tablet, Take 1 tablet by mouth Daily., Disp: , Rfl:     topiramate (TOPAMAX) 25 MG tablet, Take 1 tablet by mouth 2 (Two) Times a Day., Disp: , Rfl:     famotidine (PEPCID) 40 MG tablet, Take 1 tablet by mouth Daily. (Patient not  taking: Reported on 2024), Disp: , Rfl:     linaclotide (Linzess) 290 MCG capsule capsule, Take 1 capsule by mouth Every Morning Before Breakfast. Take 1 capsule by mouth 30 minutes before breakfast daily., Disp: 30 capsule, Rfl: 3    vitamin D (ERGOCALCIFEROL) 1.25 MG (55161 UT) capsule capsule, Take 1 capsule by mouth 1 (One) Time Per Week. (Patient not taking: Reported on 2024), Disp: , Rfl:     Allergies   Allergen Reactions    Sulfa Antibiotics Irritability       Family History   Problem Relation Age of Onset    Heart attack Mother     Brain cancer Sister     Heart attack Brother     Hepatitis Brother        Social History     Socioeconomic History    Marital status:    Tobacco Use    Smoking status: Former     Packs/day: 1.50     Years: 10.00     Additional pack years: 0.00     Total pack years: 15.00     Types: Cigarettes     Quit date: 2011     Years since quittin.8     Passive exposure: Never    Smokeless tobacco: Never   Vaping Use    Vaping Use: Never used   Substance and Sexual Activity    Alcohol use: Never    Drug use: Not Currently     Types: Marijuana     Comment: longstanding smoking, stopped 2 weeks ago    Sexual activity: Not Currently       Review of Systems  All other systems reviewed and are negative.    Vitals:    24 1503   BP: 122/70   Pulse: 64   Temp: 97.3 °F (36.3 °C)   SpO2: 95%       Physical Exam  General Appearance:  Vitals as above. no acute distress  Head/face:  Normocephalic, atraumatic  Eyes:   EOMI, no conjunctivitis or icterus   Nose/Sinuses:  Nares patent bilaterally without discharge or lesions  Mouth/Throat:  Normal oral movements without dyskinesia  Neck:  trachea is midline, no thyromegaly  Lungs:  Normal work of breathing effort, no overt rales  Heart:  No overt JVD or type VI murmur  Abdomen:  Nondistended, no guarding or rebound tenderness  Neurologic:  Alert; no focal deficits; age appropriate behavior and speech  Psychiatric: mood and  affect are congruent  Vascular: extremities without edema  Skin: no rash or cyanosis.      Assessment/Plan  1) Constipation, change in bowel habits  Discussed colonoscopy. Patient would like to avoid for now and try medicine change.  Plan:  Reduce zenpep to 1 with meals  Continue linzess  Increase exercise/hydration    2.) Dizziness with falls  Recommend to see cardiology    3.) GERD with bitter taste  Suspect bilious reflux  Recommend egd after cardiology assessment    4.) HCM  Acquire last colonoscopy from [facility]. Future consideration to proceed to colonoscopy     Len Linda MD  2/21/2024

## 2024-03-08 RX ORDER — SPIRONOLACTONE 25 MG/1
12.5 TABLET ORAL EVERY OTHER DAY
Qty: 30 TABLET | Refills: 0 | Status: SHIPPED | OUTPATIENT
Start: 2024-03-08

## 2024-03-08 NOTE — TELEPHONE ENCOUNTER
Lab Results   Component Value Date    CREATININE 2.53 (H) 01/04/2024    BUN 54 (H) 01/04/2024     01/04/2024    K 4.7 01/04/2024     01/04/2024    CO2 18.9 (L) 01/04/2024

## 2024-03-08 NOTE — TELEPHONE ENCOUNTER
Lab Results   Component Value Date    GLUCOSE 120 (H) 01/04/2024    CALCIUM 9.6 01/04/2024     01/04/2024    K 4.7 01/04/2024    CO2 18.9 (L) 01/04/2024     01/04/2024    BUN 54 (H) 01/04/2024    CREATININE 2.53 (H) 01/04/2024    EGFR 24.7 (L) 01/04/2024    BCR 21.3 01/04/2024    ANIONGAP 17.1 (H) 01/04/2024

## 2024-04-03 ENCOUNTER — LAB (OUTPATIENT)
Dept: LAB | Facility: HOSPITAL | Age: 84
End: 2024-04-03
Payer: MEDICARE

## 2024-04-03 ENCOUNTER — TRANSCRIBE ORDERS (OUTPATIENT)
Dept: LAB | Facility: HOSPITAL | Age: 84
End: 2024-04-03
Payer: MEDICARE

## 2024-04-03 DIAGNOSIS — I10 ESSENTIAL HYPERTENSION, MALIGNANT: ICD-10-CM

## 2024-04-03 DIAGNOSIS — N18.32 CHRONIC KIDNEY DISEASE (CKD) STAGE G3B/A1, MODERATELY DECREASED GLOMERULAR FILTRATION RATE (GFR) BETWEEN 30-44 ML/MIN/1.73 SQUARE METER AND ALBUMINURIA CREATININE RATIO LESS THAN 30 MG/G (CMS/H*: Primary | ICD-10-CM

## 2024-04-03 DIAGNOSIS — E55.9 AVITAMINOSIS D: ICD-10-CM

## 2024-04-03 DIAGNOSIS — N18.32 CHRONIC KIDNEY DISEASE (CKD) STAGE G3B/A1, MODERATELY DECREASED GLOMERULAR FILTRATION RATE (GFR) BETWEEN 30-44 ML/MIN/1.73 SQUARE METER AND ALBUMINURIA CREATININE RATIO LESS THAN 30 MG/G (CMS/H*: ICD-10-CM

## 2024-04-03 LAB
ALBUMIN SERPL-MCNC: 4.4 G/DL (ref 3.5–5.2)
ANION GAP SERPL CALCULATED.3IONS-SCNC: 12.2 MMOL/L (ref 5–15)
BACTERIA UR QL AUTO: NORMAL /HPF
BASOPHILS # BLD AUTO: 0.03 10*3/MM3 (ref 0–0.2)
BASOPHILS NFR BLD AUTO: 0.3 % (ref 0–1.5)
BILIRUB UR QL STRIP: NEGATIVE
BUN SERPL-MCNC: 47 MG/DL (ref 8–23)
BUN/CREAT SERPL: 19.2 (ref 7–25)
CALCIUM SPEC-SCNC: 9.9 MG/DL (ref 8.6–10.5)
CHLORIDE SERPL-SCNC: 104 MMOL/L (ref 98–107)
CLARITY UR: CLEAR
CO2 SERPL-SCNC: 22.8 MMOL/L (ref 22–29)
COLOR UR: YELLOW
CREAT SERPL-MCNC: 2.45 MG/DL (ref 0.76–1.27)
DEPRECATED RDW RBC AUTO: 45.5 FL (ref 37–54)
EGFRCR SERPLBLD CKD-EPI 2021: 25.6 ML/MIN/1.73
EOSINOPHIL # BLD AUTO: 0.14 10*3/MM3 (ref 0–0.4)
EOSINOPHIL NFR BLD AUTO: 1.4 % (ref 0.3–6.2)
ERYTHROCYTE [DISTWIDTH] IN BLOOD BY AUTOMATED COUNT: 13.4 % (ref 12.3–15.4)
GLUCOSE SERPL-MCNC: 100 MG/DL (ref 65–99)
GLUCOSE UR STRIP-MCNC: NEGATIVE MG/DL
HCT VFR BLD AUTO: 41.4 % (ref 37.5–51)
HGB BLD-MCNC: 13.9 G/DL (ref 13–17.7)
HGB UR QL STRIP.AUTO: NEGATIVE
HYALINE CASTS UR QL AUTO: NORMAL /LPF
IMM GRANULOCYTES # BLD AUTO: 0.11 10*3/MM3 (ref 0–0.05)
IMM GRANULOCYTES NFR BLD AUTO: 1.1 % (ref 0–0.5)
KETONES UR QL STRIP: NEGATIVE
LEUKOCYTE ESTERASE UR QL STRIP.AUTO: NEGATIVE
LYMPHOCYTES # BLD AUTO: 2.57 10*3/MM3 (ref 0.7–3.1)
LYMPHOCYTES NFR BLD AUTO: 25.9 % (ref 19.6–45.3)
MCH RBC QN AUTO: 30.8 PG (ref 26.6–33)
MCHC RBC AUTO-ENTMCNC: 33.6 G/DL (ref 31.5–35.7)
MCV RBC AUTO: 91.8 FL (ref 79–97)
MONOCYTES # BLD AUTO: 0.79 10*3/MM3 (ref 0.1–0.9)
MONOCYTES NFR BLD AUTO: 8 % (ref 5–12)
NEUTROPHILS NFR BLD AUTO: 6.29 10*3/MM3 (ref 1.7–7)
NEUTROPHILS NFR BLD AUTO: 63.3 % (ref 42.7–76)
NITRITE UR QL STRIP: NEGATIVE
NRBC BLD AUTO-RTO: 0 /100 WBC (ref 0–0.2)
PH UR STRIP.AUTO: 5.5 [PH] (ref 5–8)
PHOSPHATE SERPL-MCNC: 3.7 MG/DL (ref 2.5–4.5)
PLATELET # BLD AUTO: 298 10*3/MM3 (ref 140–450)
PMV BLD AUTO: 10.7 FL (ref 6–12)
POTASSIUM SERPL-SCNC: 4.5 MMOL/L (ref 3.5–5.2)
PROT UR QL STRIP: ABNORMAL
RBC # BLD AUTO: 4.51 10*6/MM3 (ref 4.14–5.8)
RBC # UR STRIP: NORMAL /HPF
REF LAB TEST METHOD: NORMAL
SODIUM SERPL-SCNC: 139 MMOL/L (ref 136–145)
SP GR UR STRIP: 1.01 (ref 1–1.03)
SQUAMOUS #/AREA URNS HPF: NORMAL /HPF
URATE SERPL-MCNC: 10.5 MG/DL (ref 3.4–7)
UROBILINOGEN UR QL STRIP: ABNORMAL
WBC # UR STRIP: NORMAL /HPF
WBC NRBC COR # BLD AUTO: 9.93 10*3/MM3 (ref 3.4–10.8)

## 2024-04-03 PROCEDURE — 85025 COMPLETE CBC W/AUTO DIFF WBC: CPT

## 2024-04-03 PROCEDURE — 84550 ASSAY OF BLOOD/URIC ACID: CPT

## 2024-04-03 PROCEDURE — 80069 RENAL FUNCTION PANEL: CPT

## 2024-04-03 PROCEDURE — 81001 URINALYSIS AUTO W/SCOPE: CPT

## 2024-04-03 PROCEDURE — 36415 COLL VENOUS BLD VENIPUNCTURE: CPT

## 2024-04-08 ENCOUNTER — TELEPHONE (OUTPATIENT)
Dept: INTERNAL MEDICINE | Facility: CLINIC | Age: 84
End: 2024-04-08
Payer: MEDICARE

## 2024-04-08 RX ORDER — TOPIRAMATE 50 MG/1
50 TABLET, FILM COATED ORAL 2 TIMES DAILY
Qty: 180 TABLET | Refills: 3 | Status: SHIPPED | OUTPATIENT
Start: 2024-04-08

## 2024-04-08 NOTE — TELEPHONE ENCOUNTER
Rx Refill Note  Requested Prescriptions     Pending Prescriptions Disp Refills    topiramate (TOPAMAX) 25 MG tablet [Pharmacy Med Name: TOPIRAMATE 25 MG TABLET] 120 tablet      Sig: TAKE 1 TABLET BY MOUTH TWICE A DAY FOR 1 WEEK THEN INCREASE TO TAKE 2 TABLETS BY MOUTH TWICE A DAY      Last filled:  12/04/23 w/3  Last office visit with prescribing clinician: 12/4/2023      Next office visit with prescribing clinician: 6/4/2024     Shruthi Mccullough MA  04/08/24, 08:03 EDT

## 2024-04-08 NOTE — TELEPHONE ENCOUNTER
[facility] CALLED ASKING FOR LAST OFFICE NOTE OF PATIENT TO BE FAXED TO THEM.     FAX NUMBER: [phone]

## 2024-04-29 RX ORDER — FAMOTIDINE 40 MG/1
40 TABLET, FILM COATED ORAL DAILY
Qty: 90 TABLET | OUTPATIENT
Start: 2024-04-29

## 2024-04-29 NOTE — TELEPHONE ENCOUNTER
Reported not taking 02-  Prescribed by historical provider    Last office visit  Next office visit

## 2024-05-09 ENCOUNTER — TELEPHONE (OUTPATIENT)
Dept: GASTROENTEROLOGY | Facility: CLINIC | Age: 84
End: 2024-05-09
Payer: MEDICARE

## 2024-05-15 NOTE — PROGRESS NOTES
Follow-up Visit      Date: 2024  Patient Name: Hal Byrnes  : 1940   MRN: [id number]     Chief Complaint:    Chief Complaint   Patient presents with    Coronary artery disease involving native coronary artery of    Primary hypertension    Nonrheumatic aortic valve stenosis       History of Present Illness: Hal Byrnes is a 83 y.o. male who is here today for follow-up on his coronary artery disease and hyperlipidemia.  Patient has been doing much better.  Patient denies any chest pain and shortness of breath any dizziness any palpitations or any other symptoms.  He does not do a whole lot activities but what ever do does not give him any symptoms.    Patient denies any lower extremity edema.  Patient denies any process for nocturnal dyspnea or any bleeding.      Problem List     CARDIAC  Coronary Artery Disease:   NSTEMI with LHC : PCI to RCA and left circumflex  Repeat C with nonobstructive coronary artery disease in , , and   Lexiscan : LV function at rest.  Normal myocardial perfusion.    Myocardium:   Stress echo : Normal EF.  Mild diastolic dysfunction.  Mild posterior wall hypokinesis.  Echo : EF 70%, diastolic dysfunction, mildly dilated LA    Valvular:   Echo : Moderate AS, mean gradient 17, trace TR, trace MR  Echo : Moderate AS, mean gradient 19, mild MR, mild TR    Electrical:   Sinus rhythm    Percardium:   Normal       CARDIAC RISK FACTORS  Hypertension  Dyslipidemia  2023   HDL 41   2023   HDL 41 LDL 64  Physical Inactivity  Obstructive Sleep Apnea    NON-CARDIAC  GERD  Chronic pancreatitis  BPH  Osteopenia  Peripheral neuropathy  Cervical and lumbar spondylosis  Psoriasis  Chronic kidney disease.    SURGERIES  None reported      Subjective      Review of Systems:   Review of Systems   Respiratory:  Positive for apnea. Negative for cough, choking, chest tightness, shortness of breath, wheezing and  "stridor.    Cardiovascular:  Negative for chest pain, palpitations and leg swelling.       Medications:     Current Outpatient Medications:     aspirin 81 MG chewable tablet, Chew 1 tablet Daily., Disp: , Rfl:     dilTIAZem (TIAZAC) 240 MG 24 hr capsule, , Disp: , Rfl:     hydroCHLOROthiazide (HYDRODIURIL) 25 MG tablet, TAKE 1 TABLET BY MOUTH DAILY, Disp: 90 tablet, Rfl: 1    isosorbide mononitrate (IMDUR) 60 MG 24 hr tablet, TAKE ONE TABLET BY MOUTH TWICE A DAY, Disp: 180 tablet, Rfl: 3    linaclotide (Linzess) 72 MCG capsule capsule, Take 1 capsule by mouth Every Morning Before Breakfast., Disp: 30 capsule, Rfl: 3    lisinopril (PRINIVIL,ZESTRIL) 40 MG tablet, Take 0.5 tablets by mouth Daily., Disp: , Rfl:     Pancrelipase, Lip-Prot-Amyl, (Zenpep) 12468-10948 units capsule delayed-release particles, Take 2 capsules by mouth 3 (Three) Times a Day With Meals., Disp: 540 capsule, Rfl: 3    spironolactone (ALDACTONE) 25 MG tablet, Take 0.5 tablets by mouth Every Other Day., Disp: 30 tablet, Rfl: 0    topiramate (TOPAMAX) 50 MG tablet, Take 1 tablet by mouth 2 (Two) Times a Day., Disp: 180 tablet, Rfl: 3    vitamin D (ERGOCALCIFEROL) 1.25 MG (55419 UT) capsule capsule, Take 1 capsule by mouth Every 30 (Thirty) Days., Disp: , Rfl:     Allergies:   Allergies   Allergen Reactions    Sulfa Antibiotics Irritability       Objective     Physical Exam:  Vitals:    05/16/24 1400   BP: 152/76   BP Location: Left arm   Patient Position: Sitting   Cuff Size: Adult   Pulse: 81   SpO2: 96%   Weight: 80.1 kg (176 lb 9.6 oz)   Height: 157.5 cm (62.01\")     Body mass index is 32.29 kg/m².    Constitutional:       General: Not in acute distress.     Appearance: Healthy appearance. Not in distress.     Neck:     JVP:Not elevated     Carotid artery: Normal    Pulmonary:      Effort: Pulmonary effort is normal.      Breath sounds: Normal breath sounds. No wheezing. No rhonchi. No rales.     Cardiovascular:      Normal rate. Regular rhythm. " Normal S1. Normal S2.      Murmurs: There is 3/6 systolic murmur.      No gallop. No click. No rub.     Abdominal:      General: Bowel sounds are normal.      Palpations: Abdomen is soft.      Tenderness: There is no abdominal tenderness.    Extremities:     Pulses:Normal radial and pedal pulses     Edema:no edema    Smoking Cessation:   Tobacco Product History : Patient quit smoking in 2011 after 15 pack years.     Lab Review:   Lab Results   Component Value Date    GLUCOSE 100 (H) 04/03/2024    BUN 47 (H) 04/03/2024    CREATININE 2.45 (H) 04/03/2024    BCR 19.2 04/03/2024    K 4.5 04/03/2024    CO2 22.8 04/03/2024    CALCIUM 9.9 04/03/2024    ALBUMIN 4.4 04/03/2024     Lab Results   Component Value Date    WBC 9.93 04/03/2024    HGB 13.9 04/03/2024    HCT 41.4 04/03/2024    MCV 91.8 04/03/2024     04/03/2024     Lab Results   Component Value Date    TSH 1.780 02/05/2024             Date/Time: 5/16/2024 2:29 PM  Performed by: Carl Sainz MD    Authorized by: Carl Sainz MD  Comparison: compared with previous ECG from 11/16/2023  Similar to previous ECG  Rhythm: sinus rhythm  Other findings: non-specific ST-T wave changes    Clinical impression: normal ECG           Assessment / Plan      Assessment:   Diagnosis Plan   1. Coronary artery disease involving native coronary artery of native heart without angina pectoris  11/16/2023      2. Essential hypertension        3. Dyslipidemia        4. Nonrheumatic aortic valve stenosis  Adult Transthoracic Echo Complete W/ Cont if Necessary Per Protocol           Plan:  Patient has been stable on his coronary artery disease.  Will continue current medications.  His cholesterol has been under very good control with Leqvio.  His next dose is in June.  His murmur has become a little worse we will go ahead and do echocardiogram for evaluation of aortic stenosis.      Follow Up:       Return in about 6 months (around 11/16/2024).    Carl Sainz MD

## 2024-05-16 ENCOUNTER — OFFICE VISIT (OUTPATIENT)
Dept: CARDIOLOGY | Facility: CLINIC | Age: 84
End: 2024-05-16
Payer: MEDICARE

## 2024-05-16 VITALS
OXYGEN SATURATION: 96 % | BODY MASS INDEX: 32.5 KG/M2 | HEIGHT: 62 IN | WEIGHT: 176.6 LBS | HEART RATE: 81 BPM | DIASTOLIC BLOOD PRESSURE: 76 MMHG | SYSTOLIC BLOOD PRESSURE: 152 MMHG

## 2024-05-16 DIAGNOSIS — I25.10 CORONARY ARTERY DISEASE INVOLVING NATIVE CORONARY ARTERY OF NATIVE HEART WITHOUT ANGINA PECTORIS: Primary | ICD-10-CM

## 2024-05-16 DIAGNOSIS — I35.0 NONRHEUMATIC AORTIC VALVE STENOSIS: ICD-10-CM

## 2024-05-16 DIAGNOSIS — I10 ESSENTIAL HYPERTENSION: ICD-10-CM

## 2024-05-16 DIAGNOSIS — E78.5 DYSLIPIDEMIA: ICD-10-CM

## 2024-05-16 PROCEDURE — 3078F DIAST BP <80 MM HG: CPT | Performed by: INTERNAL MEDICINE

## 2024-05-16 PROCEDURE — 3077F SYST BP >= 140 MM HG: CPT | Performed by: INTERNAL MEDICINE

## 2024-05-16 PROCEDURE — 93000 ELECTROCARDIOGRAM COMPLETE: CPT | Performed by: INTERNAL MEDICINE

## 2024-05-16 PROCEDURE — 99214 OFFICE O/P EST MOD 30 MIN: CPT | Performed by: INTERNAL MEDICINE

## 2024-05-16 RX ORDER — ALLOPURINOL 100 MG/1
TABLET ORAL
COMMUNITY
Start: 2024-04-11 | End: 2024-05-16

## 2024-06-04 DIAGNOSIS — E78.5 HYPERLIPIDEMIA, UNSPECIFIED HYPERLIPIDEMIA TYPE: Primary | ICD-10-CM

## 2024-06-04 RX ORDER — DIPHENHYDRAMINE HYDROCHLORIDE 50 MG/ML
50 INJECTION INTRAMUSCULAR; INTRAVENOUS AS NEEDED
Status: DISCONTINUED | OUTPATIENT
Start: 2024-06-04 | End: 2024-06-04 | Stop reason: HOSPADM

## 2024-06-04 RX ORDER — MEPERIDINE HYDROCHLORIDE 25 MG/ML
25 INJECTION INTRAMUSCULAR; INTRAVENOUS; SUBCUTANEOUS AS NEEDED
OUTPATIENT
Start: 2024-06-04

## 2024-06-04 RX ORDER — MEPERIDINE HYDROCHLORIDE 25 MG/ML
25 INJECTION INTRAMUSCULAR; INTRAVENOUS; SUBCUTANEOUS AS NEEDED
Status: DISCONTINUED | OUTPATIENT
Start: 2024-06-04 | End: 2024-06-04 | Stop reason: HOSPADM

## 2024-06-04 RX ORDER — FAMOTIDINE 10 MG/ML
20 INJECTION, SOLUTION INTRAVENOUS AS NEEDED
OUTPATIENT
Start: 2024-06-04

## 2024-06-04 RX ORDER — DIPHENHYDRAMINE HYDROCHLORIDE 50 MG/ML
50 INJECTION INTRAMUSCULAR; INTRAVENOUS AS NEEDED
OUTPATIENT
Start: 2024-06-04

## 2024-06-04 RX ORDER — FAMOTIDINE 10 MG/ML
20 INJECTION, SOLUTION INTRAVENOUS AS NEEDED
Status: DISCONTINUED | OUTPATIENT
Start: 2024-06-04 | End: 2024-06-04 | Stop reason: HOSPADM

## 2024-06-05 ENCOUNTER — OFFICE VISIT (OUTPATIENT)
Dept: INTERNAL MEDICINE | Facility: CLINIC | Age: 84
End: 2024-06-05
Payer: MEDICARE

## 2024-06-05 VITALS
HEIGHT: 62 IN | DIASTOLIC BLOOD PRESSURE: 80 MMHG | TEMPERATURE: 97.8 F | SYSTOLIC BLOOD PRESSURE: 142 MMHG | WEIGHT: 176 LBS | OXYGEN SATURATION: 95 % | BODY MASS INDEX: 32.39 KG/M2 | HEART RATE: 78 BPM

## 2024-06-05 DIAGNOSIS — I10 PRIMARY HYPERTENSION: ICD-10-CM

## 2024-06-05 DIAGNOSIS — K59.00 CONSTIPATION, UNSPECIFIED CONSTIPATION TYPE: ICD-10-CM

## 2024-06-05 DIAGNOSIS — I35.0 NONRHEUMATIC AORTIC VALVE STENOSIS: ICD-10-CM

## 2024-06-05 DIAGNOSIS — E08.22 DIABETES MELLITUS DUE TO UNDERLYING CONDITION WITH STAGE 3B CHRONIC KIDNEY DISEASE, WITHOUT LONG-TERM CURRENT USE OF INSULIN: Primary | ICD-10-CM

## 2024-06-05 DIAGNOSIS — N18.32 DIABETES MELLITUS DUE TO UNDERLYING CONDITION WITH STAGE 3B CHRONIC KIDNEY DISEASE, WITHOUT LONG-TERM CURRENT USE OF INSULIN: Primary | ICD-10-CM

## 2024-06-05 DIAGNOSIS — N18.32 STAGE 3B CHRONIC KIDNEY DISEASE: ICD-10-CM

## 2024-06-05 LAB
EXPIRATION DATE: ABNORMAL
HBA1C MFR BLD: 6.2 % (ref 4.5–5.7)
Lab: ABNORMAL

## 2024-06-05 PROCEDURE — G0009 ADMIN PNEUMOCOCCAL VACCINE: HCPCS | Performed by: INTERNAL MEDICINE

## 2024-06-05 PROCEDURE — 1126F AMNT PAIN NOTED NONE PRSNT: CPT | Performed by: INTERNAL MEDICINE

## 2024-06-05 PROCEDURE — 83036 HEMOGLOBIN GLYCOSYLATED A1C: CPT | Performed by: INTERNAL MEDICINE

## 2024-06-05 PROCEDURE — 99214 OFFICE O/P EST MOD 30 MIN: CPT | Performed by: INTERNAL MEDICINE

## 2024-06-05 PROCEDURE — 3077F SYST BP >= 140 MM HG: CPT | Performed by: INTERNAL MEDICINE

## 2024-06-05 PROCEDURE — 3079F DIAST BP 80-89 MM HG: CPT | Performed by: INTERNAL MEDICINE

## 2024-06-05 PROCEDURE — 3044F HG A1C LEVEL LT 7.0%: CPT | Performed by: INTERNAL MEDICINE

## 2024-06-05 PROCEDURE — 90677 PCV20 VACCINE IM: CPT | Performed by: INTERNAL MEDICINE

## 2024-06-05 NOTE — PROGRESS NOTES
"Internal Medicine Follow Up    Chief Complaint  Hal Byrnes is a 83 y.o. male who presents today for follow up of chronic medical conditions outlined below.    Chief Complaint   Patient presents with    Chronic Kidney Disease    Hypertension        HPI  Mr. Byrnes comes in today for follow up. He notes that there have been medication changes made by nephrology and cardiology but forgot his updated medication list. He does report that on home readings his BP remains controlled. No edema. He denies decrease in urination. He is scheduled for updated echo for AS next week. Renal function recently assessed by nephrology and stable. He did see GI for his constipation and tried linzess without improvement so did a bowel prep which did \"clean him out\" but subsequently he became constipated again. He describes stools as small and hard.         Review of Systems  Review of Systems   Constitutional: Negative.    Respiratory: Negative.     Cardiovascular: Negative.    Gastrointestinal:  Positive for constipation and indigestion. Negative for anal bleeding, blood in stool and diarrhea.   Genitourinary:  Negative for decreased urine volume and difficulty urinating.        Current Medications  Current Outpatient Medications on File Prior to Visit   Medication Sig Dispense Refill    aspirin 81 MG chewable tablet Chew 1 tablet Daily.      dilTIAZem (TIAZAC) 240 MG 24 hr capsule       hydroCHLOROthiazide (HYDRODIURIL) 25 MG tablet TAKE 1 TABLET BY MOUTH DAILY 90 tablet 1    isosorbide mononitrate (IMDUR) 60 MG 24 hr tablet TAKE ONE TABLET BY MOUTH TWICE A  tablet 3    lisinopril (PRINIVIL,ZESTRIL) 40 MG tablet Take 0.5 tablets by mouth Daily.      Pancrelipase, Lip-Prot-Amyl, (Zenpep) 58752-38463 units capsule delayed-release particles Take 2 capsules by mouth 3 (Three) Times a Day With Meals. 540 capsule 3    spironolactone (ALDACTONE) 25 MG tablet Take 0.5 tablets by mouth Every Other Day. 30 tablet 0    topiramate " "(TOPAMAX) 50 MG tablet Take 1 tablet by mouth 2 (Two) Times a Day. 180 tablet 3    vitamin D (ERGOCALCIFEROL) 1.25 MG (68646 UT) capsule capsule Take 1 capsule by mouth Every 30 (Thirty) Days.      [DISCONTINUED] linaclotide (Linzess) 72 MCG capsule capsule Take 1 capsule by mouth Every Morning Before Breakfast. 30 capsule 3     Current Facility-Administered Medications on File Prior to Visit   Medication Dose Route Frequency Provider Last Rate Last Admin    [DISCONTINUED] diphenhydrAMINE (BENADRYL) injection 50 mg  50 mg Intravenous PRN Carl Sainz MD        [DISCONTINUED] famotidine (PEPCID) injection 20 mg  20 mg Intravenous PRN Carl Sainz MD        [DISCONTINUED] Hydrocortisone Sod Suc (PF) (Solu-CORTEF) injection 100 mg  100 mg Intravenous PRN Carl Sainz MD        [DISCONTINUED] meperidine (DEMEROL) injection 25 mg  25 mg Intravenous PRN Carl Sainz MD           Allergies  Allergies   Allergen Reactions    Sulfa Antibiotics Irritability       Objective  Visit Vitals  /80   Pulse 78   Temp 97.8 °F (36.6 °C) (Infrared)   Ht 157.5 cm (62.01\")   Wt 79.8 kg (176 lb)   SpO2 95%   BMI 32.18 kg/m²        Physical Exam  Physical Exam  Vitals and nursing note reviewed.   Constitutional:       General: He is not in acute distress.     Appearance: He is well-developed. He is obese. He is not ill-appearing or toxic-appearing.   HENT:      Head: Normocephalic and atraumatic.   Eyes:      Conjunctiva/sclera: Conjunctivae normal.   Cardiovascular:      Rate and Rhythm: Normal rate and regular rhythm.      Heart sounds: Murmur (systolic) heard.   Pulmonary:      Effort: Pulmonary effort is normal. No respiratory distress.      Breath sounds: Normal breath sounds.   Musculoskeletal:      Right lower leg: No edema.      Left lower leg: No edema.   Skin:     General: Skin is warm and dry.   Neurological:      Mental Status: He is alert and oriented to person, place, and time. Mental status is at " baseline.   Psychiatric:         Mood and Affect: Mood normal.         Behavior: Behavior normal.         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 For now will presume he is taking lisinopril 20mg daily, imdur 60mg BID, HCTZ 25mg daily, diltiazem 240mg daily, and aldactone 12.5mg every other day    Stage 3b chronic kidney disease  - gfr stable ~25  - he has multiple renal cysts bilaterally   - following with nephrology    Nonrheumatic aortic valve stenosis  - following with cardiology, asymptomatic  - will have echo next week    Constipation, unspecified constipation type  - potential side effect of zenpep but did not improve with reduced dose  - did not improve with linzess and even completed bowel prep  - needs to contact GI for next best steps, likely colonoscopy    Health Maintenance  - Colonoscopy: reports done within last 2-3 years (data deficit)  - Immunizations: COVID UTD. RSV complete. PCV20 today. tdap, shingrix discussed.  - Depression screening: negative 2/2024    Return for Next scheduled follow up.

## 2024-06-12 DIAGNOSIS — E78.5 DYSLIPIDEMIA: Primary | ICD-10-CM

## 2024-06-13 ENCOUNTER — INFUSION (OUTPATIENT)
Dept: ONCOLOGY | Facility: HOSPITAL | Age: 84
End: 2024-06-13
Payer: MEDICARE

## 2024-06-13 VITALS
SYSTOLIC BLOOD PRESSURE: 156 MMHG | RESPIRATION RATE: 18 BRPM | TEMPERATURE: 97.8 F | DIASTOLIC BLOOD PRESSURE: 72 MMHG | HEART RATE: 72 BPM

## 2024-06-13 DIAGNOSIS — E78.5 DYSLIPIDEMIA: Primary | ICD-10-CM

## 2024-06-13 PROCEDURE — 96372 THER/PROPH/DIAG INJ SC/IM: CPT

## 2024-06-13 PROCEDURE — 25010000002 INCLISIRAN SODIUM 284 MG/1.5ML SOLUTION PREFILLED SYRINGE: Performed by: INTERNAL MEDICINE

## 2024-06-13 RX ADMIN — INCLISIRAN 284 MG: 284 INJECTION, SOLUTION SUBCUTANEOUS at 13:37

## 2024-06-18 ENCOUNTER — HOSPITAL ENCOUNTER (OUTPATIENT)
Dept: CARDIOLOGY | Facility: HOSPITAL | Age: 84
Discharge: HOME | End: 2024-06-18
Admitting: INTERNAL MEDICINE
Payer: MEDICARE

## 2024-06-18 VITALS
DIASTOLIC BLOOD PRESSURE: 72 MMHG | HEIGHT: 62 IN | SYSTOLIC BLOOD PRESSURE: 146 MMHG | BODY MASS INDEX: 32.39 KG/M2 | WEIGHT: 176 LBS

## 2024-06-18 DIAGNOSIS — I35.0 NONRHEUMATIC AORTIC VALVE STENOSIS: ICD-10-CM

## 2024-06-18 LAB
BH CV ECHO MEAS - AO MAX PG: 47.5 MMHG
BH CV ECHO MEAS - AO MEAN PG: 23.2 MMHG
BH CV ECHO MEAS - AO ROOT DIAM: 3.4 CM
BH CV ECHO MEAS - AO V2 MAX: 344.3 CM/SEC
BH CV ECHO MEAS - AO V2 VTI: 72.2 CM
BH CV ECHO MEAS - AVA(I,D): 1.6 CM2
BH CV ECHO MEAS - EDV(CUBED): 59.1 ML
BH CV ECHO MEAS - EF(MOD-BP): 61.1 %
BH CV ECHO MEAS - EF(MOD-SP2): 59.4 %
BH CV ECHO MEAS - EF(MOD-SP4): 64.6 %
BH CV ECHO MEAS - ESV(CUBED): 73.1 ML
BH CV ECHO MEAS - FS: -7.3 %
BH CV ECHO MEAS - IVS/LVPW: 0.99 CM
BH CV ECHO MEAS - IVSD: 1.11 CM
BH CV ECHO MEAS - LA DIMENSION: 3.5 CM
BH CV ECHO MEAS - LAT PEAK E' VEL: 10.5 CM/SEC
BH CV ECHO MEAS - LV MASS(C)D: 141.9 GRAMS
BH CV ECHO MEAS - LV MAX PG: 7.4 MMHG
BH CV ECHO MEAS - LV MEAN PG: 3.6 MMHG
BH CV ECHO MEAS - LV V1 MAX: 135.9 CM/SEC
BH CV ECHO MEAS - LV V1 VTI: 37.4 CM
BH CV ECHO MEAS - LVIDD: 3.9 CM
BH CV ECHO MEAS - LVIDS: 4.2 CM
BH CV ECHO MEAS - LVOT AREA: 3.1 CM2
BH CV ECHO MEAS - LVOT DIAM: 1.98 CM
BH CV ECHO MEAS - LVPWD: 1.12 CM
BH CV ECHO MEAS - MED PEAK E' VEL: 7.3 CM/SEC
BH CV ECHO MEAS - MV A MAX VEL: 104.9 CM/SEC
BH CV ECHO MEAS - MV DEC TIME: 0.4 SEC
BH CV ECHO MEAS - MV E MAX VEL: 86.3 CM/SEC
BH CV ECHO MEAS - MV E/A: 0.82
BH CV ECHO MEAS - PA ACC SLOPE: 966.3 CM/SEC2
BH CV ECHO MEAS - PA ACC TIME: 0.07 SEC
BH CV ECHO MEAS - SV(LVOT): 115.6 ML
BH CV ECHO MEAS - SVI(LVOT): 63.7 ML/M2
BH CV ECHO MEAS - TAPSE (>1.6): 2.24 CM
BH CV ECHO MEASUREMENTS AVERAGE E/E' RATIO: 9.7
BH CV VAS BP LEFT ARM: NORMAL MMHG
BH CV XLRA - RV BASE: 3.5 CM
BH CV XLRA - RV LENGTH: 7.7 CM
BH CV XLRA - RV MID: 2.8 CM
LEFT ATRIUM VOLUME INDEX: 50 ML/M2

## 2024-06-18 PROCEDURE — 93306 TTE W/DOPPLER COMPLETE: CPT | Performed by: INTERNAL MEDICINE

## 2024-06-18 PROCEDURE — 93306 TTE W/DOPPLER COMPLETE: CPT

## 2024-06-22 DIAGNOSIS — K86.89 PANCREATIC INSUFFICIENCY: ICD-10-CM

## 2024-06-24 RX ORDER — PANCRELIPASE LIPASE, PANCRELIPASE PROTEASE, PANCRELIPASE AMYLASE 20000; 63000; 84000 [USP'U]/1; [USP'U]/1; [USP'U]/1
CAPSULE, DELAYED RELEASE ORAL
Qty: 540 CAPSULE | Refills: 1 | Status: SHIPPED | OUTPATIENT
Start: 2024-06-24

## 2024-06-26 ENCOUNTER — TELEPHONE (OUTPATIENT)
Dept: CARDIOLOGY | Facility: CLINIC | Age: 84
End: 2024-06-26
Payer: MEDICARE

## 2024-06-26 NOTE — TELEPHONE ENCOUNTER
----- Message from Carl Sainz sent at 6/26/2024 12:41 PM EDT -----  Please call the patient regarding his abnormal result.  His heart function is okay.  He has moderate aortic stenosis.  Will keep on watching it

## 2024-07-01 RX ORDER — SPIRONOLACTONE 25 MG/1
12.5 TABLET ORAL EVERY OTHER DAY
Qty: 90 TABLET | Refills: 0 | Status: SHIPPED | OUTPATIENT
Start: 2024-07-01

## 2024-07-09 ENCOUNTER — TRANSCRIBE ORDERS (OUTPATIENT)
Dept: LAB | Facility: HOSPITAL | Age: 84
End: 2024-07-09
Payer: MEDICARE

## 2024-07-09 ENCOUNTER — LAB (OUTPATIENT)
Dept: LAB | Facility: HOSPITAL | Age: 84
End: 2024-07-09
Payer: MEDICARE

## 2024-07-09 DIAGNOSIS — N18.4 CHRONIC KIDNEY DISEASE, STAGE IV (SEVERE): ICD-10-CM

## 2024-07-09 DIAGNOSIS — E55.9 AVITAMINOSIS D: ICD-10-CM

## 2024-07-09 DIAGNOSIS — N18.4 CHRONIC KIDNEY DISEASE, STAGE IV (SEVERE): Primary | ICD-10-CM

## 2024-07-09 DIAGNOSIS — I10 ESSENTIAL HYPERTENSION, MALIGNANT: ICD-10-CM

## 2024-07-09 LAB
BACTERIA UR QL AUTO: NORMAL /HPF
BILIRUB UR QL STRIP: NEGATIVE
CLARITY UR: CLEAR
COLOR UR: YELLOW
GLUCOSE UR STRIP-MCNC: NEGATIVE MG/DL
HGB UR QL STRIP.AUTO: NEGATIVE
HYALINE CASTS UR QL AUTO: NORMAL /LPF
KETONES UR QL STRIP: NEGATIVE
LEUKOCYTE ESTERASE UR QL STRIP.AUTO: NEGATIVE
NITRITE UR QL STRIP: NEGATIVE
PH UR STRIP.AUTO: 6 [PH] (ref 5–8)
PROT UR QL STRIP: ABNORMAL
PTH-INTACT SERPL-MCNC: 79 PG/ML (ref 15–65)
RBC # UR STRIP: NORMAL /HPF
REF LAB TEST METHOD: NORMAL
SP GR UR STRIP: 1.01 (ref 1–1.03)
SQUAMOUS #/AREA URNS HPF: NORMAL /HPF
URATE SERPL-MCNC: 8 MG/DL (ref 3.4–7)
UROBILINOGEN UR QL STRIP: ABNORMAL
WBC # UR STRIP: NORMAL /HPF

## 2024-07-09 PROCEDURE — 36415 COLL VENOUS BLD VENIPUNCTURE: CPT

## 2024-07-09 PROCEDURE — 87086 URINE CULTURE/COLONY COUNT: CPT

## 2024-07-09 PROCEDURE — 83970 ASSAY OF PARATHORMONE: CPT

## 2024-07-09 PROCEDURE — 84550 ASSAY OF BLOOD/URIC ACID: CPT

## 2024-07-09 PROCEDURE — 81001 URINALYSIS AUTO W/SCOPE: CPT

## 2024-07-09 PROCEDURE — 82306 VITAMIN D 25 HYDROXY: CPT

## 2024-07-09 PROCEDURE — 80069 RENAL FUNCTION PANEL: CPT

## 2024-07-10 LAB
25(OH)D3 SERPL-MCNC: 43.8 NG/ML (ref 30–100)
ALBUMIN SERPL-MCNC: 4.1 G/DL (ref 3.5–5.2)
ANION GAP SERPL CALCULATED.3IONS-SCNC: 13.7 MMOL/L (ref 5–15)
BUN SERPL-MCNC: 37 MG/DL (ref 8–23)
BUN/CREAT SERPL: 15.4 (ref 7–25)
CALCIUM SPEC-SCNC: 9.1 MG/DL (ref 8.6–10.5)
CHLORIDE SERPL-SCNC: 101 MMOL/L (ref 98–107)
CO2 SERPL-SCNC: 20.3 MMOL/L (ref 22–29)
CREAT SERPL-MCNC: 2.41 MG/DL (ref 0.76–1.27)
EGFRCR SERPLBLD CKD-EPI 2021: 26 ML/MIN/1.73
GLUCOSE SERPL-MCNC: 102 MG/DL (ref 65–99)
PHOSPHATE SERPL-MCNC: 4.1 MG/DL (ref 2.5–4.5)
POTASSIUM SERPL-SCNC: 5.3 MMOL/L (ref 3.5–5.2)
SODIUM SERPL-SCNC: 135 MMOL/L (ref 136–145)

## 2024-07-11 LAB — BACTERIA SPEC AEROBE CULT: NO GROWTH

## 2024-09-03 ENCOUNTER — OFFICE VISIT (OUTPATIENT)
Dept: GASTROENTEROLOGY | Facility: CLINIC | Age: 84
End: 2024-09-03
Payer: MEDICARE

## 2024-09-03 VITALS
SYSTOLIC BLOOD PRESSURE: 124 MMHG | HEIGHT: 62 IN | OXYGEN SATURATION: 95 % | BODY MASS INDEX: 32.31 KG/M2 | TEMPERATURE: 97.1 F | HEART RATE: 71 BPM | WEIGHT: 175.6 LBS | DIASTOLIC BLOOD PRESSURE: 78 MMHG

## 2024-09-03 DIAGNOSIS — K59.00 CONSTIPATION, UNSPECIFIED CONSTIPATION TYPE: Primary | ICD-10-CM

## 2024-09-03 PROCEDURE — 3074F SYST BP LT 130 MM HG: CPT | Performed by: INTERNAL MEDICINE

## 2024-09-03 PROCEDURE — 99214 OFFICE O/P EST MOD 30 MIN: CPT | Performed by: INTERNAL MEDICINE

## 2024-09-03 PROCEDURE — G2211 COMPLEX E/M VISIT ADD ON: HCPCS | Performed by: INTERNAL MEDICINE

## 2024-09-03 PROCEDURE — 3078F DIAST BP <80 MM HG: CPT | Performed by: INTERNAL MEDICINE

## 2024-09-03 PROCEDURE — 1160F RVW MEDS BY RX/DR IN RCRD: CPT | Performed by: INTERNAL MEDICINE

## 2024-09-03 PROCEDURE — 1159F MED LIST DOCD IN RCRD: CPT | Performed by: INTERNAL MEDICINE

## 2024-09-03 RX ORDER — LINACLOTIDE 290 UG/1
CAPSULE, GELATIN COATED ORAL
COMMUNITY
Start: 2024-07-25

## 2024-09-03 NOTE — PROGRESS NOTES
PCP: Pia De La Garza MD    Chief Complaint   Patient presents with    Follow-up    Constipation       History of Present Illness:   Hal Byrnes is a 84 y.o. male who presents to GI clinic as a follow up for constipation. His last colonoscopy was in [place] around 3 years ago, [facility].  Constipation began around a year ago. Given linzess 72 ug which helped but caused liquid stool in the morning. I increased him to 290 ug and he goes every day but does strain to have a bm. Takes zenpep for epi.     Past Medical History:   Diagnosis Date    Arthritis     Coronary artery disease     Difficulty walking     Hyperlipidemia     Hypertension     Low back pain        Past Surgical History:   Procedure Laterality Date    APPENDECTOMY      CAROTID STENT      CATARACT EXTRACTION, BILATERAL Bilateral     CORONARY STENT PLACEMENT      PROSTATE SURGERY      for BPH         Current Outpatient Medications:     aspirin 81 MG chewable tablet, Chew 1 tablet Daily., Disp: , Rfl:     dilTIAZem (TIAZAC) 240 MG 24 hr capsule, , Disp: , Rfl:     hydroCHLOROthiazide (HYDRODIURIL) 25 MG tablet, TAKE 1 TABLET BY MOUTH DAILY, Disp: 90 tablet, Rfl: 1    isosorbide mononitrate (IMDUR) 60 MG 24 hr tablet, TAKE ONE TABLET BY MOUTH TWICE A DAY, Disp: 180 tablet, Rfl: 3    lisinopril (PRINIVIL,ZESTRIL) 40 MG tablet, Take 0.5 tablets by mouth Daily., Disp: , Rfl:     Pancrelipase, Lip-Prot-Amyl, (Zenpep) 80918-14220 units capsule delayed-release particles, TAKE TWO CAPSULES BY MOUTH THREE TIMES A DAY WITH MEALS, Disp: 540 capsule, Rfl: 1    spironolactone (ALDACTONE) 25 MG tablet, TAKE 1/2 TABLET BY MOUTH EVERY OTHER DAY, Disp: 90 tablet, Rfl: 0    topiramate (TOPAMAX) 50 MG tablet, Take 1 tablet by mouth 2 (Two) Times a Day., Disp: 180 tablet, Rfl: 3    vitamin D (ERGOCALCIFEROL) 1.25 MG (43102 UT) capsule capsule, Take 1 capsule by mouth Every 30 (Thirty) Days., Disp: , Rfl:     Current Facility-Administered Medications:      Inclisiran Sodium solution prefilled syringe 284 mg, 1.5 mL, Subcutaneous, Q6 Months, Carl Sainz MD    Allergies   Allergen Reactions    Sulfa Antibiotics Irritability       Family History   Problem Relation Age of Onset    Heart attack Mother     Brain cancer Sister     Heart attack Brother     Hepatitis Brother        Social History     Socioeconomic History    Marital status:    Tobacco Use    Smoking status: Former     Current packs/day: 0.00     Average packs/day: 1.5 packs/day for 10.0 years (15.0 ttl pk-yrs)     Types: Cigarettes     Start date: 2001     Quit date: 2011     Years since quittin.3     Passive exposure: Never    Smokeless tobacco: Never   Vaping Use    Vaping status: Never Used   Substance and Sexual Activity    Alcohol use: Never    Drug use: Not Currently     Types: Marijuana     Comment: longstanding smoking, stopped 2 weeks ago    Sexual activity: Not Currently       Review of Systems  A complete 12 point ros was asked and is negative except for that mentioned above.  In particular:  No fever  No rash  No increased arthralgias  No worsening edema  No cough  No dyspnea  No chest pain      There were no vitals filed for this visit.    Physical Exam  General: well developed, well nourished  A+O x 3 NAD  HEENT: NCAT, pupils equal appearing, sclera appear white  NECK: full ROM  Respiratory: symmetric chest rise, normal effort, normal work of breathing, no overt rales  Abdomen: non-distended  Skin: normal color, no jaundice  Neuro: no tremor, no facial droop  Psych: normal mood and affect      Assessment/Plan  1) Constipation, change in bowel habits  Discussed colonoscopy. Patient would like to avoid for now and try medicine change.  Plan:  Reduce zenpep to 1 with meals  Continue linzess  Start miralax: 1/2 capful with glass of water  Increase exercise/hydration  - due to minimal resolution, schedule colonoscopy    2.) GERD with bitter taste  Suspect bilious  reflux  Recommend egd    3.) EPI  Patient is on zenpep from previous GI, will continue  Future consideration: ct a/p with iv contrast      Len Linda MD  9/3/2024

## 2024-10-03 RX ORDER — SODIUM PICOSULFATE, MAGNESIUM OXIDE, AND ANHYDROUS CITRIC ACID 12; 3.5; 1 G/175ML; G/175ML; MG/175ML
350 LIQUID ORAL TAKE AS DIRECTED
Qty: 350 ML | Refills: 0 | Status: SHIPPED | OUTPATIENT
Start: 2024-10-03 | End: 2024-10-04

## 2024-10-09 ENCOUNTER — LAB (OUTPATIENT)
Dept: LAB | Facility: HOSPITAL | Age: 84
End: 2024-10-09
Payer: MEDICARE

## 2024-10-09 ENCOUNTER — OFFICE VISIT (OUTPATIENT)
Dept: INTERNAL MEDICINE | Facility: CLINIC | Age: 84
End: 2024-10-09
Payer: MEDICARE

## 2024-10-09 VITALS
WEIGHT: 174.4 LBS | SYSTOLIC BLOOD PRESSURE: 162 MMHG | BODY MASS INDEX: 32.09 KG/M2 | OXYGEN SATURATION: 95 % | HEIGHT: 62 IN | DIASTOLIC BLOOD PRESSURE: 82 MMHG | HEART RATE: 76 BPM

## 2024-10-09 DIAGNOSIS — I25.10 CORONARY ARTERY DISEASE INVOLVING NATIVE CORONARY ARTERY OF NATIVE HEART WITHOUT ANGINA PECTORIS: ICD-10-CM

## 2024-10-09 DIAGNOSIS — I50.32 CHRONIC DIASTOLIC (CONGESTIVE) HEART FAILURE: ICD-10-CM

## 2024-10-09 DIAGNOSIS — L40.8 INVERSE PSORIASIS: ICD-10-CM

## 2024-10-09 DIAGNOSIS — Q61.02 MULTIPLE RENAL CYSTS: ICD-10-CM

## 2024-10-09 DIAGNOSIS — I10 PRIMARY HYPERTENSION: ICD-10-CM

## 2024-10-09 DIAGNOSIS — R42 VERTIGO: ICD-10-CM

## 2024-10-09 DIAGNOSIS — I77.811 ECTATIC ABDOMINAL AORTA: ICD-10-CM

## 2024-10-09 DIAGNOSIS — H61.22 IMPACTED CERUMEN OF LEFT EAR: ICD-10-CM

## 2024-10-09 DIAGNOSIS — N18.32 DIABETES MELLITUS DUE TO UNDERLYING CONDITION WITH STAGE 3B CHRONIC KIDNEY DISEASE, WITHOUT LONG-TERM CURRENT USE OF INSULIN: ICD-10-CM

## 2024-10-09 DIAGNOSIS — L40.9 PSORIASIS: ICD-10-CM

## 2024-10-09 DIAGNOSIS — E08.22 DIABETES MELLITUS DUE TO UNDERLYING CONDITION WITH STAGE 3B CHRONIC KIDNEY DISEASE, WITHOUT LONG-TERM CURRENT USE OF INSULIN: ICD-10-CM

## 2024-10-09 DIAGNOSIS — K59.00 CONSTIPATION, UNSPECIFIED CONSTIPATION TYPE: ICD-10-CM

## 2024-10-09 DIAGNOSIS — D64.9 NORMOCYTIC ANEMIA: ICD-10-CM

## 2024-10-09 DIAGNOSIS — N18.32 STAGE 3B CHRONIC KIDNEY DISEASE: ICD-10-CM

## 2024-10-09 DIAGNOSIS — N40.1 BENIGN PROSTATIC HYPERPLASIA WITH LOWER URINARY TRACT SYMPTOMS, SYMPTOM DETAILS UNSPECIFIED: ICD-10-CM

## 2024-10-09 DIAGNOSIS — K86.89 PANCREATIC INSUFFICIENCY: ICD-10-CM

## 2024-10-09 DIAGNOSIS — R25.1 TREMOR: Chronic | ICD-10-CM

## 2024-10-09 DIAGNOSIS — M47.816 LUMBAR SPONDYLOSIS: ICD-10-CM

## 2024-10-09 DIAGNOSIS — E78.5 DYSLIPIDEMIA: ICD-10-CM

## 2024-10-09 DIAGNOSIS — Z23 NEED FOR INFLUENZA VACCINATION: ICD-10-CM

## 2024-10-09 DIAGNOSIS — Z00.00 MEDICARE ANNUAL WELLNESS VISIT, SUBSEQUENT: Primary | ICD-10-CM

## 2024-10-09 DIAGNOSIS — I35.0 NONRHEUMATIC AORTIC VALVE STENOSIS: ICD-10-CM

## 2024-10-09 DIAGNOSIS — K21.9 GASTROESOPHAGEAL REFLUX DISEASE WITHOUT ESOPHAGITIS: ICD-10-CM

## 2024-10-09 LAB
DEPRECATED RDW RBC AUTO: 44.5 FL (ref 37–54)
ERYTHROCYTE [DISTWIDTH] IN BLOOD BY AUTOMATED COUNT: 13.1 % (ref 12.3–15.4)
EXPIRATION DATE: ABNORMAL
HBA1C MFR BLD: 5.9 % (ref 4.5–5.7)
HCT VFR BLD AUTO: 43.6 % (ref 37.5–51)
HGB BLD-MCNC: 14.4 G/DL (ref 13–17.7)
Lab: ABNORMAL
MCH RBC QN AUTO: 31 PG (ref 26.6–33)
MCHC RBC AUTO-ENTMCNC: 33 G/DL (ref 31.5–35.7)
MCV RBC AUTO: 93.8 FL (ref 79–97)
PLATELET # BLD AUTO: 290 10*3/MM3 (ref 140–450)
PMV BLD AUTO: 10.3 FL (ref 6–12)
RBC # BLD AUTO: 4.65 10*6/MM3 (ref 4.14–5.8)
WBC NRBC COR # BLD AUTO: 8.99 10*3/MM3 (ref 3.4–10.8)

## 2024-10-09 PROCEDURE — 85027 COMPLETE CBC AUTOMATED: CPT

## 2024-10-09 PROCEDURE — 90662 IIV NO PRSV INCREASED AG IM: CPT | Performed by: INTERNAL MEDICINE

## 2024-10-09 PROCEDURE — G0008 ADMIN INFLUENZA VIRUS VAC: HCPCS | Performed by: INTERNAL MEDICINE

## 2024-10-09 RX ORDER — FAMOTIDINE 40 MG/1
40 TABLET, FILM COATED ORAL DAILY
COMMUNITY
End: 2024-10-09

## 2024-10-09 NOTE — PROGRESS NOTES
The ABCs of the Annual Wellness Visit  Subsequent Medicare Wellness Visit    Chief Complaint   Patient presents with    Dizziness     Pt has noticed after he eating or shifting positions he become dizzy, it did resulted in him going to the ER once before.    Medicare Wellness-subsequent       Subjective   History of Present Illness:  Hal Byrnes is a 84 y.o. male who presents for a Subsequent Medicare Wellness Visit.    HEALTH RISK ASSESSMENT    Recent Hospitalizations:  No hospitalization(s) within the last year.    Current Medical Providers:  Patient Care Team:  Pai De La Garza MD as PCP - General (Internal Medicine)  Carl Sainz MD as Cardiologist (Cardiology)  Nilay Kincaid MD as Consulting Physician (Orthopedic Surgery)  Khanh Christian MD as Consulting Physician (Neurology)  Len Linda MD as Consulting Physician (Gastroenterology)  Mary Beth Vicente MD as Consulting Physician (Nephrology)    Smoking Status:  Social History     Tobacco Use   Smoking Status Former    Current packs/day: 0.00    Average packs/day: 1.5 packs/day for 10.0 years (15.0 ttl pk-yrs)    Types: Cigarettes    Start date: 2001    Quit date: 2011    Years since quittin.4    Passive exposure: Never   Smokeless Tobacco Never       Alcohol Consumption:  Social History     Substance and Sexual Activity   Alcohol Use Never       Depression Screen:       10/9/2024    12:54 PM   PHQ-2/PHQ-9 Depression Screening   Little Interest or Pleasure in Doing Things 0-->not at all   Feeling Down, Depressed or Hopeless 0-->not at all   PHQ-9: Brief Depression Severity Measure Score 0       Fall Risk Screen:  REN Fall Risk Assessment was completed, and patient is at HIGH risk for falls. Assessment completed on:10/9/2024    Health Habits and Functional and Cognitive Screening:      10/9/2024    12:53 PM   Functional & Cognitive Status   Do you have difficulty preparing food and eating? No   Do you have  difficulty bathing yourself, getting dressed or grooming yourself? No   Do you have difficulty using the toilet? No   Do you have difficulty moving around from place to place? No   Do you have trouble with steps or getting out of a bed or a chair? No   Current Diet Well Balanced Diet   Dental Exam Up to date   Eye Exam Up to date   Current Exercises Include No Regular Exercise   Do you need help using the phone?  No   Are you deaf or do you have serious difficulty hearing?  No   Do you need help to go to places out of walking distance? No   Do you need help shopping? No   Do you need help preparing meals?  No   Do you need help with housework?  No   Do you need help with laundry? No   Do you need help taking your medications? No   Do you need help managing money? No   Do you ever drive or ride in a car without wearing a seat belt? No   Who do you live with? Spouse   If you need help, do you have trouble finding someone available to you? No   Have you been bothered in the last four weeks by sexual problems? No   Do you have difficulty concentrating, remembering or making decisions? No         Does the patient have evidence of cognitive impairment? No CDT normal, 0/3 recall.    Asprin use counseling:Taking ASA appropriately as indicated    Age-appropriate Screening Schedule:  Refer to the list below for future screening recommendations based on patient's age, sex and/or medical conditions. Orders for these recommended tests are listed in the plan section. The patient has been provided with a written plan.    Health Maintenance   Topic Date Due    DXA SCAN  Never done    DIABETIC EYE EXAM  Never done    TDAP/TD VACCINES (1 - Tdap) Never done    ZOSTER VACCINE (1 of 2) Never done    COVID-19 Vaccine (6 - 2023-24 season) 09/01/2024    ANNUAL WELLNESS VISIT  10/04/2024    LIPID PANEL  11/21/2024    URINE MICROALBUMIN  01/04/2025    HEMOGLOBIN A1C  04/09/2025    BMI FOLLOWUP  10/09/2025    RSV Vaccine - Adults  Completed     INFLUENZA VACCINE  Completed    Pneumococcal Vaccine 65+  Completed          The following portions of the patient's history were reviewed and updated as appropriate: allergies, current medications, past family history, past medical history, past social history, past surgical history, and problem list.    Outpatient Medications Prior to Visit   Medication Sig Dispense Refill    aspirin 81 MG chewable tablet Chew 1 tablet Daily.      dilTIAZem (TIAZAC) 240 MG 24 hr capsule       hydroCHLOROthiazide (HYDRODIURIL) 25 MG tablet TAKE 1 TABLET BY MOUTH DAILY 90 tablet 1    isosorbide mononitrate (IMDUR) 60 MG 24 hr tablet TAKE ONE TABLET BY MOUTH TWICE A  tablet 3    Linzess 290 MCG capsule capsule       lisinopril (PRINIVIL,ZESTRIL) 40 MG tablet Take 0.5 tablets by mouth Daily.      Pancrelipase, Lip-Prot-Amyl, (Zenpep) 16662-82036 units capsule delayed-release particles TAKE TWO CAPSULES BY MOUTH THREE TIMES A DAY WITH MEALS 540 capsule 1    spironolactone (ALDACTONE) 25 MG tablet TAKE 1/2 TABLET BY MOUTH EVERY OTHER DAY 90 tablet 0    topiramate (TOPAMAX) 50 MG tablet Take 1 tablet by mouth 2 (Two) Times a Day. 180 tablet 3    vitamin D (ERGOCALCIFEROL) 1.25 MG (30697 UT) capsule capsule Take 1 capsule by mouth Every 30 (Thirty) Days.      famotidine (PEPCID) 40 MG tablet Take 1 tablet by mouth Daily.       Facility-Administered Medications Prior to Visit   Medication Dose Route Frequency Provider Last Rate Last Admin    Inclisiran Sodium solution prefilled syringe 284 mg  1.5 mL Subcutaneous Q6 Months Carl Sainz MD           Patient Active Problem List   Diagnosis    Primary hypertension    Coronary artery disease    GERD (gastroesophageal reflux disease)    Diabetes mellitus due to underlying condition, without long-term current use of insulin    Benign prostatic hyperplasia with lower urinary tract symptoms    Peripheral neuropathy    Chronic diastolic (congestive) heart failure    Psoriasis     "Aortic stenosis    Lumbar spondylosis    Cervical spondylosis    Dyslipidemia    Inverse psoriasis    Ectatic abdominal aorta    Pancreatic insufficiency    Stage 3b chronic kidney disease    Lumbar radiculopathy    Multiple renal cysts    Hyperlipemia    Tremor    Drug induced constipation       Advanced Care Planning:  ACP discussion was held with the patient during this visit. Patient has an advance directive (not in EMR), copy requested.    Review of Systems   Constitutional:  Positive for fatigue and unexpected weight gain.   Respiratory: Negative.     Cardiovascular: Negative.    Gastrointestinal:  Positive for abdominal distention, constipation and indigestion. Negative for abdominal pain, anal bleeding, blood in stool and diarrhea.        Gas   Genitourinary: Negative.    Musculoskeletal: Negative.    Skin: Negative.    Neurological:  Positive for dizziness.   Psychiatric/Behavioral: Negative.         Compared to one year ago, the patient feels his physical health is worse. Recently with vertigo leading to falls.  Compared to one year ago, the patient feels his mental health is the same.    Reviewed chart for potential of high risk medication in the elderly: yes  Reviewed chart for potential of harmful drug interactions in the elderly:yes      No opioid medication identified on active medication list. I have reviewed chart for other potential high risk medication/s and harmful drug interactions in the elderly.      Objective         Vitals:    10/09/24 1231   BP: 142/62   Pulse: 76   SpO2: 95%   Weight: 79.1 kg (174 lb 6.4 oz)   Height: 157.5 cm (62.01\")   PainSc: 0-No pain       Body mass index is 31.89 kg/m².  Discussed the patient's BMI with him. The BMI is above average; BMI management plan is completed.    Physical Exam  Vitals and nursing note reviewed.   Constitutional:       General: He is not in acute distress.     Appearance: He is well-developed. He is obese. He is not ill-appearing, " toxic-appearing or diaphoretic.   HENT:      Head: Normocephalic and atraumatic.      Right Ear: Tympanic membrane, ear canal and external ear normal.      Left Ear: External ear normal. There is impacted cerumen.   Eyes:      General: No scleral icterus.     Conjunctiva/sclera: Conjunctivae normal.   Cardiovascular:      Rate and Rhythm: Normal rate and regular rhythm.      Heart sounds: Normal heart sounds. Murmur (systolic) heard.   Pulmonary:      Effort: Pulmonary effort is normal. No respiratory distress.      Breath sounds: Normal breath sounds.   Abdominal:      General: There is no distension.      Palpations: Abdomen is soft. There is no mass.      Tenderness: There is no abdominal tenderness.   Musculoskeletal:         General: No deformity.      Cervical back: Neck supple.      Right lower leg: No edema.      Left lower leg: No edema.   Skin:     General: Skin is warm and dry.      Findings: No rash.   Neurological:      Mental Status: He is alert and oriented to person, place, and time. Mental status is at baseline.      Gait: Gait abnormal (slow, cautious).   Psychiatric:         Mood and Affect: Mood normal.         Behavior: Behavior normal.         Thought Content: Thought content normal.         Judgment: Judgment normal.         Lab Results   Component Value Date    HGBA1C 5.9 (A) 10/09/2024      Ear Cerumen Removal    Date/Time: 10/9/2024 2:03 PM    Performed by: Pia De La Garza MD  Authorized by: Pia De La Garza MD  Consent: Verbal consent obtained. Written consent not obtained.  Risks and benefits: risks, benefits and alternatives were discussed  Consent given by: patient  Patient understanding: patient states understanding of the procedure being performed  Patient identity confirmed: verbally with patient    Anesthesia:  Local Anesthetic: none  Location details: left ear and right ear  Patient tolerance: patient tolerated the procedure well with no immediate complications (Unable to  get majority of cerumen)  Procedure type: instrumentation, irrigation   Sedation:  Patient sedated: no              Additional E&M Note during same encounter follows:  Patient has multiple medical problems which are significant and separately identifiable that require additional work above and beyond the Medicare Wellness Visit.      Experiencing daily vertigo most often when turning over in bed. Also when standing up in morning. Has fallen as a result twice. No SOA, chest pain, edema. Has issues with cerumen. No tinnitus.    See exam and ROS above.    Assessment & Plan   Medicare Risks and Personalized Health Plan  CMS Preventative Services Quick Reference  Advance Directive Discussion  Colon Cancer Screening  Fall Risk  Immunizations Discussed/Encouraged (specific immunizations; Tdap, Influenza, Shingrix, and COVID19 )    The above risks/problems have been discussed with the patient.  Pertinent information has been shared with the patient in the After Visit Summary.  Follow up plans and orders are seen below in the Assessment/Plan Section.    Diagnoses and all orders for this visit:    Medicare annual wellness visit, subsequent  - Counseling was given to patient for the following topics:  importance of immunizations, including risks and benefits. Also discussed the importance of regular dental and vision care.  -     POC Glycosylated Hemoglobin (Hb A1C)    Benign prostatic hyperplasia with lower urinary tract symptoms, symptom details unspecified  - s/p minor prostate surgery (TURP, greenlight laser?)   - does not require any medications    Chronic diastolic (congestive) heart failure  - on antihypertensives and diuretics as noted  - euvolemic    Coronary artery disease involving native coronary artery of native heart without angina pectoris  - following with cardiology  - hx of NSTEMI in 2002 with PCI to RCA and LCx. Had [facility] in 2005, 2009, 2018 with nonobstructive disease  - Lexiscan stress test 2021 normal.  -  on imdur 60mg BID, ASA. HLD managed with leqvio.    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, without long-term current use of insulin  - A1c 5.9  - diet controlled    Dyslipidemia  - previously uncontrolled on high intensity statin and zetia so now receiving leqvio injections through cardiology.      Ectatic abdominal aorta  - monitoring per cardiology     Gastroesophageal reflux disease without esophagitis  - has been experiencing indigestion and gas. Recommend resuming OTC pepcid.    Primary hypertension  - BP at goal on home readings  - on lisinopril 20mg daily, imdur 60mg BID, HCTZ 25mg daily, diltiazem 240mg daily, and aldactone 12.5mg every other day    Stage 3b chronic kidney disease  Multiple renal cysts  - gfr stable ~25  - he has multiple renal cysts bilaterally   - following with nephrology    Pancreatic insufficiency  - per patient secondary to pancreatitis  - on zenpep    Psoriasis  Inverse psoriasis  - following with dermatology, using topical steroid cream PRN     Lumbar spondylosis  - had MRI showing multilevel DDD  - Has followed with Dr. Kincaid PRN for injections    Tremor  - following with neurology. Was prescribed primidone however found it ineffective so discontinued. On topamax as alternative. Has not kept follow up.    Nonrheumatic aortic valve stenosis  - following with cardiology, moderate AS on echo 6/2024  -  has appt in December    Normocytic anemia  - noted on labs in September at outside hospital  - repeat CBC today    Vertigo  - clinically suspect BPPV. Does not seem to have symptomatic AS. Anemia was mild and unless significantly worse on repeat labs do not suspect it is causing him to feel vertigo. On several BP medications and diuretics which is another consideration however not orthostatic today.  - ENT referral given need to have L ear cleaned as well    Impacted cerumen of left ear   - attempted to remove in office but unable to get  majority of cerumen  - referring to ENT    Constipation, unspecified constipation type  - potential side effect of zenpep but did not improve with reduced dose  - did not improve with linzess and even completed bowel prep  - will be having colonoscopy     Health Maintenance  - Colonoscopy: upcoming  - Immunizations: Flu today. COVID, tdap, shingrix discussed. RSV complete. PCV20 complete.  - Depression screening: negative 10/2024    Follow Up:  Return in about 4 months (around 2/9/2025) for Follow up, 1 year for wellness 45 minutes, Labs today.     An After Visit Summary and PPPS were given to the patient.

## 2024-10-11 ENCOUNTER — TRANSCRIBE ORDERS (OUTPATIENT)
Dept: LAB | Facility: HOSPITAL | Age: 84
End: 2024-10-11
Payer: MEDICARE

## 2024-10-11 ENCOUNTER — LAB (OUTPATIENT)
Dept: LAB | Facility: HOSPITAL | Age: 84
End: 2024-10-11
Payer: MEDICARE

## 2024-10-11 DIAGNOSIS — I10 ESSENTIAL HYPERTENSION, MALIGNANT: ICD-10-CM

## 2024-10-11 DIAGNOSIS — N18.4 CHRONIC KIDNEY DISEASE, STAGE IV (SEVERE): Primary | ICD-10-CM

## 2024-10-11 DIAGNOSIS — N18.4 CHRONIC KIDNEY DISEASE, STAGE IV (SEVERE): ICD-10-CM

## 2024-10-11 LAB
ALBUMIN SERPL-MCNC: 4.2 G/DL (ref 3.5–5.2)
ANION GAP SERPL CALCULATED.3IONS-SCNC: 10 MMOL/L (ref 5–15)
BACTERIA UR QL AUTO: NORMAL /HPF
BASOPHILS # BLD AUTO: 0.03 10*3/MM3 (ref 0–0.2)
BASOPHILS NFR BLD AUTO: 0.4 % (ref 0–1.5)
BILIRUB UR QL STRIP: NEGATIVE
BUN SERPL-MCNC: 37 MG/DL (ref 8–23)
BUN/CREAT SERPL: 15 (ref 7–25)
CALCIUM SPEC-SCNC: 9.6 MG/DL (ref 8.6–10.5)
CHLORIDE SERPL-SCNC: 104 MMOL/L (ref 98–107)
CLARITY UR: CLEAR
CO2 SERPL-SCNC: 22 MMOL/L (ref 22–29)
COLOR UR: YELLOW
CREAT SERPL-MCNC: 2.47 MG/DL (ref 0.76–1.27)
DEPRECATED RDW RBC AUTO: 44.7 FL (ref 37–54)
EGFRCR SERPLBLD CKD-EPI 2021: 25.2 ML/MIN/1.73
EOSINOPHIL # BLD AUTO: 0.11 10*3/MM3 (ref 0–0.4)
EOSINOPHIL NFR BLD AUTO: 1.6 % (ref 0.3–6.2)
ERYTHROCYTE [DISTWIDTH] IN BLOOD BY AUTOMATED COUNT: 13 % (ref 12.3–15.4)
GLUCOSE SERPL-MCNC: 96 MG/DL (ref 65–99)
GLUCOSE UR STRIP-MCNC: NEGATIVE MG/DL
HCT VFR BLD AUTO: 42.7 % (ref 37.5–51)
HGB BLD-MCNC: 13.6 G/DL (ref 13–17.7)
HGB UR QL STRIP.AUTO: NEGATIVE
HYALINE CASTS UR QL AUTO: NORMAL /LPF
IMM GRANULOCYTES # BLD AUTO: 0.04 10*3/MM3 (ref 0–0.05)
IMM GRANULOCYTES NFR BLD AUTO: 0.6 % (ref 0–0.5)
KETONES UR QL STRIP: NEGATIVE
LEUKOCYTE ESTERASE UR QL STRIP.AUTO: NEGATIVE
LYMPHOCYTES # BLD AUTO: 1.97 10*3/MM3 (ref 0.7–3.1)
LYMPHOCYTES NFR BLD AUTO: 29.3 % (ref 19.6–45.3)
MCH RBC QN AUTO: 29.8 PG (ref 26.6–33)
MCHC RBC AUTO-ENTMCNC: 31.9 G/DL (ref 31.5–35.7)
MCV RBC AUTO: 93.4 FL (ref 79–97)
MONOCYTES # BLD AUTO: 0.46 10*3/MM3 (ref 0.1–0.9)
MONOCYTES NFR BLD AUTO: 6.8 % (ref 5–12)
NEUTROPHILS NFR BLD AUTO: 4.11 10*3/MM3 (ref 1.7–7)
NEUTROPHILS NFR BLD AUTO: 61.3 % (ref 42.7–76)
NITRITE UR QL STRIP: NEGATIVE
NRBC BLD AUTO-RTO: 0 /100 WBC (ref 0–0.2)
PH UR STRIP.AUTO: 6 [PH] (ref 5–8)
PHOSPHATE SERPL-MCNC: 3.3 MG/DL (ref 2.5–4.5)
PLATELET # BLD AUTO: 244 10*3/MM3 (ref 140–450)
PMV BLD AUTO: 10.3 FL (ref 6–12)
POTASSIUM SERPL-SCNC: 5.1 MMOL/L (ref 3.5–5.2)
PROT UR QL STRIP: ABNORMAL
RBC # BLD AUTO: 4.57 10*6/MM3 (ref 4.14–5.8)
RBC # UR STRIP: NORMAL /HPF
REF LAB TEST METHOD: NORMAL
SODIUM SERPL-SCNC: 136 MMOL/L (ref 136–145)
SP GR UR STRIP: 1.01 (ref 1–1.03)
SQUAMOUS #/AREA URNS HPF: NORMAL /HPF
URATE SERPL-MCNC: 8.1 MG/DL (ref 3.4–7)
UROBILINOGEN UR QL STRIP: ABNORMAL
WBC # UR STRIP: NORMAL /HPF
WBC NRBC COR # BLD AUTO: 6.72 10*3/MM3 (ref 3.4–10.8)

## 2024-10-11 PROCEDURE — 85025 COMPLETE CBC W/AUTO DIFF WBC: CPT

## 2024-10-11 PROCEDURE — 36415 COLL VENOUS BLD VENIPUNCTURE: CPT

## 2024-10-11 PROCEDURE — 80069 RENAL FUNCTION PANEL: CPT

## 2024-10-11 PROCEDURE — 84550 ASSAY OF BLOOD/URIC ACID: CPT

## 2024-10-11 PROCEDURE — 81001 URINALYSIS AUTO W/SCOPE: CPT

## 2024-10-15 ENCOUNTER — TELEPHONE (OUTPATIENT)
Dept: INTERNAL MEDICINE | Facility: CLINIC | Age: 84
End: 2024-10-15
Payer: MEDICARE

## 2024-10-15 NOTE — TELEPHONE ENCOUNTER
----- Message from Pia De La Garza sent at 10/15/2024 10:33 AM EDT -----  Patient has not viewed [employer] message regarding their lab results, please mail them a letter with results and prior result note.

## 2024-10-16 ENCOUNTER — TELEPHONE (OUTPATIENT)
Dept: GASTROENTEROLOGY | Facility: CLINIC | Age: 84
End: 2024-10-16
Payer: MEDICARE

## 2024-10-16 RX ORDER — POLYETHYLENE GLYCOL-3350 AND ELECTROLYTES 236; 6.74; 5.86; 2.97; 22.74 G/274.31G; G/274.31G; G/274.31G; G/274.31G; G/274.31G
POWDER, FOR SOLUTION ORAL
Qty: 4000 ML | Refills: 0 | Status: SHIPPED | OUTPATIENT
Start: 2024-10-16

## 2024-10-16 NOTE — TELEPHONE ENCOUNTER
I spoke with Mr Hong. Informed patient that his Clenpiq bowel prep was sent on 10/3/2024. I advised patient to call his pharmacy to get prep ready for him. More than likely they put the bowel prep back on the shelf.

## 2024-10-18 ENCOUNTER — OUTSIDE FACILITY SERVICE (OUTPATIENT)
Dept: GASTROENTEROLOGY | Facility: CLINIC | Age: 84
End: 2024-10-18
Payer: MEDICARE

## 2024-10-18 PROCEDURE — 45385 COLONOSCOPY W/LESION REMOVAL: CPT | Performed by: INTERNAL MEDICINE

## 2024-10-18 PROCEDURE — 88305 TISSUE EXAM BY PATHOLOGIST: CPT | Performed by: INTERNAL MEDICINE

## 2024-10-18 PROCEDURE — 43239 EGD BIOPSY SINGLE/MULTIPLE: CPT | Performed by: INTERNAL MEDICINE

## 2024-10-18 PROCEDURE — 45380 COLONOSCOPY AND BIOPSY: CPT | Performed by: INTERNAL MEDICINE

## 2024-10-18 RX ORDER — PANTOPRAZOLE SODIUM 40 MG/1
40 TABLET, DELAYED RELEASE ORAL DAILY
Qty: 90 TABLET | Refills: 3 | Status: SHIPPED | OUTPATIENT
Start: 2024-10-18

## 2024-10-21 ENCOUNTER — LAB REQUISITION (OUTPATIENT)
Dept: LAB | Facility: HOSPITAL | Age: 84
End: 2024-10-21
Payer: MEDICARE

## 2024-10-21 DIAGNOSIS — K64.8 OTHER HEMORRHOIDS: ICD-10-CM

## 2024-10-21 DIAGNOSIS — K57.30 DIVERTICULOSIS OF LARGE INTESTINE WITHOUT PERFORATION OR ABSCESS WITHOUT BLEEDING: ICD-10-CM

## 2024-10-21 DIAGNOSIS — Z12.11 ENCOUNTER FOR SCREENING FOR MALIGNANT NEOPLASM OF COLON: ICD-10-CM

## 2024-10-21 DIAGNOSIS — K44.9 DIAPHRAGMATIC HERNIA WITHOUT OBSTRUCTION OR GANGRENE: ICD-10-CM

## 2024-10-21 DIAGNOSIS — K31.89 OTHER DISEASES OF STOMACH AND DUODENUM: ICD-10-CM

## 2024-10-21 DIAGNOSIS — D12.3 BENIGN NEOPLASM OF TRANSVERSE COLON: ICD-10-CM

## 2024-10-21 DIAGNOSIS — K21.00 GASTRO-ESOPHAGEAL REFLUX DISEASE WITH ESOPHAGITIS, WITHOUT BLEEDING: ICD-10-CM

## 2024-10-21 DIAGNOSIS — K59.00 CONSTIPATION, UNSPECIFIED: ICD-10-CM

## 2024-10-22 LAB
CYTO UR: NORMAL
LAB AP CASE REPORT: NORMAL
LAB AP CLINICAL INFORMATION: NORMAL
PATH REPORT.FINAL DX SPEC: NORMAL
PATH REPORT.GROSS SPEC: NORMAL

## 2024-12-10 NOTE — PROGRESS NOTES
Follow-up Visit      Date: 2024  Patient Name: Hal Byrnes  : 1940   MRN: [id number]     Chief Complaint:    Chief Complaint   Patient presents with    Coronary artery disease involving native coronary artery of     6-Mo F/U       History of Present Illness: Hal Byrnes is a 84 y.o. male who is here today for follow-up on his multiple medical problems.    Patient has been having occasional chest discomfort but no significant symptoms reported.  Patient denies any lower extremity edema.  Patient denies any lower extremity edema.  Patient denies any paroxysmal nocturnal dyspnea.  Patient denies any palpitations.  Patient denies any other significant weight loss or weight gain.  Patient denies any passing out.      Problem List     CARDIAC  Coronary Artery Disease:   NSTEMI with LHC : PCI to RCA and left circumflex  Repeat LHC with nonobstructive coronary artery disease in , , and   Lexiscan : LV function at rest.  Normal myocardial perfusion.    Myocardium:   Stress echo : Normal EF.  Mild diastolic dysfunction.  Mild posterior wall hypokinesis.  Echo : EF 70%, diastolic dysfunction, mildly dilated LA    Valvular:   Echo : Moderate AS, mean gradient 17, trace TR, trace MR  Echo : Moderate AS, mean gradient 19, mild MR, mild TR    Electrical:   Sinus rhythm    Percardium:   Normal       CARDIAC RISK FACTORS  Hypertension  Dyslipidemia  2023   HDL 41   2023   HDL 41 LDL 64  Physical Inactivity  Obstructive Sleep Apnea    NON-CARDIAC  GERD  Chronic pancreatitis  BPH  Osteopenia  Peripheral neuropathy  Cervical and lumbar spondylosis  Psoriasis  Chronic kidney disease.    SURGERIES  None reported      Subjective      Review of Systems:   Review of Systems   Respiratory:  Negative for apnea, cough, choking, chest tightness, shortness of breath, wheezing and stridor.    Cardiovascular:  Positive for chest pain. Negative  "for palpitations and leg swelling.       Medications:     Current Outpatient Medications:     aspirin 81 MG chewable tablet, Chew 1 tablet Daily., Disp: , Rfl:     dilTIAZem XR (DILACOR XR) 240 MG 24 hr capsule, , Disp: , Rfl:     hydroCHLOROthiazide (HYDRODIURIL) 25 MG tablet, TAKE 1 TABLET BY MOUTH DAILY, Disp: 90 tablet, Rfl: 1    isosorbide mononitrate (IMDUR) 60 MG 24 hr tablet, TAKE ONE TABLET BY MOUTH TWICE A DAY, Disp: 180 tablet, Rfl: 3    Linzess 290 MCG capsule capsule, , Disp: , Rfl:     lisinopril (PRINIVIL,ZESTRIL) 40 MG tablet, Take 0.5 tablets by mouth Daily., Disp: , Rfl:     Pancrelipase, Lip-Prot-Amyl, (Zenpep) 03343-76548 units capsule delayed-release particles, TAKE TWO CAPSULES BY MOUTH THREE TIMES A DAY WITH MEALS, Disp: 540 capsule, Rfl: 1    pantoprazole (PROTONIX) 40 MG EC tablet, Take 1 tablet by mouth Daily., Disp: 90 tablet, Rfl: 3    polyethylene glycol (GaviLyte-G) 236 g solution, FOLLOW INSTRUCTIONS MAILED TO YOUR HOME., Disp: 4000 mL, Rfl: 0    spironolactone (ALDACTONE) 25 MG tablet, TAKE 1/2 TABLET BY MOUTH EVERY OTHER DAY, Disp: 90 tablet, Rfl: 0    topiramate (TOPAMAX) 50 MG tablet, Take 1 tablet by mouth 2 (Two) Times a Day., Disp: 180 tablet, Rfl: 3    vitamin D (ERGOCALCIFEROL) 1.25 MG (29025 UT) capsule capsule, Take 1 capsule by mouth Every 30 (Thirty) Days., Disp: , Rfl:     dilTIAZem (TIAZAC) 240 MG 24 hr capsule, , Disp: , Rfl:     Current Facility-Administered Medications:     Inclisiran Sodium solution prefilled syringe 284 mg, 1.5 mL, Subcutaneous, Q6 Months, Carl Sainz MD    Allergies:   Allergies   Allergen Reactions    Sulfa Antibiotics Irritability       Objective     Physical Exam:  Vitals:    12/12/24 1326   BP: 142/60   Pulse: 63   SpO2: 94%   Weight: 80.1 kg (176 lb 9.6 oz)   Height: 160 cm (63\")     Body mass index is 31.28 kg/m².    Constitutional:       General: Not in acute distress.     Appearance: Healthy appearance. Not in distress.     Neck:     " JVP:Not elevated     Carotid artery: Normal    Pulmonary:      Effort: Pulmonary effort is normal.      Breath sounds: Normal breath sounds. No wheezing. No rhonchi. No rales.     Cardiovascular:      Normal rate. Regular rhythm. Normal S1. Normal S2.      Murmurs: There is 2/6 systolic murmur.      No gallop. No click. No rub.     Abdominal:      General: Bowel sounds are normal.      Palpations: Abdomen is soft.      Tenderness: There is no abdominal tenderness.    Extremities:     Pulses:Normal radial and pedal pulses     Edema:no edema    Smoking Cessation:   Tobacco Product History : Patient quit smoking in 2011 after 15 pack years     Lab Review:   Lab Results   Component Value Date    GLUCOSE 96 10/11/2024    BUN 37 (H) 10/11/2024    CREATININE 2.47 (H) 10/11/2024    BCR 15.0 10/11/2024    K 5.1 10/11/2024    CO2 22.0 10/11/2024    CALCIUM 9.6 10/11/2024    ALBUMIN 4.2 10/11/2024     Lab Results   Component Value Date    WBC 6.72 10/11/2024    HGB 13.6 10/11/2024    HCT 42.7 10/11/2024    MCV 93.4 10/11/2024     10/11/2024     Lab Results   Component Value Date    TSH 1.780 02/05/2024           ECG 12 Lead    Date/Time: 12/12/2024 1:49 PM  Performed by: Carl Sainz MD    Authorized by: Carl Sainz MD  Comparison: compared with previous ECG from 5/16/2024  Similar to previous ECG  Rhythm: sinus rhythm    Clinical impression: normal ECG           Assessment / Plan      Assessment:   Diagnosis Plan   1. Primary hypertension  ECG 12 Lead      2. Hyperlipidemia, unspecified hyperlipidemia type  Non-HDL Cholesterol Panel    Hepatic Function Panel    Lipid Panel    High Sensitivity CRP    Microalbumin / Creatinine Urine Ratio - Urine, Clean Catch      3. Nonrheumatic aortic valve stenosis             Plan:  Patient blood pressure has been under good control and is going to continue with his current medications.  Patient cholesterol has not been checked for a year and he has been on Leqvio  injections.  Will go ahead and check his blood work today.  His aortic stenosis murmur has not changed over a year.  Will keep following with echo if needed when he becomes symptomatic.      Follow Up:       Return in about 1 year (around 12/12/2025).    Carl Sainz MD

## 2024-12-12 ENCOUNTER — OFFICE VISIT (OUTPATIENT)
Dept: CARDIOLOGY | Facility: CLINIC | Age: 84
End: 2024-12-12
Payer: MEDICARE

## 2024-12-12 ENCOUNTER — INFUSION (OUTPATIENT)
Dept: ONCOLOGY | Facility: HOSPITAL | Age: 84
End: 2024-12-12
Payer: MEDICARE

## 2024-12-12 ENCOUNTER — LAB (OUTPATIENT)
Dept: LAB | Facility: HOSPITAL | Age: 84
End: 2024-12-12
Payer: MEDICARE

## 2024-12-12 VITALS
HEIGHT: 63 IN | DIASTOLIC BLOOD PRESSURE: 60 MMHG | HEART RATE: 63 BPM | WEIGHT: 176.6 LBS | SYSTOLIC BLOOD PRESSURE: 142 MMHG | BODY MASS INDEX: 31.29 KG/M2 | OXYGEN SATURATION: 94 %

## 2024-12-12 VITALS
HEART RATE: 86 BPM | TEMPERATURE: 97.7 F | DIASTOLIC BLOOD PRESSURE: 78 MMHG | RESPIRATION RATE: 24 BRPM | SYSTOLIC BLOOD PRESSURE: 167 MMHG

## 2024-12-12 DIAGNOSIS — E78.5 DYSLIPIDEMIA: Primary | ICD-10-CM

## 2024-12-12 DIAGNOSIS — I10 PRIMARY HYPERTENSION: Primary | ICD-10-CM

## 2024-12-12 DIAGNOSIS — I35.0 NONRHEUMATIC AORTIC VALVE STENOSIS: ICD-10-CM

## 2024-12-12 DIAGNOSIS — E78.5 HYPERLIPIDEMIA, UNSPECIFIED HYPERLIPIDEMIA TYPE: ICD-10-CM

## 2024-12-12 LAB
ALBUMIN SERPL-MCNC: 3.8 G/DL (ref 3.5–5.2)
ALBUMIN UR-MCNC: 228.6 MG/DL
ALP SERPL-CCNC: 74 U/L (ref 39–117)
ALT SERPL W P-5'-P-CCNC: 27 U/L (ref 1–41)
AST SERPL-CCNC: 21 U/L (ref 1–40)
BILIRUB CONJ SERPL-MCNC: <0.2 MG/DL (ref 0–0.3)
BILIRUB INDIRECT SERPL-MCNC: NORMAL MG/DL
BILIRUB SERPL-MCNC: <0.2 MG/DL (ref 0–1.2)
CHOLEST SERPL-MCNC: 147 MG/DL (ref 0–200)
CHOLEST SERPL-MCNC: 148 MG/DL (ref 0–200)
CREAT UR-MCNC: 104.1 MG/DL
CRP SERPL-MCNC: 0.83 MG/DL (ref 0.01–0.5)
HDLC SERPL-MCNC: 44 MG/DL (ref 40–60)
HDLC SERPL-MCNC: 47 MG/DL (ref 40–60)
LDLC SERPL CALC-MCNC: 61 MG/DL (ref 0–100)
LDLC SERPL CALC-MCNC: 67 MG/DL (ref 0–100)
LDLC/HDLC SERPL: 1.1 {RATIO}
LDLC/HDLC SERPL: 1.34 {RATIO}
MICROALBUMIN/CREAT UR: 2196 MG/G (ref 0–29)
PROT SERPL-MCNC: 7.3 G/DL (ref 6–8.5)
TRIGL SERPL-MCNC: 225 MG/DL (ref 0–150)
TRIGL SERPL-MCNC: 241 MG/DL (ref 0–150)
VLDLC SERPL-MCNC: 37 MG/DL (ref 5–40)
VLDLC SERPL-MCNC: 39 MG/DL (ref 5–40)

## 2024-12-12 PROCEDURE — 82570 ASSAY OF URINE CREATININE: CPT

## 2024-12-12 PROCEDURE — 86141 C-REACTIVE PROTEIN HS: CPT

## 2024-12-12 PROCEDURE — 25010000002 INCLISIRAN SODIUM 284 MG/1.5ML SOLUTION PREFILLED SYRINGE: Performed by: INTERNAL MEDICINE

## 2024-12-12 PROCEDURE — 3078F DIAST BP <80 MM HG: CPT | Performed by: INTERNAL MEDICINE

## 2024-12-12 PROCEDURE — 80076 HEPATIC FUNCTION PANEL: CPT

## 2024-12-12 PROCEDURE — 36415 COLL VENOUS BLD VENIPUNCTURE: CPT

## 2024-12-12 PROCEDURE — 99214 OFFICE O/P EST MOD 30 MIN: CPT | Performed by: INTERNAL MEDICINE

## 2024-12-12 PROCEDURE — 96372 THER/PROPH/DIAG INJ SC/IM: CPT

## 2024-12-12 PROCEDURE — 82043 UR ALBUMIN QUANTITATIVE: CPT

## 2024-12-12 PROCEDURE — 93000 ELECTROCARDIOGRAM COMPLETE: CPT | Performed by: INTERNAL MEDICINE

## 2024-12-12 PROCEDURE — 80061 LIPID PANEL: CPT

## 2024-12-12 PROCEDURE — 3077F SYST BP >= 140 MM HG: CPT | Performed by: INTERNAL MEDICINE

## 2024-12-12 RX ORDER — DILTIAZEM HYDROCHLORIDE 240 MG/1
CAPSULE, EXTENDED RELEASE ORAL
COMMUNITY
Start: 2024-10-31

## 2024-12-12 RX ADMIN — INCLISIRAN 284 MG: 284 INJECTION, SOLUTION SUBCUTANEOUS at 10:59

## 2024-12-13 LAB
CHOLEST SERPL-MCNC: 150 MG/DL (ref 100–199)
HDLC SERPL-MCNC: 43 MG/DL
LABORATORY COMMENT REPORT: NORMAL
NONHDLC SERPL-MCNC: 107 MG/DL (ref 0–129)

## 2024-12-16 RX ORDER — LINACLOTIDE 290 UG/1
290 CAPSULE, GELATIN COATED ORAL
Qty: 90 CAPSULE | Refills: 3 | Status: SHIPPED | OUTPATIENT
Start: 2024-12-16

## 2024-12-16 NOTE — TELEPHONE ENCOUNTER
Rx Refill Note  Pending Prescriptions:                       Disp   Refills    Linzess 290 MCG capsule capsule [Pharmacy *30 cap*         Sig: TAKE 1 CAPSULE BY MOUTH EVERY MORNING 30 MINUTES           BEFORE BREAKFAST    Last office visit with prescribing clinician: 9/3/2024   Last telemedicine visit with prescribing clinician: Visit date not found   Next office visit with prescribing clinician: 3/3/2025         Kristin Carmona MA  12/16/24, 10:59 EST

## 2024-12-18 ENCOUNTER — TELEPHONE (OUTPATIENT)
Dept: CARDIOLOGY | Facility: CLINIC | Age: 84
End: 2024-12-18
Payer: MEDICARE

## 2024-12-18 DIAGNOSIS — I10 PRIMARY HYPERTENSION: Primary | ICD-10-CM

## 2024-12-18 DIAGNOSIS — L30.9 DERMATITIS: ICD-10-CM

## 2024-12-18 RX ORDER — CLOBETASOL PROPIONATE 0.5 MG/G
OINTMENT TOPICAL
Qty: 60 G | Refills: 0 | OUTPATIENT
Start: 2024-12-18

## 2024-12-18 NOTE — TELEPHONE ENCOUNTER
Called patient regarding his abnormal result per Dr. Sainz- let him know Dr. Sainz wants to repeat his microalbumin urea if it is still high then he needs to let his nephrologist know. Informed him that per Dr. Sainz the rest of his labs are okay including high-sensitivity CRP.  Informed him the orders are in and he can have this lab done at any [facility] facility. Patient verbalizes understanding and is agreeable.

## 2024-12-19 ENCOUNTER — LAB (OUTPATIENT)
Dept: LAB | Facility: HOSPITAL | Age: 84
End: 2024-12-19
Payer: MEDICARE

## 2024-12-19 DIAGNOSIS — E78.5 DYSLIPIDEMIA: ICD-10-CM

## 2024-12-19 LAB
CHOLEST SERPL-MCNC: 146 MG/DL (ref 0–200)
HDLC SERPL-MCNC: 46 MG/DL (ref 40–60)
LDLC SERPL CALC-MCNC: 66 MG/DL (ref 0–100)
LDLC/HDLC SERPL: 1.29 {RATIO}
TRIGL SERPL-MCNC: 204 MG/DL (ref 0–150)
VLDLC SERPL-MCNC: 34 MG/DL (ref 5–40)

## 2024-12-19 PROCEDURE — 80061 LIPID PANEL: CPT

## 2024-12-19 PROCEDURE — 36415 COLL VENOUS BLD VENIPUNCTURE: CPT

## 2024-12-23 ENCOUNTER — TELEPHONE (OUTPATIENT)
Dept: CARDIOLOGY | Facility: CLINIC | Age: 84
End: 2024-12-23
Payer: MEDICARE

## 2024-12-23 NOTE — TELEPHONE ENCOUNTER
----- Message from Carl Sainz sent at 12/20/2024  2:12 PM EST -----  Please call the patient regarding his abnormal result.  Keep watching your triglycerides rest of the labs are good      Pt called made aware of the above results, verbalized understanding

## 2025-01-08 DIAGNOSIS — K86.89 PANCREATIC INSUFFICIENCY: ICD-10-CM

## 2025-01-08 RX ORDER — PANCRELIPASE LIPASE, PANCRELIPASE PROTEASE, PANCRELIPASE AMYLASE 20000; 63000; 84000 [USP'U]/1; [USP'U]/1; [USP'U]/1
2 CAPSULE, DELAYED RELEASE ORAL 3 TIMES DAILY
Qty: 540 CAPSULE | Refills: 1 | Status: SHIPPED | OUTPATIENT
Start: 2025-01-08

## 2025-01-09 ENCOUNTER — TRANSCRIBE ORDERS (OUTPATIENT)
Dept: LAB | Facility: HOSPITAL | Age: 85
End: 2025-01-09
Payer: MEDICARE

## 2025-01-09 ENCOUNTER — LAB (OUTPATIENT)
Dept: LAB | Facility: HOSPITAL | Age: 85
End: 2025-01-09
Payer: MEDICARE

## 2025-01-09 DIAGNOSIS — I10 ESSENTIAL HYPERTENSION, MALIGNANT: ICD-10-CM

## 2025-01-09 DIAGNOSIS — E55.9 AVITAMINOSIS D: ICD-10-CM

## 2025-01-09 DIAGNOSIS — N18.4 CHRONIC KIDNEY DISEASE, STAGE IV (SEVERE): ICD-10-CM

## 2025-01-09 DIAGNOSIS — N18.4 CHRONIC KIDNEY DISEASE, STAGE IV (SEVERE): Primary | ICD-10-CM

## 2025-01-09 LAB
BACTERIA UR QL AUTO: NORMAL /HPF
BILIRUB UR QL STRIP: NEGATIVE
CLARITY UR: CLEAR
COLOR UR: YELLOW
CREAT UR-MCNC: 60.4 MG/DL
DEPRECATED RDW RBC AUTO: 41.7 FL (ref 37–54)
ERYTHROCYTE [DISTWIDTH] IN BLOOD BY AUTOMATED COUNT: 12.7 % (ref 12.3–15.4)
GLUCOSE UR STRIP-MCNC: NEGATIVE MG/DL
HCT VFR BLD AUTO: 40.9 % (ref 37.5–51)
HGB BLD-MCNC: 14 G/DL (ref 13–17.7)
HGB UR QL STRIP.AUTO: NEGATIVE
HYALINE CASTS UR QL AUTO: NORMAL /LPF
KETONES UR QL STRIP: NEGATIVE
LEUKOCYTE ESTERASE UR QL STRIP.AUTO: NEGATIVE
MCH RBC QN AUTO: 31.3 PG (ref 26.6–33)
MCHC RBC AUTO-ENTMCNC: 34.2 G/DL (ref 31.5–35.7)
MCV RBC AUTO: 91.3 FL (ref 79–97)
NITRITE UR QL STRIP: NEGATIVE
PH UR STRIP.AUTO: 5.5 [PH] (ref 5–8)
PLATELET # BLD AUTO: 290 10*3/MM3 (ref 140–450)
PMV BLD AUTO: 10 FL (ref 6–12)
PROT ?TM UR-MCNC: 195.7 MG/DL
PROT UR QL STRIP: ABNORMAL
PROT/CREAT UR: 3240.1 MG/G CREA (ref 0–200)
RBC # BLD AUTO: 4.48 10*6/MM3 (ref 4.14–5.8)
RBC # UR STRIP: NORMAL /HPF
REF LAB TEST METHOD: NORMAL
SP GR UR STRIP: 1.01 (ref 1–1.03)
SQUAMOUS #/AREA URNS HPF: NORMAL /HPF
UROBILINOGEN UR QL STRIP: ABNORMAL
WBC # UR STRIP: NORMAL /HPF
WBC NRBC COR # BLD AUTO: 8.58 10*3/MM3 (ref 3.4–10.8)

## 2025-01-09 PROCEDURE — 80069 RENAL FUNCTION PANEL: CPT

## 2025-01-09 PROCEDURE — 83970 ASSAY OF PARATHORMONE: CPT

## 2025-01-09 PROCEDURE — 36415 COLL VENOUS BLD VENIPUNCTURE: CPT

## 2025-01-09 PROCEDURE — 84156 ASSAY OF PROTEIN URINE: CPT

## 2025-01-09 PROCEDURE — 82570 ASSAY OF URINE CREATININE: CPT

## 2025-01-09 PROCEDURE — 85027 COMPLETE CBC AUTOMATED: CPT

## 2025-01-09 PROCEDURE — 84550 ASSAY OF BLOOD/URIC ACID: CPT

## 2025-01-09 PROCEDURE — 82306 VITAMIN D 25 HYDROXY: CPT

## 2025-01-09 PROCEDURE — 81001 URINALYSIS AUTO W/SCOPE: CPT

## 2025-01-10 LAB
25(OH)D3 SERPL-MCNC: 43.4 NG/ML (ref 30–100)
ALBUMIN SERPL-MCNC: 4.2 G/DL (ref 3.5–5.2)
ANION GAP SERPL CALCULATED.3IONS-SCNC: 12.8 MMOL/L (ref 5–15)
BUN SERPL-MCNC: 43 MG/DL (ref 8–23)
BUN/CREAT SERPL: 15.1 (ref 7–25)
CALCIUM SPEC-SCNC: 9.8 MG/DL (ref 8.6–10.5)
CHLORIDE SERPL-SCNC: 113 MMOL/L (ref 98–107)
CO2 SERPL-SCNC: 21.2 MMOL/L (ref 22–29)
CREAT SERPL-MCNC: 2.85 MG/DL (ref 0.76–1.27)
EGFRCR SERPLBLD CKD-EPI 2021: 21.3 ML/MIN/1.73
GLUCOSE SERPL-MCNC: 103 MG/DL (ref 65–99)
PHOSPHATE SERPL-MCNC: 3.6 MG/DL (ref 2.5–4.5)
POTASSIUM SERPL-SCNC: 6.1 MMOL/L (ref 3.5–5.2)
PTH-INTACT SERPL-MCNC: 74.4 PG/ML (ref 15–65)
SODIUM SERPL-SCNC: 147 MMOL/L (ref 136–145)
URATE SERPL-MCNC: 8.4 MG/DL (ref 3.4–7)

## 2025-01-15 RX ORDER — DILTIAZEM HYDROCHLORIDE 240 MG/1
CAPSULE, EXTENDED RELEASE ORAL
Status: CANCELLED | OUTPATIENT
Start: 2025-01-15

## 2025-01-25 DIAGNOSIS — I25.10 CORONARY ARTERY DISEASE INVOLVING NATIVE CORONARY ARTERY OF NATIVE HEART WITHOUT ANGINA PECTORIS: ICD-10-CM

## 2025-01-25 DIAGNOSIS — I10 PRIMARY HYPERTENSION: ICD-10-CM

## 2025-01-26 RX ORDER — ISOSORBIDE MONONITRATE 60 MG/1
60 TABLET, EXTENDED RELEASE ORAL 2 TIMES DAILY
Qty: 180 TABLET | Refills: 3 | Status: SHIPPED | OUTPATIENT
Start: 2025-01-26

## 2025-02-25 ENCOUNTER — APPOINTMENT (OUTPATIENT)
Dept: GENERAL RADIOLOGY | Facility: HOSPITAL | Age: 85
End: 2025-02-25
Payer: MEDICARE

## 2025-02-25 ENCOUNTER — HOSPITAL ENCOUNTER (EMERGENCY)
Facility: HOSPITAL | Age: 85
Discharge: HOME | End: 2025-02-25
Attending: EMERGENCY MEDICINE | Admitting: EMERGENCY MEDICINE
Payer: MEDICARE

## 2025-02-25 ENCOUNTER — APPOINTMENT (OUTPATIENT)
Dept: CARDIOLOGY | Facility: HOSPITAL | Age: 85
End: 2025-02-25
Payer: MEDICARE

## 2025-02-25 VITALS
TEMPERATURE: 98.8 F | OXYGEN SATURATION: 93 % | SYSTOLIC BLOOD PRESSURE: 156 MMHG | HEIGHT: 64 IN | WEIGHT: 169.97 LBS | BODY MASS INDEX: 29.02 KG/M2 | RESPIRATION RATE: 18 BRPM | DIASTOLIC BLOOD PRESSURE: 86 MMHG | HEART RATE: 58 BPM

## 2025-02-25 DIAGNOSIS — I35.0 NONRHEUMATIC AORTIC VALVE STENOSIS: ICD-10-CM

## 2025-02-25 DIAGNOSIS — I50.32 CHRONIC DIASTOLIC (CONGESTIVE) HEART FAILURE: ICD-10-CM

## 2025-02-25 DIAGNOSIS — R07.9 CHEST PAIN IN ADULT: Primary | ICD-10-CM

## 2025-02-25 LAB
ALBUMIN SERPL-MCNC: 3.7 G/DL (ref 3.5–5.2)
ALBUMIN/GLOB SERPL: 1.4 G/DL
ALP SERPL-CCNC: 79 U/L (ref 39–117)
ALT SERPL W P-5'-P-CCNC: 34 U/L (ref 1–41)
ANION GAP SERPL CALCULATED.3IONS-SCNC: 10 MMOL/L (ref 5–15)
AST SERPL-CCNC: 31 U/L (ref 1–40)
AV MEAN PRESS GRAD SYS DOP V1V2: 23.3 MMHG
AV VMAX SYS DOP: 325.3 CM/SEC
BASOPHILS # BLD AUTO: 0.02 10*3/MM3 (ref 0–0.2)
BASOPHILS NFR BLD AUTO: 0.3 % (ref 0–1.5)
BH CV ECHO MEAS - AO MAX PG: 42.3 MMHG
BH CV ECHO MEAS - AO ROOT DIAM: 3.3 CM
BH CV ECHO MEAS - AO V2 VTI: 77.7 CM
BH CV ECHO MEAS - AVA(I,D): 1.14 CM2
BH CV ECHO MEAS - EDV(CUBED): 68.9 ML
BH CV ECHO MEAS - EDV(MOD-SP2): 72.4 ML
BH CV ECHO MEAS - EDV(MOD-SP4): 82.9 ML
BH CV ECHO MEAS - EF(MOD-SP2): 70.9 %
BH CV ECHO MEAS - EF(MOD-SP4): 53.6 %
BH CV ECHO MEAS - ESV(CUBED): 24.4 ML
BH CV ECHO MEAS - ESV(MOD-SP2): 21.1 ML
BH CV ECHO MEAS - ESV(MOD-SP4): 38.5 ML
BH CV ECHO MEAS - FS: 29.3 %
BH CV ECHO MEAS - IVS/LVPW: 1 CM
BH CV ECHO MEAS - IVSD: 1.3 CM
BH CV ECHO MEAS - LA DIMENSION: 3.4 CM
BH CV ECHO MEAS - LAT PEAK E' VEL: 7.1 CM/SEC
BH CV ECHO MEAS - LV MASS(C)D: 193.5 GRAMS
BH CV ECHO MEAS - LV MAX PG: 4.8 MMHG
BH CV ECHO MEAS - LV MEAN PG: 2.5 MMHG
BH CV ECHO MEAS - LV V1 MAX: 109.5 CM/SEC
BH CV ECHO MEAS - LV V1 VTI: 28.1 CM
BH CV ECHO MEAS - LVIDD: 4.1 CM
BH CV ECHO MEAS - LVIDS: 2.9 CM
BH CV ECHO MEAS - LVOT AREA: 3.1 CM2
BH CV ECHO MEAS - LVOT DIAM: 2 CM
BH CV ECHO MEAS - LVPWD: 1.3 CM
BH CV ECHO MEAS - MED PEAK E' VEL: 5.3 CM/SEC
BH CV ECHO MEAS - MR MAX PG: 38.5 MMHG
BH CV ECHO MEAS - MR MAX VEL: 306.5 CM/SEC
BH CV ECHO MEAS - MV A MAX VEL: 133 CM/SEC
BH CV ECHO MEAS - MV DEC SLOPE: 405 CM/SEC2
BH CV ECHO MEAS - MV DEC TIME: 0.26 SEC
BH CV ECHO MEAS - MV E MAX VEL: 88 CM/SEC
BH CV ECHO MEAS - MV E/A: 0.66
BH CV ECHO MEAS - MV MAX PG: 6.8 MMHG
BH CV ECHO MEAS - MV MEAN PG: 3 MMHG
BH CV ECHO MEAS - MV P1/2T: 81.7 MSEC
BH CV ECHO MEAS - MV V2 VTI: 41.6 CM
BH CV ECHO MEAS - MVA(P1/2T): 2.7 CM2
BH CV ECHO MEAS - MVA(VTI): 2.12 CM2
BH CV ECHO MEAS - PA ACC TIME: 0.11 SEC
BH CV ECHO MEAS - PA V2 MAX: 109 CM/SEC
BH CV ECHO MEAS - PI END-D VEL: 122 CM/SEC
BH CV ECHO MEAS - RAP SYSTOLE: 3 MMHG
BH CV ECHO MEAS - RVSP: 20 MMHG
BH CV ECHO MEAS - SV(LVOT): 88.3 ML
BH CV ECHO MEAS - SV(MOD-SP2): 51.3 ML
BH CV ECHO MEAS - SV(MOD-SP4): 44.4 ML
BH CV ECHO MEAS - TAPSE (>1.6): 1.9 CM
BH CV ECHO MEAS - TR MAX PG: 17 MMHG
BH CV ECHO MEAS - TR MAX VEL: 205 CM/SEC
BH CV ECHO MEASUREMENTS AVERAGE E/E' RATIO: 14.19
BH CV XLRA - RV BASE: 2.9 CM
BH CV XLRA - RV LENGTH: 8.1 CM
BH CV XLRA - RV MID: 2.4 CM
BH CV XLRA - TDI S': 11.2 CM/SEC
BILIRUB SERPL-MCNC: 0.3 MG/DL (ref 0–1.2)
BUN SERPL-MCNC: 31 MG/DL (ref 8–23)
BUN/CREAT SERPL: 13.2 (ref 7–25)
CALCIUM SPEC-SCNC: 9 MG/DL (ref 8.6–10.5)
CHLORIDE SERPL-SCNC: 104 MMOL/L (ref 98–107)
CO2 SERPL-SCNC: 26 MMOL/L (ref 22–29)
CREAT SERPL-MCNC: 2.35 MG/DL (ref 0.76–1.27)
DEPRECATED RDW RBC AUTO: 43.1 FL (ref 37–54)
EGFRCR SERPLBLD CKD-EPI 2021: 26.8 ML/MIN/1.73
EOSINOPHIL # BLD AUTO: 0.18 10*3/MM3 (ref 0–0.4)
EOSINOPHIL NFR BLD AUTO: 2.3 % (ref 0.3–6.2)
ERYTHROCYTE [DISTWIDTH] IN BLOOD BY AUTOMATED COUNT: 13 % (ref 12.3–15.4)
GEN 5 1HR TROPONIN T REFLEX: 62 NG/L
GLOBULIN UR ELPH-MCNC: 2.7 GM/DL
GLUCOSE SERPL-MCNC: 117 MG/DL (ref 65–99)
HCT VFR BLD AUTO: 39.5 % (ref 37.5–51)
HGB BLD-MCNC: 12.9 G/DL (ref 13–17.7)
HOLD SPECIMEN: NORMAL
IMM GRANULOCYTES # BLD AUTO: 0.05 10*3/MM3 (ref 0–0.05)
IMM GRANULOCYTES NFR BLD AUTO: 0.6 % (ref 0–0.5)
LEFT ATRIUM VOLUME INDEX: 39.6 ML/M2
LIPASE SERPL-CCNC: 41 U/L (ref 13–60)
LV EF BIPLANE MOD: 62.5 %
LYMPHOCYTES # BLD AUTO: 1.91 10*3/MM3 (ref 0.7–3.1)
LYMPHOCYTES NFR BLD AUTO: 23.9 % (ref 19.6–45.3)
MCH RBC QN AUTO: 29.8 PG (ref 26.6–33)
MCHC RBC AUTO-ENTMCNC: 32.7 G/DL (ref 31.5–35.7)
MCV RBC AUTO: 91.2 FL (ref 79–97)
MONOCYTES # BLD AUTO: 0.64 10*3/MM3 (ref 0.1–0.9)
MONOCYTES NFR BLD AUTO: 8 % (ref 5–12)
NEUTROPHILS NFR BLD AUTO: 5.2 10*3/MM3 (ref 1.7–7)
NEUTROPHILS NFR BLD AUTO: 64.9 % (ref 42.7–76)
NRBC BLD AUTO-RTO: 0 /100 WBC (ref 0–0.2)
NT-PROBNP SERPL-MCNC: 473.7 PG/ML (ref 0–1800)
PLATELET # BLD AUTO: 248 10*3/MM3 (ref 140–450)
PMV BLD AUTO: 9.4 FL (ref 6–12)
POTASSIUM SERPL-SCNC: 3.8 MMOL/L (ref 3.5–5.2)
PROT SERPL-MCNC: 6.4 G/DL (ref 6–8.5)
QT INTERVAL: 354 MS
QT INTERVAL: 396 MS
QTC INTERVAL: 408 MS
QTC INTERVAL: 421 MS
RBC # BLD AUTO: 4.33 10*6/MM3 (ref 4.14–5.8)
SODIUM SERPL-SCNC: 140 MMOL/L (ref 136–145)
TROPONIN T % DELTA: 2
TROPONIN T NUMERIC DELTA: 1 NG/L
TROPONIN T SERPL HS-MCNC: 61 NG/L
WBC NRBC COR # BLD AUTO: 8 10*3/MM3 (ref 3.4–10.8)
WHOLE BLOOD HOLD COAG: NORMAL
WHOLE BLOOD HOLD SPECIMEN: NORMAL

## 2025-02-25 PROCEDURE — 85025 COMPLETE CBC W/AUTO DIFF WBC: CPT

## 2025-02-25 PROCEDURE — 93306 TTE W/DOPPLER COMPLETE: CPT | Performed by: INTERNAL MEDICINE

## 2025-02-25 PROCEDURE — 93306 TTE W/DOPPLER COMPLETE: CPT

## 2025-02-25 PROCEDURE — 93005 ELECTROCARDIOGRAM TRACING: CPT | Performed by: EMERGENCY MEDICINE

## 2025-02-25 PROCEDURE — 96374 THER/PROPH/DIAG INJ IV PUSH: CPT

## 2025-02-25 PROCEDURE — 80053 COMPREHEN METABOLIC PANEL: CPT | Performed by: EMERGENCY MEDICINE

## 2025-02-25 PROCEDURE — 83690 ASSAY OF LIPASE: CPT | Performed by: EMERGENCY MEDICINE

## 2025-02-25 PROCEDURE — 93005 ELECTROCARDIOGRAM TRACING: CPT

## 2025-02-25 PROCEDURE — 83880 ASSAY OF NATRIURETIC PEPTIDE: CPT | Performed by: EMERGENCY MEDICINE

## 2025-02-25 PROCEDURE — 84484 ASSAY OF TROPONIN QUANT: CPT | Performed by: EMERGENCY MEDICINE

## 2025-02-25 PROCEDURE — 71045 X-RAY EXAM CHEST 1 VIEW: CPT

## 2025-02-25 PROCEDURE — 36415 COLL VENOUS BLD VENIPUNCTURE: CPT

## 2025-02-25 PROCEDURE — 99284 EMERGENCY DEPT VISIT MOD MDM: CPT

## 2025-02-25 RX ORDER — LABETALOL HYDROCHLORIDE 5 MG/ML
10 INJECTION, SOLUTION INTRAVENOUS ONCE
Status: COMPLETED | OUTPATIENT
Start: 2025-02-25 | End: 2025-02-25

## 2025-02-25 RX ORDER — NITROGLYCERIN 0.4 MG/1
0.4 TABLET SUBLINGUAL
Status: DISCONTINUED | OUTPATIENT
Start: 2025-02-25 | End: 2025-02-25 | Stop reason: HOSPADM

## 2025-02-25 RX ORDER — HYDRALAZINE HYDROCHLORIDE 50 MG/1
50 TABLET, FILM COATED ORAL 3 TIMES DAILY
Qty: 90 TABLET | Refills: 3 | Status: SHIPPED | OUTPATIENT
Start: 2025-02-25

## 2025-02-25 RX ORDER — ASPIRIN 81 MG/1
324 TABLET, CHEWABLE ORAL ONCE
Status: COMPLETED | OUTPATIENT
Start: 2025-02-25 | End: 2025-02-25

## 2025-02-25 RX ORDER — SODIUM CHLORIDE 0.9 % (FLUSH) 0.9 %
10 SYRINGE (ML) INJECTION AS NEEDED
Status: DISCONTINUED | OUTPATIENT
Start: 2025-02-25 | End: 2025-02-25 | Stop reason: HOSPADM

## 2025-02-25 RX ADMIN — ASPIRIN 81 MG CHEWABLE TABLET 243 MG: 81 TABLET CHEWABLE at 11:26

## 2025-02-25 RX ADMIN — Medication 10 MG: at 14:58

## 2025-02-25 RX ADMIN — NITROGLYCERIN 0.4 MG: 0.4 TABLET SUBLINGUAL at 14:52

## 2025-02-25 NOTE — DISCHARGE INSTRUCTIONS
Keep outpatient follow-up with cardiology.    Return to the ER with any further concern.   MEDICATIONS  (STANDING):  buMETAnide Injectable 2 milliGRAM(s) IV Push once  calcium acetate 667 milliGRAM(s) Oral four times a day with meals  chlorhexidine 2% Cloths 1 Application(s) Topical <User Schedule>  cholecalciferol 1000 Unit(s) Oral daily  cloNIDine 0.2 milliGRAM(s) Oral every 12 hours  famotidine    Tablet 20 milliGRAM(s) Oral daily  insulin lispro (HumaLOG) corrective regimen sliding scale   SubCutaneous every 4 hours  methylPREDNISolone sodium succinate Injectable 60 milliGRAM(s) IV Push two times a day  metoprolol tartrate 50 milliGRAM(s) Oral two times a day  mycophenolate mofetil 1 Gram(s) Oral <User Schedule>  nystatin    Suspension 209444 Unit(s) Swish and Swallow four times a day  polyethylene glycol 3350 17 Gram(s) Oral daily  senna 2 Tablet(s) Oral at bedtime  tacrolimus ER Tablet (ENVARSUS XR) 4 milliGRAM(s) Oral <User Schedule>  tamoxifen 20 milliGRAM(s) Oral at bedtime  trimethoprim   80 mG/sulfamethoxazole 400 mG 1 Tablet(s) Oral daily  valGANciclovir 450 milliGRAM(s) Oral daily

## 2025-02-25 NOTE — Clinical Note
[facility] EMERGENCY DEPARTMENT  [address]  [facility] [address]  Phone: [phone]    Hal Byrnes was seen and treated in our emergency department on 2/25/2025.  He may return to work on 02/27/2025.         Thank you for choosing [facility].    Albania Grant MD

## 2025-02-25 NOTE — CONSULTS
[facility] Cardiology, emergency department consult  Date of Hospital Visit: 25  Encounter Provider: Sofia Herring PA-C    Place of Service: [facility]  Patient Name: Hal Byrnes  :1940  MRN: [id number]     Primary Care Provider: Pia De La Garza MD    Chief complaint: Chest pain, elevated blood pressure    PROBLEM LIST  CARDIAC  Coronary Artery Disease:   NSTEMI with C : PCI to RCA and left circumflex  Repeat C with nonobstructive coronary artery disease in , , and   Lexiscan : LV function at rest.  Normal myocardial perfusion.    Myocardium:   Stress echo : Normal EF.  Mild diastolic dysfunction.  Mild posterior wall hypokinesis.  Echo : EF 70%, diastolic dysfunction, mildly dilated LA    Valvular:   Echo : Moderate AS, mean gradient 17, trace TR, trace MR  Echo : Moderate AS, mean gradient 19, mild MR, mild TR    Electrical:   Sinus rhythm    Percardium:   Normal       CARDIAC RISK FACTORS  Hypertension  Dyslipidemia  2023   HDL 41   2023   HDL 41 LDL 64  Physical Inactivity  Obstructive Sleep Apnea    NON-CARDIAC  GERD  Chronic pancreatitis  BPH  Osteopenia  Peripheral neuropathy  Cervical and lumbar spondylosis  Psoriasis  Chronic kidney disease.    SURGERIES  None reported    History of Present Illness:  Patient is an 84-year-old history of CAD s/p stents,moderate aortic stenosis, hypertension, CKD who presents to the emergency department with chest pain and elevated blood pressure.  Patient states over the last several days he has noticed his blood pressure becoming more elevated.  Last evening he started to develop some chest discomfort and  it has been constant since that time.  Patient has also noticed some mild swelling to his legs.  Over the last couple of weeks with his blood pressure being elevated he is noticed the swelling and shortness of breath.  He was last seen by  "his nephrologist about a month ago and he cannot recall any changes being made to his medications.  He has been taking his medications as prescribed with no issue.  Patient did receive aspirin upon arrival and wife states once he was actually in her room he started to feel better with no chest discomfort and blood pressure has come down.  He does state as he age she gets a little anxious regarding his health.            I reviewed patient's past medical history, surgical history, family history and social history.    Current Outpatient Medications   Medication Instructions    aspirin 81 mg, Daily    dilTIAZem XR (DILACOR XR) 240 MG 24 hr capsule     hydroCHLOROthiazide 25 mg, Oral, Daily    isosorbide mononitrate (IMDUR) 60 mg, Oral, 2 Times Daily    Linzess 290 mcg, Oral, Every Morning Before Breakfast    lisinopril (PRINIVIL,ZESTRIL) 20 mg, Daily    Pancrelipase, Lip-Prot-Amyl, (Zenpep) 19388-08364 units capsule delayed-release particles 2 capsules, Oral, 3 Times Daily    pantoprazole (PROTONIX) 40 mg, Oral, Daily    polyethylene glycol (GaviLyte-G) 236 g solution FOLLOW INSTRUCTIONS MAILED TO YOUR HOME.    spironolactone (ALDACTONE) 12.5 mg, Oral, Every Other Day    topiramate (TOPAMAX) 50 mg, Oral, 2 Times Daily    vitamin D (ERGOCALCIFEROL) 50,000 Units, Every 30 Days          Scheduled Meds:Inclisiran Sodium, 1.5 mL, Subcutaneous, Q6 Months  labetalol, 10 mg, Intravenous, Once      Continuous Infusions:       Review of Systems            Objective:     Vitals:    02/25/25 1110 02/25/25 1347 02/25/25 1430   BP: 179/96 (!) 197/94 179/85   BP Location: Left arm     Patient Position: Sitting     Pulse: 93 64 65   Resp: 18     Temp: 98.8 °F (37.1 °C)     TempSrc: Oral     SpO2: 95% 95% 95%   Weight: 77.1 kg (170 lb)     Height: 162.6 cm (64\")         Body mass index is 29.18 kg/m².  Flowsheet Rows      Flowsheet Row First Filed Value   Admission Height 162.6 cm (64\") Documented at 02/25/2025 1110   Admission Weight " 77.1 kg (170 lb) Documented at 02/25/2025 1110          No intake or output data in the 24 hours ending 02/25/25 1456    Constitutional:       Appearance: Not in distress.   Pulmonary:      Comments: Clear to auscultation bilaterally  Cardiovascular:      Normal rate. Regular rhythm.      Murmurs: There is a systolic murmur.   Edema:     Comments: Trace bilateral lower extremity edema  Abdominal:      General: There is no distension.   Neurological:      Mental Status: Alert.           Lab Review:                CBC:      Lab 02/25/25  1124   WBC 8.00   HEMOGLOBIN 12.9*   HEMATOCRIT 39.5   PLATELETS 248   NEUTROS ABS 5.20   IMMATURE GRANS (ABS) 0.05   LYMPHS ABS 1.91   MONOS ABS 0.64   EOS ABS 0.18   MCV 91.2     CMP:        Lab 02/25/25  1124   SODIUM 140   POTASSIUM 3.8   CHLORIDE 104   CO2 26.0   ANION GAP 10.0   BUN 31*   CREATININE 2.35*   EGFR 26.8*   GLUCOSE 117*   CALCIUM 9.0   TOTAL PROTEIN 6.4   ALBUMIN 3.7   GLOBULIN 2.7   ALT (SGPT) 34   AST (SGOT) 31   BILIRUBIN 0.3   ALK PHOS 79   LIPASE 41         Lab Results   Component Value Date    HGBA1C 5.9 (A) 10/09/2024     Estimated Creatinine Clearance: 22.4 mL/min (A) (by C-G formula based on SCr of 2.35 mg/dL (H)).          Lab 02/25/25  1324 02/25/25  1124   PROBNP  --  473.7   HSTROP T 62* 61*       Lab Results   Component Value Date    CHOL 146 12/19/2024    CHLPL 150 12/12/2024    TRIG 204 (H) 12/19/2024    HDL 46 12/19/2024    LDL 66 12/19/2024         XR Chest 1 View    Result Date: 2/25/2025  Impression: 1.No acute radiographic abnormality is identified. Electronically Signed: Franki Shelton MD  2/25/2025 11:52 AM EST  Workstation ID: [id number]      Results for orders placed during the hospital encounter of 02/25/25    Adult Transthoracic Echo Complete W/ Cont if Necessary Per Protocol    Interpretation Summary    Left ventricular systolic function is normal. Calculated left ventricular EF = 62.5%    Left ventricular wall thickness is consistent with  mild concentric hypertrophy.  Mild gradient is seen in the LV cavity.    The left atrial cavity is dilated.    Left atrial volume is mildly increased.    Moderate aortic valve stenosis is present.    Aortic valve mean pressure gradient is 23 mmHg with good stroke-volume.    Estimated right ventricular systolic pressure from tricuspid regurgitation is normal (<35 mmHg).       EKG: Normal sinus rhythm, heart rate 64 bpm, nonspecific ST segment changes.    Result Review:  I have personally reviewed the results from the time of admission and agree with these findings:  [x]  Laboratory  [x]  Radiology  [x]  EKG/Telemetry   []  Pathology  []  Old records  []  Other:             Assessment:   Chest pain, flat troponins 61-62  , no MI  History of CAD and previous stents  States that this is not similar to his anginal equivalent  Hypertension, currently uncontrolled   Chronic HFpEF/diastolic heart failure      Plan:   Patient had echocardiogram done in the emergency department.  There is no wall motion abnormality. Patient does have an EF of 62% with mild LVH.  Patient still has moderate aortic stenosis.  Patient has flat troponins and no signs of ACS.  Chest pain likely related to uncontrolled hypertension.  Patient is encouraged to return if he has recurrent symptoms that are his anginal equivalent.  He can also contact our office if needed.  Patient was given labetalol in the emergency department with some improvement of his hypertension and his symptoms.  Will add hydralazine 50 mg 3 times daily to patient's antihypertensive regimen.  Currently patient is on diltiazem 240 mg daily, Imdur 120 mg daily, and spironolactone 12.5 mg daily. I did discuss with the patient and wife if his blood pressure is dropping too low he can cut the hydralazine in half for 25 mg 3 times daily.  I spoke to Dr. Sainz and he is concerned about possible amyloidosis in the setting of patient's symptoms.  As an outpatient we will order a PYP scan  as well as amyloid blood work and urine sample.  He will follow-up in 4 weeks for further evaluation and treatment.        Sofia Herring PA-C

## 2025-02-26 NOTE — ED PROVIDER NOTES
Subjective   History of Present Illness  84-year-old male who presents for evaluation of chest pain.  The patient reports pain in the center of his chest with radiation into the center of his back for the last 2 days.  He describes it as relatively constant.  He does report having a significant elevated blood pressure yesterday.  He has a previous history of stent intervention and is followed by Dr. Sainz of cardiology.  He denies radiation of symptoms to the neck shoulders or arms.  No trauma to his chest.  He does state that the pain seems to be exacerbated with activity.  No abdominal pain.  No nausea or vomiting.  No diaphoresis.  No change evaluated function.  He overall appears well nontoxic and in no acute distress.      Review of Systems   Constitutional:  Negative for chills, fatigue and fever.   HENT:  Negative for congestion, ear pain, postnasal drip, sinus pressure and sore throat.    Eyes:  Negative for pain, redness and visual disturbance.   Respiratory:  Negative for cough, chest tightness and shortness of breath.    Cardiovascular:  Positive for chest pain. Negative for palpitations and leg swelling.   Gastrointestinal:  Negative for abdominal pain, anal bleeding, blood in stool, diarrhea, nausea and vomiting.   Endocrine: Negative for polydipsia and polyuria.   Genitourinary:  Negative for difficulty urinating, dysuria, frequency and urgency.   Musculoskeletal:  Negative for arthralgias, back pain and neck pain.   Skin:  Negative for pallor and rash.   Allergic/Immunologic: Negative for environmental allergies and immunocompromised state.   Neurological:  Negative for dizziness, weakness and headaches.   Hematological:  Negative for adenopathy.   Psychiatric/Behavioral:  Negative for confusion, self-injury and suicidal ideas. The patient is not nervous/anxious.    All other systems reviewed and are negative.      Past Medical History:   Diagnosis Date    Arthritis     Coronary artery disease      Difficulty walking     Hyperlipidemia     Hypertension     Low back pain        Allergies   Allergen Reactions    Sulfa Antibiotics Irritability       Past Surgical History:   Procedure Laterality Date    APPENDECTOMY      CAROTID STENT      CATARACT EXTRACTION, BILATERAL Bilateral     COLONOSCOPY  2024    CORONARY STENT PLACEMENT      PROSTATE SURGERY      for BPH       Family History   Problem Relation Age of Onset    Heart attack Mother     Brain cancer Sister     Heart attack Brother     Hepatitis Brother        Social History     Socioeconomic History    Marital status:    Tobacco Use    Smoking status: Former     Current packs/day: 0.00     Average packs/day: 1.5 packs/day for 10.0 years (15.0 ttl pk-yrs)     Types: Cigarettes     Start date: 2001     Quit date: 2011     Years since quittin.8     Passive exposure: Never    Smokeless tobacco: Never   Vaping Use    Vaping status: Never Used   Substance and Sexual Activity    Alcohol use: Never    Drug use: Not Currently     Types: Marijuana     Comment: longstanding smoking, stopped 2 weeks ago    Sexual activity: Not Currently           Objective   Physical Exam  Vitals and nursing note reviewed.   Constitutional:       General: He is not in acute distress.     Appearance: Normal appearance. He is well-developed. He is not toxic-appearing or diaphoretic.   HENT:      Head: Normocephalic and atraumatic.      Right Ear: External ear normal.      Left Ear: External ear normal.      Nose: Nose normal.   Eyes:      General: Lids are normal.      Pupils: Pupils are equal, round, and reactive to light.   Neck:      Trachea: No tracheal deviation.   Cardiovascular:      Rate and Rhythm: Normal rate and regular rhythm.      Pulses: No decreased pulses.      Heart sounds: Normal heart sounds. No murmur heard.     No friction rub. No gallop.   Pulmonary:      Effort: Pulmonary effort is normal. No respiratory distress.      Breath sounds: Normal  breath sounds. No decreased breath sounds, wheezing, rhonchi or rales.   Abdominal:      General: Bowel sounds are normal.      Palpations: Abdomen is soft.      Tenderness: There is no abdominal tenderness. There is no guarding or rebound.   Musculoskeletal:         General: No deformity. Normal range of motion.      Cervical back: Normal range of motion and neck supple.   Lymphadenopathy:      Cervical: No cervical adenopathy.   Skin:     General: Skin is warm and dry.      Findings: No rash.   Neurological:      Mental Status: He is alert and oriented to person, place, and time.      Cranial Nerves: No cranial nerve deficit.      Sensory: No sensory deficit.   Psychiatric:         Speech: Speech normal.         Behavior: Behavior normal.         Thought Content: Thought content normal.         Judgment: Judgment normal.         Procedures           ED Course                  HEART Score: 8   Shared Decision Making  I discussed the findings with the patient/patient representative who is in agreement with the treatment plan and the final disposition.  Risks and benefits of discharge and/or observation/admission were discussed: Yes                                      Medical Decision Making  Differential includes acute coronary syndrome, chest wall pain, pleurisy, pneumonia, other unspecified etiology.    Troponin was elevated, but remained stable on repeat evaluation.  Normal white count, normal lipase, normal BNP, baseline elevated creatinine consistent with known chronic renal insufficiency, no acute electrolyte abnormalities.    Chest x-ray shows no acute disease.    Cardiology service evaluated the patient here in the ER and has recommended discharge and outpatient follow-up.    On repeat evaluation the patient does desire to go home, and denies any chest pain at this time.    He will be discharged with referral to the heart valve clinic and advised to return to the ER with any further concern.    Problems  Addressed:  Chest pain in adult: complicated acute illness or injury with systemic symptoms    Amount and/or Complexity of Data Reviewed  External Data Reviewed: labs, radiology and ECG.  Labs: ordered. Decision-making details documented in ED Course.  Radiology: ordered and independent interpretation performed. Decision-making details documented in ED Course.  ECG/medicine tests: ordered and independent interpretation performed. Decision-making details documented in ED Course.     Details: EKG performed 2/25/2025 at 1115 shows sinus rhythm, left axis deviation, normal QTc, normal QRS, no acute ischemic changes.    Risk  OTC drugs.        Final diagnoses:   Chest pain in adult       ED Disposition  ED Disposition       ED Disposition   Discharge    Condition   Stable    Comment   --               [facility]  [address] [place] [address] [address]  [facility] [address] [phone]  Schedule an appointment as soon as possible for a visit            Medication List        New Prescriptions      hydrALAZINE 50 MG tablet  Commonly known as: APRESOLINE  Take 1 tablet by mouth 3 (Three) Times a Day.               Where to Get Your Medications        These medications were sent to [facility] [id number] - [place], [place] - [address] [facility] - [phone]  - [phone]  [address] [facility] [address]      Phone: [phone]   hydrALAZINE 50 MG tablet            Albania Grant MD  02/25/25 2132

## 2025-03-03 ENCOUNTER — OFFICE VISIT (OUTPATIENT)
Dept: GASTROENTEROLOGY | Facility: CLINIC | Age: 85
End: 2025-03-03
Payer: MEDICARE

## 2025-03-03 VITALS
WEIGHT: 180.2 LBS | TEMPERATURE: 98.2 F | HEART RATE: 85 BPM | BODY MASS INDEX: 30.77 KG/M2 | OXYGEN SATURATION: 96 % | SYSTOLIC BLOOD PRESSURE: 170 MMHG | RESPIRATION RATE: 18 BRPM | DIASTOLIC BLOOD PRESSURE: 102 MMHG | HEIGHT: 64 IN

## 2025-03-03 DIAGNOSIS — K59.00 CONSTIPATION, UNSPECIFIED CONSTIPATION TYPE: Primary | ICD-10-CM

## 2025-03-03 PROCEDURE — 3077F SYST BP >= 140 MM HG: CPT | Performed by: INTERNAL MEDICINE

## 2025-03-03 PROCEDURE — 99214 OFFICE O/P EST MOD 30 MIN: CPT | Performed by: INTERNAL MEDICINE

## 2025-03-03 PROCEDURE — 1160F RVW MEDS BY RX/DR IN RCRD: CPT | Performed by: INTERNAL MEDICINE

## 2025-03-03 PROCEDURE — G2211 COMPLEX E/M VISIT ADD ON: HCPCS | Performed by: INTERNAL MEDICINE

## 2025-03-03 PROCEDURE — 3080F DIAST BP >= 90 MM HG: CPT | Performed by: INTERNAL MEDICINE

## 2025-03-03 PROCEDURE — 1159F MED LIST DOCD IN RCRD: CPT | Performed by: INTERNAL MEDICINE

## 2025-03-03 NOTE — PROGRESS NOTES
PCP: Pia De La Garza MD    Chief Complaint   Patient presents with    Constipation     Medication is not helping       History of Present Illness:   Hal Byrnes is a 84 y.o. male who presents to GI clinic as a follow up for constipation. Linzess doesn't seem to be working as well. More bloated.    Past Medical History:   Diagnosis Date    Arthritis     Coronary artery disease     Difficulty walking     Hyperlipidemia     Hypertension     Low back pain        Past Surgical History:   Procedure Laterality Date    APPENDECTOMY      CAROTID STENT      CATARACT EXTRACTION, BILATERAL Bilateral     COLONOSCOPY  11/2024    CORONARY STENT PLACEMENT      PROSTATE SURGERY      for BPH         Current Outpatient Medications:     aspirin 81 MG chewable tablet, Chew 1 tablet Daily., Disp: , Rfl:     dilTIAZem XR (DILACOR XR) 240 MG 24 hr capsule, , Disp: , Rfl:     hydrALAZINE (APRESOLINE) 50 MG tablet, Take 1 tablet by mouth 3 (Three) Times a Day., Disp: 90 tablet, Rfl: 3    isosorbide mononitrate (IMDUR) 60 MG 24 hr tablet, TAKE 1 TABLET BY MOUTH TWICE A DAY, Disp: 180 tablet, Rfl: 3    linaclotide (Linzess) 290 MCG capsule capsule, TAKE 1 CAPSULE BY MOUTH EVERY MORNING 30 MINUTES BEFORE BREAKFAST, Disp: 90 capsule, Rfl: 3    lisinopril (PRINIVIL,ZESTRIL) 40 MG tablet, Take 0.5 tablets by mouth Daily., Disp: , Rfl:     Pancrelipase, Lip-Prot-Amyl, (Zenpep) 41741-32539 units capsule delayed-release particles, Take 2 capsules by mouth 3 (Three) Times a Day., Disp: 540 capsule, Rfl: 1    pantoprazole (PROTONIX) 40 MG EC tablet, Take 1 tablet by mouth Daily., Disp: 90 tablet, Rfl: 3    spironolactone (ALDACTONE) 25 MG tablet, TAKE 1/2 TABLET BY MOUTH EVERY OTHER DAY, Disp: 90 tablet, Rfl: 0    vitamin D (ERGOCALCIFEROL) 1.25 MG (34968 UT) capsule capsule, Take 1 capsule by mouth Every 30 (Thirty) Days., Disp: , Rfl:     hydroCHLOROthiazide (HYDRODIURIL) 25 MG tablet, TAKE 1 TABLET BY MOUTH DAILY, Disp: 90 tablet, Rfl:  1    polyethylene glycol (GaviLyte-G) 236 g solution, FOLLOW INSTRUCTIONS MAILED TO YOUR HOME., Disp: 4000 mL, Rfl: 0    topiramate (TOPAMAX) 50 MG tablet, Take 1 tablet by mouth 2 (Two) Times a Day., Disp: 180 tablet, Rfl: 3    Current Facility-Administered Medications:     Inclisiran Sodium solution prefilled syringe 284 mg, 1.5 mL, Subcutaneous, Q6 Months, Carl Sainz MD    Allergies   Allergen Reactions    Sulfa Antibiotics Irritability       Family History   Problem Relation Age of Onset    Heart attack Mother     Brain cancer Sister     Heart attack Brother     Hepatitis Brother        Social History     Socioeconomic History    Marital status:    Tobacco Use    Smoking status: Former     Current packs/day: 0.00     Average packs/day: 1.5 packs/day for 10.0 years (15.0 ttl pk-yrs)     Types: Cigarettes     Start date: 2001     Quit date: 2011     Years since quittin.8     Passive exposure: Never    Smokeless tobacco: Never   Vaping Use    Vaping status: Never Used   Substance and Sexual Activity    Alcohol use: Never    Drug use: Not Currently     Types: Marijuana     Comment: longstanding smoking, stopped 2 weeks ago    Sexual activity: Not Currently       Review of Systems  A complete 12 point ros was asked and is negative except for that mentioned above.  In particular:  No fever  No rash  No increased arthralgias  No worsening edema  No cough  No dyspnea  No chest pain      Vitals:    25 1428   BP: (!) 170/102   Pulse: 85   Resp: 18   Temp: 98.2 °F (36.8 °C)   SpO2: 96%       Physical Exam  General: well developed, well nourished  A+O x 3 NAD  HEENT: NCAT, pupils equal appearing, sclera appear white  NECK: full ROM  Respiratory: symmetric chest rise, normal effort, normal work of breathing, no overt rales  Abdomen: non-distended  Skin: normal color, no jaundice  Neuro: no tremor, no facial droop  Psych: normal mood and affect      Assessment/Plan  Colonoscopy 10/2024:  adequate prep, 7 mm polyp, normal TI , sigmoid diverticulosis, repeat colonoscopy in       1) Constipation, change in bowel habits, bloating  Workup: colonoscopy.  Plan:  Reduce zenpep to 1 with meals  Continue linzess, take with meals  Increase miralax: 1 and 1/2 capful with glass of water  Increase exercise/hydration  Stop fiber supplement  Future consideration: adding motegrity, mag oxide, dulcolax    2.) GERD with bitter taste  Suspect bilious reflux  Workup: egd 10/2024: la grade a esophagitis, small hiatal hernia  - continue ppi    3.) EPI  Patient is on zenpep from previous GI, will continue  Future consideration: ct a/p with iv contrast        Len Linda MD  3/3/2025

## 2025-03-04 ENCOUNTER — LAB (OUTPATIENT)
Dept: LAB | Facility: HOSPITAL | Age: 85
End: 2025-03-04
Payer: MEDICARE

## 2025-03-04 ENCOUNTER — OFFICE VISIT (OUTPATIENT)
Dept: INTERNAL MEDICINE | Facility: CLINIC | Age: 85
End: 2025-03-04
Payer: MEDICARE

## 2025-03-04 VITALS
HEIGHT: 64 IN | TEMPERATURE: 98 F | BODY MASS INDEX: 30.77 KG/M2 | HEART RATE: 80 BPM | OXYGEN SATURATION: 96 % | SYSTOLIC BLOOD PRESSURE: 178 MMHG | DIASTOLIC BLOOD PRESSURE: 86 MMHG | WEIGHT: 180.2 LBS

## 2025-03-04 DIAGNOSIS — R07.9 CHEST PAIN, UNSPECIFIED TYPE: ICD-10-CM

## 2025-03-04 DIAGNOSIS — E08.22 DIABETES MELLITUS DUE TO UNDERLYING CONDITION WITH STAGE 3B CHRONIC KIDNEY DISEASE, WITHOUT LONG-TERM CURRENT USE OF INSULIN: Primary | ICD-10-CM

## 2025-03-04 DIAGNOSIS — K59.00 CONSTIPATION, UNSPECIFIED CONSTIPATION TYPE: ICD-10-CM

## 2025-03-04 DIAGNOSIS — N18.32 DIABETES MELLITUS DUE TO UNDERLYING CONDITION WITH STAGE 3B CHRONIC KIDNEY DISEASE, WITHOUT LONG-TERM CURRENT USE OF INSULIN: Primary | ICD-10-CM

## 2025-03-04 DIAGNOSIS — I35.0 NONRHEUMATIC AORTIC VALVE STENOSIS: ICD-10-CM

## 2025-03-04 DIAGNOSIS — I10 PRIMARY HYPERTENSION: ICD-10-CM

## 2025-03-04 DIAGNOSIS — I50.32 CHRONIC DIASTOLIC (CONGESTIVE) HEART FAILURE: ICD-10-CM

## 2025-03-04 DIAGNOSIS — R07.9 CHEST PAIN IN ADULT: ICD-10-CM

## 2025-03-04 DIAGNOSIS — N18.32 STAGE 3B CHRONIC KIDNEY DISEASE: ICD-10-CM

## 2025-03-04 DIAGNOSIS — M54.16 LUMBAR RADICULOPATHY: ICD-10-CM

## 2025-03-04 LAB
EXPIRATION DATE: ABNORMAL
HBA1C MFR BLD: 5.8 % (ref 4.5–5.7)
Lab: ABNORMAL

## 2025-03-04 PROCEDURE — 36415 COLL VENOUS BLD VENIPUNCTURE: CPT

## 2025-03-04 PROCEDURE — 86335 IMMUNFIX E-PHORSIS/URINE/CSF: CPT

## 2025-03-04 PROCEDURE — 3044F HG A1C LEVEL LT 7.0%: CPT | Performed by: INTERNAL MEDICINE

## 2025-03-04 PROCEDURE — 1126F AMNT PAIN NOTED NONE PRSNT: CPT | Performed by: INTERNAL MEDICINE

## 2025-03-04 PROCEDURE — 82043 UR ALBUMIN QUANTITATIVE: CPT

## 2025-03-04 PROCEDURE — 86334 IMMUNOFIX E-PHORESIS SERUM: CPT

## 2025-03-04 PROCEDURE — 99214 OFFICE O/P EST MOD 30 MIN: CPT | Performed by: INTERNAL MEDICINE

## 2025-03-04 PROCEDURE — 82570 ASSAY OF URINE CREATININE: CPT

## 2025-03-04 PROCEDURE — 83036 HEMOGLOBIN GLYCOSYLATED A1C: CPT | Performed by: INTERNAL MEDICINE

## 2025-03-04 PROCEDURE — 3077F SYST BP >= 140 MM HG: CPT | Performed by: INTERNAL MEDICINE

## 2025-03-04 PROCEDURE — 3079F DIAST BP 80-89 MM HG: CPT | Performed by: INTERNAL MEDICINE

## 2025-03-04 PROCEDURE — 83521 IG LIGHT CHAINS FREE EACH: CPT

## 2025-03-04 PROCEDURE — 82784 ASSAY IGA/IGD/IGG/IGM EACH: CPT

## 2025-03-04 RX ORDER — FUROSEMIDE 20 MG/1
TABLET ORAL
COMMUNITY
Start: 2025-02-13

## 2025-03-04 RX ORDER — TERAZOSIN 2 MG/1
2 CAPSULE ORAL NIGHTLY
Qty: 90 CAPSULE | Refills: 1 | Status: SHIPPED | OUTPATIENT
Start: 2025-03-04

## 2025-03-04 RX ORDER — TOPIRAMATE 50 MG/1
50 TABLET, FILM COATED ORAL 2 TIMES DAILY
COMMUNITY

## 2025-03-04 NOTE — PROGRESS NOTES
Internal Medicine Follow Up    Chief Complaint  Hal Brynes is a 84 y.o. male who presents today for follow up of chronic medical conditions outlined below.    Chief Complaint   Patient presents with    Diabetes    Shortness of Breath    Chest Pain     Has recurrent CP. Feels okay today. Went to ER omn 2/25/25        HPI  Mr. Byrnes comes in today for follow up. He was recently in ED due to chest pain. BP elevated. Saw cardiology. Added hydralazine. He had only been taking this daily but realized it was TID and has been taking it correctly for about 2 days.  this morning. He is having chest pain on and off. Cardiology notes concern for cardiac amyloid. He will complete labs today. Has further testing this month. Notably stopped lisinopril and aldactone about 2 months ago. Reports these were stopped by nephrology. He notes pain in RLE radiating from low back. Plans to follow up with Dr. Alexander.    Diabetes  Associated symptoms include chest pain.   Shortness of Breath  Associated symptoms include chest pain.   Chest Pain   Associated symptoms include back pain and shortness of breath.        Review of Systems  Review of Systems   Respiratory:  Positive for shortness of breath.    Cardiovascular:  Positive for chest pain.   Musculoskeletal:  Positive for back pain and myalgias.        Current Medications  Current Outpatient Medications on File Prior to Visit   Medication Sig Dispense Refill    aspirin 81 MG chewable tablet Chew 1 tablet Daily.      dilTIAZem XR (DILACOR XR) 240 MG 24 hr capsule       furosemide (LASIX) 20 MG tablet       hydrALAZINE (APRESOLINE) 50 MG tablet Take 1 tablet by mouth 3 (Three) Times a Day. 90 tablet 3    isosorbide mononitrate (IMDUR) 60 MG 24 hr tablet TAKE 1 TABLET BY MOUTH TWICE A  tablet 3    linaclotide (Linzess) 290 MCG capsule capsule TAKE 1 CAPSULE BY MOUTH EVERY MORNING 30 MINUTES BEFORE BREAKFAST 90 capsule 3    Pancrelipase, Lip-Prot-Amyl, (Zenpep)  "89045-43045 units capsule delayed-release particles Take 2 capsules by mouth 3 (Three) Times a Day. 540 capsule 1    pantoprazole (PROTONIX) 40 MG EC tablet Take 1 tablet by mouth Daily. 90 tablet 3    topiramate (TOPAMAX) 50 MG tablet Take 1 tablet by mouth 2 (Two) Times a Day.      vitamin D (ERGOCALCIFEROL) 1.25 MG (01562 UT) capsule capsule Take 1 capsule by mouth Every 30 (Thirty) Days.      [DISCONTINUED] hydroCHLOROthiazide (HYDRODIURIL) 25 MG tablet TAKE 1 TABLET BY MOUTH DAILY 90 tablet 1    [DISCONTINUED] lisinopril (PRINIVIL,ZESTRIL) 40 MG tablet Take 0.5 tablets by mouth Daily. (Patient not taking: Reported on 3/4/2025)      [DISCONTINUED] polyethylene glycol (GaviLyte-G) 236 g solution FOLLOW INSTRUCTIONS MAILED TO YOUR HOME. 4000 mL 0    [DISCONTINUED] spironolactone (ALDACTONE) 25 MG tablet TAKE 1/2 TABLET BY MOUTH EVERY OTHER DAY (Patient not taking: Reported on 3/4/2025) 90 tablet 0    [DISCONTINUED] topiramate (TOPAMAX) 50 MG tablet Take 1 tablet by mouth 2 (Two) Times a Day. 180 tablet 3     Current Facility-Administered Medications on File Prior to Visit   Medication Dose Route Frequency Provider Last Rate Last Admin    Inclisiran Sodium solution prefilled syringe 284 mg  1.5 mL Subcutaneous Q6 Months Carl Sainz MD           Allergies  Allergies   Allergen Reactions    Sulfa Antibiotics Irritability       Objective  Visit Vitals  /86 (BP Location: Left arm, Patient Position: Sitting)   Pulse 80   Temp 98 °F (36.7 °C) (Temporal)   Ht 162.6 cm (64\")   Wt 81.7 kg (180 lb 3.2 oz)   SpO2 96%   BMI 30.93 kg/m²        Physical Exam  Physical Exam  Vitals and nursing note reviewed.   Constitutional:       General: He is not in acute distress.     Appearance: He is well-developed. He is obese. He is not ill-appearing or toxic-appearing.   HENT:      Head: Normocephalic and atraumatic.   Eyes:      Conjunctiva/sclera: Conjunctivae normal.   Cardiovascular:      Rate and Rhythm: Normal rate and " regular rhythm.      Heart sounds: Murmur (systolic) heard.   Pulmonary:      Effort: Pulmonary effort is normal. No respiratory distress.      Breath sounds: Normal breath sounds.   Skin:     General: Skin is warm and dry.   Neurological:      Mental Status: He is alert and oriented to person, place, and time. Mental status is at baseline.   Psychiatric:         Mood and Affect: Mood normal.         Behavior: Behavior normal.         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 Nephrology has notably stopped lisinopril and aldactone.  - on imdur 60mg BID, diltiazem 240mg daily, lasix 20mg daily, and hydralazine 50mg TID added during ED visit  - will add terazosin 2mg qhs    Constipation, unspecified constipation type  - following with GI, on linzess    Chest pain, unspecified type  - workup in ED negative, likely related to elevated BP  - has upcoming testing per cardiology    Lumbar radiculopathy  - had MRI showing multilevel DDD  - Has radicular sxs in LLE  - plans to follow up with Dr. Kincaid for injections    Chronic diastolic (congestive) heart failure  - on antihypertensives and lasix 20mg daily  - euvolemic  - has labs and imaging ordered for possible cardiac amyloid    Health Maintenance  - Colonoscopy: 10/2024, possible repeat 5y  - Immunizations: Flu UTD.  COVID, tdap, shingrix discussed. RSV complete. PCV20 complete.  - Depression screening: negative 10/2024    Return in about 3 months (around 6/4/2025) for Follow up HTN, DM. Medicare Wellness 45 minutes in 10/2025., Labs today.

## 2025-03-05 LAB
ALBUMIN UR-MCNC: 316.4 MG/DL
CREAT UR-MCNC: 86.6 MG/DL
MICROALBUMIN/CREAT UR: 3653.6 MG/G (ref 0–29)

## 2025-03-06 LAB
KAPPA LC FREE SER-MCNC: 65.8 MG/L (ref 3.3–19.4)
KAPPA LC FREE/LAMBDA FREE SER: 1.81 {RATIO} (ref 0.26–1.65)
LAMBDA LC FREE SERPL-MCNC: 36.4 MG/L (ref 5.7–26.3)

## 2025-03-07 ENCOUNTER — TELEPHONE (OUTPATIENT)
Dept: CARDIOLOGY | Facility: CLINIC | Age: 85
End: 2025-03-07
Payer: MEDICARE

## 2025-03-07 DIAGNOSIS — R76.8 ELEVATED SERUM IMMUNOGLOBULIN FREE LIGHT CHAIN LEVEL: Primary | ICD-10-CM

## 2025-03-07 LAB
IGA SERPL-MCNC: 234 MG/DL (ref 61–437)
IGG SERPL-MCNC: 914 MG/DL (ref 603–1613)
IGM SERPL-MCNC: 63 MG/DL (ref 15–143)
INTERPRETATION UR IFE-IMP: NORMAL
PROT PATTERN SERPL IFE-IMP: NORMAL

## 2025-03-07 RX ORDER — AMLODIPINE BESYLATE 5 MG/1
5 TABLET ORAL DAILY
Qty: 90 TABLET | Refills: 3 | Status: SHIPPED | OUTPATIENT
Start: 2025-03-07

## 2025-03-07 NOTE — TELEPHONE ENCOUNTER
----- Message from Carl Sainz sent at 3/7/2025  8:12 AM EST -----  Can you please refer him to hematology.  ----- Message -----  From: Sofia Herring PA-C  Sent: 3/7/2025   8:08 AM EST  To: Carl Sainz MD    Amyloid studies

## 2025-03-07 NOTE — TELEPHONE ENCOUNTER
Spoke with patient regarding lab work. Patient verbalizes understanding and referral sent.      Asked patient how his BP was doing, his PCP started him on terazosin 2 mg nightly on 3/4/2025.    Patient states his BP is still ranging from 160-190 systolic. He is taking   Terazosin 2 mg nightly  Hydralazine 50 mg TID  Lasix 20 mg daily  Imdur 60 mg BID  Diltiazem 240 mg daily    Of note he also said he has been dealing with constipation for 6 months and just saw  this week.     Please advise,

## 2025-03-19 ENCOUNTER — APPOINTMENT (OUTPATIENT)
Dept: CT IMAGING | Facility: HOSPITAL | Age: 85
End: 2025-03-19
Payer: MEDICARE

## 2025-03-19 ENCOUNTER — HOSPITAL ENCOUNTER (EMERGENCY)
Facility: HOSPITAL | Age: 85
Discharge: HOME | End: 2025-03-19
Attending: EMERGENCY MEDICINE
Payer: MEDICARE

## 2025-03-19 VITALS
OXYGEN SATURATION: 95 % | DIASTOLIC BLOOD PRESSURE: 61 MMHG | SYSTOLIC BLOOD PRESSURE: 166 MMHG | HEIGHT: 64 IN | WEIGHT: 170 LBS | RESPIRATION RATE: 18 BRPM | HEART RATE: 91 BPM | TEMPERATURE: 97.8 F | BODY MASS INDEX: 29.02 KG/M2

## 2025-03-19 DIAGNOSIS — N18.9 CHRONIC KIDNEY DISEASE, UNSPECIFIED CKD STAGE: ICD-10-CM

## 2025-03-19 DIAGNOSIS — K59.00 CONSTIPATION, UNSPECIFIED CONSTIPATION TYPE: Primary | ICD-10-CM

## 2025-03-19 DIAGNOSIS — N28.1 RENAL CYST: ICD-10-CM

## 2025-03-19 LAB
ALBUMIN SERPL-MCNC: 3.7 G/DL (ref 3.5–5.2)
ALBUMIN/GLOB SERPL: 1.5 G/DL
ALP SERPL-CCNC: 74 U/L (ref 39–117)
ALT SERPL W P-5'-P-CCNC: 30 U/L (ref 1–41)
ANION GAP SERPL CALCULATED.3IONS-SCNC: 13 MMOL/L (ref 5–15)
AST SERPL-CCNC: 30 U/L (ref 1–40)
BASOPHILS # BLD AUTO: 0.01 10*3/MM3 (ref 0–0.2)
BASOPHILS NFR BLD AUTO: 0.1 % (ref 0–1.5)
BILIRUB SERPL-MCNC: 0.2 MG/DL (ref 0–1.2)
BUN SERPL-MCNC: 39 MG/DL (ref 8–23)
BUN/CREAT SERPL: 14 (ref 7–25)
CALCIUM SPEC-SCNC: 8.7 MG/DL (ref 8.6–10.5)
CHLORIDE SERPL-SCNC: 99 MMOL/L (ref 98–107)
CO2 SERPL-SCNC: 22 MMOL/L (ref 22–29)
CREAT SERPL-MCNC: 2.79 MG/DL (ref 0.76–1.27)
D-LACTATE SERPL-SCNC: 1.5 MMOL/L (ref 0.5–2)
DEPRECATED RDW RBC AUTO: 43.3 FL (ref 37–54)
EGFRCR SERPLBLD CKD-EPI 2021: 21.8 ML/MIN/1.73
EOSINOPHIL # BLD AUTO: 0 10*3/MM3 (ref 0–0.4)
EOSINOPHIL NFR BLD AUTO: 0 % (ref 0.3–6.2)
ERYTHROCYTE [DISTWIDTH] IN BLOOD BY AUTOMATED COUNT: 13.2 % (ref 12.3–15.4)
GLOBULIN UR ELPH-MCNC: 2.5 GM/DL
GLUCOSE SERPL-MCNC: 133 MG/DL (ref 65–99)
HCT VFR BLD AUTO: 36.3 % (ref 37.5–51)
HGB BLD-MCNC: 12.1 G/DL (ref 13–17.7)
HOLD SPECIMEN: NORMAL
IMM GRANULOCYTES # BLD AUTO: 0.11 10*3/MM3 (ref 0–0.05)
IMM GRANULOCYTES NFR BLD AUTO: 0.9 % (ref 0–0.5)
LIPASE SERPL-CCNC: 29 U/L (ref 13–60)
LYMPHOCYTES # BLD AUTO: 0.69 10*3/MM3 (ref 0.7–3.1)
LYMPHOCYTES NFR BLD AUTO: 5.4 % (ref 19.6–45.3)
MCH RBC QN AUTO: 30 PG (ref 26.6–33)
MCHC RBC AUTO-ENTMCNC: 33.3 G/DL (ref 31.5–35.7)
MCV RBC AUTO: 89.9 FL (ref 79–97)
MONOCYTES # BLD AUTO: 0.75 10*3/MM3 (ref 0.1–0.9)
MONOCYTES NFR BLD AUTO: 5.8 % (ref 5–12)
NEUTROPHILS NFR BLD AUTO: 11.27 10*3/MM3 (ref 1.7–7)
NEUTROPHILS NFR BLD AUTO: 87.8 % (ref 42.7–76)
NRBC BLD AUTO-RTO: 0 /100 WBC (ref 0–0.2)
PLATELET # BLD AUTO: 239 10*3/MM3 (ref 140–450)
PMV BLD AUTO: 9.4 FL (ref 6–12)
POTASSIUM SERPL-SCNC: 3.9 MMOL/L (ref 3.5–5.2)
PROT SERPL-MCNC: 6.2 G/DL (ref 6–8.5)
RBC # BLD AUTO: 4.04 10*6/MM3 (ref 4.14–5.8)
SODIUM SERPL-SCNC: 134 MMOL/L (ref 136–145)
WBC NRBC COR # BLD AUTO: 12.83 10*3/MM3 (ref 3.4–10.8)
WHOLE BLOOD HOLD COAG: NORMAL
WHOLE BLOOD HOLD SPECIMEN: NORMAL

## 2025-03-19 PROCEDURE — 83690 ASSAY OF LIPASE: CPT | Performed by: PHYSICIAN ASSISTANT

## 2025-03-19 PROCEDURE — 83605 ASSAY OF LACTIC ACID: CPT | Performed by: PHYSICIAN ASSISTANT

## 2025-03-19 PROCEDURE — 99284 EMERGENCY DEPT VISIT MOD MDM: CPT

## 2025-03-19 PROCEDURE — 85025 COMPLETE CBC W/AUTO DIFF WBC: CPT | Performed by: PHYSICIAN ASSISTANT

## 2025-03-19 PROCEDURE — 80053 COMPREHEN METABOLIC PANEL: CPT | Performed by: PHYSICIAN ASSISTANT

## 2025-03-19 PROCEDURE — 74176 CT ABD & PELVIS W/O CONTRAST: CPT

## 2025-03-19 RX ORDER — MAG HYDROX/ALUMINUM HYD/SIMETH 400-400-40
1 SUSPENSION, ORAL (FINAL DOSE FORM) ORAL AS NEEDED
Qty: 25 EACH | Refills: 0 | Status: ON HOLD | OUTPATIENT
Start: 2025-03-19

## 2025-03-19 RX ORDER — SODIUM CHLORIDE 0.9 % (FLUSH) 0.9 %
10 SYRINGE (ML) INJECTION AS NEEDED
Status: DISCONTINUED | OUTPATIENT
Start: 2025-03-19 | End: 2025-03-19 | Stop reason: HOSPADM

## 2025-03-19 NOTE — ED PROVIDER NOTES
Subjective  History of Present Illness:    Chief Complaint: Constipation, abdominal swelling  History of Present Illness: 84-year-old male with constipation and abdominal swelling.  He states has been going on for several months.  He is currently on Linzess send it does not appear to be working he also takes multiple other constipation medications that are over-the-counter with no relief.  He does not go regularly, and when he does have a bowel movement it is very small amounts.  He states lately his abdomen has started to swell.  He has significant history for arthritis, coronary disease, hypertension, hyperlipidemia.  Onset: Gradual  Duration: Several months  Exacerbating / Alleviating factors: Patient has been taking Linzess, and over-the-counter medication to relieve  Associated symptoms: Abdominal distention and pain      Nurses Notes reviewed and agree, including vitals, allergies, social history and prior medical history.     REVIEW OF SYSTEMS: All systems reviewed and not pertinent unless noted.    Review of Systems   Gastrointestinal:  Positive for abdominal distention, abdominal pain and constipation.   All other systems reviewed and are negative.      Past Medical History:   Diagnosis Date    Arthritis     Coronary artery disease     Difficulty walking     Hyperlipidemia     Hypertension     Low back pain        Allergies:    Sulfa antibiotics      Past Surgical History:   Procedure Laterality Date    APPENDECTOMY      CAROTID STENT      CATARACT EXTRACTION, BILATERAL Bilateral     COLONOSCOPY  2024    CORONARY STENT PLACEMENT      PROSTATE SURGERY      for BPH         Social History     Socioeconomic History    Marital status:    Tobacco Use    Smoking status: Former     Current packs/day: 0.00     Average packs/day: 1.5 packs/day for 10.0 years (15.0 ttl pk-yrs)     Types: Cigarettes     Start date: 2001     Quit date: 2011     Years since quittin.9     Passive exposure: Never  "   Smokeless tobacco: Never   Vaping Use    Vaping status: Never Used   Substance and Sexual Activity    Alcohol use: Never    Drug use: Not Currently     Types: Marijuana     Comment: longstanding smoking, stopped 2 weeks ago    Sexual activity: Not Currently         Family History   Problem Relation Age of Onset    Heart attack Mother     Brain cancer Sister     Heart attack Brother     Hepatitis Brother        Objective  Physical Exam:  /61 (BP Location: Left arm, Patient Position: Sitting)   Pulse 91   Temp 97.8 °F (36.6 °C) (Oral)   Resp 18   Ht 162.6 cm (64\")   Wt 77.1 kg (170 lb)   SpO2 95%   BMI 29.18 kg/m²      Physical Exam  Vitals and nursing note reviewed.   Constitutional:       Appearance: He is well-developed.   HENT:      Head: Normocephalic and atraumatic.      Mouth/Throat:      Mouth: Mucous membranes are moist.   Eyes:      Extraocular Movements: Extraocular movements intact.   Cardiovascular:      Rate and Rhythm: Normal rate and regular rhythm.   Pulmonary:      Effort: Pulmonary effort is normal.      Breath sounds: Normal breath sounds.   Abdominal:      General: There is distension.      Palpations: Abdomen is soft.   Musculoskeletal:         General: Normal range of motion.      Cervical back: Normal range of motion.   Skin:     General: Skin is warm and dry.   Neurological:      Mental Status: He is alert and oriented to person, place, and time.   Psychiatric:         Behavior: Behavior normal.         Thought Content: Thought content normal.         Judgment: Judgment normal.           Procedures    ED Course:    ED Course as of 03/19/25 2241   Wed Mar 19, 2025   1553 Review of previous  non ED visits, prior labs, prior imaging, available notes from prior evaluations or visits with specialists, medication list, allergies, past medical history, past surgical history  Review of recent GI note on March 3, 2024 for constipation, including evaluation, plan of care and recent lab " [CS]   1905 Updated patient and family on all lab results and CT scan results, discussed the renal cyst, recommended follow-up with nephrology he states he has never been told that he has renal cyst, his BUN/creatinine and GFR are similar to his baseline, consistent with his chronic kidney disease. [CS]      ED Course User Index  [CS] Juan Pablo Trejo Jr., SAM       Lab Results (last 24 hours)       Procedure Component Value Units Date/Time    CBC & Differential [[id number]]  (Abnormal) Collected: 03/19/25 1704    Specimen: Blood Updated: 03/19/25 1722    Narrative:      The following orders were created for panel order CBC & Differential.  Procedure                               Abnormality         Status                     ---------                               -----------         ------                     CBC Auto Differential[[id number]]        Abnormal            Final result                 Please view results for these tests on the individual orders.    Comprehensive Metabolic Panel [[id number]]  (Abnormal) Collected: 03/19/25 1704    Specimen: Blood Updated: 03/19/25 1738     Glucose 133 mg/dL      BUN 39 mg/dL      Creatinine 2.79 mg/dL      Sodium 134 mmol/L      Potassium 3.9 mmol/L      Comment: Slight hemolysis detected by analyzer. Result may be falsely elevated.        Chloride 99 mmol/L      CO2 22.0 mmol/L      Calcium 8.7 mg/dL      Total Protein 6.2 g/dL      Albumin 3.7 g/dL      ALT (SGPT) 30 U/L      AST (SGOT) 30 U/L      Alkaline Phosphatase 74 U/L      Total Bilirubin 0.2 mg/dL      Globulin 2.5 gm/dL      Comment: Calculated Result        A/G Ratio 1.5 g/dL      BUN/Creatinine Ratio 14.0     Anion Gap 13.0 mmol/L      eGFR 21.8 mL/min/1.73     Narrative:      GFR Categories in Chronic Kidney Disease (CKD)      GFR Category          GFR (mL/min/1.73)    Interpretation  G1                     90 or greater         Normal or high (1)  G2                      60-89                Mild  decrease (1)  G3a                   45-59                Mild to moderate decrease  G3b                   30-44                Moderate to severe decrease  G4                    15-29                Severe decrease  G5                    14 or less           Kidney failure          (1)In the absence of evidence of kidney disease, neither GFR category G1 or G2 fulfill the criteria for CKD.    eGFR calculation 2021 CKD-EPI creatinine equation, which does not include race as a factor    Lipase [[id number]]  (Normal) Collected: 03/19/25 1704    Specimen: Blood Updated: 03/19/25 1738     Lipase 29 U/L     Lactic Acid, Plasma [[id number]]  (Normal) Collected: 03/19/25 1704    Specimen: Blood Updated: 03/19/25 1738     Lactate 1.5 mmol/L      Comment: Falsely depressed results may occur on samples drawn from patients receiving N-Acetylcysteine (NAC) or Metamizole.       CBC Auto Differential [[id number]]  (Abnormal) Collected: 03/19/25 1704    Specimen: Blood Updated: 03/19/25 1722     WBC 12.83 10*3/mm3      RBC 4.04 10*6/mm3      Hemoglobin 12.1 g/dL      Hematocrit 36.3 %      MCV 89.9 fL      MCH 30.0 pg      MCHC 33.3 g/dL      RDW 13.2 %      RDW-SD 43.3 fl      MPV 9.4 fL      Platelets 239 10*3/mm3      Neutrophil % 87.8 %      Lymphocyte % 5.4 %      Monocyte % 5.8 %      Eosinophil % 0.0 %      Basophil % 0.1 %      Immature Grans % 0.9 %      Neutrophils, Absolute 11.27 10*3/mm3      Lymphocytes, Absolute 0.69 10*3/mm3      Monocytes, Absolute 0.75 10*3/mm3      Eosinophils, Absolute 0.00 10*3/mm3      Basophils, Absolute 0.01 10*3/mm3      Immature Grans, Absolute 0.11 10*3/mm3      nRBC 0.0 /100 WBC              CT Abdomen Pelvis Without Contrast  Result Date: 3/19/2025  CT ABDOMEN PELVIS WO CONTRAST Date of Exam: 3/19/2025 4:27 PM EDT Indication: constipation, distended abdomen. Comparison: None available. Technique: Axial CT images were obtained of the abdomen and pelvis without the administration of contrast.  Reconstructed coronal and sagittal images were also obtained. Automated exposure control and iterative construction methods were used. FINDINGS: Lung bases: Trace bilateral pleural fluid. Atheromatous disease of the coronary vessels. Liver:No masses. No intrahepatic biliary ductal dilatation. Spleen:No masses. No perisplenic hematoma. Pancreas:No pancreatic masses. No evidence of pancreatitis. Gallbladder and common bile duct:No evidence of cholelithiasis. No evidence of cholecystitis. Adrenal glands:No adrenal masses Kidneys and ureters: Innumerable bilateral renal cysts. No comparison imaging is available. The majority of these are renal cysts and fluid attenuating measuring up to 8.5 cm on the right. Persistent hyperdensities measuring up to approximately 0.9 cm likely represent hemorrhagic or proteinaceous cyst. There is an exophytic isodensity measuring  1.3 cm x 0.8 cm along the posterior right kidney on image #36 series 2 which is indeterminant. Hypodensities noted involving the left kidney measuring up to 4.6 cm. Urinary bladder:No urinary bladder wall thickening. No bladder masses. Small bowel:Normal caliber small bowel. Large bowel: Colonic diverticulosis without evidence of diverticulitis. Appendix: The appendix is not identified. There is soft tissue in the right lower quadrant at the approximate expected location of the appendix measuring 3.5 cm x 2.1 cm with multiple surgical clips noted in the region. This is best seen on image #106 series 2. Surgical clips within the right inguinal canal. GENITOURINARY: Prostatic calcifications. Ascites or pneumoperitoneum:None. Adenopathy:None present Osseous structures: The pubic bones are intact. The proximal femurs are intact. Mild degenerative changes of the hips. The sacrum and sacroiliac joints are normal. Degenerative changes of the lumbar spine. Other findings: Atheromatous disease of the abdominal aorta and visualized branches.     Impression: 1.No acute  findings within the abdomen or pelvis. 2.Colonic diverticulosis without evidence of diverticulitis. 3.Innumerable bilateral renal cysts as described above. There is an exophytic isodensity measuring 1.3 cm x 0.8 cm along the posterior right kidney which is indeterminant. This may represent a complex cyst. Recommend correlation with renal mass protocol CT of the abdomen and pelvis. 4.The appendix is not identified. There is soft tissue in the right lower quadrant at the approximate expected location of the appendix of the inguinal canal measuring 3.5 cm x 2.1 cm with multiple surgical clips noted in the region. This may represent postsurgical changes. Correlate with surgical history. Correlation with prior imaging recommended. Underlying mass not excluded. GI consult recommended. 5.Trace bilateral pleural fluid. Electronically Signed: Khanh King MD  3/19/2025 4:53 PM EDT  Workstation ID: [id number]                                                                    Medical Decision Making  Patient Presentation 84-year-old male with constipation    DDX constipation, dehydration, hypokalemia, electrolyte abnormality, urinary tract infection, bowel obstruction, fecal impaction    Data Review/ Non ED Records /Analysis/Ordering unique tests  Review of previous  non ED visits, prior labs, prior imaging, available notes from prior evaluations or visits with specialists, medication list, allergies, past medical history, past surgical history        Independent Review Studies  I Personally reviewed all laboratory studies performed in the emergency department     Intervention/Re-evaluation no acute intervention on reevaluation patient remained stable    Independent Clinician discussed the case with my supervising physician Dr. Corley    Risk Stratification tools/clinical decision rules patient presented with constipation I was concerned about electrolyte abnormality, dehydration, possibly urinary tract infection.  Also  considered an obstruction or fecal impaction.  Labs were drawn and a CT scan was performed, no acute emergent findings, chronic changes on the CT scan.  He is on appropriate bowel regimen, added suppositories and recommended follow-up with GI minimal risk    Shared Decision Making discussed this with patient and family they were agreeable    Disposition patient stable for    Problems Addressed:  Chronic kidney disease, unspecified CKD stage: complicated acute illness or injury  Constipation, unspecified constipation type: complicated acute illness or injury  Renal cyst: complicated acute illness or injury    Amount and/or Complexity of Data Reviewed  External Data Reviewed: labs, radiology and notes.  Labs: ordered. Decision-making details documented in ED Course.  Radiology: ordered. Decision-making details documented in ED Course.    Risk  OTC drugs.  Prescription drug management.          Final diagnoses:   Constipation, unspecified constipation type   Chronic kidney disease, unspecified CKD stage   Renal cyst           Disposition DISCHARGE    Patient discharged in stable condition.    Reviewed implications of results, diagnosis, meds, responsibility to follow up, warning signs and symptoms of possible worsening, potential complications and reasons to return to ER.    Patient/Family voiced understanding of above instructions.    Discussed plan for discharge, as there is no emergent indication for admission.  Pt/family is agreeable and understands need for follow up and possible repeat testing.  Pt/family is aware that discharge does not mean that nothing is wrong but that it indicates no emergency is currently present that requires admission and they must continue care with follow-up as given below or with a physician of their choice.     FOLLOW-UP  Pia De La Garza MD  [address] [place] [id number] [phone]    Schedule an appointment as soon as possible for a visit       [facility]  EMERGENCY DEPARTMENT  [address] [facility] [id number] [phone]    If symptoms worsen         Medication List        New Prescriptions      glycerin adult 2 g suppository  Insert 1 suppository into the rectum As Needed for Constipation.               Where to Get Your Medications        These medications were sent to [facility] [id number] - [place], [place] - [address] [facility] - [phone]  - [phone]  [address] [facility] [address]      Phone: [phone]   glycerin adult 2 g suppository             Juan Pablo rTejo Jr., SAM  03/19/25 2245

## 2025-03-24 ENCOUNTER — HOSPITAL ENCOUNTER (OUTPATIENT)
Dept: CARDIOLOGY | Facility: HOSPITAL | Age: 85
Discharge: HOME | End: 2025-03-24
Admitting: PHYSICIAN ASSISTANT
Payer: MEDICARE

## 2025-03-24 DIAGNOSIS — R07.9 CHEST PAIN IN ADULT: ICD-10-CM

## 2025-03-24 DIAGNOSIS — I35.0 NONRHEUMATIC AORTIC VALVE STENOSIS: ICD-10-CM

## 2025-03-24 DIAGNOSIS — I50.32 CHRONIC DIASTOLIC (CONGESTIVE) HEART FAILURE: ICD-10-CM

## 2025-03-24 PROCEDURE — 78803 RP LOCLZJ TUM SPECT 1 AREA: CPT

## 2025-03-24 PROCEDURE — A9500 TC99M SESTAMIBI: HCPCS | Performed by: PHYSICIAN ASSISTANT

## 2025-03-24 PROCEDURE — 34310000005 TECHNETIUM SESTAMIBI: Performed by: PHYSICIAN ASSISTANT

## 2025-03-24 RX ADMIN — TECHNETIUM TC 99M SESTAMIBI 1 DOSE: 1 INJECTION INTRAVENOUS at 12:01

## 2025-03-26 LAB
QT INTERVAL: 354 MS
QT INTERVAL: 396 MS
QTC INTERVAL: 408 MS
QTC INTERVAL: 421 MS

## 2025-03-27 ENCOUNTER — OFFICE VISIT (OUTPATIENT)
Dept: CARDIOLOGY | Facility: CLINIC | Age: 85
End: 2025-03-27
Payer: MEDICARE

## 2025-03-27 VITALS
HEIGHT: 64 IN | BODY MASS INDEX: 30.39 KG/M2 | WEIGHT: 178 LBS | OXYGEN SATURATION: 94 % | DIASTOLIC BLOOD PRESSURE: 60 MMHG | HEART RATE: 78 BPM | SYSTOLIC BLOOD PRESSURE: 146 MMHG

## 2025-03-27 DIAGNOSIS — G58.8 OTHER MONONEUROPATHY: ICD-10-CM

## 2025-03-27 DIAGNOSIS — N18.30 STAGE 3 CHRONIC KIDNEY DISEASE, UNSPECIFIED WHETHER STAGE 3A OR 3B CKD: ICD-10-CM

## 2025-03-27 DIAGNOSIS — I25.10 CORONARY ARTERY DISEASE INVOLVING NATIVE CORONARY ARTERY OF NATIVE HEART WITHOUT ANGINA PECTORIS: Primary | ICD-10-CM

## 2025-03-27 DIAGNOSIS — I10 PRIMARY HYPERTENSION: ICD-10-CM

## 2025-03-27 NOTE — PROGRESS NOTES
Follow-up Visit      Date: 2025  Patient Name: Hal Byrnes  : 1940   MRN: [id number]     Chief Complaint:    Chief Complaint   Patient presents with    Hypertension    Coronary Artery Disease    Fatigue    Shortness of Breath    Chest Pain       History of Present Illness: Hal Byrnes is a 84 y.o. male who is here today for follow-up from the hospital ER visit with hypertension and chest pain.    His main problem related to his shortness of breath and constipation.  He thinks he stays short of breath all the time.  He had normal monoclonal antibodies for which she is seeing hematologist.  He denies any chest pain any lower extremity edema or any other significant cardiac symptoms.  He had a recent echocardiogram that did show normal heart function with mild to moderate aortic stenosis.    He denies any weight loss or any weight gain.  He is going to follow-up with his nephrologist.      Problem List     CARDIAC  Coronary Artery Disease:   NSTEMI with LHC : PCI to RCA and left circumflex  Repeat C with nonobstructive coronary artery disease in , , and   Lexiscan : LV function at rest.  Normal myocardial perfusion.    Myocardium:   Stress echo : Normal EF.  Mild diastolic dysfunction.  Mild posterior wall hypokinesis.  Echo : EF 70%, diastolic dysfunction, mildly dilated LA    Valvular:   Echo : Moderate AS, mean gradient 17, trace TR, trace MR  Echo : Moderate AS, mean gradient 19, mild MR, mild TR    Electrical:   Sinus rhythm    Percardium:   Normal     CARDIAC RISK FACTORS  Hypertension  Dyslipidemia  2023   HDL 41   2023   HDL 41 LDL 64  Physical Inactivity  Obstructive Sleep Apnea    NON-CARDIAC  GERD  Chronic pancreatitis  BPH  Osteopenia  Peripheral neuropathy  Cervical and lumbar spondylosis  Psoriasis  Chronic kidney disease.    SURGERIES  None reported      Subjective      Review of Systems:   Review  of Systems   Respiratory:  Positive for shortness of breath.        Medications:   No current facility-administered medications for this visit.  No current outpatient medications on file.    Facility-Administered Medications Ordered in Other Visits:     amLODIPine (NORVASC) tablet 5 mg, 5 mg, Oral, Daily, Kimmie Gupta MD, 5 mg at 04/01/25 0819    ampicillin-sulbactam (UNASYN) 3 g in sodium chloride 0.9 % 100 mL MBP, 3 g, Intravenous, Q12H, Kimmie Gupta MD, 3 g at 04/01/25 0434    aspirin chewable tablet 81 mg, 81 mg, Oral, Daily, Kimmie Gupta MD, 81 mg at 04/01/25 0819    sennosides-docusate (PERICOLACE) 8.6-50 MG per tablet 2 tablet, 2 tablet, Oral, BID, 2 tablet at 04/01/25 0818 **AND** polyethylene glycol (MIRALAX) packet 17 g, 17 g, Oral, Daily PRN, 17 g at 04/01/25 0825 **AND** bisacodyl (DULCOLAX) EC tablet 5 mg, 5 mg, Oral, Daily PRN **AND** bisacodyl (DULCOLAX) suppository 10 mg, 10 mg, Rectal, Daily PRN, Jyoti Carey MD    dextrose (D50W) (25 g/50 mL) IV injection 25 g, 25 g, Intravenous, Q15 Min PRN, Kimmie Gupta MD    dextrose (GLUTOSE) oral gel 15 g, 15 g, Oral, Q15 Min PRN, Kimmie Gupta MD    doxycycline (VIBRAMYCIN) 100 mg in sodium chloride 0.9 % 100 mL MBP, 100 mg, Intravenous, Q12H, Kimmie Gupta MD, 100 mg at 04/01/25 0325    glucagon (GLUCAGEN) injection 1 mg, 1 mg, Intramuscular, Q15 Min PRN, Kimmie Gupta MD    heparin (porcine) 5000 UNIT/ML injection 5,000 Units, 5,000 Units, Subcutaneous, Q12H, Kimmie Gupta MD, 5,000 Units at 04/01/25 0819    hydrALAZINE (APRESOLINE) tablet 50 mg, 50 mg, Oral, TID, Kimmie Gupta MD, 50 mg at 04/01/25 0818    Insulin Lispro (humaLOG) injection 2-7 Units, 2-7 Units, Subcutaneous, TID With Meals, Kimmie Gupta MD, 2 Units at 04/01/25 0817    ipratropium-albuterol (DUO-NEB) nebulizer solution 3 mL, 3 mL, Nebulization, 4x Daily - RT, Kimmie Gupta MD, 3 mL at 04/01/25 0729    isosorbide mononitrate (IMDUR) 24 hr  "tablet 60 mg, 60 mg, Oral, BID, Kimmie Gupta MD, 60 mg at 04/01/25 0819    lubiprostone (AMITIZA) capsule 8 mcg, 8 mcg, Oral, BID, Kimmie Gupta MD, 8 mcg at 04/01/25 0818    methylPREDNISolone sodium succinate (SOLU-Medrol) injection 60 mg, 60 mg, Intravenous, Q12H, Kimmie Gupta MD, 60 mg at 04/01/25 0036    pancrelipase (Lip-Prot-Amyl) (CREON) capsule 24,000 units of lipase, 24,000 units of lipase, Oral, TID With Meals, Kimmie Gupta MD, 24,000 units of lipase at 04/01/25 0818    pantoprazole (PROTONIX) EC tablet 40 mg, 40 mg, Oral, Daily, Kimmie Gupta MD, 40 mg at 04/01/25 0819    simethicone (MYLICON) chewable tablet 80 mg, 80 mg, Oral, 4x Daily PRN, Jyoti Carey MD, 80 mg at 03/31/25 1742    sodium chloride 0.9 % flush 10 mL, 10 mL, Intravenous, PRN, [place] Grant MD, 10 mL at 04/01/25 0820    terazosin (HYTRIN) capsule 2 mg, 2 mg, Oral, Nightly, Kimmie Gupta MD, 2 mg at 03/31/25 2137    topiramate (TOPAMAX) tablet 50 mg, 50 mg, Oral, BID, Kimmie Gupta MD, 50 mg at 04/01/25 0818    Allergies:   Allergies   Allergen Reactions    Sulfa Antibiotics Irritability       Objective     Physical Exam:  Vitals:    03/27/25 1109   BP: 146/60   BP Location: Left arm   Patient Position: Sitting   Cuff Size: Adult   Pulse: 78   SpO2: 94%   Weight: 80.7 kg (178 lb)   Height: 162.6 cm (64\")     Body mass index is 30.55 kg/m².    Constitutional:       General: Not in acute distress.     Appearance: Healthy appearance. Not in distress.     Neck:     JVP:Not elevated     Carotid artery: Normal    Pulmonary:      Effort: Pulmonary effort is normal.      Breath sounds: Normal breath sounds. No wheezing. No rhonchi. No rales.     Cardiovascular:      Normal rate. Regular rhythm. Normal S1. Normal S2.      Murmurs: There is 3/6 systolic murmur.      No gallop. No click. No rub.     Abdominal:      General: Bowel sounds are normal.      Palpations: Abdomen is soft.      Tenderness: There is no " abdominal tenderness.    Extremities:     Pulses:Normal radial and pedal pulses     Edema:no edema    Smoking Cessation:   Tobacco Product History : Patient quit smoking in 2011 after 15 pack years     Lab Review:   Lab Results   Component Value Date    GLUCOSE 134 (H) 04/01/2025    BUN 56 (H) 04/01/2025    CREATININE 3.67 (H) 04/01/2025    BCR 15.3 04/01/2025    K 4.0 04/01/2025    CO2 23.0 04/01/2025    CALCIUM 8.4 (L) 04/01/2025    ALBUMIN 3.3 (L) 03/31/2025    AST 19 03/31/2025    ALT 22 03/31/2025     Lab Results   Component Value Date    WBC 11.13 (H) 04/01/2025    HGB 10.2 (L) 04/01/2025    HCT 31.1 (L) 04/01/2025    MCV 90.7 04/01/2025     04/01/2025     Lab Results   Component Value Date    TSH 1.780 02/05/2024           ECG 12 Lead    Date/Time: 3/27/2025 11:44 AM  Performed by: Carl Sainz MD    Authorized by: Carl Sainz MD  Comparison: compared with previous ECG from 2/25/2025  Similar to previous ECG  Rhythm: sinus rhythm  Other findings: left ventricular hypertrophy with strain    Clinical impression: abnormal EKG           Assessment / Plan      Assessment:   Diagnosis Plan   1. Coronary artery disease involving native coronary artery of native heart without angina pectoris  ECG 12 Lead      2. Primary hypertension        3. Other mononeuropathy        4. Stage 3 chronic kidney disease, unspecified whether stage 3a or 3b CKD             Plan:  Patient has been stable on his coronary artery disease symptoms.  Will continue him on aspirin.  His blood pressure has been under good control with the latest medication regimen of Imdur hydralazine Norvasc and diltiazem.  He will be seeing his hematologist to see if he has to have any further workup.  He is going to continue follow-up with his nephrologist.  His main problem is shortness of breath which seems to be getting worse.  I believe it is related to his worsening kidney function and not to the heart failure but I certainly cannot rule  it out.      Follow Up:       Return in about 6 months (around 9/27/2025).    Carl Sainz MD

## 2025-03-30 ENCOUNTER — HOSPITAL ENCOUNTER (EMERGENCY)
Facility: HOSPITAL | Age: 85
Discharge: HOME | DRG: 193 | End: 2025-03-30
Attending: EMERGENCY MEDICINE | Admitting: EMERGENCY MEDICINE
Payer: MEDICARE

## 2025-03-30 ENCOUNTER — HOSPITAL ENCOUNTER (INPATIENT)
Facility: HOSPITAL | Age: 85
LOS: 8 days | Discharge: HOME | DRG: 193 | End: 2025-04-07
Attending: EMERGENCY MEDICINE | Admitting: HOSPITALIST
Payer: MEDICARE

## 2025-03-30 ENCOUNTER — APPOINTMENT (OUTPATIENT)
Dept: GENERAL RADIOLOGY | Facility: HOSPITAL | Age: 85
DRG: 193 | End: 2025-03-30
Payer: MEDICARE

## 2025-03-30 VITALS
SYSTOLIC BLOOD PRESSURE: 123 MMHG | HEIGHT: 64 IN | BODY MASS INDEX: 29.02 KG/M2 | HEART RATE: 65 BPM | DIASTOLIC BLOOD PRESSURE: 61 MMHG | WEIGHT: 170 LBS | RESPIRATION RATE: 18 BRPM | TEMPERATURE: 97.9 F | OXYGEN SATURATION: 96 %

## 2025-03-30 DIAGNOSIS — J96.01 ACUTE HYPOXIC RESPIRATORY FAILURE: ICD-10-CM

## 2025-03-30 DIAGNOSIS — R07.9 NONSPECIFIC CHEST PAIN: Primary | ICD-10-CM

## 2025-03-30 DIAGNOSIS — N18.32 STAGE 3B CHRONIC KIDNEY DISEASE: ICD-10-CM

## 2025-03-30 DIAGNOSIS — J96.01 ACUTE RESPIRATORY FAILURE WITH HYPOXIA AND HYPERCAPNIA: ICD-10-CM

## 2025-03-30 DIAGNOSIS — N18.9 CHRONIC RENAL FAILURE, UNSPECIFIED CKD STAGE: ICD-10-CM

## 2025-03-30 DIAGNOSIS — I25.10 CORONARY ARTERY DISEASE INVOLVING NATIVE CORONARY ARTERY OF NATIVE HEART WITHOUT ANGINA PECTORIS: ICD-10-CM

## 2025-03-30 DIAGNOSIS — R09.02 HYPOXIA: ICD-10-CM

## 2025-03-30 DIAGNOSIS — I10 PRIMARY HYPERTENSION: ICD-10-CM

## 2025-03-30 DIAGNOSIS — E78.5 HYPERLIPIDEMIA, UNSPECIFIED HYPERLIPIDEMIA TYPE: ICD-10-CM

## 2025-03-30 DIAGNOSIS — R76.8 ELEVATED SERUM IMMUNOGLOBULIN FREE LIGHT CHAINS: ICD-10-CM

## 2025-03-30 DIAGNOSIS — J18.9 PNEUMONIA OF RIGHT LUNG DUE TO INFECTIOUS ORGANISM, UNSPECIFIED PART OF LUNG: Primary | ICD-10-CM

## 2025-03-30 DIAGNOSIS — I50.32 CHRONIC HEART FAILURE WITH PRESERVED EJECTION FRACTION (HFPEF): ICD-10-CM

## 2025-03-30 DIAGNOSIS — J96.02 ACUTE RESPIRATORY FAILURE WITH HYPOXIA AND HYPERCAPNIA: ICD-10-CM

## 2025-03-30 DIAGNOSIS — J44.1 COPD WITH ACUTE EXACERBATION: ICD-10-CM

## 2025-03-30 LAB
ALBUMIN SERPL-MCNC: 3.5 G/DL (ref 3.5–5.2)
ALBUMIN SERPL-MCNC: 3.6 G/DL (ref 3.5–5.2)
ALBUMIN/GLOB SERPL: 1.4 G/DL
ALBUMIN/GLOB SERPL: 1.4 G/DL
ALP SERPL-CCNC: 65 U/L (ref 39–117)
ALP SERPL-CCNC: 68 U/L (ref 39–117)
ALT SERPL W P-5'-P-CCNC: 19 U/L (ref 1–41)
ALT SERPL W P-5'-P-CCNC: 22 U/L (ref 1–41)
ANION GAP SERPL CALCULATED.3IONS-SCNC: 11 MMOL/L (ref 5–15)
ANION GAP SERPL CALCULATED.3IONS-SCNC: 15 MMOL/L (ref 5–15)
ARTERIAL PATENCY WRIST A: POSITIVE
ARTERIAL PATENCY WRIST A: POSITIVE
AST SERPL-CCNC: 17 U/L (ref 1–40)
AST SERPL-CCNC: 23 U/L (ref 1–40)
ATMOSPHERIC PRESS: ABNORMAL MM[HG]
ATMOSPHERIC PRESS: ABNORMAL MM[HG]
BASE EXCESS BLDA CALC-SCNC: -1.6 MMOL/L (ref 0–2)
BASE EXCESS BLDA CALC-SCNC: 0.2 MMOL/L (ref 0–2)
BASOPHILS # BLD AUTO: 0.02 10*3/MM3 (ref 0–0.2)
BASOPHILS # BLD AUTO: 0.02 10*3/MM3 (ref 0–0.2)
BASOPHILS NFR BLD AUTO: 0.2 % (ref 0–1.5)
BASOPHILS NFR BLD AUTO: 0.3 % (ref 0–1.5)
BDY SITE: ABNORMAL
BDY SITE: ABNORMAL
BILIRUB SERPL-MCNC: 0.2 MG/DL (ref 0–1.2)
BILIRUB SERPL-MCNC: 0.3 MG/DL (ref 0–1.2)
BODY TEMPERATURE: 37
BODY TEMPERATURE: 37
BUN SERPL-MCNC: 36 MG/DL (ref 8–23)
BUN SERPL-MCNC: 36 MG/DL (ref 8–23)
BUN/CREAT SERPL: 11.4 (ref 7–25)
BUN/CREAT SERPL: 11.5 (ref 7–25)
CALCIUM SPEC-SCNC: 8.5 MG/DL (ref 8.6–10.5)
CALCIUM SPEC-SCNC: 8.6 MG/DL (ref 8.6–10.5)
CHLORIDE SERPL-SCNC: 100 MMOL/L (ref 98–107)
CHLORIDE SERPL-SCNC: 101 MMOL/L (ref 98–107)
CO2 BLDA-SCNC: 29.1 MMOL/L (ref 22–33)
CO2 BLDA-SCNC: 29.6 MMOL/L (ref 22–33)
CO2 SERPL-SCNC: 25 MMOL/L (ref 22–29)
CO2 SERPL-SCNC: 25 MMOL/L (ref 22–29)
COHGB MFR BLD: 1 % (ref 0–2)
COHGB MFR BLD: 1.3 % (ref 0–2)
CREAT SERPL-MCNC: 3.13 MG/DL (ref 0.76–1.27)
CREAT SERPL-MCNC: 3.17 MG/DL (ref 0.76–1.27)
D-LACTATE SERPL-SCNC: 0.7 MMOL/L (ref 0.5–2)
DEPRECATED RDW RBC AUTO: 43 FL (ref 37–54)
DEPRECATED RDW RBC AUTO: 45.6 FL (ref 37–54)
EGFRCR SERPLBLD CKD-EPI 2021: 18.7 ML/MIN/1.73
EGFRCR SERPLBLD CKD-EPI 2021: 19 ML/MIN/1.73
EOSINOPHIL # BLD AUTO: 0.02 10*3/MM3 (ref 0–0.4)
EOSINOPHIL # BLD AUTO: 0.04 10*3/MM3 (ref 0–0.4)
EOSINOPHIL NFR BLD AUTO: 0.2 % (ref 0.3–6.2)
EOSINOPHIL NFR BLD AUTO: 0.6 % (ref 0.3–6.2)
EPAP: 5
EPAP: 5
ERYTHROCYTE [DISTWIDTH] IN BLOOD BY AUTOMATED COUNT: 13.2 % (ref 12.3–15.4)
ERYTHROCYTE [DISTWIDTH] IN BLOOD BY AUTOMATED COUNT: 13.4 % (ref 12.3–15.4)
FLUAV SUBTYP SPEC NAA+PROBE: NOT DETECTED
FLUBV RNA ISLT QL NAA+PROBE: NOT DETECTED
GEN 5 1HR TROPONIN T REFLEX: 80 NG/L
GLOBULIN UR ELPH-MCNC: 2.5 GM/DL
GLOBULIN UR ELPH-MCNC: 2.5 GM/DL
GLUCOSE SERPL-MCNC: 112 MG/DL (ref 65–99)
GLUCOSE SERPL-MCNC: 154 MG/DL (ref 65–99)
HCO3 BLDA-SCNC: 27.2 MMOL/L (ref 20–26)
HCO3 BLDA-SCNC: 27.9 MMOL/L (ref 20–26)
HCT VFR BLD AUTO: 32.9 % (ref 37.5–51)
HCT VFR BLD AUTO: 36.9 % (ref 37.5–51)
HCT VFR BLD CALC: 36.1 % (ref 38–51)
HCT VFR BLD CALC: 37.8 % (ref 38–51)
HGB BLD-MCNC: 11.2 G/DL (ref 13–17.7)
HGB BLD-MCNC: 12.1 G/DL (ref 13–17.7)
HGB BLDA-MCNC: 11.8 G/DL (ref 13.5–17.5)
HGB BLDA-MCNC: 12.3 G/DL (ref 13.5–17.5)
HOLD SPECIMEN: NORMAL
IMM GRANULOCYTES # BLD AUTO: 0.05 10*3/MM3 (ref 0–0.05)
IMM GRANULOCYTES # BLD AUTO: 0.14 10*3/MM3 (ref 0–0.05)
IMM GRANULOCYTES NFR BLD AUTO: 0.7 % (ref 0–0.5)
IMM GRANULOCYTES NFR BLD AUTO: 1.3 % (ref 0–0.5)
INHALED O2 CONCENTRATION: 45 %
INHALED O2 CONCENTRATION: 55 %
IPAP: 10
IPAP: 10
LYMPHOCYTES # BLD AUTO: 0.68 10*3/MM3 (ref 0.7–3.1)
LYMPHOCYTES # BLD AUTO: 1.4 10*3/MM3 (ref 0.7–3.1)
LYMPHOCYTES NFR BLD AUTO: 20.7 % (ref 19.6–45.3)
LYMPHOCYTES NFR BLD AUTO: 6.3 % (ref 19.6–45.3)
MAGNESIUM SERPL-MCNC: 2 MG/DL (ref 1.6–2.4)
MCH RBC QN AUTO: 30.2 PG (ref 26.6–33)
MCH RBC QN AUTO: 30.4 PG (ref 26.6–33)
MCHC RBC AUTO-ENTMCNC: 32.8 G/DL (ref 31.5–35.7)
MCHC RBC AUTO-ENTMCNC: 34 G/DL (ref 31.5–35.7)
MCV RBC AUTO: 89.4 FL (ref 79–97)
MCV RBC AUTO: 92 FL (ref 79–97)
METHGB BLD QL: 0.5 % (ref 0–1.5)
METHGB BLD QL: 0.5 % (ref 0–1.5)
MODALITY: ABNORMAL
MODALITY: ABNORMAL
MONOCYTES # BLD AUTO: 0.27 10*3/MM3 (ref 0.1–0.9)
MONOCYTES # BLD AUTO: 0.5 10*3/MM3 (ref 0.1–0.9)
MONOCYTES NFR BLD AUTO: 2.5 % (ref 5–12)
MONOCYTES NFR BLD AUTO: 7.4 % (ref 5–12)
NEUTROPHILS NFR BLD AUTO: 4.74 10*3/MM3 (ref 1.7–7)
NEUTROPHILS NFR BLD AUTO: 70.3 % (ref 42.7–76)
NEUTROPHILS NFR BLD AUTO: 89.5 % (ref 42.7–76)
NEUTROPHILS NFR BLD AUTO: 9.64 10*3/MM3 (ref 1.7–7)
NRBC BLD AUTO-RTO: 0 /100 WBC (ref 0–0.2)
NRBC BLD AUTO-RTO: 0 /100 WBC (ref 0–0.2)
NT-PROBNP SERPL-MCNC: 816.9 PG/ML (ref 0–1800)
NT-PROBNP SERPL-MCNC: 926.8 PG/ML (ref 0–1800)
OXYHGB MFR BLDV: 91.6 % (ref 94–99)
OXYHGB MFR BLDV: 97.2 % (ref 94–99)
PAW @ PEAK INSP FLOW SETTING VENT: 0 CMH2O
PAW @ PEAK INSP FLOW SETTING VENT: 0 CMH2O
PCO2 BLDA: 58.4 MM HG (ref 35–45)
PCO2 BLDA: 64.6 MM HG (ref 35–45)
PCO2 TEMP ADJ BLD: 58.4 MM HG (ref 35–48)
PCO2 TEMP ADJ BLD: 64.6 MM HG (ref 35–48)
PH BLDA: 7.23 PH UNITS (ref 7.35–7.45)
PH BLDA: 7.29 PH UNITS (ref 7.35–7.45)
PH, TEMP CORRECTED: 7.23 PH UNITS
PH, TEMP CORRECTED: 7.29 PH UNITS
PLATELET # BLD AUTO: 194 10*3/MM3 (ref 140–450)
PLATELET # BLD AUTO: 213 10*3/MM3 (ref 140–450)
PMV BLD AUTO: 9.1 FL (ref 6–12)
PMV BLD AUTO: 9.2 FL (ref 6–12)
PO2 BLDA: 121 MM HG (ref 83–108)
PO2 BLDA: 75.8 MM HG (ref 83–108)
PO2 TEMP ADJ BLD: 121 MM HG (ref 83–108)
PO2 TEMP ADJ BLD: 75.8 MM HG (ref 83–108)
POTASSIUM SERPL-SCNC: 3.6 MMOL/L (ref 3.5–5.2)
POTASSIUM SERPL-SCNC: 3.9 MMOL/L (ref 3.5–5.2)
PROT SERPL-MCNC: 6 G/DL (ref 6–8.5)
PROT SERPL-MCNC: 6.1 G/DL (ref 6–8.5)
PSV: 5 CMH2O
PSV: 5 CMH2O
QT INTERVAL: 398 MS
QT INTERVAL: 406 MS
QTC INTERVAL: 438 MS
QTC INTERVAL: 450 MS
RBC # BLD AUTO: 3.68 10*6/MM3 (ref 4.14–5.8)
RBC # BLD AUTO: 4.01 10*6/MM3 (ref 4.14–5.8)
SARS-COV-2 RNA RESP QL NAA+PROBE: NOT DETECTED
SODIUM SERPL-SCNC: 137 MMOL/L (ref 136–145)
SODIUM SERPL-SCNC: 140 MMOL/L (ref 136–145)
TOTAL RATE: 19 BREATHS/MINUTE
TOTAL RATE: 23 BREATHS/MINUTE
TROPONIN T % DELTA: 0
TROPONIN T NUMERIC DELTA: 0 NG/L
TROPONIN T SERPL HS-MCNC: 123 NG/L
TROPONIN T SERPL HS-MCNC: 162 NG/L
TROPONIN T SERPL HS-MCNC: 80 NG/L
VENTILATOR MODE: ABNORMAL
VENTILATOR MODE: ABNORMAL
WBC NRBC COR # BLD AUTO: 10.77 10*3/MM3 (ref 3.4–10.8)
WBC NRBC COR # BLD AUTO: 6.75 10*3/MM3 (ref 3.4–10.8)
WHOLE BLOOD HOLD COAG: NORMAL
WHOLE BLOOD HOLD COAG: NORMAL
WHOLE BLOOD HOLD SPECIMEN: NORMAL
WHOLE BLOOD HOLD SPECIMEN: NORMAL

## 2025-03-30 PROCEDURE — 94799 UNLISTED PULMONARY SVC/PX: CPT

## 2025-03-30 PROCEDURE — 83605 ASSAY OF LACTIC ACID: CPT | Performed by: EMERGENCY MEDICINE

## 2025-03-30 PROCEDURE — 83880 ASSAY OF NATRIURETIC PEPTIDE: CPT | Performed by: EMERGENCY MEDICINE

## 2025-03-30 PROCEDURE — 36415 COLL VENOUS BLD VENIPUNCTURE: CPT

## 2025-03-30 PROCEDURE — 83050 HGB METHEMOGLOBIN QUAN: CPT

## 2025-03-30 PROCEDURE — 80053 COMPREHEN METABOLIC PANEL: CPT | Performed by: EMERGENCY MEDICINE

## 2025-03-30 PROCEDURE — 93005 ELECTROCARDIOGRAM TRACING: CPT | Performed by: EMERGENCY MEDICINE

## 2025-03-30 PROCEDURE — 85025 COMPLETE CBC W/AUTO DIFF WBC: CPT | Performed by: EMERGENCY MEDICINE

## 2025-03-30 PROCEDURE — 99284 EMERGENCY DEPT VISIT MOD MDM: CPT

## 2025-03-30 PROCEDURE — 87040 BLOOD CULTURE FOR BACTERIA: CPT | Performed by: EMERGENCY MEDICINE

## 2025-03-30 PROCEDURE — 96374 THER/PROPH/DIAG INJ IV PUSH: CPT

## 2025-03-30 PROCEDURE — 25010000002 ONDANSETRON PER 1 MG: Performed by: EMERGENCY MEDICINE

## 2025-03-30 PROCEDURE — 87636 SARSCOV2 & INF A&B AMP PRB: CPT | Performed by: EMERGENCY MEDICINE

## 2025-03-30 PROCEDURE — 94660 CPAP INITIATION&MGMT: CPT

## 2025-03-30 PROCEDURE — 96375 TX/PRO/DX INJ NEW DRUG ADDON: CPT

## 2025-03-30 PROCEDURE — 36600 WITHDRAWAL OF ARTERIAL BLOOD: CPT

## 2025-03-30 PROCEDURE — 25010000002 AMPICILLIN-SULBACTAM PER 1.5 G: Performed by: EMERGENCY MEDICINE

## 2025-03-30 PROCEDURE — 84484 ASSAY OF TROPONIN QUANT: CPT | Performed by: EMERGENCY MEDICINE

## 2025-03-30 PROCEDURE — 99223 1ST HOSP IP/OBS HIGH 75: CPT | Performed by: INTERNAL MEDICINE

## 2025-03-30 PROCEDURE — 71045 X-RAY EXAM CHEST 1 VIEW: CPT

## 2025-03-30 PROCEDURE — 25010000002 HEPARIN (PORCINE) PER 1000 UNITS: Performed by: INTERNAL MEDICINE

## 2025-03-30 PROCEDURE — 99291 CRITICAL CARE FIRST HOUR: CPT

## 2025-03-30 PROCEDURE — 82805 BLOOD GASES W/O2 SATURATION: CPT

## 2025-03-30 PROCEDURE — 94640 AIRWAY INHALATION TREATMENT: CPT

## 2025-03-30 PROCEDURE — 25010000002 MORPHINE PER 10 MG: Performed by: EMERGENCY MEDICINE

## 2025-03-30 PROCEDURE — 25010000002 METHYLPREDNISOLONE PER 125 MG: Performed by: EMERGENCY MEDICINE

## 2025-03-30 PROCEDURE — 82375 ASSAY CARBOXYHB QUANT: CPT

## 2025-03-30 PROCEDURE — 83735 ASSAY OF MAGNESIUM: CPT | Performed by: EMERGENCY MEDICINE

## 2025-03-30 RX ORDER — ONDANSETRON 2 MG/ML
4 INJECTION INTRAMUSCULAR; INTRAVENOUS ONCE
Status: COMPLETED | OUTPATIENT
Start: 2025-03-30 | End: 2025-03-30

## 2025-03-30 RX ORDER — SODIUM CHLORIDE 0.9 % (FLUSH) 0.9 %
10 SYRINGE (ML) INJECTION AS NEEDED
Status: DISCONTINUED | OUTPATIENT
Start: 2025-03-30 | End: 2025-04-07 | Stop reason: HOSPADM

## 2025-03-30 RX ORDER — HEPARIN SODIUM 5000 [USP'U]/ML
5000 INJECTION, SOLUTION INTRAVENOUS; SUBCUTANEOUS EVERY 12 HOURS SCHEDULED
Status: DISCONTINUED | OUTPATIENT
Start: 2025-03-30 | End: 2025-04-07 | Stop reason: HOSPADM

## 2025-03-30 RX ORDER — IBUPROFEN 600 MG/1
1 TABLET ORAL
Status: DISCONTINUED | OUTPATIENT
Start: 2025-03-30 | End: 2025-04-07 | Stop reason: HOSPADM

## 2025-03-30 RX ORDER — ISOSORBIDE MONONITRATE 60 MG/1
60 TABLET, EXTENDED RELEASE ORAL 2 TIMES DAILY
Status: DISCONTINUED | OUTPATIENT
Start: 2025-03-30 | End: 2025-04-07 | Stop reason: HOSPADM

## 2025-03-30 RX ORDER — HYDRALAZINE HYDROCHLORIDE 50 MG/1
50 TABLET, FILM COATED ORAL 3 TIMES DAILY
Status: DISCONTINUED | OUTPATIENT
Start: 2025-03-30 | End: 2025-04-07 | Stop reason: HOSPADM

## 2025-03-30 RX ORDER — DEXTROSE MONOHYDRATE 25 G/50ML
25 INJECTION, SOLUTION INTRAVENOUS
Status: DISCONTINUED | OUTPATIENT
Start: 2025-03-30 | End: 2025-04-07 | Stop reason: HOSPADM

## 2025-03-30 RX ORDER — SODIUM CHLORIDE 0.9 % (FLUSH) 0.9 %
10 SYRINGE (ML) INJECTION AS NEEDED
Status: DISCONTINUED | OUTPATIENT
Start: 2025-03-30 | End: 2025-03-30 | Stop reason: HOSPADM

## 2025-03-30 RX ORDER — IPRATROPIUM BROMIDE AND ALBUTEROL SULFATE 2.5; .5 MG/3ML; MG/3ML
3 SOLUTION RESPIRATORY (INHALATION) ONCE
Status: COMPLETED | OUTPATIENT
Start: 2025-03-30 | End: 2025-03-30

## 2025-03-30 RX ORDER — MORPHINE SULFATE 4 MG/ML
4 INJECTION, SOLUTION INTRAMUSCULAR; INTRAVENOUS ONCE
Status: COMPLETED | OUTPATIENT
Start: 2025-03-30 | End: 2025-03-30

## 2025-03-30 RX ORDER — IPRATROPIUM BROMIDE AND ALBUTEROL SULFATE 2.5; .5 MG/3ML; MG/3ML
3 SOLUTION RESPIRATORY (INHALATION)
Status: DISCONTINUED | OUTPATIENT
Start: 2025-03-30 | End: 2025-04-07 | Stop reason: HOSPADM

## 2025-03-30 RX ORDER — PANTOPRAZOLE SODIUM 40 MG/1
40 TABLET, DELAYED RELEASE ORAL DAILY
Status: DISCONTINUED | OUTPATIENT
Start: 2025-03-30 | End: 2025-04-07 | Stop reason: HOSPADM

## 2025-03-30 RX ORDER — ASPIRIN 81 MG/1
81 TABLET, CHEWABLE ORAL DAILY
Status: DISCONTINUED | OUTPATIENT
Start: 2025-03-30 | End: 2025-04-07 | Stop reason: HOSPADM

## 2025-03-30 RX ORDER — ENOXAPARIN SODIUM 100 MG/ML
30 INJECTION SUBCUTANEOUS EVERY 24 HOURS
Status: DISCONTINUED | OUTPATIENT
Start: 2025-03-30 | End: 2025-03-30

## 2025-03-30 RX ORDER — METHYLPREDNISOLONE SODIUM SUCCINATE 125 MG/2ML
125 INJECTION, POWDER, LYOPHILIZED, FOR SOLUTION INTRAMUSCULAR; INTRAVENOUS ONCE
Status: COMPLETED | OUTPATIENT
Start: 2025-03-30 | End: 2025-03-30

## 2025-03-30 RX ORDER — INSULIN LISPRO 100 [IU]/ML
2-7 INJECTION, SOLUTION INTRAVENOUS; SUBCUTANEOUS
Status: DISCONTINUED | OUTPATIENT
Start: 2025-03-30 | End: 2025-04-07 | Stop reason: HOSPADM

## 2025-03-30 RX ORDER — TOPIRAMATE 25 MG/1
50 TABLET, FILM COATED ORAL 2 TIMES DAILY
Status: DISCONTINUED | OUTPATIENT
Start: 2025-03-30 | End: 2025-04-07 | Stop reason: HOSPADM

## 2025-03-30 RX ORDER — TERAZOSIN 2 MG/1
2 CAPSULE ORAL NIGHTLY
Status: DISCONTINUED | OUTPATIENT
Start: 2025-03-30 | End: 2025-04-07 | Stop reason: HOSPADM

## 2025-03-30 RX ORDER — METHYLPREDNISOLONE SODIUM SUCCINATE 125 MG/2ML
60 INJECTION, POWDER, LYOPHILIZED, FOR SOLUTION INTRAMUSCULAR; INTRAVENOUS EVERY 12 HOURS
Status: DISCONTINUED | OUTPATIENT
Start: 2025-03-31 | End: 2025-04-02

## 2025-03-30 RX ORDER — AMLODIPINE BESYLATE 5 MG/1
5 TABLET ORAL DAILY
Status: DISCONTINUED | OUTPATIENT
Start: 2025-03-30 | End: 2025-04-07 | Stop reason: HOSPADM

## 2025-03-30 RX ORDER — NICOTINE POLACRILEX 4 MG
15 LOZENGE BUCCAL
Status: DISCONTINUED | OUTPATIENT
Start: 2025-03-30 | End: 2025-04-07 | Stop reason: HOSPADM

## 2025-03-30 RX ORDER — LUBIPROSTONE 8 UG/1
8 CAPSULE ORAL 2 TIMES DAILY
Status: DISCONTINUED | OUTPATIENT
Start: 2025-03-30 | End: 2025-04-07 | Stop reason: HOSPADM

## 2025-03-30 RX ADMIN — IPRATROPIUM BROMIDE AND ALBUTEROL SULFATE 3 ML: 2.5; .5 SOLUTION RESPIRATORY (INHALATION) at 15:23

## 2025-03-30 RX ADMIN — ONDANSETRON 4 MG: 2 INJECTION INTRAMUSCULAR; INTRAVENOUS at 02:28

## 2025-03-30 RX ADMIN — DOXYCYCLINE 100 MG: 100 INJECTION, POWDER, LYOPHILIZED, FOR SOLUTION INTRAVENOUS at 14:20

## 2025-03-30 RX ADMIN — METHYLPREDNISOLONE SODIUM SUCCINATE 125 MG: 125 INJECTION INTRAMUSCULAR; INTRAVENOUS at 15:48

## 2025-03-30 RX ADMIN — IPRATROPIUM BROMIDE AND ALBUTEROL SULFATE 3 ML: 2.5; .5 SOLUTION RESPIRATORY (INHALATION) at 21:59

## 2025-03-30 RX ADMIN — HEPARIN SODIUM 5000 UNITS: 5000 INJECTION INTRAVENOUS; SUBCUTANEOUS at 21:28

## 2025-03-30 RX ADMIN — ISOSORBIDE MONONITRATE 60 MG: 60 TABLET, EXTENDED RELEASE ORAL at 21:27

## 2025-03-30 RX ADMIN — PANTOPRAZOLE SODIUM 40 MG: 40 TABLET, DELAYED RELEASE ORAL at 19:13

## 2025-03-30 RX ADMIN — LUBIPROSTONE 8 MCG: 8 CAPSULE ORAL at 21:27

## 2025-03-30 RX ADMIN — TERAZOSIN HYDROCHLORIDE 2 MG: 2 CAPSULE ORAL at 21:27

## 2025-03-30 RX ADMIN — AMPICILLIN SODIUM AND SULBACTAM SODIUM 3 G: 2; 1 INJECTION, POWDER, FOR SOLUTION INTRAMUSCULAR; INTRAVENOUS at 14:25

## 2025-03-30 RX ADMIN — HYDRALAZINE HYDROCHLORIDE 50 MG: 50 TABLET ORAL at 21:27

## 2025-03-30 RX ADMIN — AMLODIPINE BESYLATE 5 MG: 5 TABLET ORAL at 19:13

## 2025-03-30 RX ADMIN — ASPIRIN 81 MG: 81 TABLET, CHEWABLE ORAL at 19:13

## 2025-03-30 RX ADMIN — MORPHINE SULFATE 4 MG: 4 INJECTION, SOLUTION INTRAMUSCULAR; INTRAVENOUS at 02:28

## 2025-03-30 RX ADMIN — TOPIRAMATE 50 MG: 25 TABLET, FILM COATED ORAL at 21:27

## 2025-03-30 NOTE — Clinical Note
Level of Care: Telemetry [5]   Diagnosis: Acute hypoxic respiratory failure [1975116]   Admitting Physician: ELANA SZYMANSKI [[id number]]   Attending Physician: ELANA SZYMANSKI [[id number]]   Certification: I Certify That Inpatient Hospital Services Are Medically Necessary For Greater Than 2 Midnights

## 2025-03-30 NOTE — ED PROVIDER NOTES
Subjective   History of Present Illness  This 84-year-old male with past medical history of hypertension and coronary artery disease presenting to the emergency department with some chest discomfort.  Patient has had some worsening chest pain for the last week or so.  It has been intermittent in nature.  Patient states that over the last 48 hours his pain has been consistent.  Substernal.  Nonradiating.  Has also had some minor shortness of breath.  Denies any associated cough or hemoptysis.  No fevers or chills.  No headache or change in vision.  No focal weakness.  No abdominal pain or vomiting    History provided by:  Patient and EMS personnel   used: No        Review of Systems   Constitutional:  Negative for chills and fever.   HENT:  Negative for congestion, ear pain and sore throat.    Eyes:  Negative for visual disturbance.   Respiratory:  Positive for shortness of breath.    Cardiovascular:  Positive for chest pain.   Gastrointestinal:  Negative for abdominal pain.   Genitourinary:  Negative for difficulty urinating.   Musculoskeletal:  Negative for arthralgias.   Skin:  Negative for rash.   Neurological:  Negative for dizziness, weakness and numbness.   Psychiatric/Behavioral:  Negative for agitation.        Past Medical History:   Diagnosis Date    Arthritis     Coronary artery disease     Difficulty walking     Fatigue     Hyperlipidemia     Hypertension     Low back pain     Other chest pain     SOB (shortness of breath)        Allergies   Allergen Reactions    Sulfa Antibiotics Irritability       Past Surgical History:   Procedure Laterality Date    APPENDECTOMY      CAROTID STENT      CATARACT EXTRACTION, BILATERAL Bilateral     COLONOSCOPY  11/2024    CORONARY STENT PLACEMENT      PROSTATE SURGERY      for BPH       Family History   Problem Relation Age of Onset    Heart attack Mother     Brain cancer Sister     Heart attack Brother     Hepatitis Brother        Social History      Socioeconomic History    Marital status:    Tobacco Use    Smoking status: Former     Current packs/day: 0.00     Average packs/day: 1.5 packs/day for 10.0 years (15.0 ttl pk-yrs)     Types: Cigarettes     Start date: 2001     Quit date: 2011     Years since quittin.9     Passive exposure: Never    Smokeless tobacco: Never   Vaping Use    Vaping status: Never Used   Substance and Sexual Activity    Alcohol use: Never    Drug use: Not Currently     Types: Marijuana     Comment: longstanding smoking, stopped 2 weeks ago    Sexual activity: Not Currently           Objective   Physical Exam  Vitals and nursing note reviewed.   Constitutional:       General: He is not in acute distress.     Appearance: He is not ill-appearing or toxic-appearing.   HENT:      Mouth/Throat:      Pharynx: No posterior oropharyngeal erythema.   Eyes:      Extraocular Movements: Extraocular movements intact.      Pupils: Pupils are equal, round, and reactive to light.   Cardiovascular:      Rate and Rhythm: Normal rate and regular rhythm.   Pulmonary:      Effort: Pulmonary effort is normal. No respiratory distress.   Abdominal:      General: Abdomen is flat. There is no distension.      Palpations: There is no mass.      Tenderness: There is no abdominal tenderness. There is no guarding or rebound.   Musculoskeletal:         General: No deformity. Normal range of motion.   Neurological:      General: No focal deficit present.      Mental Status: He is alert.      Sensory: No sensory deficit.      Motor: No weakness.         ECG 12 Lead      Date/Time: 3/30/2025 4:07 AM    Performed by: Maxwell Flores MD  Authorized by: Maxwell Flores MD  Interpreted by ED physician  Comparison: compared with previous ECG   Similar to previous ECG  Rhythm: sinus rhythm  Rate: normal  BPM: 70  QRS axis: normal  Conduction: conduction normal  Other findings comments: Nonspecific ST changes  Clinical impression: non-specific  ECG  Comments: Interpretation:  EKG was directly visualized by myself, interpretations as documented in hospital course.               ED Course  ED Course as of 03/30/25 0512   Sun Mar 30, 2025   0409 BP: 159/71 [JK]   0409 Temp: 97.9 °F (36.6 °C) [JK]   0409 Temp src: Oral [JK]   0409 Heart Rate: 72 [JK]   0409 Resp: 18 [JK]   0409 SpO2: 93 % [JK]   0409 Device (Oxygen Therapy): room air  Interpretation:  Patient's repeat vitals, telemetry tracing, and pulse oximetry tracing were directly viewed and interpreted by myself.   O2 sat 93% on room air, interpreted as normal.  Telemetry rhythm strip revealed a rate of 72 bpm, interpreted as normal sinus rhythm [JK]   0409 CBC & Differential(!) [JK]   0409 Comprehensive Metabolic Panel(!) [JK]   0409 Magnesium [JK]   0409 BNP [JK]   0409 COVID-19 and FLU A/B PCR, 1 HR TAT - Swab, Nasopharynx [JK]   0409 High Sensitivity Troponin T(!!)  Interpretation:  Laboratory studies were reviewed and interpreted directly by myself.  CMP showed some chronic kidney disease with a BUN of 36 creatinine 3.13, CBC shows a chronic anemia with a hemoglobin 11.2, respiratory panel normal, initial troponin was 80 [JK]   0409 XR Chest 1 View  Interpretation:  Imaging was directly visualized by myself, per my interpretations, chest x-ray was unremarkable [JK]   0510 Repeat troponin of 80.  Delta gap of 0 [JK]   0511 On reevaluation, the patient is feeling much better.  Vital signs have improved.  Overall they are well-appearing.  There were no abnormal cardiopulmonary findings.  No respiratory distress.  Abdomen soft, nontender and no evidence of acute abdomen.  Story is minimally concerning for ACS, PE or dissection given the atypical nature, risk factors, history and physical examination.  Patient's heart score places the patient at low risk for acute coronary syndrome.  Patient feels comfortable following up with her primary care physician and/or primary cardiologist.  Patient was given  strict return precautions.  Verbalized understanding. [JK]   0542 I had a discussion with the patient/family regarding diagnosis, diagnostic results, treatment plan, and medications. The patient/family indicated understanding of these instructions. I spent adequate time at the bedside prior to discharge necessary to discuss the aftercare instructions, giving patient education, providing explanations of the results of our evaluations/findings, and my decision making to assure that the patient/family understand the plan of care. Time was allotted to answer questions at that time and throughout the ED course. Patient is required to maintain timely follow up, as discussed. I also discussed the potential for the development of an acute emergent condition requiring further evaluation, return to the ER, admission, or even surgical intervention.  I encouraged the patient to return to the emergency department immediately for any concerns, worsening symptoms, new complaints, or if symptoms persist and they are unable to seek follow-up in a timely fashion. The patient/family expressed understanding and agreement with this plan    Shared decision making:   After full review of the patient's clinical presentation, review of any work-up including but not limited to laboratory studies and radiology obtained, I had a discussion with the patient.  Treatment options were discussed as well as the risks, benefits and consequences.  I discussed all findings with the patient and family members if available.  During the discussion, treatment goals were understood by all as well as any misconceptions which were addressed with the patient.  Ample time was given for any questions they may have had.  They are in agreement with the treatment plan as well as final disposition. [JK]      ED Course User Index  [JK] Maxwell Flores MD                  HEART Score: 5                                      Medical Decision Making  This is a  84-year-old male with a history of chronic kidney disease presenting with some chest discomfort.  Seems atypical in nature.  Chronic.  Patient is well-known to cardiology has been seen recently for similar symptoms.  EKG is nonspecific..  Overall, the patient is nontoxic.  Afebrile.  IV access was established in the patient.  Placed on continuous telemetry monitoring.  Given the patient's presentation, differential is broad and will require further evaluation.  Workup initiated.      Differential diagnosis: CAD, ACS, peptic ulcer disease, dyspepsia, pneumonia, bronchitis, atypical chest pain, angina, musculoskeletal pain      Amount and/or Complexity of Data Reviewed  Independent Historian: EMS  External Data Reviewed: labs, radiology, ECG and notes.     Details: External laboratories, imaging as well as notes were reviewed personally by myself.  All relevant studies were used to guide decision making.     Date of previous record: 3/27/2025    Source of note: Cardiology    Summary:  Patient was seen and evaluated for routine visit.  I did review basic laboratory studies on file as well as a previous chest x-ray and EKG.  Records reviewed    Labs: ordered. Decision-making details documented in ED Course.  Radiology: ordered and independent interpretation performed. Decision-making details documented in ED Course.  ECG/medicine tests: ordered and independent interpretation performed. Decision-making details documented in ED Course.    Risk  Prescription drug management.        Final diagnoses:   Nonspecific chest pain   Primary hypertension   Coronary artery disease involving native coronary artery of native heart without angina pectoris   Stage 3b chronic kidney disease   Hyperlipidemia, unspecified hyperlipidemia type       ED Disposition  ED Disposition       ED Disposition   Discharge    Condition   Stable    Comment   --               Pia De La Garza MD  [address] [facility]  [id number]  [phone]    Call in 1 day           Medication List      No changes were made to your prescriptions during this visit.            Maxwell Flores MD  03/30/25 0512

## 2025-03-30 NOTE — H&P
[facility] Medicine Services  HISTORY AND PHYSICAL    Patient Name: Hal Byrnes  : 1940  MRN: [id number]  Primary Care Physician: Pia De La Garza MD    Subjective   Subjective     Chief Complaint:shortness of breath    HPI:  Hal Byrnes is a 84 y.o. male who  has a past medical history of Arthritis, Coronary artery disease, Difficulty walking, Fatigue, Hyperlipidemia, Hypertension, Low back pain, Other chest pain, and SOB (shortness of breath). who presented   Initally to the ED last night with chest pain and was sent home. His wife found him unresponsive this morning and RA sats were 40%. He was started on NRBFM and brought to the ED and placed on Bipap for Acute Hypoxic and Hypercapnic Respiratory Failure.  CXR only revealed a RUL infiltrate and ABX have been started. Since being on Bipap his ABG has improved and he is more awake, but still very sleepy.   He denies any pain.  He has battled constipation lately without relief.    Personal History         PMH: He  has a past medical history of Arthritis, Coronary artery disease, Difficulty walking, Fatigue, Hyperlipidemia, Hypertension, Low back pain, Other chest pain, and SOB (shortness of breath).   PSxH: He  has a past surgical history that includes Coronary stent placement; Appendectomy; Prostate surgery; Cataract extraction, bilateral (Bilateral); Carotid stent; and Colonoscopy (2024).         FH: His family history includes Brain cancer in his sister; Heart attack in his brother and mother; Hepatitis in his brother.   SH: He  reports that he quit smoking about 13 years ago. His smoking use included cigarettes. He started smoking about 23 years ago. He has a 15 pack-year smoking history. He has never been exposed to tobacco smoke. He has never used smokeless tobacco. He reports that he does not currently use drugs after having used the following drugs: Marijuana. He reports that he does not drink alcohol.      Medications:  Pancrelipase (Lip-Prot-Amyl), amLODIPine, aspirin, dilTIAZem XR, furosemide, glycerin adult, hydrALAZINE, isosorbide mononitrate, linaclotide, pantoprazole, terazosin, topiramate, and vitamin D    Allergies   Allergen Reactions    Sulfa Antibiotics Irritability       Objective   Objective     Vital Signs:   Temp:  [97.8 °F (36.6 °C)-97.9 °F (36.6 °C)] 97.8 °F (36.6 °C)  Heart Rate:  [] 78  Resp:  [18-22] 22  BP: (116-159)/(61-80) 123/68    Constitutional: sleepy but will wake up and follow commands slowly  Eyes: clear sclerae, no conjunctival injection  HENT: NCAT, mucous membranes moist  Neck: no masses or lymphadenopathy, trachea midline  Respiratory: good breath sounds bilaterally, respirations unlabored-- on bipap  Cardiovascular: RRR, no murmurs appreciated, palpable peripheral pulses  Abdomen:  soft, no HSM or masses palpable, full but not tender  Musculoskeletal: 1-2+ peripheral edema, no clubbing or cyanosis  Neurologic: Oriented x 3,                       Strength symmetric in all extremities                     Cranial Nerves grossly intact, speech clear  Skin: No rashes or jaundice  Psychiatric: cooperative      Result Review:  I have personally reviewed the results from the time of this admission   to 3/30/2025 16:39 EDT and agree with these findings:  [x]  Laboratory  [x]  Microbiology  [x]  Radiology  [x]  EKG/Telemetry   [x]  Cardiology/Vascular   []  Pathology  [x]  Old records  []  Other:  Most notable findings include: new infiltrate, aortic stenosis, CKD, abnormal troponin, elevated pCO2      LAB RESULTS:      Lab 03/30/25  1324 03/30/25 0221   WBC 10.77 6.75   HEMOGLOBIN 12.1* 11.2*   HEMATOCRIT 36.9* 32.9*   PLATELETS 213 194   NEUTROS ABS 9.64* 4.74   IMMATURE GRANS (ABS) 0.14* 0.05   LYMPHS ABS 0.68* 1.40   MONOS ABS 0.27 0.50   EOS ABS 0.02 0.04   MCV 92.0 89.4   LACTATE 0.7  --          Lab 03/30/25  1324 03/30/25 0221   SODIUM 140 137   POTASSIUM 3.9 3.6   CHLORIDE  100 101   CO2 25.0 25.0   ANION GAP 15.0 11.0   BUN 36* 36*   CREATININE 3.17* 3.13*   EGFR 18.7* 19.0*   GLUCOSE 154* 112*   CALCIUM 8.5* 8.6   MAGNESIUM  --  2.0         Lab 03/30/25  1324 03/30/25  0221   TOTAL PROTEIN 6.1 6.0   ALBUMIN 3.6 3.5   GLOBULIN 2.5 2.5   ALT (SGPT) 22 19   AST (SGOT) 23 17   BILIRUBIN 0.2 0.3   ALK PHOS 68 65         Lab 03/30/25  1418 03/30/25  1324 03/30/25  0330 03/30/25  0221   PROBNP  --  816.9  --  926.8   HSTROP T 162* 123* 80* 80*                 Lab 03/30/25  1516 03/30/25  1337   PH, ARTERIAL 7.287* 7.232*   PCO2, ARTERIAL 58.4* 64.6*   PO2 .0* 75.8*   FIO2 55 45   HCO3 ART 27.9* 27.2*   BASE EXCESS ART 0.2 -1.6*   CARBOXYHEMOGLOBIN 1.0 1.3     Brief Urine Lab Results  (Last result in the past 365 days)        Color   Clarity   Blood   Leuk Est   Nitrite   Protein   CREAT   Urine HCG        03/04/25 1057             86.6               COVID19   Date Value Ref Range Status   03/30/2025 Not Detected Not Detected - Ref. Range Final       XR Chest 1 View  Result Date: 3/30/2025  XR CHEST 1 VW Date of Exam: 3/30/2025 1:17 PM EDT Indication: SOA triage protocol Comparison: Chest radiograph dated 3/30/2025 Findings: The cardiomediastinal silhouette is within normal limits. There is worsening right-sided bronchial wall thickening and interstitial opacities with new airspace opacity within the inferior right upper lobe. There is no pleural effusion or pneumothorax. There are degenerative changes of the thoracic spine.     Impression: Impression: Worsening right-sided bronchial wall thickening and interstitial opacities with new airspace opacity within the inferior right upper lobe. Findings are concerning for pneumonia. Electronically Signed: Luke Fajardo  3/30/2025 1:39 PM EDT  Workstation ID: [id number]    XR Chest 1 View  Result Date: 3/30/2025  XR CHEST 1 VW Date of Exam: 3/30/2025 1:45 AM EDT Indication: SOA triage protocol Comparison: Chest radiograph 2/25/2025 Findings:  There are no airspace consolidations. No pleural fluid. No pneumothorax. The pulmonary vasculature appears within normal limits. The cardiac and mediastinal silhouette appear unremarkable. No acute osseous abnormality identified.     Impression: Impression: No active disease. Electronically Signed: Ward Carvajal MD  3/30/2025 2:24 AM EDT  Workstation ID: [id number]      Results for orders placed during the hospital encounter of 02/25/25    Adult Transthoracic Echo Complete W/ Cont if Necessary Per Protocol    Interpretation Summary    Left ventricular systolic function is normal. Calculated left ventricular EF = 62.5%    Left ventricular wall thickness is consistent with mild concentric hypertrophy.  Mild gradient is seen in the LV cavity.    The left atrial cavity is dilated.    Left atrial volume is mildly increased.    Moderate aortic valve stenosis is present.    Aortic valve mean pressure gradient is 23 mmHg with good stroke-volume.    Estimated right ventricular systolic pressure from tricuspid regurgitation is normal (<35 mmHg).      The patient has started, but not completed, their COVID-19 vaccination series.    Assessment & Plan   Assessment / Plan       Acute hypoxic respiratory failure    Primary hypertension    Coronary artery disease    Diabetes mellitus due to underlying condition, without long-term current use of insulin    Chronic diastolic (congestive) heart failure    Psoriasis    Aortic stenosis    Dyslipidemia    Stage 3b chronic kidney disease      Assessment & Plan:  85 yo man with HO HTN, Chronic HfpEF with moderate aortic stenosis, HTN, HLP, CAD and CKD who initially was in the ED with chest pain then returned with acute mixed respiratory failure    Acute Mixed Respiratory failure  Pneumonia  -BIPAP  Nebs, steroids  Cont abx- unasyn and doxy  -check urine antigens,   -SLP eval  -possible underlying NELLA      Elevated troponin  NSTEMI with LHC 2002: PCI to RCA and left circumflex  Repeat LHC with  nonobstructive coronary artery disease in 2005, 2009, and 2018  Lexiscan 8/21: LV function at rest.  Normal myocardial perfusion.    -give aspirin  -CKD precludes LHC safely    T2DM  -SSI    HLP  -cont statin    A/CKD with Polycystic Kidneys  -baseline creatinine about 2.5  -elevated Light chains  -has appointment with Dr Anaya on April      VTE Prophylaxis:HEP SQ    CODE STATUS:FULL CODE     Expected Discharge  Expected Discharge Date: 4/1/2025; Expected Discharge Time:     Electronically signed by Kimmie Gupta MD 03/30/25 16:39 EDT

## 2025-03-30 NOTE — ED NOTES
Hal Byrnes    Nursing Report ED to Floor:  Mental status: aox4  Ambulatory status: non ambulatory ined   Oxygen Therapy:  bipap  Cardiac Rhythm: nsr  Admitted from: home  Safety Concerns:  na  Precautions: na  Social Issues: na  ED Room #:  29    ED Nurse Phone Extension - [phone] or may call [phone].      HPI:   Chief Complaint   Patient presents with    Shortness of Breath       Past Medical History:  Past Medical History:   Diagnosis Date    Arthritis     Coronary artery disease     Difficulty walking     Fatigue     Hyperlipidemia     Hypertension     Low back pain     Other chest pain     SOB (shortness of breath)         Past Surgical History:  Past Surgical History:   Procedure Laterality Date    APPENDECTOMY      CAROTID STENT      CATARACT EXTRACTION, BILATERAL Bilateral     COLONOSCOPY  11/2024    CORONARY STENT PLACEMENT      PROSTATE SURGERY      for BPH        Admitting Doctor:   Kimmie Gupta MD    Consulting Provider(s):  Consults       No orders found from 3/1/2025 to 3/31/2025.             Admitting Diagnosis:   The primary encounter diagnosis was Pneumonia of right lung due to infectious organism, unspecified part of lung. Diagnoses of Hypoxia, Acute respiratory failure with hypoxia and hypercapnia, Chronic renal failure, unspecified CKD stage, and COPD with acute exacerbation were also pertinent to this visit.    Most Recent Vitals:   Vitals:    03/30/25 1529 03/30/25 1544 03/30/25 1600 03/30/25 1615   BP: 136/70 116/68 119/67 123/68   BP Location:       Patient Position:       Pulse: 81 77 79 78   Resp:       Temp:       TempSrc:       SpO2: 100% 97% 97% 96%   Weight:       Height:           Active LDAs/IV Access:   Lines, Drains & Airways       Active LDAs       Name Placement date Placement time Site Days    Peripheral IV 03/30/25 1315 Left Antecubital 03/30/25  1315  Antecubital  less than 1    Peripheral IV 03/30/25 1326 Anterior;Right;Upper Arm 03/30/25  1326  Arm  less than 1                     Labs (abnormal labs have a star):   Labs Reviewed   COMPREHENSIVE METABOLIC PANEL - Abnormal; Notable for the following components:       Result Value    Glucose 154 (*)     BUN 36 (*)     Creatinine 3.17 (*)     Calcium 8.5 (*)     eGFR 18.7 (*)     All other components within normal limits    Narrative:     GFR Categories in Chronic Kidney Disease (CKD)      GFR Category          GFR (mL/min/1.73)    Interpretation  G1                     90 or greater         Normal or high (1)  G2                      60-89                Mild decrease (1)  G3a                   45-59                Mild to moderate decrease  G3b                   30-44                Moderate to severe decrease  G4                    15-29                Severe decrease  G5                    14 or less           Kidney failure          (1)In the absence of evidence of kidney disease, neither GFR category G1 or G2 fulfill the criteria for CKD.    eGFR calculation 2021 CKD-EPI creatinine equation, which does not include race as a factor   TROPONIN - Abnormal; Notable for the following components:    HS Troponin T 123 (*)     All other components within normal limits    Narrative:     High Sensitive Troponin T Reference Range:  <14.0 ng/L- Negative Female for AMI  <22.0 ng/L- Negative Male for AMI  >=14 - Abnormal Female indicating possible myocardial injury.  >=22 - Abnormal Male indicating possible myocardial injury.   Clinicians would have to utilize clinical acumen, EKG, Troponin, and serial changes to determine if it is an Acute Myocardial Infarction or myocardial injury due to an underlying chronic condition.        CBC WITH AUTO DIFFERENTIAL - Abnormal; Notable for the following components:    RBC 4.01 (*)     Hemoglobin 12.1 (*)     Hematocrit 36.9 (*)     Neutrophil % 89.5 (*)     Lymphocyte % 6.3 (*)     Monocyte % 2.5 (*)     Eosinophil % 0.2 (*)     Immature Grans % 1.3 (*)     Neutrophils, Absolute 9.64 (*)      Lymphocytes, Absolute 0.68 (*)     Immature Grans, Absolute 0.14 (*)     All other components within normal limits   BLOOD GAS, ARTERIAL W/CO-OXIMETRY - Abnormal; Notable for the following components:    pH, Arterial 7.232 (*)     pCO2, Arterial 64.6 (*)     pO2, Arterial 75.8 (*)     HCO3, Arterial 27.2 (*)     Base Excess, Arterial -1.6 (*)     Hemoglobin, Blood Gas 12.3 (*)     Hematocrit, Blood Gas 37.8 (*)     Oxyhemoglobin 91.6 (*)     pCO2, Temperature Corrected 64.6 (*)     pO2, Temperature Corrected 75.8 (*)     All other components within normal limits   TROPONIN - Abnormal; Notable for the following components:    HS Troponin T 162 (*)     All other components within normal limits    Narrative:     High Sensitive Troponin T Reference Range:  <14.0 ng/L- Negative Female for AMI  <22.0 ng/L- Negative Male for AMI  >=14 - Abnormal Female indicating possible myocardial injury.  >=22 - Abnormal Male indicating possible myocardial injury.   Clinicians would have to utilize clinical acumen, EKG, Troponin, and serial changes to determine if it is an Acute Myocardial Infarction or myocardial injury due to an underlying chronic condition.        BLOOD GAS, ARTERIAL W/CO-OXIMETRY - Abnormal; Notable for the following components:    pH, Arterial 7.287 (*)     pCO2, Arterial 58.4 (*)     pO2, Arterial 121.0 (*)     HCO3, Arterial 27.9 (*)     Hemoglobin, Blood Gas 11.8 (*)     Hematocrit, Blood Gas 36.1 (*)     pCO2, Temperature Corrected 58.4 (*)     pO2, Temperature Corrected 121 (*)     All other components within normal limits   BNP (IN-HOUSE) - Normal    Narrative:     This assay is used as an aid in the diagnosis of individuals suspected of having heart failure. It can be used as an aid in the diagnosis of acute decompensated heart failure (ADHF) in patients presenting with signs and symptoms of ADHF to the emergency department (ED). In addition, NT-proBNP of <300 pg/mL indicates ADHF is not likely.    Age  Range Result Interpretation  NT-proBNP Concentration (pg/mL:      <50             Positive            >450                   Gray                 300-450                    Negative             <300    50-75           Positive            >900                  Gray                300-900                  Negative            <300      >75             Positive            >1800                  Gray                300-1800                  Negative            <300   LACTIC ACID, PLASMA - Normal   BLOOD CULTURE   BLOOD CULTURE   STREP PNEUMO AG, URINE OR CSF   LEGIONELLA ANTIGEN, URINE   RAINBOW DRAW    Narrative:     The following orders were created for panel order [place] Draw.  Procedure                               Abnormality         Status                     ---------                               -----------         ------                     Green Top (Gel)[[id number]]                                  Final result               Lavender Top[[id number]]                                     Final result               Gold Top - SST[[id number]]                                   Final result               Meeks Top[[id number]]                                         Final result               Light Blue Top[[id number]]                                   Final result                 Please view results for these tests on the individual orders.   BLOOD GAS, ARTERIAL   CBC AND DIFFERENTIAL    Narrative:     The following orders were created for panel order CBC & Differential.  Procedure                               Abnormality         Status                     ---------                               -----------         ------                     CBC Auto Differential[[id number]]        Abnormal            Final result                 Please view results for these tests on the individual orders.   GREEN TOP   LAVENDER TOP   GOLD TOP - SST   GRAY TOP   LIGHT BLUE TOP       Meds Given in ED:   Medications   sodium chloride 0.9  % flush 10 mL (has no administration in time range)   ampicillin-sulbactam (UNASYN) 3 g in sodium chloride 0.9 % 100 mL MBP (0 g Intravenous Stopped 3/30/25 1505)   doxycycline (VIBRAMYCIN) 100 mg in sodium chloride 0.9 % 100 mL MBP (0 mg Intravenous Stopped 3/30/25 1601)   ipratropium-albuterol (DUO-NEB) nebulizer solution 3 mL (3 mL Nebulization Given 3/30/25 1523)   methylPREDNISolone sodium succinate (SOLU-Medrol) injection 125 mg (125 mg Intravenous Given 3/30/25 1548)           Last NIH score:                                                          Dysphagia screening results:  Patient Factors Component (Dysphagia:Stroke or Rule-out)  Best Eye Response: 4-->(E4) spontaneous (03/30/25 1407)  Best Motor Response: 6-->(M6) obeys commands (03/30/25 1407)  Best Verbal Response: 4-->(V4) confused (03/30/25 1407)  Brooks Coma Scale Score: 14 (03/30/25 1407)     Martha Coma Scale:  No data recorded     CIWA:        Restraint Type:            Isolation Status:  No active isolations

## 2025-03-31 LAB
ALBUMIN SERPL-MCNC: 3.3 G/DL (ref 3.5–5.2)
ALBUMIN/GLOB SERPL: 1.3 G/DL
ALP SERPL-CCNC: 64 U/L (ref 39–117)
ALT SERPL W P-5'-P-CCNC: 22 U/L (ref 1–41)
ANION GAP SERPL CALCULATED.3IONS-SCNC: 13 MMOL/L (ref 5–15)
ARTERIAL PATENCY WRIST A: ABNORMAL
AST SERPL-CCNC: 19 U/L (ref 1–40)
ATMOSPHERIC PRESS: ABNORMAL MM[HG]
BASE EXCESS BLDA CALC-SCNC: -0.7 MMOL/L (ref 0–2)
BASOPHILS # BLD AUTO: 0 10*3/MM3 (ref 0–0.2)
BASOPHILS NFR BLD AUTO: 0 % (ref 0–1.5)
BDY SITE: ABNORMAL
BILIRUB SERPL-MCNC: 0.2 MG/DL (ref 0–1.2)
BODY TEMPERATURE: 37
BUN SERPL-MCNC: 44 MG/DL (ref 8–23)
BUN/CREAT SERPL: 14 (ref 7–25)
CALCIUM SPEC-SCNC: 8.4 MG/DL (ref 8.6–10.5)
CHLORIDE SERPL-SCNC: 101 MMOL/L (ref 98–107)
CO2 BLDA-SCNC: 27.8 MMOL/L (ref 22–33)
CO2 SERPL-SCNC: 22 MMOL/L (ref 22–29)
COHGB MFR BLD: 1.1 % (ref 0–2)
CREAT SERPL-MCNC: 3.14 MG/DL (ref 0.76–1.27)
DEPRECATED RDW RBC AUTO: 43 FL (ref 37–54)
EGFRCR SERPLBLD CKD-EPI 2021: 18.9 ML/MIN/1.73
EOSINOPHIL # BLD AUTO: 0 10*3/MM3 (ref 0–0.4)
EOSINOPHIL NFR BLD AUTO: 0 % (ref 0.3–6.2)
EPAP: 0
ERYTHROCYTE [DISTWIDTH] IN BLOOD BY AUTOMATED COUNT: 13.1 % (ref 12.3–15.4)
GLOBULIN UR ELPH-MCNC: 2.6 GM/DL
GLUCOSE BLDC GLUCOMTR-MCNC: 134 MG/DL (ref 70–130)
GLUCOSE BLDC GLUCOMTR-MCNC: 139 MG/DL (ref 70–130)
GLUCOSE BLDC GLUCOMTR-MCNC: 177 MG/DL (ref 70–130)
GLUCOSE BLDC GLUCOMTR-MCNC: 218 MG/DL (ref 70–130)
GLUCOSE BLDC GLUCOMTR-MCNC: 242 MG/DL (ref 70–130)
GLUCOSE BLDC GLUCOMTR-MCNC: 269 MG/DL (ref 70–130)
GLUCOSE BLDC GLUCOMTR-MCNC: 433 MG/DL (ref 70–130)
GLUCOSE SERPL-MCNC: 166 MG/DL (ref 65–99)
HCO3 BLDA-SCNC: 26.2 MMOL/L (ref 20–26)
HCT VFR BLD AUTO: 33.5 % (ref 37.5–51)
HCT VFR BLD CALC: 35.2 % (ref 38–51)
HGB BLD-MCNC: 11 G/DL (ref 13–17.7)
HGB BLDA-MCNC: 11.5 G/DL (ref 13.5–17.5)
IMM GRANULOCYTES # BLD AUTO: 0.05 10*3/MM3 (ref 0–0.05)
IMM GRANULOCYTES NFR BLD AUTO: 0.8 % (ref 0–0.5)
INHALED O2 CONCENTRATION: 36 %
IPAP: 0
L PNEUMO1 AG UR QL IA: NEGATIVE
LYMPHOCYTES # BLD AUTO: 0.4 10*3/MM3 (ref 0.7–3.1)
LYMPHOCYTES NFR BLD AUTO: 6.2 % (ref 19.6–45.3)
MCH RBC QN AUTO: 29.6 PG (ref 26.6–33)
MCHC RBC AUTO-ENTMCNC: 32.8 G/DL (ref 31.5–35.7)
MCV RBC AUTO: 90.3 FL (ref 79–97)
METHGB BLD QL: 0.7 % (ref 0–1.5)
MODALITY: ABNORMAL
MONOCYTES # BLD AUTO: 0.02 10*3/MM3 (ref 0.1–0.9)
MONOCYTES NFR BLD AUTO: 0.3 % (ref 5–12)
NEUTROPHILS NFR BLD AUTO: 6.01 10*3/MM3 (ref 1.7–7)
NEUTROPHILS NFR BLD AUTO: 92.7 % (ref 42.7–76)
NRBC BLD AUTO-RTO: 0 /100 WBC (ref 0–0.2)
OXYHGB MFR BLDV: 91.6 % (ref 94–99)
PAW @ PEAK INSP FLOW SETTING VENT: 0 CMH2O
PCO2 BLDA: 52.3 MM HG (ref 35–45)
PCO2 TEMP ADJ BLD: 52.3 MM HG (ref 35–48)
PH BLDA: 7.31 PH UNITS (ref 7.35–7.45)
PH, TEMP CORRECTED: 7.31 PH UNITS
PLATELET # BLD AUTO: 204 10*3/MM3 (ref 140–450)
PMV BLD AUTO: 9.3 FL (ref 6–12)
PO2 BLDA: 67.5 MM HG (ref 83–108)
PO2 TEMP ADJ BLD: 67.5 MM HG (ref 83–108)
POTASSIUM SERPL-SCNC: 4 MMOL/L (ref 3.5–5.2)
PROT SERPL-MCNC: 5.9 G/DL (ref 6–8.5)
RBC # BLD AUTO: 3.71 10*6/MM3 (ref 4.14–5.8)
S PNEUM AG SPEC QL LA: NEGATIVE
SODIUM SERPL-SCNC: 136 MMOL/L (ref 136–145)
TOTAL RATE: 0 BREATHS/MINUTE
WBC NRBC COR # BLD AUTO: 6.48 10*3/MM3 (ref 3.4–10.8)

## 2025-03-31 PROCEDURE — 85025 COMPLETE CBC W/AUTO DIFF WBC: CPT | Performed by: INTERNAL MEDICINE

## 2025-03-31 PROCEDURE — 25010000002 METHYLPREDNISOLONE PER 125 MG: Performed by: INTERNAL MEDICINE

## 2025-03-31 PROCEDURE — 36600 WITHDRAWAL OF ARTERIAL BLOOD: CPT

## 2025-03-31 PROCEDURE — 63710000001 INSULIN LISPRO (HUMAN) PER 5 UNITS: Performed by: INTERNAL MEDICINE

## 2025-03-31 PROCEDURE — 80053 COMPREHEN METABOLIC PANEL: CPT | Performed by: INTERNAL MEDICINE

## 2025-03-31 PROCEDURE — 94799 UNLISTED PULMONARY SVC/PX: CPT

## 2025-03-31 PROCEDURE — 87449 NOS EACH ORGANISM AG IA: CPT | Performed by: INTERNAL MEDICINE

## 2025-03-31 PROCEDURE — 92610 EVALUATE SWALLOWING FUNCTION: CPT

## 2025-03-31 PROCEDURE — 87899 AGENT NOS ASSAY W/OPTIC: CPT | Performed by: INTERNAL MEDICINE

## 2025-03-31 PROCEDURE — 82375 ASSAY CARBOXYHB QUANT: CPT

## 2025-03-31 PROCEDURE — 25010000002 HEPARIN (PORCINE) PER 1000 UNITS: Performed by: INTERNAL MEDICINE

## 2025-03-31 PROCEDURE — 99232 SBSQ HOSP IP/OBS MODERATE 35: CPT | Performed by: INTERNAL MEDICINE

## 2025-03-31 PROCEDURE — 25010000002 AMPICILLIN-SULBACTAM PER 1.5 G: Performed by: INTERNAL MEDICINE

## 2025-03-31 PROCEDURE — 82948 REAGENT STRIP/BLOOD GLUCOSE: CPT

## 2025-03-31 PROCEDURE — 82805 BLOOD GASES W/O2 SATURATION: CPT

## 2025-03-31 PROCEDURE — 99232 SBSQ HOSP IP/OBS MODERATE 35: CPT | Performed by: STUDENT IN AN ORGANIZED HEALTH CARE EDUCATION/TRAINING PROGRAM

## 2025-03-31 PROCEDURE — 83050 HGB METHEMOGLOBIN QUAN: CPT

## 2025-03-31 RX ORDER — BISACODYL 5 MG/1
5 TABLET, DELAYED RELEASE ORAL DAILY PRN
Status: DISCONTINUED | OUTPATIENT
Start: 2025-03-31 | End: 2025-04-07 | Stop reason: HOSPADM

## 2025-03-31 RX ORDER — WATER 10 ML/10ML
INJECTION INTRAMUSCULAR; INTRAVENOUS; SUBCUTANEOUS
Status: COMPLETED
Start: 2025-03-31 | End: 2025-03-31

## 2025-03-31 RX ORDER — AMOXICILLIN 250 MG
2 CAPSULE ORAL 2 TIMES DAILY
Status: DISCONTINUED | OUTPATIENT
Start: 2025-03-31 | End: 2025-04-07 | Stop reason: HOSPADM

## 2025-03-31 RX ORDER — BISACODYL 10 MG
10 SUPPOSITORY, RECTAL RECTAL DAILY PRN
Status: DISCONTINUED | OUTPATIENT
Start: 2025-03-31 | End: 2025-04-07 | Stop reason: HOSPADM

## 2025-03-31 RX ORDER — SIMETHICONE 80 MG
80 TABLET,CHEWABLE ORAL 4 TIMES DAILY PRN
Status: DISCONTINUED | OUTPATIENT
Start: 2025-03-31 | End: 2025-04-07 | Stop reason: HOSPADM

## 2025-03-31 RX ORDER — POLYETHYLENE GLYCOL 3350 17 G/17G
17 POWDER, FOR SOLUTION ORAL DAILY PRN
Status: DISCONTINUED | OUTPATIENT
Start: 2025-03-31 | End: 2025-04-07 | Stop reason: HOSPADM

## 2025-03-31 RX ADMIN — ASPIRIN 81 MG: 81 TABLET, CHEWABLE ORAL at 08:34

## 2025-03-31 RX ADMIN — HYDRALAZINE HYDROCHLORIDE 50 MG: 50 TABLET ORAL at 15:44

## 2025-03-31 RX ADMIN — IPRATROPIUM BROMIDE AND ALBUTEROL SULFATE 3 ML: 2.5; .5 SOLUTION RESPIRATORY (INHALATION) at 13:25

## 2025-03-31 RX ADMIN — SENNOSIDES AND DOCUSATE SODIUM 2 TABLET: 50; 8.6 TABLET ORAL at 21:37

## 2025-03-31 RX ADMIN — DOXYCYCLINE 100 MG: 100 INJECTION, POWDER, LYOPHILIZED, FOR SOLUTION INTRAVENOUS at 03:25

## 2025-03-31 RX ADMIN — AMPICILLIN SODIUM AND SULBACTAM SODIUM 3 G: 2; 1 INJECTION, POWDER, FOR SOLUTION INTRAMUSCULAR; INTRAVENOUS at 14:58

## 2025-03-31 RX ADMIN — ISOSORBIDE MONONITRATE 60 MG: 60 TABLET, EXTENDED RELEASE ORAL at 08:33

## 2025-03-31 RX ADMIN — TOPIRAMATE 50 MG: 25 TABLET, FILM COATED ORAL at 08:34

## 2025-03-31 RX ADMIN — HEPARIN SODIUM 5000 UNITS: 5000 INJECTION INTRAVENOUS; SUBCUTANEOUS at 21:37

## 2025-03-31 RX ADMIN — ISOSORBIDE MONONITRATE 60 MG: 60 TABLET, EXTENDED RELEASE ORAL at 21:37

## 2025-03-31 RX ADMIN — SIMETHICONE 80 MG: 80 TABLET, CHEWABLE ORAL at 17:42

## 2025-03-31 RX ADMIN — AMLODIPINE BESYLATE 5 MG: 5 TABLET ORAL at 08:33

## 2025-03-31 RX ADMIN — PANTOPRAZOLE SODIUM 40 MG: 40 TABLET, DELAYED RELEASE ORAL at 08:34

## 2025-03-31 RX ADMIN — HEPARIN SODIUM 5000 UNITS: 5000 INJECTION INTRAVENOUS; SUBCUTANEOUS at 08:34

## 2025-03-31 RX ADMIN — PANCRELIPASE 24000 UNITS OF LIPASE: 60000; 12000; 38000 CAPSULE, DELAYED RELEASE PELLETS ORAL at 17:37

## 2025-03-31 RX ADMIN — HYDRALAZINE HYDROCHLORIDE 50 MG: 50 TABLET ORAL at 08:33

## 2025-03-31 RX ADMIN — PANCRELIPASE 24000 UNITS OF LIPASE: 60000; 12000; 38000 CAPSULE, DELAYED RELEASE PELLETS ORAL at 08:33

## 2025-03-31 RX ADMIN — LUBIPROSTONE 8 MCG: 8 CAPSULE ORAL at 08:34

## 2025-03-31 RX ADMIN — IPRATROPIUM BROMIDE AND ALBUTEROL SULFATE 3 ML: 2.5; .5 SOLUTION RESPIRATORY (INHALATION) at 19:50

## 2025-03-31 RX ADMIN — TOPIRAMATE 50 MG: 25 TABLET, FILM COATED ORAL at 21:37

## 2025-03-31 RX ADMIN — INSULIN LISPRO 2 UNITS: 100 INJECTION, SOLUTION INTRAVENOUS; SUBCUTANEOUS at 12:28

## 2025-03-31 RX ADMIN — POLYETHYLENE GLYCOL 3350 17 G: 17 POWDER, FOR SOLUTION ORAL at 14:58

## 2025-03-31 RX ADMIN — PANCRELIPASE 24000 UNITS OF LIPASE: 60000; 12000; 38000 CAPSULE, DELAYED RELEASE PELLETS ORAL at 12:28

## 2025-03-31 RX ADMIN — HYDRALAZINE HYDROCHLORIDE 50 MG: 50 TABLET ORAL at 21:37

## 2025-03-31 RX ADMIN — METHYLPREDNISOLONE SODIUM SUCCINATE 60 MG: 125 INJECTION INTRAMUSCULAR; INTRAVENOUS at 12:28

## 2025-03-31 RX ADMIN — IPRATROPIUM BROMIDE AND ALBUTEROL SULFATE 3 ML: 2.5; .5 SOLUTION RESPIRATORY (INHALATION) at 16:49

## 2025-03-31 RX ADMIN — TERAZOSIN HYDROCHLORIDE 2 MG: 2 CAPSULE ORAL at 21:37

## 2025-03-31 RX ADMIN — IPRATROPIUM BROMIDE AND ALBUTEROL SULFATE 3 ML: 2.5; .5 SOLUTION RESPIRATORY (INHALATION) at 08:30

## 2025-03-31 RX ADMIN — AMPICILLIN SODIUM AND SULBACTAM SODIUM 3 G: 2; 1 INJECTION, POWDER, FOR SOLUTION INTRAMUSCULAR; INTRAVENOUS at 03:25

## 2025-03-31 RX ADMIN — METHYLPREDNISOLONE SODIUM SUCCINATE 60 MG: 125 INJECTION INTRAMUSCULAR; INTRAVENOUS at 00:29

## 2025-03-31 RX ADMIN — WATER 10 ML: 1 INJECTION INTRAMUSCULAR; INTRAVENOUS; SUBCUTANEOUS at 12:28

## 2025-03-31 RX ADMIN — INSULIN LISPRO 3 UNITS: 100 INJECTION, SOLUTION INTRAVENOUS; SUBCUTANEOUS at 17:37

## 2025-03-31 RX ADMIN — LUBIPROSTONE 8 MCG: 8 CAPSULE ORAL at 21:37

## 2025-03-31 RX ADMIN — DOXYCYCLINE 100 MG: 100 INJECTION, POWDER, LYOPHILIZED, FOR SOLUTION INTRAVENOUS at 15:44

## 2025-03-31 NOTE — THERAPY EVALUATION
Acute Care - Speech Language Pathology   Swallow Initial Evaluation   [facility]    Clinical Swallow Evaluation       Patient Name: Hal Byrnes  : 1940  MRN: [id number]  Today's Date: 3/31/2025               Admit Date: 3/30/2025    Visit Dx:     ICD-10-CM ICD-9-CM   1. Pneumonia of right lung due to infectious organism, unspecified part of lung  J18.9 483.8   2. Hypoxia  R09.02 799.02   3. Acute respiratory failure with hypoxia and hypercapnia  J96.01 518.81    J96.02    4. Chronic renal failure, unspecified CKD stage  N18.9 585.9   5. COPD with acute exacerbation  J44.1 491.21     Patient Active Problem List   Diagnosis    Primary hypertension    Coronary artery disease    Gastroesophageal reflux disease with esophagitis without hemorrhage    Diabetes mellitus due to underlying condition, without long-term current use of insulin    Benign prostatic hyperplasia with lower urinary tract symptoms    Peripheral neuropathy    Chronic diastolic (congestive) heart failure    Psoriasis    Aortic stenosis    Lumbar spondylosis    Cervical spondylosis    Dyslipidemia    Inverse psoriasis    Ectatic abdominal aorta    Pancreatic insufficiency    Stage 3b chronic kidney disease    Lumbar radiculopathy    Multiple renal cysts    Hyperlipemia    Tremor    Drug induced constipation    Acute hypoxic respiratory failure     Past Medical History:   Diagnosis Date    Arthritis     Coronary artery disease     Difficulty walking     Fatigue     Hyperlipidemia     Hypertension     Low back pain     Other chest pain     SOB (shortness of breath)      Past Surgical History:   Procedure Laterality Date    APPENDECTOMY      CAROTID STENT      CATARACT EXTRACTION, BILATERAL Bilateral     COLONOSCOPY  2024    CORONARY STENT PLACEMENT      PROSTATE SURGERY      for BPH       SLP Recommendation and Plan  SLP Swallowing Diagnosis: functional oral phase, R/O pharyngeal dysphagia, other (see comments) (no suspected pharyngeal  dysphagia) (03/31/25 0900)  SLP Diet Recommendation: regular textures, thin liquids (03/31/25 0900)  Recommended Precautions and Strategies: general aspiration precautions (03/31/25 0900)  SLP Rec. for Method of Medication Administration: meds whole, with thin liquids, as tolerated (03/31/25 0900)     Monitor for Signs of Aspiration: notify SLP if any concerns (03/31/25 0900)  Recommended Diagnostics: other (see comments) (diet tolerance) (03/31/25 0900)  Swallow Criteria for Skilled Therapeutic Interventions Met: demonstrates skilled criteria (03/31/25 0900)  Anticipated Discharge Disposition (SLP): home (03/31/25 0900)  Rehab Potential/Prognosis, Swallowing: good, to achieve stated therapy goals (03/31/25 0900)  Therapy Frequency (Swallow): PRN, 5 days per week (03/31/25 0900)  Predicted Duration Therapy Intervention (Days): 1 week (03/31/25 0900)  Oral Care Recommendations: Oral Care BID/PRN, Toothbrush (03/31/25 0900)                                               SWALLOW EVALUATION (Last 72 Hours)       SLP Adult Swallow Evaluation       Row Name 03/31/25 0900                   Rehab Evaluation    Document Type evaluation  -CH        Subjective Information no complaints  -        Patient Observations alert;cooperative;agree to therapy  -        Patient/Family/Caregiver Comments/Observations none present  -        Patient Effort excellent  -        Symptoms Noted During/After Treatment none  -CH        Oral Care oral rinse provided  -           General Information    Patient Profile Reviewed yes  -CH        Pertinent History Of Current Problem Acute hypoxic and hypercepnic respiratory failure. RUL infiltrate, concerning for pna.  -        Current Method of Nutrition regular textures;thin liquids  -        Precautions/Limitations, Vision WFL;for purposes of eval  -CH        Precautions/Limitations, Hearing WFL;for purposes of eval  -        Prior Level of Function-Communication WFL  -         Prior Level of Function-Swallowing no diet consistency restrictions  -        Plans/Goals Discussed with patient;agreed upon  -        Barriers to Rehab none identified  -        Patient's Goals for Discharge return home  -           Pain    Pretreatment Pain Rating 0/10 - no pain  -        Posttreatment Pain Rating 0/10 - no pain  -           Oral Motor Structure and Function    Dentition Assessment natural, present and adequate  -        Secretion Management WNL/WFL  -CH        Mucosal Quality moist, healthy  -        Volitional Swallow WFL  -CH           Oral Musculature and Cranial Nerve Assessment    Oral Motor General Assessment WFL  -CH           General Eating/Swallowing Observations    Respiratory Support Currently in Use nasal cannula  -        O2 Liters 4L  -CH        Eating/Swallowing Skills self-fed;fed by SLP;appropriate self-feeding skills observed  -        Positioning During Eating upright 90 degree;upright in bed  -        Utensils Used spoon;cup;straw  -        Consistencies Trialed ice chips;thin liquids;pureed;regular textures  -        Pre SpO2 (%) 94  -CH        Post SpO2 (%) 95  -CH           Clinical Swallow Eval    Oral Prep Phase WFL  -CH        Oral Transit WFL  -CH        Oral Residue WFL  -CH        Pharyngeal Phase no overt signs/symptoms of pharyngeal impairment  -CH        Esophageal Phase unremarkable  -           Swallowing Quality of Life Assessment    Education and counseling provided Signs of aspiration;Risks of aspiration  -           SLP Evaluation Clinical Impression    SLP Swallowing Diagnosis functional oral phase;R/O pharyngeal dysphagia;other (see comments)  no suspected pharyngeal dysphagia  -        Functional Impact risk of aspiration/pneumonia  -        Rehab Potential/Prognosis, Swallowing good, to achieve stated therapy goals  -        Swallow Criteria for Skilled Therapeutic Interventions Met demonstrates skilled criteria  -            Recommendations    Therapy Frequency (Swallow) PRN;5 days per week  -        Predicted Duration Therapy Intervention (Days) 1 week  -CH        SLP Diet Recommendation regular textures;thin liquids  -        Recommended Diagnostics other (see comments)  diet tolerance  -        Recommended Precautions and Strategies general aspiration precautions  -        Oral Care Recommendations Oral Care BID/PRN;Toothbrush  -        SLP Rec. for Method of Medication Administration meds whole;with thin liquids;as tolerated  -        Monitor for Signs of Aspiration notify SLP if any concerns  -        Anticipated Discharge Disposition (SLP) home  -                  User Key  (r) = Recorded By, (t) = Taken By, (c) = Cosigned By      Initials Name Effective Dates     Elenita Juarez MS CCC-SLP 01/20/25 -                     EDUCATION  The patient has been educated in the following areas:   Dysphagia (Swallowing Impairment) Oral Care/Hydration.        SLP GOALS       Row Name 03/31/25 0900             (LTG) Patient will demonstrate functional swallow for    Diet Texture (Demonstrate functional swallow) regular textures  -      Liquid viscosity (Demonstrate functional swallow) thin liquids  -      [place] (Demonstrate functional swallow) independently (over 90% accuracy)  -      Time Frame (Demonstrate functional swallow) 1 week  -         (STG) Patient will tolerate trials of    Consistencies Trialed (Tolerate trials) regular textures;thin liquids  -      Desired Outcome (Tolerate trials) without signs/symptoms of aspiration;with adequate oral prep/transit/clearance  -      M[place]ester (Tolerate trials) with minimal cues (75-90% accuracy)  -      Time Frame (Tolerate trials) 1 week  -                User Key  (r) = Recorded By, (t) = Taken By, (c) = Cosigned By      Initials Name Provider Type    Elenita Sommer MS CCC-SLP Speech and Language Pathologist                          Time Calculation:    Time Calculation- SLP       Row Name 03/31/25 1347             Time Calculation- SLP    SLP Start Time 0900  -CH      SLP Received On 03/31/25  -CH         Untimed Charges    SLP Eval/Re-eval  ST Eval Oral Pharyng Swallow - [id number]  -CH      [id number] Eval Oral Pharyng Swallow Minutes 45  -CH         Total Minutes    Untimed Charges Total Minutes 45  -CH       Total Minutes 45  -CH                User Key  (r) = Recorded By, (t) = Taken By, (c) = Cosigned By      Initials Name Provider Type    Elenita Sommer MS CCC-SLP Speech and Language Pathologist                    Therapy Charges for Today       Code Description Service Date Service Provider Modifiers Qty    [id number]  ST EVAL ORAL PHARYNG SWALLOW 3 3/31/2025 Elenita Juarez MS CCC-SLP GN 1                 Elenita Juarez MS CCC-BRADEN  3/31/2025

## 2025-03-31 NOTE — PAYOR COMM NOTE
"Crystal Rowland RN  [facility]  Utilization Management  P:[phone]  F:[phone]    Ref # [id number]    Hal Wilson (84 y.o. Male)       Date of Birth   1940    Social Security Number       Address   [address] [place] [address]    Home Phone   [phone]    MRN   [id number]       Mandaeism   Anabaptist    Marital Status                               Admission Date   3/30/2025    Admission Type   Emergency    Admitting Provider   Jyoti Carey MD    Attending Provider   Jyoti Carey MD    Department, Room/Bed   [facility] 6A, N607/1       Discharge Date       Discharge Disposition       Discharge Destination                                 Attending Provider: Jyoti Carey MD    Allergies: Sulfa Antibiotics    Isolation: None   Infection: None   Code Status: CPR    Ht: 162 cm (63.78\")   Wt: 77.1 kg (170 lb)    Admission Cmt: None   Principal Problem: Acute hypoxic respiratory failure [J96.01]                   Active Insurance as of 3/30/2025       Primary Coverage       Payor Plan Insurance Group Employer/Plan Group    ANTHEM MEDICARE REPLACEMENT ANTHEM MEDICARE ADVANTAGE PPO [id number]       Payor Plan Address Payor Plan Phone Number Payor Plan Fax Number Effective Dates    [address] [phone]  2024 - None Entered    [facility] [id number]         Subscriber Name Subscriber Birth Date Member ID       HAL WILSON 1940 [id number]                     Emergency Contacts        (Rel.) Home Phone Work Phone Mobile Phone    PrincessHerlinda fontanezy (Daughter) [phone] -- [phone]    KATIEJYOTI (Spouse) -- -- [phone]                 History & Physical        Kimmie Gupta MD at [address] [place] [address] Medicine Services  HISTORY AND PHYSICAL    Patient Name: Hal Wilson  : 1940  MRN: [id number]  Primary Care Physician: Pia De La Garza, " MD    Subjective  Subjective     Chief Complaint:shortness of breath    HPI:  Hal Byrnes is a 84 y.o. male who  has a past medical history of Arthritis, Coronary artery disease, Difficulty walking, Fatigue, Hyperlipidemia, Hypertension, Low back pain, Other chest pain, and SOB (shortness of breath). who presented   Initally to the ED last night with chest pain and was sent home. His wife found him unresponsive this morning and RA sats were 40%. He was started on NRBFM and brought to the ED and placed on Bipap for Acute Hypoxic and Hypercapnic Respiratory Failure.  CXR only revealed a RUL infiltrate and ABX have been started. Since being on Bipap his ABG has improved and he is more awake, but still very sleepy.   He denies any pain.  He has battled constipation lately without relief.    Personal History         PMH: He  has a past medical history of Arthritis, Coronary artery disease, Difficulty walking, Fatigue, Hyperlipidemia, Hypertension, Low back pain, Other chest pain, and SOB (shortness of breath).   PSxH: He  has a past surgical history that includes Coronary stent placement; Appendectomy; Prostate surgery; Cataract extraction, bilateral (Bilateral); Carotid stent; and Colonoscopy (11/2024).         FH: His family history includes Brain cancer in his sister; Heart attack in his brother and mother; Hepatitis in his brother.   SH: He  reports that he quit smoking about 13 years ago. His smoking use included cigarettes. He started smoking about 23 years ago. He has a 15 pack-year smoking history. He has never been exposed to tobacco smoke. He has never used smokeless tobacco. He reports that he does not currently use drugs after having used the following drugs: Marijuana. He reports that he does not drink alcohol.     Medications:  Pancrelipase (Lip-Prot-Amyl), amLODIPine, aspirin, dilTIAZem XR, furosemide, glycerin adult, hydrALAZINE, isosorbide mononitrate, linaclotide, pantoprazole, terazosin,  topiramate, and vitamin D    Allergies   Allergen Reactions    Sulfa Antibiotics Irritability       Objective  Objective     Vital Signs:   Temp:  [97.8 °F (36.6 °C)-97.9 °F (36.6 °C)] 97.8 °F (36.6 °C)  Heart Rate:  [] 78  Resp:  [18-22] 22  BP: (116-159)/(61-80) 123/68    Constitutional: sleepy but will wake up and follow commands slowly  Eyes: clear sclerae, no conjunctival injection  HENT: NCAT, mucous membranes moist  Neck: no masses or lymphadenopathy, trachea midline  Respiratory: good breath sounds bilaterally, respirations unlabored-- on bipap  Cardiovascular: RRR, no murmurs appreciated, palpable peripheral pulses  Abdomen:  soft, no HSM or masses palpable, full but not tender  Musculoskeletal: 1-2+ peripheral edema, no clubbing or cyanosis  Neurologic: Oriented x 3,                       Strength symmetric in all extremities                     Cranial Nerves grossly intact, speech clear  Skin: No rashes or jaundice  Psychiatric: cooperative      Result Review:  I have personally reviewed the results from the time of this admission   to 3/30/2025 16:39 EDT and agree with these findings:  [x]  Laboratory  [x]  Microbiology  [x]  Radiology  [x]  EKG/Telemetry   [x]  Cardiology/Vascular   []  Pathology  [x]  Old records  []  Other:  Most notable findings include: new infiltrate, aortic stenosis, CKD, abnormal troponin, elevated pCO2      LAB RESULTS:      Lab 03/30/25  1324 03/30/25 0221   WBC 10.77 6.75   HEMOGLOBIN 12.1* 11.2*   HEMATOCRIT 36.9* 32.9*   PLATELETS 213 194   NEUTROS ABS 9.64* 4.74   IMMATURE GRANS (ABS) 0.14* 0.05   LYMPHS ABS 0.68* 1.40   MONOS ABS 0.27 0.50   EOS ABS 0.02 0.04   MCV 92.0 89.4   LACTATE 0.7  --          Lab 03/30/25  1324 03/30/25  0221   SODIUM 140 137   POTASSIUM 3.9 3.6   CHLORIDE 100 101   CO2 25.0 25.0   ANION GAP 15.0 11.0   BUN 36* 36*   CREATININE 3.17* 3.13*   EGFR 18.7* 19.0*   GLUCOSE 154* 112*   CALCIUM 8.5* 8.6   MAGNESIUM  --  2.0         Lab  03/30/25  1324 03/30/25  0221   TOTAL PROTEIN 6.1 6.0   ALBUMIN 3.6 3.5   GLOBULIN 2.5 2.5   ALT (SGPT) 22 19   AST (SGOT) 23 17   BILIRUBIN 0.2 0.3   ALK PHOS 68 65         Lab 03/30/25  1418 03/30/25  1324 03/30/25  0330 03/30/25  0221   PROBNP  --  816.9  --  926.8   HSTROP T 162* 123* 80* 80*                 Lab 03/30/25  1516 03/30/25  1337   PH, ARTERIAL 7.287* 7.232*   PCO2, ARTERIAL 58.4* 64.6*   PO2 .0* 75.8*   FIO2 55 45   HCO3 ART 27.9* 27.2*   BASE EXCESS ART 0.2 -1.6*   CARBOXYHEMOGLOBIN 1.0 1.3     Brief Urine Lab Results  (Last result in the past 365 days)        Color   Clarity   Blood   Leuk Est   Nitrite   Protein   CREAT   Urine HCG        03/04/25 1057             86.6               COVID19   Date Value Ref Range Status   03/30/2025 Not Detected Not Detected - Ref. Range Final       XR Chest 1 View  Result Date: 3/30/2025  XR CHEST 1 VW Date of Exam: 3/30/2025 1:17 PM EDT Indication: SOA triage protocol Comparison: Chest radiograph dated 3/30/2025 Findings: The cardiomediastinal silhouette is within normal limits. There is worsening right-sided bronchial wall thickening and interstitial opacities with new airspace opacity within the inferior right upper lobe. There is no pleural effusion or pneumothorax. There are degenerative changes of the thoracic spine.     Impression: Impression: Worsening right-sided bronchial wall thickening and interstitial opacities with new airspace opacity within the inferior right upper lobe. Findings are concerning for pneumonia. Electronically Signed: Luke Harriselor  3/30/2025 1:39 PM EDT  Workstation ID: [id number]    XR Chest 1 View  Result Date: 3/30/2025  XR CHEST 1 VW Date of Exam: 3/30/2025 1:45 AM EDT Indication: SOA triage protocol Comparison: Chest radiograph 2/25/2025 Findings: There are no airspace consolidations. No pleural fluid. No pneumothorax. The pulmonary vasculature appears within normal limits. The cardiac and mediastinal silhouette appear  unremarkable. No acute osseous abnormality identified.     Impression: Impression: No active disease. Electronically Signed: Ward Carvajal MD  3/30/2025 2:24 AM EDT  Workstation ID: [id number]      Results for orders placed during the hospital encounter of 02/25/25    Adult Transthoracic Echo Complete W/ Cont if Necessary Per Protocol    Interpretation Summary    Left ventricular systolic function is normal. Calculated left ventricular EF = 62.5%    Left ventricular wall thickness is consistent with mild concentric hypertrophy.  Mild gradient is seen in the LV cavity.    The left atrial cavity is dilated.    Left atrial volume is mildly increased.    Moderate aortic valve stenosis is present.    Aortic valve mean pressure gradient is 23 mmHg with good stroke-volume.    Estimated right ventricular systolic pressure from tricuspid regurgitation is normal (<35 mmHg).      The patient has started, but not completed, their COVID-19 vaccination series.    Assessment & Plan  Assessment / Plan       Acute hypoxic respiratory failure    Primary hypertension    Coronary artery disease    Diabetes mellitus due to underlying condition, without long-term current use of insulin    Chronic diastolic (congestive) heart failure    Psoriasis    Aortic stenosis    Dyslipidemia    Stage 3b chronic kidney disease      Assessment & Plan:  85 yo man with HO HTN, Chronic HfpEF with moderate aortic stenosis, HTN, HLP, CAD and CKD who initially was in the ED with chest pain then returned with acute mixed respiratory failure    Acute Mixed Respiratory failure  Pneumonia  -BIPAP  Nebs, steroids  Cont abx- unasyn and doxy  -check urine antigens,   -SLP eval  -possible underlying NELLA      Elevated troponin  NSTEMI with [facility] 2002: PCI to RCA and left circumflex  Repeat [facility] with nonobstructive coronary artery disease in 2005, 2009, and 2018  Lexiscan 8/21: LV function at rest.  Normal myocardial perfusion.    -give aspirin  -CKD precludes LHC  safely    T2DM  -SSI    HLP  -cont statin    A/CKD with Polycystic Kidneys  -baseline creatinine about 2.5  -elevated Light chains  -has appointment with Dr Anaya on April      VTE Prophylaxis:HEP SQ    CODE STATUS:FULL CODE     Expected Discharge  Expected Discharge Date: 4/1/2025; Expected Discharge Time:     Electronically signed by Kimmie Gupta MD 03/30/25 16:39 EDT            Electronically signed by Kimmie Gupta MD at 03/30/25 2026          Emergency Department Notes        Crista Madison, RN at 03/30/25 1649           [facility] Nichole Fitz    Nursing Report ED to Floor:  Mental status: aox4  Ambulatory status: non ambulatory ined   Oxygen Therapy:  bipap  Cardiac Rhythm: nsr  Admitted from: home  Safety Concerns:  na  Precautions: na  Social Issues: na  ED Room #:  29    ED Nurse Phone Extension - [phone] or may call [phone].      HPI:   Chief Complaint   Patient presents with    Shortness of Breath       Past Medical History:  Past Medical History:   Diagnosis Date    Arthritis     Coronary artery disease     Difficulty walking     Fatigue     Hyperlipidemia     Hypertension     Low back pain     Other chest pain     SOB (shortness of breath)         Past Surgical History:  Past Surgical History:   Procedure Laterality Date    APPENDECTOMY      CAROTID STENT      CATARACT EXTRACTION, BILATERAL Bilateral     COLONOSCOPY  11/2024    CORONARY STENT PLACEMENT      PROSTATE SURGERY      for BPH        Admitting Doctor:   Kimmie Gupta MD    Consulting Provider(s):  Consults       No orders found from 3/1/2025 to 3/31/2025.             Admitting Diagnosis:   The primary encounter diagnosis was Pneumonia of right lung due to infectious organism, unspecified part of lung. Diagnoses of Hypoxia, Acute respiratory failure with hypoxia and hypercapnia, Chronic renal failure, unspecified CKD stage, and COPD with acute exacerbation were also pertinent to this visit.    Most Recent Vitals:   Vitals:    03/30/25 1529  03/30/25 1544 03/30/25 1600 03/30/25 1615   BP: 136/70 116/68 119/67 123/68   BP Location:       Patient Position:       Pulse: 81 77 79 78   Resp:       Temp:       TempSrc:       SpO2: 100% 97% 97% 96%   Weight:       Height:           Active LDAs/IV Access:   Lines, Drains & Airways       Active LDAs       Name Placement date Placement time Site Days    Peripheral IV 03/30/25 1315 Left Antecubital 03/30/25  1315  Antecubital  less than 1    Peripheral IV 03/30/25 1326 Anterior;Right;Upper Arm 03/30/25  1326  Arm  less than 1                    Labs (abnormal labs have a star):   Labs Reviewed   COMPREHENSIVE METABOLIC PANEL - Abnormal; Notable for the following components:       Result Value    Glucose 154 (*)     BUN 36 (*)     Creatinine 3.17 (*)     Calcium 8.5 (*)     eGFR 18.7 (*)     All other components within normal limits    Narrative:     GFR Categories in Chronic Kidney Disease (CKD)      GFR Category          GFR (mL/min/1.73)    Interpretation  G1                     90 or greater         Normal or high (1)  G2                      60-89                Mild decrease (1)  G3a                   45-59                Mild to moderate decrease  G3b                   30-44                Moderate to severe decrease  G4                    15-29                Severe decrease  G5                    14 or less           Kidney failure          (1)In the absence of evidence of kidney disease, neither GFR category G1 or G2 fulfill the criteria for CKD.    eGFR calculation 2021 CKD-EPI creatinine equation, which does not include race as a factor   TROPONIN - Abnormal; Notable for the following components:    HS Troponin T 123 (*)     All other components within normal limits    Narrative:     High Sensitive Troponin T Reference Range:  <14.0 ng/L- Negative Female for AMI  <22.0 ng/L- Negative Male for AMI  >=14 - Abnormal Female indicating possible myocardial injury.  >=22 - Abnormal Male indicating possible  myocardial injury.   Clinicians would have to utilize clinical acumen, EKG, Troponin, and serial changes to determine if it is an Acute Myocardial Infarction or myocardial injury due to an underlying chronic condition.        CBC WITH AUTO DIFFERENTIAL - Abnormal; Notable for the following components:    RBC 4.01 (*)     Hemoglobin 12.1 (*)     Hematocrit 36.9 (*)     Neutrophil % 89.5 (*)     Lymphocyte % 6.3 (*)     Monocyte % 2.5 (*)     Eosinophil % 0.2 (*)     Immature Grans % 1.3 (*)     Neutrophils, Absolute 9.64 (*)     Lymphocytes, Absolute 0.68 (*)     Immature Grans, Absolute 0.14 (*)     All other components within normal limits   BLOOD GAS, ARTERIAL W/CO-OXIMETRY - Abnormal; Notable for the following components:    pH, Arterial 7.232 (*)     pCO2, Arterial 64.6 (*)     pO2, Arterial 75.8 (*)     HCO3, Arterial 27.2 (*)     Base Excess, Arterial -1.6 (*)     Hemoglobin, Blood Gas 12.3 (*)     Hematocrit, Blood Gas 37.8 (*)     Oxyhemoglobin 91.6 (*)     pCO2, Temperature Corrected 64.6 (*)     pO2, Temperature Corrected 75.8 (*)     All other components within normal limits   TROPONIN - Abnormal; Notable for the following components:    HS Troponin T 162 (*)     All other components within normal limits    Narrative:     High Sensitive Troponin T Reference Range:  <14.0 ng/L- Negative Female for AMI  <22.0 ng/L- Negative Male for AMI  >=14 - Abnormal Female indicating possible myocardial injury.  >=22 - Abnormal Male indicating possible myocardial injury.   Clinicians would have to utilize clinical acumen, EKG, Troponin, and serial changes to determine if it is an Acute Myocardial Infarction or myocardial injury due to an underlying chronic condition.        BLOOD GAS, ARTERIAL W/CO-OXIMETRY - Abnormal; Notable for the following components:    pH, Arterial 7.287 (*)     pCO2, Arterial 58.4 (*)     pO2, Arterial 121.0 (*)     HCO3, Arterial 27.9 (*)     Hemoglobin, Blood Gas 11.8 (*)     Hematocrit, Blood  Gas 36.1 (*)     pCO2, Temperature Corrected 58.4 (*)     pO2, Temperature Corrected 121 (*)     All other components within normal limits   BNP (IN-HOUSE) - Normal    Narrative:     This assay is used as an aid in the diagnosis of individuals suspected of having heart failure. It can be used as an aid in the diagnosis of acute decompensated heart failure (ADHF) in patients presenting with signs and symptoms of ADHF to the emergency department (ED). In addition, NT-proBNP of <300 pg/mL indicates ADHF is not likely.    Age Range Result Interpretation  NT-proBNP Concentration (pg/mL:      <50             Positive            >450                   Gray                 300-450                    Negative             <300    50-75           Positive            >900                  Gray                300-900                  Negative            <300      >75             Positive            >1800                  Gray                300-1800                  Negative            <300   LACTIC ACID, PLASMA - Normal   BLOOD CULTURE   BLOOD CULTURE   STREP PNEUMO AG, URINE OR CSF   LEGIONELLA ANTIGEN, URINE   RAINBOW DRAW    Narrative:     The following orders were created for panel order [place] Draw.  Procedure                               Abnormality         Status                     ---------                               -----------         ------                     Green Top (Gel)[[id number]]                                  Final result               Lavender Top[[id number]]                                     Final result               Gold Top - SST[[id number]]                                   Final result               Meeks Top[[id number]]                                         Final result               Light Blue Top[[id number]]                                   Final result                 Please view results for these tests on the individual orders.   BLOOD GAS, ARTERIAL   CBC AND DIFFERENTIAL    Narrative:      The following orders were created for panel order CBC & Differential.  Procedure                               Abnormality         Status                     ---------                               -----------         ------                     CBC Auto Differential[[id number]]        Abnormal            Final result                 Please view results for these tests on the individual orders.   GREEN TOP   LAVENDER TOP   GOLD TOP - SST   GRAY TOP   LIGHT BLUE TOP       Meds Given in ED:   Medications   sodium chloride 0.9 % flush 10 mL (has no administration in time range)   ampicillin-sulbactam (UNASYN) 3 g in sodium chloride 0.9 % 100 mL MBP (0 g Intravenous Stopped 3/30/25 1505)   doxycycline (VIBRAMYCIN) 100 mg in sodium chloride 0.9 % 100 mL MBP (0 mg Intravenous Stopped 3/30/25 1601)   ipratropium-albuterol (DUO-NEB) nebulizer solution 3 mL (3 mL Nebulization Given 3/30/25 1523)   methylPREDNISolone sodium succinate (SOLU-Medrol) injection 125 mg (125 mg Intravenous Given 3/30/25 1548)           Last NIH score:                                                          Dysphagia screening results:  Patient Factors Component (Dysphagia:Stroke or Rule-out)  Best Eye Response: 4-->(E4) spontaneous (03/30/25 1407)  Best Motor Response: 6-->(M6) obeys commands (03/30/25 1407)  Best Verbal Response: 4-->(V4) confused (03/30/25 1407)  Martha Coma Scale Score: 14 (03/30/25 1407)     Martha Coma Scale:  No data recorded     CIWA:        Restraint Type:            Isolation Status:  No active isolations          Electronically signed by Crista Madison, RN at 03/30/25 1650       Vital Signs (last 2 days)       Date/Time Temp Temp src Pulse Resp BP Patient Position SpO2    03/31/25 0832 98.4 (36.9) Oral 95 19 138/78 Lying 95    03/31/25 0540 97.8 (36.6) Oral 85 18 136/72 Lying 93    03/31/25 0025 97.8 (36.6) Oral 92 18 128/64 Lying 96    03/30/25 2210 -- -- 85 -- -- -- 95    03/30/25 2204 -- -- 82 -- -- -- 95    03/30/25  2159 -- -- 88 18 -- -- 93    03/30/25 2127 97.8 (36.6) Oral 85 19 150/84 Sitting 94    03/30/25 1916 -- -- -- -- 150/64 Lying --    03/30/25 1742 98.9 (37.2) Oral 92 20 157/86 Lying 93    03/30/25 1715 -- -- 78 -- 133/70 -- 94    03/30/25 1700 -- -- 77 -- 117/67 -- 94    03/30/25 1645 -- -- 77 -- 115/66 -- 94    03/30/25 1630 -- -- 77 -- 117/66 -- 94    03/30/25 1615 -- -- 78 -- 123/68 -- 96    03/30/25 1600 -- -- 79 -- 119/67 -- 97    03/30/25 1544 -- -- 77 -- 116/68 -- 97    03/30/25 1529 -- -- 81 -- 136/70 -- 100    03/30/25 1523 -- -- 80 -- -- -- 99    03/30/25 1514 -- -- 78 -- 130/69 -- 99    03/30/25 1459 -- -- 79 -- 125/71 -- 99    03/30/25 1445 -- -- 81 -- 129/70 -- 98    03/30/25 1431 -- -- 87 -- 126/79 -- 97    03/30/25 1416 -- -- 90 -- 152/70 -- 98    03/30/25 1325 97.8 (36.6) Axillary -- -- -- -- --    03/30/25 1316 -- -- 105 -- 142/80 -- 96    03/30/25 1315 -- -- 104 22 140/80 Lying 96    03/30/25 1311 -- -- -- -- 140/80 -- --          Current Facility-Administered Medications   Medication Dose Route Frequency Provider Last Rate Last Admin    amLODIPine (NORVASC) tablet 5 mg  5 mg Oral Daily Kimmie Gupta MD   5 mg at 03/31/25 0833    ampicillin-sulbactam (UNASYN) 3 g in sodium chloride 0.9 % 100 mL MBP  3 g Intravenous Q12H Kimmie Gupta MD   3 g at 03/31/25 0325    aspirin chewable tablet 81 mg  81 mg Oral Daily Kimmie Gupta MD   81 mg at 03/31/25 0834    dextrose (D50W) (25 g/50 mL) IV injection 25 g  25 g Intravenous Q15 Min PRN Kimmie Gupta MD        dextrose (GLUTOSE) oral gel 15 g  15 g Oral Q15 Min PRN Kimmie Gupta MD        doxycycline (VIBRAMYCIN) 100 mg in sodium chloride 0.9 % 100 mL MBP  100 mg Intravenous Q12H Kimmie Gupta MD   100 mg at 03/31/25 0325    glucagon (GLUCAGEN) injection 1 mg  1 mg Intramuscular Q15 Min PRKimmie Keller MD        heparin (porcine) 5000 UNIT/ML injection 5,000 Units  5,000 Units Subcutaneous Q12H Kimmie Gupta MD   5,000  Units at 03/31/25 0834    hydrALAZINE (APRESOLINE) tablet 50 mg  50 mg Oral TID Kimmie Gupta MD   50 mg at 03/31/25 0833    Insulin Lispro (humaLOG) injection 2-7 Units  2-7 Units Subcutaneous TID With Meals Kimmie Gupta MD        ipratropium-albuterol (DUO-NEB) nebulizer solution 3 mL  3 mL Nebulization 4x Daily - RT Kimmie Gupta MD   3 mL at 03/30/25 2159    isosorbide mononitrate (IMDUR) 24 hr tablet 60 mg  60 mg Oral BID Kimmie Gupta MD   60 mg at 03/31/25 0833    lubiprostone (AMITIZA) capsule 8 mcg  8 mcg Oral BID Kimmie Gupta MD   8 mcg at 03/31/25 0834    methylPREDNISolone sodium succinate (SOLU-Medrol) injection 60 mg  60 mg Intravenous Q12H Kimmie Gupta MD   60 mg at 03/31/25 0029    pancrelipase (Lip-Prot-Amyl) (CREON) capsule 24,000 units of lipase  24,000 units of lipase Oral TID With Meals Kimmie Gupta MD   24,000 units of lipase at 03/31/25 0833    pantoprazole (PROTONIX) EC tablet 40 mg  40 mg Oral Daily Kimmie Gupta MD   40 mg at 03/31/25 0834    sodium chloride 0.9 % flush 10 mL  10 mL Intravenous PRN Alabnia Grant MD        terazosin (HYTRIN) capsule 2 mg  2 mg Oral Nightly Kimmie Gupta MD   2 mg at 03/30/25 2127    topiramate (TOPAMAX) tablet 50 mg  50 mg Oral BID Kimmie Gupta MD   50 mg at 03/31/25 0834     Lab Results (all)       Procedure Component Value Units Date/Time    CBC & Differential [[id number]]  (Abnormal) Collected: 03/31/25 0852    Specimen: Blood Updated: 03/31/25 0905    Narrative:      The following orders were created for panel order CBC & Differential.  Procedure                               Abnormality         Status                     ---------                               -----------         ------                     CBC Auto Differential[[id number]]        Abnormal            Final result                 Please view results for these tests on the individual orders.    CBC Auto Differential [[id number]]  (Abnormal)  Collected: 03/31/25 0852    Specimen: Blood Updated: 03/31/25 0905     WBC 6.48 10*3/mm3      RBC 3.71 10*6/mm3      Hemoglobin 11.0 g/dL      Hematocrit 33.5 %      MCV 90.3 fL      MCH 29.6 pg      MCHC 32.8 g/dL      RDW 13.1 %      RDW-SD 43.0 fl      MPV 9.3 fL      Platelets 204 10*3/mm3      Neutrophil % 92.7 %      Lymphocyte % 6.2 %      Monocyte % 0.3 %      Eosinophil % 0.0 %      Basophil % 0.0 %      Immature Grans % 0.8 %      Neutrophils, Absolute 6.01 10*3/mm3      Lymphocytes, Absolute 0.40 10*3/mm3      Monocytes, Absolute 0.02 10*3/mm3      Eosinophils, Absolute 0.00 10*3/mm3      Basophils, Absolute 0.00 10*3/mm3      Immature Grans, Absolute 0.05 10*3/mm3      nRBC 0.0 /100 WBC     Comprehensive Metabolic Panel [[id number]] Collected: 03/31/25 0852    Specimen: Blood Updated: 03/31/25 0900    POC Glucose Once [[id number]]  (Abnormal) Collected: 03/31/25 0756    Specimen: Blood Updated: 03/31/25 0758     Glucose 139 mg/dL     S. Pneumo Ag Urine or CSF - Urine, Urine, Clean Catch [[id number]] Collected: 03/31/25 0352    Specimen: Urine, Clean Catch Updated: 03/31/25 0506    Legionella Antigen, Urine - Urine, Urine, Clean Catch [[id number]] Collected: 03/31/25 0352    Specimen: Urine, Clean Catch Updated: 03/31/25 0506    Blood Gas, Arterial With Co-Ox [[id number]]  (Abnormal) Collected: 03/31/25 0502    Specimen: Arterial Blood Updated: 03/31/25 0502     Site Right Radial     Walt's Test N/A     pH, Arterial 7.308 pH units      Comment: 84 Value below reference range        pCO2, Arterial 52.3 mm Hg      Comment: 83 Value above reference range        pO2, Arterial 67.5 mm Hg      Comment: 84 Value below reference range        HCO3, Arterial 26.2 mmol/L      Base Excess, Arterial -0.7 mmol/L      Hemoglobin, Blood Gas 11.5 g/dL      Comment: 84 Value below reference range        Hematocrit, Blood Gas 35.2 %      Oxyhemoglobin 91.6 %      Comment: 84 Value below reference range        Methemoglobin 0.70  %      Carboxyhemoglobin 1.1 %      CO2 Content 27.8 mmol/L      Temperature 37.0     Barometric Pressure for Blood Gas --     Comment: N/A        Modality Nasal Cannula     FIO2 36 %      Rate 0 Breaths/minute      PIP 0 cmH2O      Comment: Meter: [id number]     :  [id number]        IPAP 0     EPAP 0     pH, Temp Corrected 7.308 pH Units      pCO2, Temperature Corrected 52.3 mm Hg      pO2, Temperature Corrected 67.5 mm Hg     Blood Culture - Blood, Arm, Right [[id number]] Collected: 03/30/25 1330    Specimen: Blood from Arm, Right Updated: 03/30/25 1621    Blood Culture - Blood, Arm, Right [[id number]] Collected: 03/30/25 1418    Specimen: Blood from Arm, Right Updated: 03/30/25 1619    Blood Gas, Arterial With Co-Ox [[id number]]  (Abnormal) Collected: 03/30/25 1516    Specimen: Arterial Blood Updated: 03/30/25 1516     Site Right Radial     Walt's Test Positive     pH, Arterial 7.287 pH units      Comment: 84 Value below reference range        pCO2, Arterial 58.4 mm Hg      Comment: 83 Value above reference range        pO2, Arterial 121.0 mm Hg      Comment: 83 Value above reference range        HCO3, Arterial 27.9 mmol/L      Base Excess, Arterial 0.2 mmol/L      Hemoglobin, Blood Gas 11.8 g/dL      Comment: 84 Value below reference range        Hematocrit, Blood Gas 36.1 %      Oxyhemoglobin 97.2 %      Methemoglobin 0.50 %      Carboxyhemoglobin 1.0 %      CO2 Content 29.6 mmol/L      Temperature 37.0     Barometric Pressure for Blood Gas --     Comment: N/A        Modality BiPap     FIO2 55 %      Ventilator Mode BiPAP     Rate 19 Breaths/minute      PSV 5.0 cmH2O      PIP 0 cmH2O      Comment: Meter: [id number]     :  [id number]        IPAP 10     EPAP 5     pH, Temp Corrected 7.287 pH Units      pCO2, Temperature Corrected 58.4 mm Hg      pO2, Temperature Corrected 121 mm Hg     High Sensitivity Troponin T [[id number]]  (Abnormal) Collected: 03/30/25 1418    Specimen: Blood Updated:  03/30/25 1455     HS Troponin T 162 ng/L      Comment: Specimen hemolyzed.  Results may be falsely decreased.       Narrative:      High Sensitive Troponin T Reference Range:  <14.0 ng/L- Negative Female for AMI  <22.0 ng/L- Negative Male for AMI  >=14 - Abnormal Female indicating possible myocardial injury.  >=22 - Abnormal Male indicating possible myocardial injury.   Clinicians would have to utilize clinical acumen, EKG, Troponin, and serial changes to determine if it is an Acute Myocardial Infarction or myocardial injury due to an underlying chronic condition.         Lactic Acid, Plasma [[id number]]  (Normal) Collected: 03/30/25 1324    Specimen: Blood Updated: 03/30/25 1421     Lactate 0.7 mmol/L      Comment: Falsely depressed results may occur on samples drawn from patients receiving N-Acetylcysteine (NAC) or Metamizole.       Comprehensive Metabolic Panel [[id number]]  (Abnormal) Collected: 03/30/25 1324    Specimen: Blood Updated: 03/30/25 1403     Glucose 154 mg/dL      BUN 36 mg/dL      Creatinine 3.17 mg/dL      Sodium 140 mmol/L      Potassium 3.9 mmol/L      Chloride 100 mmol/L      CO2 25.0 mmol/L      Calcium 8.5 mg/dL      Total Protein 6.1 g/dL      Albumin 3.6 g/dL      ALT (SGPT) 22 U/L      AST (SGOT) 23 U/L      Alkaline Phosphatase 68 U/L      Total Bilirubin 0.2 mg/dL      Globulin 2.5 gm/dL      Comment: Calculated Result        A/G Ratio 1.4 g/dL      BUN/Creatinine Ratio 11.4     Anion Gap 15.0 mmol/L      eGFR 18.7 mL/min/1.73     Narrative:      GFR Categories in Chronic Kidney Disease (CKD)      GFR Category          GFR (mL/min/1.73)    Interpretation  G1                     90 or greater         Normal or high (1)  G2                      60-89                Mild decrease (1)  G3a                   45-59                Mild to moderate decrease  G3b                   30-44                Moderate to severe decrease  G4                    15-29                Severe decrease  G5                     14 or less           Kidney failure          (1)In the absence of evidence of kidney disease, neither GFR category G1 or G2 fulfill the criteria for CKD.    eGFR calculation 2021 CKD-EPI creatinine equation, which does not include race as a factor    BNP [[id number]]  (Normal) Collected: 03/30/25 1324    Specimen: Blood Updated: 03/30/25 1402     proBNP 816.9 pg/mL     Narrative:      This assay is used as an aid in the diagnosis of individuals suspected of having heart failure. It can be used as an aid in the diagnosis of acute decompensated heart failure (ADHF) in patients presenting with signs and symptoms of ADHF to the emergency department (ED). In addition, NT-proBNP of <300 pg/mL indicates ADHF is not likely.    Age Range Result Interpretation  NT-proBNP Concentration (pg/mL:      <50             Positive            >450                   Gray                 300-450                    Negative             <300    50-75           Positive            >900                  Gray                300-900                  Negative            <300      >75             Positive            >1800                  Gray                300-1800                  Negative            <300    High Sensitivity Troponin T [[id number]]  (Abnormal) Collected: 03/30/25 1324    Specimen: Blood Updated: 03/30/25 1359     HS Troponin T 123 ng/L     Narrative:      High Sensitive Troponin T Reference Range:  <14.0 ng/L- Negative Female for AMI  <22.0 ng/L- Negative Male for AMI  >=14 - Abnormal Female indicating possible myocardial injury.  >=22 - Abnormal Male indicating possible myocardial injury.   Clinicians would have to utilize clinical acumen, EKG, Troponin, and serial changes to determine if it is an Acute Myocardial Infarction or myocardial injury due to an underlying chronic condition.         CBC & Differential [[id number]]  (Abnormal) Collected: 03/30/25 1324    Specimen: Blood Updated: 03/30/25 1356    Narrative:       The following orders were created for panel order CBC & Differential.  Procedure                               Abnormality         Status                     ---------                               -----------         ------                     CBC Auto Differential[[id number]]        Abnormal            Final result                 Please view results for these tests on the individual orders.    CBC Auto Differential [[id number]]  (Abnormal) Collected: 03/30/25 1324    Specimen: Blood Updated: 03/30/25 1356     WBC 10.77 10*3/mm3      RBC 4.01 10*6/mm3      Hemoglobin 12.1 g/dL      Hematocrit 36.9 %      MCV 92.0 fL      MCH 30.2 pg      MCHC 32.8 g/dL      RDW 13.4 %      RDW-SD 45.6 fl      MPV 9.1 fL      Platelets 213 10*3/mm3      Neutrophil % 89.5 %      Lymphocyte % 6.3 %      Monocyte % 2.5 %      Eosinophil % 0.2 %      Basophil % 0.2 %      Immature Grans % 1.3 %      Neutrophils, Absolute 9.64 10*3/mm3      Lymphocytes, Absolute 0.68 10*3/mm3      Monocytes, Absolute 0.27 10*3/mm3      Eosinophils, Absolute 0.02 10*3/mm3      Basophils, Absolute 0.02 10*3/mm3      Immature Grans, Absolute 0.14 10*3/mm3      nRBC 0.0 /100 WBC     [place] Draw [[id number]] Collected: 03/30/25 1324    Specimen: Blood Updated: 03/30/25 1345    Narrative:      The following orders were created for panel order [place] Draw.  Procedure                               Abnormality         Status                     ---------                               -----------         ------                     Green Top (Gel)[[id number]]                                  Final result               Lavender Top[[id number]]                                     Final result               Gold Top - SST[[id number]]                                   Final result               Meeks Top[[id number]]                                         Final result               Light Blue Top[[id number]]                                   Final result                  Please view results for these tests on the individual orders.    Green Top (Gel) [[id number]] Collected: 03/30/25 1324    Specimen: Blood Updated: 03/30/25 1345     Extra Tube Hold for add-ons.     Comment: Auto resulted.       Lavender Top [[id number]] Collected: 03/30/25 1324    Specimen: Blood Updated: 03/30/25 1345     Extra Tube hold for add-on     Comment: Auto resulted       Gold Top - SST [[id number]] Collected: 03/30/25 1324    Specimen: Blood Updated: 03/30/25 1345     Extra Tube Hold for add-ons.     Comment: Auto resulted.       Meeks Top [[id number]] Collected: 03/30/25 1324    Specimen: Blood Updated: 03/30/25 1345     Extra Tube Hold for add-ons.     Comment: Auto resulted.       Light Blue Top [[id number]] Collected: 03/30/25 1324    Specimen: Blood Updated: 03/30/25 1345     Extra Tube Hold for add-ons.     Comment: Auto resulted       Blood Gas, Arterial With Co-Ox [[id number]]  (Abnormal) Collected: 03/30/25 1337    Specimen: Arterial Blood Updated: 03/30/25 1338     Site Left Radial     Walt's Test Positive     pH, Arterial 7.232 pH units      Comment: 84 Value below reference range        pCO2, Arterial 64.6 mm Hg      Comment: 83 Value above reference range        pO2, Arterial 75.8 mm Hg      Comment: 84 Value below reference range        HCO3, Arterial 27.2 mmol/L      Base Excess, Arterial -1.6 mmol/L      Hemoglobin, Blood Gas 12.3 g/dL      Comment: 84 Value below reference range        Hematocrit, Blood Gas 37.8 %      Oxyhemoglobin 91.6 %      Comment: 84 Value below reference range        Methemoglobin 0.50 %      Carboxyhemoglobin 1.3 %      CO2 Content 29.1 mmol/L      Temperature 37.0     Barometric Pressure for Blood Gas --     Comment: N/A        Modality BiPap     FIO2 45 %      Ventilator Mode BiPAP     Rate 23 Breaths/minute      PSV 5.0 cmH2O      PIP 0 cmH2O      Comment: Meter: [id number]     :  [id number]        IPAP 10     EPAP 5     pH, Temp Corrected 7.232 pH  Units      pCO2, Temperature Corrected 64.6 mm Hg      pO2, Temperature Corrected 75.8 mm Hg           Imaging Results (All)       Procedure Component Value Units Date/Time    XR Chest 1 View [[id number]] Collected: 03/30/25 1338     Updated: 03/30/25 1342    Narrative:      XR CHEST 1 VW    Date of Exam: 3/30/2025 1:17 PM EDT    Indication: SOA triage protocol    Comparison: Chest radiograph dated 3/30/2025    Findings:  The cardiomediastinal silhouette is within normal limits. There is worsening right-sided bronchial wall thickening and interstitial opacities with new airspace opacity within the inferior right upper lobe. There is no pleural effusion or pneumothorax.   There are degenerative changes of the thoracic spine.      Impression:      Impression:  Worsening right-sided bronchial wall thickening and interstitial opacities with new airspace opacity within the inferior right upper lobe. Findings are concerning for pneumonia.        Electronically Signed: Luke Fajardo    3/30/2025 1:39 PM EDT    Workstation ID: [id number]          ECG/EMG Results (all)       Procedure Component Value Units Date/Time    ECG 12 Lead Chest Pain [[id number]] Collected: 03/30/25 1322     Updated: 03/30/25 1322     QT Interval 346 ms      QTC Interval 454 ms     Narrative:      Test Reason : Chest Pain  Blood Pressure :   */*   mmHG  Vent. Rate : 104 BPM     Atrial Rate : 104 BPM     P-R Int : 182 ms          QRS Dur :  94 ms      QT Int : 346 ms       P-R-T Axes :  47  -7  89 degrees    QTcB Int : 454 ms    Sinus tachycardia  Otherwise normal ECG  When compared with ECG of 30-Mar-2025 03:30, (Unconfirmed)  Vent. rate has increased by  34 bpm  Nonspecific T wave abnormality, improved in Lateral leads    Referred By: gabrielle           Confirmed By:     ECG 12 Lead Chest Pain [[id number]] Collected: 03/30/25 1435     Updated: 03/30/25 1435     QT Interval 388 ms      QTC Interval 464 ms     Narrative:      Test Reason : soa  Blood  Pressure :   */*   mmHG  Vent. Rate :  86 BPM     Atrial Rate :  86 BPM     P-R Int : 182 ms          QRS Dur : 102 ms      QT Int : 388 ms       P-R-T Axes :  53 -33  77 degrees    QTcB Int : 464 ms    Normal sinus rhythm  Left axis deviation  Abnormal ECG  When compared with ECG of 30-Mar-2025 13:22, (Unconfirmed)  No significant change was found    Referred By: edmd           Confirmed By:

## 2025-03-31 NOTE — PROGRESS NOTES
"[facility]  IP Progress Note    PROBLEM LIST:  CARDIAC   Coronary Artery Disease:   NSTEMI with LHC 2002: PCI to RCA and left circumflex   Repeat LHC with nonobstructive coronary artery disease in 2005, 2009, and 2018   Lexiscan 8/21: LV function at rest.  Normal myocardial perfusion.     Myocardium:   Stress echo 2007: Normal EF.  Mild diastolic dysfunction.  Mild posterior wall hypokinesis.   Echo 1/20: EF 70%, diastolic dysfunction, mildly dilated LA     Valvular:   Echo 1/20: Moderate AS, mean gradient 17, trace TR, trace MR   Echo 5/21: Moderate AS, mean gradient 19, mild MR, mild TR     Electrical:   Sinus rhythm     Pericardium:   Normal       CARDIAC RISK FACTORS   Hypertension   Dyslipidemia   02/2023   HDL 41    11/2023   HDL 41 LDL 64   Physical Inactivity   Obstructive Sleep Apnea     NON-CARDIAC   GERD   Chronic pancreatitis   BPH   Osteopenia   Peripheral neuropathy   Cervical and lumbar spondylosis   Psoriasis   Chronic kidney disease.     SURGERIES   None reported       CHIEF COMPLAINTS:  Shortness of breath      Subjective   Patient is feeling much better      Objective     Blood pressure 138/78, pulse 95, temperature 98.4 °F (36.9 °C), temperature source Oral, resp. rate 19, height 162 cm (63.78\"), weight 77.1 kg (170 lb), SpO2 95%.     Intake/Output Summary (Last 24 hours) at 3/31/2025 0915  Last data filed at 3/30/2025 1601  Gross per 24 hour   Intake 200 ml   Output --   Net 200 ml       PHYSICAL EXAM:  Constitutional:       General: Not in acute distress.     Appearance: Healthy appearance. Not in distress.     Neck:     JVP:Not elevated     Carotid artery: Normal    Pulmonary:      Effort: Pulmonary effort is normal.      Breath sounds: Normal breath sounds. No wheezing. No rhonchi. No rales.     Cardiovascular:      Normal rate. Regular rhythm. Normal S1. Normal S2.      Murmurs: There is 3/6 murmur.      No gallop. No " click. No rub.     Abdominal:      General: Bowel sounds are normal.      Palpations: Abdomen is soft.      Tenderness: There is no abdominal tenderness.    Extremities:     Pulses:Normal radial and pedal pulses     Edema:no edema    MEDICATIONS:    amLODIPine, 5 mg, Oral, Daily  ampicillin-sulbactam, 3 g, Intravenous, Q12H  aspirin, 81 mg, Oral, Daily  doxycycline, 100 mg, Intravenous, Q12H  heparin (porcine), 5,000 Units, Subcutaneous, Q12H  hydrALAZINE, 50 mg, Oral, TID  insulin lispro, 2-7 Units, Subcutaneous, TID With Meals  ipratropium-albuterol, 3 mL, Nebulization, 4x Daily - RT  isosorbide mononitrate, 60 mg, Oral, BID  lubiprostone, 8 mcg, Oral, BID  methylPREDNISolone sodium succinate, 60 mg, Intravenous, Q12H  Pancrelipase (Lip-Prot-Amyl), 24,000 units of lipase, Oral, TID With Meals  pantoprazole, 40 mg, Oral, Daily  terazosin, 2 mg, Oral, Nightly  topiramate, 50 mg, Oral, BID          Results from last 7 days   Lab Units 03/31/25  0852   WBC 10*3/mm3 6.48   HEMOGLOBIN g/dL 11.0*   HEMATOCRIT % 33.5*   PLATELETS 10*3/mm3 204     Results from last 7 days   Lab Units 03/30/25  1324   SODIUM mmol/L 140   POTASSIUM mmol/L 3.9   CHLORIDE mmol/L 100   CO2 mmol/L 25.0   BUN mg/dL 36*   CREATININE mg/dL 3.17*   CALCIUM mg/dL 8.5*   BILIRUBIN mg/dL 0.2   ALK PHOS U/L 68   ALT (SGPT) U/L 22   AST (SGOT) U/L 23   GLUCOSE mg/dL 154*         Lab Results   Component Value Date    TROPONINT 162 (C) 03/30/2025                 Iron   Date Value Ref Range Status   06/29/2023 74 59 - 158 mcg/dL Final     Ferritin   Date Value Ref Range Status   06/29/2023 122.00 30.00 - 400.00 ng/mL Final     Iron Saturation (TSAT)   Date Value Ref Range Status   06/29/2023 20 20 - 50 % Final     TIBC   Date Value Ref Range Status   06/29/2023 377 298 - 536 mcg/dL Final      Hemoglobin A1C   Date Value Ref Range Status   03/04/2025 5.8 (A) 4.5 - 5.7 % Final     Magnesium   Date Value Ref Range Status   03/30/2025 2.0 1.6 - 2.4 mg/dL Final         RESULT REVIEW:    I reviewed the patient's new clinical results.    Tele: Sinus Rythym      ASSESSMENT:     Coronary artery disease  Chronic kidney disease stage IV  Respiratory failure      PLAN:     -Most of the problem is having is related to his respiratory status.  He is improved a lot with the respiratory treatment and antibiotics.  His BNP is not elevated which makes me think that he is not in heart failure.  His kidney function is deteriorating.  He may need evaluation for his coronary arteries but he will end up on dialysis.  He wants to hold on that.  His aortic valve is at best moderate aortic stenosis

## 2025-03-31 NOTE — PLAN OF CARE
Goal Outcome Evaluation:  Plan of Care Reviewed With: patient                Anticipated Discharge Disposition (SLP): home          SLP Swallowing Diagnosis: functional oral phase, R/O pharyngeal dysphagia, other (see comments) (no suspected pharyngeal dysphagia) (03/31/25 0900)

## 2025-03-31 NOTE — PROGRESS NOTES
Psychiatric Medicine Services  PROGRESS NOTE    Patient Name: Hal Byrnes  : 1940  MRN: [id number]    Date of Admission: 3/30/2025  Primary Care Physician: Pia De La Garza MD    Subjective   Subjective     CC:  F/u resp failure    HPI:  More comfortable breathing today. Feeling some abdominal distention but otherwise improved since admission      Objective   Objective     Vital Signs:   Temp:  [97.8 °F (36.6 °C)-98.9 °F (37.2 °C)] 98 °F (36.7 °C)  Heart Rate:  [77-95] 88  Resp:  [18-20] 20  BP: (115-157)/(64-86) 134/70  Flow (L/min) (Oxygen Therapy):  [4-5] 4     Physical Exam:  Constitutional: No acute distress, awake, alert  HENT: NCAT, mucous membranes moist  Respiratory: Clear to auscultation bilaterally, respiratory effort fair with decreased basilar breath sounds on 4L NC  Cardiovascular: RRR, +murmur  Gastrointestinal: Positive bowel sounds, soft, nontender, nondistended  Musculoskeletal: trace bilateral ankle edema  Psychiatric: Appropriate affect, cooperative  Neurologic: Oriented x 3, strength symmetric in all extremities, Cranial Nerves grossly intact to confrontation, speech clear  Skin: No rashes      Results Reviewed:  LAB RESULTS:      Lab 25  0852 25  1418 25  1324 25  0330 25  0221   WBC 6.48  --  10.77  --  6.75   HEMOGLOBIN 11.0*  --  12.1*  --  11.2*   HEMATOCRIT 33.5*  --  36.9*  --  32.9*   PLATELETS 204  --  213  --  194   NEUTROS ABS 6.01  --  9.64*  --  4.74   IMMATURE GRANS (ABS) 0.05  --  0.14*  --  0.05   LYMPHS ABS 0.40*  --  0.68*  --  1.40   MONOS ABS 0.02*  --  0.27  --  0.50   EOS ABS 0.00  --  0.02  --  0.04   MCV 90.3  --  92.0  --  89.4   LACTATE  --   --  0.7  --   --    HSTROP T  --  162* 123* 80* 80*         Lab 25  0852 25  1324 25  0221   SODIUM 136 140 137   POTASSIUM 4.0 3.9 3.6   CHLORIDE 101 100 101   CO2 22.0 25.0 25.0   ANION GAP 13.0 15.0 11.0   BUN 44* 36* 36*   CREATININE  3.14* 3.17* 3.13*   EGFR 18.9* 18.7* 19.0*   GLUCOSE 166* 154* 112*   CALCIUM 8.4* 8.5* 8.6   MAGNESIUM  --   --  2.0         Lab 03/31/25  0852 03/30/25  1324 03/30/25  0221   TOTAL PROTEIN 5.9* 6.1 6.0   ALBUMIN 3.3* 3.6 3.5   GLOBULIN 2.6 2.5 2.5   ALT (SGPT) 22 22 19   AST (SGOT) 19 23 17   BILIRUBIN 0.2 0.2 0.3   ALK PHOS 64 68 65         Lab 03/30/25  1418 03/30/25  1324 03/30/25  0330 03/30/25  0221   PROBNP  --  816.9  --  926.8   HSTROP T 162* 123* 80* 80*                 Lab 03/31/25  0502 03/30/25  1516 03/30/25  1337   PH, ARTERIAL 7.308* 7.287* 7.232*   PCO2, ARTERIAL 52.3* 58.4* 64.6*   PO2 ART 67.5* 121.0* 75.8*   FIO2 36 55 45   HCO3 ART 26.2* 27.9* 27.2*   BASE EXCESS ART -0.7* 0.2 -1.6*   CARBOXYHEMOGLOBIN 1.1 1.0 1.3     Brief Urine Lab Results  (Last result in the past 365 days)        Color   Clarity   Blood   Leuk Est   Nitrite   Protein   CREAT   Urine HCG        03/04/25 1057             86.6                 Microbiology Results Abnormal       None            XR Chest 1 View  Result Date: 3/30/2025  XR CHEST 1 VW Date of Exam: 3/30/2025 1:17 PM EDT Indication: SOA triage protocol Comparison: Chest radiograph dated 3/30/2025 Findings: The cardiomediastinal silhouette is within normal limits. There is worsening right-sided bronchial wall thickening and interstitial opacities with new airspace opacity within the inferior right upper lobe. There is no pleural effusion or pneumothorax. There are degenerative changes of the thoracic spine.     Impression: Impression: Worsening right-sided bronchial wall thickening and interstitial opacities with new airspace opacity within the inferior right upper lobe. Findings are concerning for pneumonia. Electronically Signed: Luke Fajardo  3/30/2025 1:39 PM EDT  Workstation ID: [id number]    XR Chest 1 View  Result Date: 3/30/2025  XR CHEST 1 VW Date of Exam: 3/30/2025 1:45 AM EDT Indication: SOA triage protocol Comparison: Chest radiograph 2/25/2025  Findings: There are no airspace consolidations. No pleural fluid. No pneumothorax. The pulmonary vasculature appears within normal limits. The cardiac and mediastinal silhouette appear unremarkable. No acute osseous abnormality identified.     Impression: Impression: No active disease. Electronically Signed: Ward Carvajal MD  3/30/2025 2:24 AM EDT  Workstation ID: [id number]      Results for orders placed during the hospital encounter of 02/25/25    Adult Transthoracic Echo Complete W/ Cont if Necessary Per Protocol    Interpretation Summary    Left ventricular systolic function is normal. Calculated left ventricular EF = 62.5%    Left ventricular wall thickness is consistent with mild concentric hypertrophy.  Mild gradient is seen in the LV cavity.    The left atrial cavity is dilated.    Left atrial volume is mildly increased.    Moderate aortic valve stenosis is present.    Aortic valve mean pressure gradient is 23 mmHg with good stroke-volume.    Estimated right ventricular systolic pressure from tricuspid regurgitation is normal (<35 mmHg).      Current medications:  Scheduled Meds:amLODIPine, 5 mg, Oral, Daily  ampicillin-sulbactam, 3 g, Intravenous, Q12H  aspirin, 81 mg, Oral, Daily  doxycycline, 100 mg, Intravenous, Q12H  heparin (porcine), 5,000 Units, Subcutaneous, Q12H  hydrALAZINE, 50 mg, Oral, TID  insulin lispro, 2-7 Units, Subcutaneous, TID With Meals  ipratropium-albuterol, 3 mL, Nebulization, 4x Daily - RT  isosorbide mononitrate, 60 mg, Oral, BID  lubiprostone, 8 mcg, Oral, BID  methylPREDNISolone sodium succinate, 60 mg, Intravenous, Q12H  Pancrelipase (Lip-Prot-Amyl), 24,000 units of lipase, Oral, TID With Meals  pantoprazole, 40 mg, Oral, Daily  terazosin, 2 mg, Oral, Nightly  topiramate, 50 mg, Oral, BID      Continuous Infusions:   PRN Meds:.  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        Brief Hospital Course to date:  Hal Byrnes is a 84 y.o. male with a history of hypertension, chronic HFpEF with moderate aortic stenosis, hyperlipidemia, CAD and CKD here with acute mixed respiratory failure    Acute mixed respiratory failure w hypoxia and hypercarbia  Pneumonia  - Cultures in process, negative flu, negative COVID  - Continue Unasyn and Doxy to cover for CAP  - Continue steroids to cover for wheezing, likely can start taper tomorrow    Elevated troponin  History of HFpEF  Aortic stenosis  - Denies any chest pain, elevated troponins  - Continue aspirin  - Cardiology following  --being worked up for possible amyloid outpatient    Type 2 diabetes  - Continue SSI, monitor with steroids    Hyperlipidemia  HTN  --cont home meds    CKD with polycystic kidneys  - Baseline creatinine 2.5--3      Tremors  --on topamax for this, follows w outpatient neuro        Expected Discharge Location and Transportation: home  Expected Discharge   Expected Discharge Date: 4/1/2025; Expected Discharge Time:      VTE Prophylaxis:  Pharmacologic VTE prophylaxis orders are present.         AM-PAC 6 Clicks Score (PT): 23 (03/30/25 2030)    CODE STATUS:   Code Status and Medical Interventions: CPR (Attempt to Resuscitate); Full Support   Ordered at: 03/30/25 7955     Code Status (Patient has no pulse and is not breathing):    CPR (Attempt to Resuscitate)     Medical Interventions (Patient has pulse or is breathing):     Full Support       Jyoti Carey MD  03/31/25

## 2025-03-31 NOTE — CASE MANAGEMENT/SOCIAL WORK
Discharge Planning Assessment  [facility]     Patient Name: Hal Byrnes  MRN: [id number]  Today's Date: 3/31/2025    Admit Date: 3/30/2025    Plan: discharge plan   Discharge Needs Assessment       Row Name 03/31/25 1424       Living Environment    People in Home spouse    Name(s) of People in Home Jyoti(spouse)    Current Living Arrangements home    Primary Care Provided by self    Provides Primary Care For no one    Family Caregiver if Needed child(dayna), adult;spouse    Family Caregiver Names Jyoti(spoouse) or Odalis(daughter)    Quality of Family Relationships helpful;involved;supportive    Living Arrangement Comments I spoke with pt and pt's spouse in room with permission regarding discharge plan. Pt resides in [facility] with [place].       Transition Planning    Patient/Family Anticipates Transition to home with family    Patient/Family Anticipated Services at Transition        Discharge Needs Assessment    Readmission Within the Last 30 Days no previous admission in last 30 days    Equipment Currently Used at Home none    Concerns to be Addressed discharge planning    Equipment Needed After Discharge other (see comments)  Pending. Pt currently on 4 L of O2 and does not wear home O2    Discharge Coordination/Progress Pt confirms that pt has [employer] Replacement with prescription coverage. Pt uses [facility] in [facility]. Pt reports he is independent with ADLs and uses no DME for mobility, no HH and no home O2. Pt is on 4 L of O2 at present and if home O2 needed at discharge, pt is agreeable. Pt is here with acute on chronic resp failure. Discharge plan home, pending PT/OT recommendations/eval. CM will cont to follow                   Discharge Plan       Row Name 03/31/25 7872       Plan    Plan discharge plan    Plan Comments Pt is on 4 L of O2 at present and if home O2 needed at discharge, pt is agreeable. Pt is here with acute on chronic resp failure. Discharge plan home, pending  PT/OT recommendations/eval. CM will cont to follow    Final Discharge Disposition Code 01 - home or self-care                    Expected Discharge Date and Time       Expected Discharge Date Expected Discharge Time    Apr 1, 2025            Demographic Summary       Row Name 03/31/25 4219       General Information    General Information Comments PCP is PRABHA MCGOWAN       Contact Information    Permission Granted to Share Info With     Contact Information Obtained for     Contact Information Comments Jyoti Byrnes(spouse) [phone] or Odalis Sánchez(daughter) [phone]                   Functional Status    No documentation.                  Psychosocial    No documentation.                  Abuse/Neglect    No documentation.                  Legal    No documentation.                  Substance Abuse    No documentation.                  Patient Forms    No documentation.                     Flori Garza RN

## 2025-04-01 LAB
ANION GAP SERPL CALCULATED.3IONS-SCNC: 13 MMOL/L (ref 5–15)
BASOPHILS # BLD AUTO: 0.01 10*3/MM3 (ref 0–0.2)
BASOPHILS NFR BLD AUTO: 0.1 % (ref 0–1.5)
BUN SERPL-MCNC: 56 MG/DL (ref 8–23)
BUN/CREAT SERPL: 15.3 (ref 7–25)
CALCIUM SPEC-SCNC: 8.4 MG/DL (ref 8.6–10.5)
CHLORIDE SERPL-SCNC: 101 MMOL/L (ref 98–107)
CO2 SERPL-SCNC: 23 MMOL/L (ref 22–29)
CREAT SERPL-MCNC: 3.67 MG/DL (ref 0.76–1.27)
DEPRECATED RDW RBC AUTO: 43.5 FL (ref 37–54)
EGFRCR SERPLBLD CKD-EPI 2021: 15.7 ML/MIN/1.73
EOSINOPHIL # BLD AUTO: 0 10*3/MM3 (ref 0–0.4)
EOSINOPHIL NFR BLD AUTO: 0 % (ref 0.3–6.2)
ERYTHROCYTE [DISTWIDTH] IN BLOOD BY AUTOMATED COUNT: 13.2 % (ref 12.3–15.4)
GLUCOSE BLDC GLUCOMTR-MCNC: 132 MG/DL (ref 70–130)
GLUCOSE BLDC GLUCOMTR-MCNC: 140 MG/DL (ref 70–130)
GLUCOSE BLDC GLUCOMTR-MCNC: 141 MG/DL (ref 70–130)
GLUCOSE BLDC GLUCOMTR-MCNC: 153 MG/DL (ref 70–130)
GLUCOSE BLDC GLUCOMTR-MCNC: 159 MG/DL (ref 70–130)
GLUCOSE SERPL-MCNC: 134 MG/DL (ref 65–99)
HCT VFR BLD AUTO: 31.1 % (ref 37.5–51)
HGB BLD-MCNC: 10.2 G/DL (ref 13–17.7)
IMM GRANULOCYTES # BLD AUTO: 0.09 10*3/MM3 (ref 0–0.05)
IMM GRANULOCYTES NFR BLD AUTO: 0.8 % (ref 0–0.5)
LYMPHOCYTES # BLD AUTO: 0.34 10*3/MM3 (ref 0.7–3.1)
LYMPHOCYTES NFR BLD AUTO: 3.1 % (ref 19.6–45.3)
MCH RBC QN AUTO: 29.7 PG (ref 26.6–33)
MCHC RBC AUTO-ENTMCNC: 32.8 G/DL (ref 31.5–35.7)
MCV RBC AUTO: 90.7 FL (ref 79–97)
MONOCYTES # BLD AUTO: 0.14 10*3/MM3 (ref 0.1–0.9)
MONOCYTES NFR BLD AUTO: 1.3 % (ref 5–12)
NEUTROPHILS NFR BLD AUTO: 10.55 10*3/MM3 (ref 1.7–7)
NEUTROPHILS NFR BLD AUTO: 94.7 % (ref 42.7–76)
NRBC BLD AUTO-RTO: 0 /100 WBC (ref 0–0.2)
PLATELET # BLD AUTO: 211 10*3/MM3 (ref 140–450)
PMV BLD AUTO: 9.5 FL (ref 6–12)
POTASSIUM SERPL-SCNC: 4 MMOL/L (ref 3.5–5.2)
RBC # BLD AUTO: 3.43 10*6/MM3 (ref 4.14–5.8)
SODIUM SERPL-SCNC: 137 MMOL/L (ref 136–145)
WBC NRBC COR # BLD AUTO: 11.13 10*3/MM3 (ref 3.4–10.8)

## 2025-04-01 PROCEDURE — 94664 DEMO&/EVAL PT USE INHALER: CPT

## 2025-04-01 PROCEDURE — 82948 REAGENT STRIP/BLOOD GLUCOSE: CPT

## 2025-04-01 PROCEDURE — 63710000001 INSULIN LISPRO (HUMAN) PER 5 UNITS: Performed by: INTERNAL MEDICINE

## 2025-04-01 PROCEDURE — 80048 BASIC METABOLIC PNL TOTAL CA: CPT | Performed by: STUDENT IN AN ORGANIZED HEALTH CARE EDUCATION/TRAINING PROGRAM

## 2025-04-01 PROCEDURE — 25010000002 METHYLPREDNISOLONE PER 125 MG: Performed by: INTERNAL MEDICINE

## 2025-04-01 PROCEDURE — 25010000002 HEPARIN (PORCINE) PER 1000 UNITS: Performed by: INTERNAL MEDICINE

## 2025-04-01 PROCEDURE — 94799 UNLISTED PULMONARY SVC/PX: CPT

## 2025-04-01 PROCEDURE — 97165 OT EVAL LOW COMPLEX 30 MIN: CPT

## 2025-04-01 PROCEDURE — 97161 PT EVAL LOW COMPLEX 20 MIN: CPT

## 2025-04-01 PROCEDURE — 99232 SBSQ HOSP IP/OBS MODERATE 35: CPT | Performed by: INTERNAL MEDICINE

## 2025-04-01 PROCEDURE — 25010000002 AMPICILLIN-SULBACTAM PER 1.5 G: Performed by: INTERNAL MEDICINE

## 2025-04-01 PROCEDURE — 85025 COMPLETE CBC W/AUTO DIFF WBC: CPT | Performed by: STUDENT IN AN ORGANIZED HEALTH CARE EDUCATION/TRAINING PROGRAM

## 2025-04-01 PROCEDURE — 25010000002 DOXYCYCLINE 100 MG RECONSTITUTED SOLUTION 1 EACH VIAL: Performed by: INTERNAL MEDICINE

## 2025-04-01 RX ORDER — WATER 10 ML/10ML
INJECTION INTRAMUSCULAR; INTRAVENOUS; SUBCUTANEOUS
Status: COMPLETED
Start: 2025-04-01 | End: 2025-04-01

## 2025-04-01 RX ORDER — ALUMINA, MAGNESIA, AND SIMETHICONE 2400; 2400; 240 MG/30ML; MG/30ML; MG/30ML
30 SUSPENSION ORAL ONCE
Status: COMPLETED | OUTPATIENT
Start: 2025-04-01 | End: 2025-04-01

## 2025-04-01 RX ORDER — ACETAMINOPHEN 325 MG/1
650 TABLET ORAL EVERY 6 HOURS PRN
Status: DISCONTINUED | OUTPATIENT
Start: 2025-04-01 | End: 2025-04-07 | Stop reason: HOSPADM

## 2025-04-01 RX ADMIN — METHYLPREDNISOLONE SODIUM SUCCINATE 60 MG: 125 INJECTION INTRAMUSCULAR; INTRAVENOUS at 00:36

## 2025-04-01 RX ADMIN — METHYLPREDNISOLONE SODIUM SUCCINATE 60 MG: 125 INJECTION INTRAMUSCULAR; INTRAVENOUS at 23:59

## 2025-04-01 RX ADMIN — DOXYCYCLINE 100 MG: 100 INJECTION, POWDER, LYOPHILIZED, FOR SOLUTION INTRAVENOUS at 03:25

## 2025-04-01 RX ADMIN — WATER 10 ML: 1 INJECTION INTRAMUSCULAR; INTRAVENOUS; SUBCUTANEOUS at 23:59

## 2025-04-01 RX ADMIN — PANCRELIPASE 24000 UNITS OF LIPASE: 60000; 12000; 38000 CAPSULE, DELAYED RELEASE PELLETS ORAL at 12:48

## 2025-04-01 RX ADMIN — SIMETHICONE 80 MG: 80 TABLET, CHEWABLE ORAL at 18:42

## 2025-04-01 RX ADMIN — ASPIRIN 81 MG: 81 TABLET, CHEWABLE ORAL at 08:19

## 2025-04-01 RX ADMIN — ACETAMINOPHEN 650 MG: 325 TABLET, FILM COATED ORAL at 21:26

## 2025-04-01 RX ADMIN — DOXYCYCLINE 100 MG: 100 INJECTION, POWDER, LYOPHILIZED, FOR SOLUTION INTRAVENOUS at 16:46

## 2025-04-01 RX ADMIN — HEPARIN SODIUM 5000 UNITS: 5000 INJECTION INTRAVENOUS; SUBCUTANEOUS at 21:27

## 2025-04-01 RX ADMIN — HEPARIN SODIUM 5000 UNITS: 5000 INJECTION INTRAVENOUS; SUBCUTANEOUS at 08:19

## 2025-04-01 RX ADMIN — ALUMINUM HYDROXIDE, MAGNESIUM HYDROXIDE, AND DIMETHICONE 30 ML: 400; 400; 40 SUSPENSION ORAL at 21:26

## 2025-04-01 RX ADMIN — ISOSORBIDE MONONITRATE 60 MG: 60 TABLET, EXTENDED RELEASE ORAL at 08:19

## 2025-04-01 RX ADMIN — HYDRALAZINE HYDROCHLORIDE 50 MG: 50 TABLET ORAL at 08:18

## 2025-04-01 RX ADMIN — TOPIRAMATE 50 MG: 25 TABLET, FILM COATED ORAL at 08:18

## 2025-04-01 RX ADMIN — INSULIN LISPRO 2 UNITS: 100 INJECTION, SOLUTION INTRAVENOUS; SUBCUTANEOUS at 08:17

## 2025-04-01 RX ADMIN — PANCRELIPASE 24000 UNITS OF LIPASE: 60000; 12000; 38000 CAPSULE, DELAYED RELEASE PELLETS ORAL at 08:18

## 2025-04-01 RX ADMIN — BISACODYL 10 MG: 10 SUPPOSITORY RECTAL at 18:48

## 2025-04-01 RX ADMIN — AMLODIPINE BESYLATE 5 MG: 5 TABLET ORAL at 08:19

## 2025-04-01 RX ADMIN — PANTOPRAZOLE SODIUM 40 MG: 40 TABLET, DELAYED RELEASE ORAL at 08:19

## 2025-04-01 RX ADMIN — METHYLPREDNISOLONE SODIUM SUCCINATE 60 MG: 125 INJECTION INTRAMUSCULAR; INTRAVENOUS at 12:47

## 2025-04-01 RX ADMIN — HYDRALAZINE HYDROCHLORIDE 50 MG: 50 TABLET ORAL at 15:46

## 2025-04-01 RX ADMIN — LUBIPROSTONE 8 MCG: 8 CAPSULE ORAL at 08:18

## 2025-04-01 RX ADMIN — SENNOSIDES AND DOCUSATE SODIUM 2 TABLET: 50; 8.6 TABLET ORAL at 08:18

## 2025-04-01 RX ADMIN — SENNOSIDES AND DOCUSATE SODIUM 2 TABLET: 50; 8.6 TABLET ORAL at 21:27

## 2025-04-01 RX ADMIN — IPRATROPIUM BROMIDE AND ALBUTEROL SULFATE 3 ML: 2.5; .5 SOLUTION RESPIRATORY (INHALATION) at 20:23

## 2025-04-01 RX ADMIN — TERAZOSIN HYDROCHLORIDE 2 MG: 2 CAPSULE ORAL at 21:27

## 2025-04-01 RX ADMIN — TOPIRAMATE 50 MG: 25 TABLET, FILM COATED ORAL at 21:26

## 2025-04-01 RX ADMIN — Medication 10 ML: at 08:20

## 2025-04-01 RX ADMIN — HYDRALAZINE HYDROCHLORIDE 50 MG: 50 TABLET ORAL at 21:27

## 2025-04-01 RX ADMIN — POLYETHYLENE GLYCOL 3350 17 G: 17 POWDER, FOR SOLUTION ORAL at 08:25

## 2025-04-01 RX ADMIN — BISACODYL 5 MG: 5 TABLET, COATED ORAL at 11:01

## 2025-04-01 RX ADMIN — LUBIPROSTONE 8 MCG: 8 CAPSULE ORAL at 21:27

## 2025-04-01 RX ADMIN — IPRATROPIUM BROMIDE AND ALBUTEROL SULFATE 3 ML: 2.5; .5 SOLUTION RESPIRATORY (INHALATION) at 07:29

## 2025-04-01 RX ADMIN — IPRATROPIUM BROMIDE AND ALBUTEROL SULFATE 3 ML: 2.5; .5 SOLUTION RESPIRATORY (INHALATION) at 16:22

## 2025-04-01 RX ADMIN — PANCRELIPASE 24000 UNITS OF LIPASE: 60000; 12000; 38000 CAPSULE, DELAYED RELEASE PELLETS ORAL at 18:35

## 2025-04-01 RX ADMIN — ISOSORBIDE MONONITRATE 60 MG: 60 TABLET, EXTENDED RELEASE ORAL at 21:27

## 2025-04-01 RX ADMIN — AMPICILLIN SODIUM AND SULBACTAM SODIUM 3 G: 2; 1 INJECTION, POWDER, FOR SOLUTION INTRAMUSCULAR; INTRAVENOUS at 04:34

## 2025-04-01 RX ADMIN — AMPICILLIN SODIUM AND SULBACTAM SODIUM 3 G: 2; 1 INJECTION, POWDER, FOR SOLUTION INTRAMUSCULAR; INTRAVENOUS at 15:40

## 2025-04-01 NOTE — ED PROVIDER NOTES
Subjective   History of Present Illness  84-year-old male who presents via EMS for evaluation of respiratory distress and hypoxia.  EMS arrived the patient seen and was identified to have oxygen saturation down to 44%.  The patient was placed on nonrebreather and oxygen saturations improved to 80%.  Eventually CPAP was applied by EMS.  The patient's overall respiratory status seems to be improving but still has increased work of breathing with prolonged expiration.  He also continues to be mildly somnolent.  He has a history of COPD.  He also was given aspirin and sublingual nitroglycerin and route by EMS.  The patient was seen within the last 24 hours for somewhat similar symptoms.  Per the family's report he did not develop increased shortness of breath until after he left the ER on the previous ER visit.  There is no reported recent fever or definitive sick contacts.  No congestion or upper respiratory symptoms.  No complaints of abdominal pain or change in bowel or urinary function.  No new medications.  No other acute complaints.      Review of Systems   Constitutional:  Negative for chills, fatigue and fever.   HENT:  Negative for congestion, ear pain, postnasal drip, sinus pressure and sore throat.    Eyes:  Negative for pain, redness and visual disturbance.   Respiratory:  Positive for cough, shortness of breath and wheezing. Negative for chest tightness.    Cardiovascular:  Negative for chest pain, palpitations and leg swelling.   Gastrointestinal:  Negative for abdominal pain, anal bleeding, blood in stool, diarrhea, nausea and vomiting.   Endocrine: Negative for polydipsia and polyuria.   Genitourinary:  Negative for difficulty urinating, dysuria, frequency and urgency.   Musculoskeletal:  Negative for arthralgias, back pain and neck pain.   Skin:  Negative for pallor and rash.   Allergic/Immunologic: Negative for environmental allergies and immunocompromised state.   Neurological:  Negative for  dizziness, weakness and headaches.   Hematological:  Negative for adenopathy.   Psychiatric/Behavioral:  Positive for decreased concentration. Negative for confusion, self-injury and suicidal ideas. The patient is not nervous/anxious.    All other systems reviewed and are negative.      Past Medical History:   Diagnosis Date    Arthritis     Coronary artery disease     Difficulty walking     Fatigue     Hyperlipidemia     Hypertension     Low back pain     Other chest pain     SOB (shortness of breath)        Allergies   Allergen Reactions    Sulfa Antibiotics Irritability       Past Surgical History:   Procedure Laterality Date    APPENDECTOMY      CAROTID STENT      CATARACT EXTRACTION, BILATERAL Bilateral     COLONOSCOPY  2024    CORONARY STENT PLACEMENT      PROSTATE SURGERY      for BPH       Family History   Problem Relation Age of Onset    Heart attack Mother     Brain cancer Sister     Heart attack Brother     Hepatitis Brother        Social History     Socioeconomic History    Marital status:    Tobacco Use    Smoking status: Former     Current packs/day: 0.00     Average packs/day: 1.5 packs/day for 10.0 years (15.0 ttl pk-yrs)     Types: Cigarettes     Start date: 2001     Quit date: 2011     Years since quittin.9     Passive exposure: Never    Smokeless tobacco: Never   Vaping Use    Vaping status: Never Used   Substance and Sexual Activity    Alcohol use: Never    Drug use: Not Currently     Types: Marijuana     Comment: longstanding smoking, stopped 2 weeks ago    Sexual activity: Not Currently           Objective   Physical Exam  Vitals and nursing note reviewed.   Constitutional:       General: He is not in acute distress.     Appearance: Normal appearance. He is well-developed. He is not toxic-appearing or diaphoretic.   HENT:      Head: Normocephalic and atraumatic.      Right Ear: External ear normal.      Left Ear: External ear normal.      Nose: Nose normal.   Eyes:       General: Lids are normal.      Pupils: Pupils are equal, round, and reactive to light.   Neck:      Trachea: No tracheal deviation.   Cardiovascular:      Rate and Rhythm: Normal rate and regular rhythm.      Pulses: No decreased pulses.      Heart sounds: Normal heart sounds. No murmur heard.     No friction rub. No gallop.   Pulmonary:      Effort: Tachypnea, accessory muscle usage, prolonged expiration and respiratory distress present.      Breath sounds: Examination of the right-middle field reveals wheezing. Examination of the left-middle field reveals wheezing. Examination of the right-lower field reveals wheezing and rales. Examination of the left-lower field reveals wheezing and rales. Wheezing and rales present. No decreased breath sounds or rhonchi.      Comments: Crackles in the bilateral bases, increased work of breathing, prolonged expiration, wheezing on expiration.  Abdominal:      General: Bowel sounds are normal.      Palpations: Abdomen is soft.      Tenderness: There is no abdominal tenderness. There is no guarding or rebound.   Musculoskeletal:         General: No deformity. Normal range of motion.      Cervical back: Normal range of motion and neck supple.   Lymphadenopathy:      Cervical: No cervical adenopathy.   Skin:     General: Skin is warm and dry.      Findings: No rash.   Neurological:      Mental Status: He is alert and oriented to person, place, and time.      Cranial Nerves: No cranial nerve deficit.      Sensory: No sensory deficit.   Psychiatric:         Speech: Speech normal.         Behavior: Behavior normal.         Thought Content: Thought content normal.         Judgment: Judgment normal.         Critical Care    Performed by: Albania Grant MD  Authorized by: Albania Grant MD    Critical care provider statement:     Critical care time (minutes):  35    Critical care time was exclusive of:  Separately billable procedures and treating other patients    Critical  care was necessary to treat or prevent imminent or life-threatening deterioration of the following conditions:  Respiratory failure    Critical care was time spent personally by me on the following activities:  Development of treatment plan with patient or surrogate, discussions with consultants, evaluation of patient's response to treatment, blood draw for specimens, examination of patient, obtaining history from patient or surrogate, ordering and performing treatments and interventions, ordering and review of laboratory studies, ordering and review of radiographic studies, pulse oximetry, re-evaluation of patient's condition and review of old charts    I assumed direction of critical care for this patient from another provider in my specialty: no      Care discussed with: admitting provider               ED Course  ED Course as of 04/01/25 1601   Sun Mar 30, 2025   1608 The patient reportedly was in respiratory distress upon EMS arrival, oxygen saturations reportedly were in the 40s upon initial arrival.  With application of nonrebreather the improved into the 80s.  Then with CPAP he is breathing status further improved.  He had hypercapnia with acute respiratory acidosis.  With continued BiPAP this has improved.  His mentation likewise improved and he is being much more comfortable.  He will open his eyes talk and interact and follow commands.  He still however is mildly somnolent.  Imaging shows right upper lobe pneumonia.  I have initiated antibiotics.  The most recent ABG shows a pH of 7.28 with a pCO2 of 58.  He has chronic renal sufficiency with a creatinine of 3.17.  Normal lactic acid level, normal BNP, normal white count.  If you feel this patient needs to go to the ICU just let me know. [NS]      ED Course User Index  [NS] Albania Grant MD                                                       Medical Decision Making  Differential include COPD exacerbation, CHF exacerbation, pneumonia, viral  illness, respiratory distress, other unspecified etiology.    Presentation appears consistent with COPD exacerbation with hypercapnia and hypoxia.    Initial ABGShows a pH of 7.23 with a pCO2 of 64.  BiPAP was applied and the patient's pCO2 improved to 58 and the pH improved to 7.28.  The patient's mentation improved as well.  He is able to awake follow commands and interact appropriately.    Labs show normal white count, chronic renal insufficiency with a creatinine of 3.17 lactic acid level normal.  Relatively nonconcerning BNP    Chest x-ray shows worsening right-sided bronchial wall thickening and interstitial opacities with new airspace opacity within the inferior right upper lobe concerning for pneumonia.    I have initiated blood cultures and antibiotics.  The patient was also given breathing treatments and steroids here in the ER.  Due to improving mentation I discussed the patient with the hospitalist who will consult for admission.    Problems Addressed:  Acute respiratory failure with hypoxia and hypercapnia: complicated acute illness or injury with systemic symptoms that poses a threat to life or bodily functions  Chronic renal failure, unspecified CKD stage: chronic illness or injury with exacerbation, progression, or side effects of treatment  COPD with acute exacerbation: complicated acute illness or injury with systemic symptoms that poses a threat to life or bodily functions  Hypoxia: complicated acute illness or injury with systemic symptoms that poses a threat to life or bodily functions  Pneumonia of right lung due to infectious organism, unspecified part of lung: complicated acute illness or injury with systemic symptoms that poses a threat to life or bodily functions    Amount and/or Complexity of Data Reviewed  Independent Historian: friend     Details: Several family members provide additional history.  External Data Reviewed: labs, radiology, ECG and notes.  Labs: ordered. Decision-making  details documented in ED Course.  Radiology: ordered and independent interpretation performed. Decision-making details documented in ED Course.  ECG/medicine tests: ordered and independent interpretation performed. Decision-making details documented in ED Course.     Details:     EKG performed 3/30/2025 at 1322 interpreted by myself shows sinus tachycardia, normal QTc, normal QRS, no acute ischemic changes.  Discussion of management or test interpretation with external provider(s): I discussed the patient with the hospitalist who will consult for admission.    Risk  Prescription drug management.  Decision regarding hospitalization.        Final diagnoses:   Pneumonia of right lung due to infectious organism, unspecified part of lung   Hypoxia   Acute respiratory failure with hypoxia and hypercapnia   Chronic renal failure, unspecified CKD stage   COPD with acute exacerbation       ED Disposition  ED Disposition       ED Disposition   Decision to Admit    Condition   --    Comment   Level of Care: Telemetry [5]   Diagnosis: COPD exacerbation [760684]                 No follow-up provider specified.       Medication List      No changes were made to your prescriptions during this visit.            Albania Grant MD  04/01/25 0416

## 2025-04-01 NOTE — PLAN OF CARE
Goal Outcome Evaluation:  Plan of Care Reviewed With: patient, spouse        Progress: no change  Outcome Evaluation: Patient presents with impaired activity tolerance/endurance and ROM impacting BADLs warranting continued skilled O T services. Recommend home with spouse assist due to close to baseline independence with SBA transfer and movement in hallway.    Anticipated Discharge Disposition (OT): home with assist

## 2025-04-01 NOTE — PROGRESS NOTES
[facility]  PROGRESS NOTE    Patient Name: Hal Byrnes  : 1940  MRN: [id number]    Date of Admission: 3/30/2025  Primary Care Physician: Pia De La Garza MD    Subjective   Subjective     CC:  F/u resp failure    HPI:  States that he feels good.  Breathing better.  Now constipated.  Had miralax ands suppository.  Wife very concerned about ?of MM and wants to see oncology while he's here.  Has an appointment with Dr. Anaya on       Objective   Objective     Vital Signs:   Temp:  [98 °F (36.7 °C)-98.7 °F (37.1 °C)] 98 °F (36.7 °C)  Heart Rate:  [88-97] 96  Resp:  [18-20] 18  BP: (131-150)/(60-81) 141/81  Flow (L/min) (Oxygen Therapy):  [2-4] 2     Physical Exam:  Constitutional: No acute distress, awake, alert, sitting up in chair, wife at bedside  HENT: NCAT, mucous membranes moist  Respiratory: decreased BS bases  Cardiovascular: RRR, +murmur  Gastrointestinal: Positive bowel sounds, soft, nontender, nondistended  Musculoskeletal: trace bilateral ankle edema  Psychiatric: Appropriate affect, cooperative  Neurologic: Oriented x 3, strength symmetric in all extremities, Cranial Nerves grossly intact to confrontation, speech clear  Skin: No rashes      Results Reviewed:  LAB RESULTS:      Lab 25  0352 25  0852 25  1418 25  1324 25  0330 25  0221   WBC 11.13* 6.48  --  10.77  --  6.75   HEMOGLOBIN 10.2* 11.0*  --  12.1*  --  11.2*   HEMATOCRIT 31.1* 33.5*  --  36.9*  --  32.9*   PLATELETS 211 204  --  213  --  194   NEUTROS ABS 10.55* 6.01  --  9.64*  --  4.74   IMMATURE GRANS (ABS) 0.09* 0.05  --  0.14*  --  0.05   LYMPHS ABS 0.34* 0.40*  --  0.68*  --  1.40   MONOS ABS 0.14 0.02*  --  0.27  --  0.50   EOS ABS 0.00 0.00  --  0.02  --  0.04   MCV 90.7 90.3  --  92.0  --  89.4   LACTATE  --   --   --  0.7  --   --    HSTROP T  --   --  162* 123* 80* 80*         Lab 25  0352 25  0852 25  1324  03/30/25  0221   SODIUM 137 136 140 137   POTASSIUM 4.0 4.0 3.9 3.6   CHLORIDE 101 101 100 101   CO2 23.0 22.0 25.0 25.0   ANION GAP 13.0 13.0 15.0 11.0   BUN 56* 44* 36* 36*   CREATININE 3.67* 3.14* 3.17* 3.13*   EGFR 15.7* 18.9* 18.7* 19.0*   GLUCOSE 134* 166* 154* 112*   CALCIUM 8.4* 8.4* 8.5* 8.6   MAGNESIUM  --   --   --  2.0         Lab 03/31/25  0852 03/30/25  1324 03/30/25  0221   TOTAL PROTEIN 5.9* 6.1 6.0   ALBUMIN 3.3* 3.6 3.5   GLOBULIN 2.6 2.5 2.5   ALT (SGPT) 22 22 19   AST (SGOT) 19 23 17   BILIRUBIN 0.2 0.2 0.3   ALK PHOS 64 68 65         Lab 03/30/25  1418 03/30/25  1324 03/30/25  0330 03/30/25  0221   PROBNP  --  816.9  --  926.8   HSTROP T 162* 123* 80* 80*                 Lab 03/31/25  0502 03/30/25  1516 03/30/25  1337   PH, ARTERIAL 7.308* 7.287* 7.232*   PCO2, ARTERIAL 52.3* 58.4* 64.6*   PO2 ART 67.5* 121.0* 75.8*   FIO2 36 55 45   HCO3 ART 26.2* 27.9* 27.2*   BASE EXCESS ART -0.7* 0.2 -1.6*   CARBOXYHEMOGLOBIN 1.1 1.0 1.3     Brief Urine Lab Results  (Last result in the past 365 days)        Color   Clarity   Blood   Leuk Est   Nitrite   Protein   CREAT   Urine HCG        03/04/25 1057             86.6                 Microbiology Results Abnormal       None            No radiology results from the last 24 hrs      Results for orders placed during the hospital encounter of 02/25/25    Adult Transthoracic Echo Complete W/ Cont if Necessary Per Protocol    Interpretation Summary    Left ventricular systolic function is normal. Calculated left ventricular EF = 62.5%    Left ventricular wall thickness is consistent with mild concentric hypertrophy.  Mild gradient is seen in the LV cavity.    The left atrial cavity is dilated.    Left atrial volume is mildly increased.    Moderate aortic valve stenosis is present.    Aortic valve mean pressure gradient is 23 mmHg with good stroke-volume.    Estimated right ventricular systolic pressure from tricuspid regurgitation is normal (<35  mmHg).      Current medications:  Scheduled Meds:amLODIPine, 5 mg, Oral, Daily  ampicillin-sulbactam, 3 g, Intravenous, Q12H  aspirin, 81 mg, Oral, Daily  doxycycline, 100 mg, Intravenous, Q12H  heparin (porcine), 5,000 Units, Subcutaneous, Q12H  hydrALAZINE, 50 mg, Oral, TID  insulin lispro, 2-7 Units, Subcutaneous, TID With Meals  ipratropium-albuterol, 3 mL, Nebulization, 4x Daily - RT  isosorbide mononitrate, 60 mg, Oral, BID  lubiprostone, 8 mcg, Oral, BID  methylPREDNISolone sodium succinate, 60 mg, Intravenous, Q12H  Pancrelipase (Lip-Prot-Amyl), 24,000 units of lipase, Oral, TID With Meals  pantoprazole, 40 mg, Oral, Daily  senna-docusate sodium, 2 tablet, Oral, BID  terazosin, 2 mg, Oral, Nightly  topiramate, 50 mg, Oral, BID      Continuous Infusions:   PRN Meds:.  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        Brief Hospital Course to date:  Hal Byrnes is a 84 y.o. male with a history of hypertension, chronic HFpEF with moderate aortic stenosis, hyperlipidemia, CAD and CKD here with acute mixed respiratory failure    Acute mixed respiratory failure w hypoxia and hypercarbia  Pneumonia  - Cultures in process, negative flu, negative COVID  - Continue Unasyn and Doxy to cover for CAP  -  Continue IV steroids to cover for wheezing day 3    Elevated troponin  History of HFpEF  Aortic stenosis  - Denies any chest pain, elevated troponins  - Continue aspirin  - Cardiology following  --being worked up for possible amyloid outpatient    Constipation  --bowel regimen.  Add soap suds enema if needed    Type 2 diabetes  - Continue SSI, monitor with steroids    Hyperlipidemia  HTN  --cont home meds    CKD with polycystic kidneys  - Baseline creatinine 2.5--3    Wife concerned about work up for ?MM  --wants to see heme onc here.  Has appointment with Dr. Anaya on 4/14  --the SPEP and UPEP that I See from March 2025 are both negative      Tremors  --on topamax for this, follows w outpatient neuro        Expected Discharge Location and Transportation: home  Expected Discharge   Expected Discharge Date: 4/3/2025; Expected Discharge Time:      VTE Prophylaxis:  Pharmacologic VTE prophylaxis orders are present.         AM-PAC 6 Clicks Score (PT): 22 (04/01/25 0945)    CODE STATUS:   Code Status and Medical Interventions: CPR (Attempt to Resuscitate); Full Support   Ordered at: 03/30/25 1822     Code Status (Patient has no pulse and is not breathing):    CPR (Attempt to Resuscitate)     Medical Interventions (Patient has pulse or is breathing):    Full Support       Bill Hightower MD  04/01/25

## 2025-04-01 NOTE — THERAPY EVALUATION
Patient Name: Hal Byrnes  : 1940    MRN: [id number]                              Today's Date: 2025       Admit Date: 3/30/2025    Visit Dx:     ICD-10-CM ICD-9-CM   1. Pneumonia of right lung due to infectious organism, unspecified part of lung  J18.9 483.8   2. Hypoxia  R09.02 799.02   3. Acute respiratory failure with hypoxia and hypercapnia  J96.01 518.81    J96.02    4. Chronic renal failure, unspecified CKD stage  N18.9 585.9   5. COPD with acute exacerbation  J44.1 491.21     Patient Active Problem List   Diagnosis    Primary hypertension    Coronary artery disease    Gastroesophageal reflux disease with esophagitis without hemorrhage    Diabetes mellitus due to underlying condition, without long-term current use of insulin    Benign prostatic hyperplasia with lower urinary tract symptoms    Peripheral neuropathy    Chronic diastolic (congestive) heart failure    Psoriasis    Aortic stenosis    Lumbar spondylosis    Cervical spondylosis    Dyslipidemia    Inverse psoriasis    Ectatic abdominal aorta    Pancreatic insufficiency    Stage 3b chronic kidney disease    Lumbar radiculopathy    Multiple renal cysts    Hyperlipemia    Tremor    Drug induced constipation    Acute hypoxic respiratory failure     Past Medical History:   Diagnosis Date    Arthritis     Coronary artery disease     Difficulty walking     Fatigue     Hyperlipidemia     Hypertension     Low back pain     Other chest pain     SOB (shortness of breath)      Past Surgical History:   Procedure Laterality Date    APPENDECTOMY      CAROTID STENT      CATARACT EXTRACTION, BILATERAL Bilateral     COLONOSCOPY  2024    CORONARY STENT PLACEMENT      PROSTATE SURGERY      for BPH      General Information       Row Name 25 0935          Physical Therapy Time and Intention    Document Type evaluation  -ML     Mode of Treatment physical therapy  -ML       Row Name 25 0935          General Information    Patient Profile  Reviewed yes  -ML     Prior Level of Function independent:;all household mobility;community mobility;gait;transfer;bed mobility;feeding;grooming;dressing;driving;min assist:;bathing  patient does not ambulate with AD at baseline  -ML     Existing Precautions/Restrictions fall;oxygen therapy device and L/min  -ML     Barriers to Rehab none identified  -ML       Row Name 04/01/25 0935          Living Environment    Current Living Arrangements home  -ML     People in Home spouse  -ML       Row Name 04/01/25 0935          Home Main Entrance    Number of Stairs, Main Entrance one  -ML       Row Name 04/01/25 0935          Stairs Within Home, Primary    Stairs, Within Home, Primary patient lives on the main level of a multi level home  -ML       Row Name 04/01/25 0935          Cognition    Orientation Status (Cognition) oriented x 4  -ML       Row Name 04/01/25 0935          Safety Issues/Impairments Affecting Functional Mobility    Safety Issues Affecting Function (Mobility) safety precaution awareness  -ML     Impairments Affecting Function (Mobility) endurance/activity tolerance;shortness of breath  -ML               User Key  (r) = Recorded By, (t) = Taken By, (c) = Cosigned By      Initials Name Provider Type    ML Pia Nesbitt Physical Therapist                   Mobility       Row Name 04/01/25 0938          Bed Mobility    Comment, (Bed Mobility) patient found and left seated in bedside chair  -ML       Row Name 04/01/25 0938          Sit-Stand Transfer    Sit-Stand [place] (Transfers) standby assist  -ML     Assistive Device (Sit-Stand Transfers) other (see comments)  no AD  -ML       Row Name 04/01/25 0938          Gait/Stairs (Locomotion)    [place] Level (Gait) standby assist  -ML     Assistive Device (Gait) other (see comments)  no AD  -ML     Distance in Feet (Gait) 400  -ML     Comment, (Gait/Stairs) Patient demonstrates step through gait pattern, no rest breaks required, however patient with  increased work of breathing at end of ambulation distance. Patient on 2 LPM during ambulation.  -ML               User Key  (r) = Recorded By, (t) = Taken By, (c) = Cosigned By      Initials Name Provider Type    Pia Davidson Physical Therapist                   Obj/Interventions       Row Name 04/01/25 0946          Range of Motion Comprehensive    General Range of Motion bilateral lower extremity ROM WFL  -ML       Row Name 04/01/25 0946          Strength Comprehensive (MMT)    General Manual Muscle Testing (MMT) Assessment no strength deficits identified  -ML       Row Name 04/01/25 0946          Balance    Balance Assessment sitting static balance;sitting dynamic balance;standing static balance;standing dynamic balance  -ML     Static Sitting Balance independent  -ML     Dynamic Sitting Balance independent  -ML     Position, Sitting Balance unsupported;sitting in chair  -ML     Static Standing Balance standby assist  -ML     Dynamic Standing Balance standby assist  -ML     Position/Device Used, Standing Balance unsupported  -ML     Balance Interventions sitting;standing;sit to stand;occupation based/functional task  -ML       Row Name 04/01/25 0946          Sensory Assessment (Somatosensory)    Sensory Assessment (Somatosensory) LE sensation intact  -ML               User Key  (r) = Recorded By, (t) = Taken By, (c) = Cosigned By      Initials Name Provider Type    Pia Davidson Physical Therapist                   Goals/Plan       Row Name 04/01/25 0970          Bed Mobility Goal 1 (PT)    Activity/Assistive Device (Bed Mobility Goal 1, PT) sit to supine;supine to sit  -ML     [place] Level/Cues Needed (Bed Mobility Goal 1, PT) independent  -ML     Time Frame (Bed Mobility Goal 1, PT) short term goal (STG);5 days  -ML       Row Name 04/01/25 0948          Transfer Goal 1 (PT)    Activity/Assistive Device (Transfer Goal 1, PT) sit-to-stand/stand-to-sit;bed-to-chair/chair-to-bed  -ML     [place]  Level/Cues Needed (Transfer Goal 1, PT) independent  -ML     Time Frame (Transfer Goal 1, PT) short term goal (STG);5 days  -ML       [facility] Name 04/01/25 0948          Gait Training Goal 1 (PT)    Activity/Assistive Device (Gait Training Goal 1, PT) gait (walking locomotion);increase endurance/gait distance  -ML     [place] Level (Gait Training Goal 1, PT) independent  -ML     Distance (Gait Training Goal 1, PT) 600  -ML     Time Frame (Gait Training Goal 1, PT) long term goal (LTG);10 days  -ML       Row Name 04/01/25 0948          Therapy Assessment/Plan (PT)    Planned Therapy Interventions (PT) balance training;bed mobility training;gait training;home exercise program;patient/family education;postural re-education;strengthening;transfer training  -ML               User Key  (r) = Recorded By, (t) = Taken By, (c) = Cosigned By      Initials Name Provider Type     Pia Nesbitt Physical Therapist                   Clinical Impression       [facility] Name 04/01/25 0946          Pain    Pretreatment Pain Rating 0/10 - no pain  -ML     Posttreatment Pain Rating 0/10 - no pain  -ML       [facility] Name 04/01/25 0946          Plan of Care Review    Plan of Care Reviewed With patient;spouse  -ML     Outcome Evaluation Physical therapy evaluation complete. The patient required SBA to ambulate without AD, patient with increased work of breathing during ambulation, however, no rest breaks required. Patient would continue to benefit from 1-2 additional PT visits to progress activity tolerance deficits.  -ML       [facility] Name 04/01/25 0948          Therapy Assessment/Plan (PT)    Patient/Family Therapy Goals Statement (PT) return to PLOF  -ML     Rehab Potential (PT) good  -ML     Criteria for Skilled Interventions Met (PT) yes  -ML     Therapy Frequency (PT) daily  -ML     Predicted Duration of Therapy Intervention (PT) 3 days  -ML       Row Name 04/01/25 0999          Vital Signs    Pre Systolic BP Rehab 139  -ML     Pre Treatment  Diastolic BP 70  -ML     Post Systolic BP Rehab 140  -ML     Post Treatment Diastolic BP 71  -ML     Pretreatment Heart Rate (beats/min) 99  -ML     Posttreatment Heart Rate (beats/min) 100  -ML     Pre SpO2 (%) 97  -ML     Post SpO2 (%) 95  -ML     Pre Patient Position Sitting  -ML     Intra Patient Position Standing  -ML     Post Patient Position Sitting  -ML       Row Name 04/01/25 0946          Positioning and Restraints    Pre-Treatment Position sitting in chair/recliner  -ML     Post Treatment Position chair  -ML     In Chair notified nsg;reclined;call light within reach;encouraged to call for assist;exit alarm on;with family/caregiver;waffle cushion;legs elevated  -ML               User Key  (r) = Recorded By, (t) = Taken By, (c) = Cosigned By      Initials Name Provider Type    Pia Davidson Physical Therapist                   Outcome Measures       Row Name 04/01/25 0949          How much help from another person do you currently need...    Turning from your back to your side while in flat bed without using bedrails? 4  -ML     Moving from lying on back to sitting on the side of a flat bed without bedrails? 4  -ML     Moving to and from a bed to a chair (including a wheelchair)? 4  -ML     Standing up from a chair using your arms (e.g., wheelchair, bedside chair)? 4  -ML     Climbing 3-5 steps with a railing? 3  -ML     To walk in hospital room? 3  -ML     AM-PAC 6 Clicks Score (PT) 22  -ML     Highest Level of Mobility Goal 7 --> Walk 25 feet or more  -ML       Row Name 04/01/25 0949 04/01/25 0948       Functional Assessment    Outcome Measure Options AM-PAC 6 Clicks Basic Mobility (PT)  -ML AM-PAC 6 Clicks Daily Activity (OT)  -ALEXIA              User Key  (r) = Recorded By, (t) = Taken By, (c) = Cosigned By      Initials Name Provider Type    Gina Nunes, OT Occupational Therapist    Pia Davidson Physical Therapist                                 Physical Therapy Education       Title: PT OT  SLP Therapies (In Progress)       Topic: Physical Therapy (Done)       Point: Mobility training (Done)       Learning Progress Summary            Patient Acceptance, E, VU by ML at 4/1/2025 0949   Family Acceptance, E, VU by ML at 4/1/2025 0949                      Point: Precautions (Done)       Learning Progress Summary            Patient Acceptance, E, VU by ML at 4/1/2025 0949   Family Acceptance, E, VU by ML at 4/1/2025 0949                                      User Key       Initials Effective Dates Name Provider Type Discipline     04/22/21 -  Pia Nesbitt Physical Therapist PT                  PT Recommendation and Plan  Planned Therapy Interventions (PT): balance training, bed mobility training, gait training, home exercise program, patient/family education, postural re-education, strengthening, transfer training  Outcome Evaluation: Physical therapy evaluation complete. The patient required SBA to ambulate without AD, patient with increased work of breathing during ambulation, however, no rest breaks required. Patient would continue to benefit from 1-2 additional PT visits to progress activity tolerance deficits.     Time Calculation:   PT Evaluation Complexity  History, PT Evaluation Complexity: 1-2 personal factors and/or comorbidities  Examination of Body Systems (PT Eval Complexity): 1-2 elements  Clinical Presentation (PT Evaluation Complexity): evolving  Clinical Decision Making (PT Evaluation Complexity): low complexity  Overall Complexity (PT Evaluation Complexity): low complexity     PT Charges       Row Name 04/01/25 0950             Time Calculation    Start Time 0905  -ML      PT Received On 04/01/25  -ML      PT Goal Re-Cert Due Date 04/11/25  -ML         Untimed Charges    PT Eval/Re-eval Minutes 35  -ML         Total Minutes    Untimed Charges Total Minutes 35  -ML       Total Minutes 35  -ML                User Key  (r) = Recorded By, (t) = Taken By, (c) = Cosigned By      Initials Name  Provider Type    Pia Davidson Physical Therapist                  Therapy Charges for Today       Code Description Service Date Service Provider Modifiers Qty    [id number] HC PT EVAL LOW COMPLEXITY 3 4/1/2025 Pia Nesbitt GP 1            PT G-Codes  Outcome Measure Options: AM-PAC 6 Clicks Basic Mobility (PT)  AM-PAC 6 Clicks Score (PT): 22  AM-PAC 6 Clicks Score (OT): 18  PT Discharge Summary  Anticipated Discharge Disposition (PT): home with assist    Pia Nesbitt  4/1/2025

## 2025-04-01 NOTE — PLAN OF CARE
Goal Outcome Evaluation:  Plan of Care Reviewed With: patient, spouse           Outcome Evaluation: Physical therapy evaluation complete. The patient required SBA to ambulate without AD, patient with increased work of breathing during ambulation, however, no rest breaks required. Patient would continue to benefit from 1-2 additional PT visits to progress activity tolerance deficits.    Anticipated Discharge Disposition (PT): home with assist

## 2025-04-01 NOTE — THERAPY EVALUATION
Patient Name: Hal Byrnes  : 1940    MRN: [id number]                              Today's Date: 2025       Admit Date: 3/30/2025    Visit Dx:     ICD-10-CM ICD-9-CM   1. Pneumonia of right lung due to infectious organism, unspecified part of lung  J18.9 483.8   2. Hypoxia  R09.02 799.02   3. Acute respiratory failure with hypoxia and hypercapnia  J96.01 518.81    J96.02    4. Chronic renal failure, unspecified CKD stage  N18.9 585.9   5. COPD with acute exacerbation  J44.1 491.21     Patient Active Problem List   Diagnosis    Primary hypertension    Coronary artery disease    Gastroesophageal reflux disease with esophagitis without hemorrhage    Diabetes mellitus due to underlying condition, without long-term current use of insulin    Benign prostatic hyperplasia with lower urinary tract symptoms    Peripheral neuropathy    Chronic diastolic (congestive) heart failure    Psoriasis    Aortic stenosis    Lumbar spondylosis    Cervical spondylosis    Dyslipidemia    Inverse psoriasis    Ectatic abdominal aorta    Pancreatic insufficiency    Stage 3b chronic kidney disease    Lumbar radiculopathy    Multiple renal cysts    Hyperlipemia    Tremor    Drug induced constipation    Acute hypoxic respiratory failure     Past Medical History:   Diagnosis Date    Arthritis     Coronary artery disease     Difficulty walking     Fatigue     Hyperlipidemia     Hypertension     Low back pain     Other chest pain     SOB (shortness of breath)      Past Surgical History:   Procedure Laterality Date    APPENDECTOMY      CAROTID STENT      CATARACT EXTRACTION, BILATERAL Bilateral     COLONOSCOPY  2024    CORONARY STENT PLACEMENT      PROSTATE SURGERY      for BPH      General Information       Row Name 25 0940          OT Time and Intention    Subjective Information complains of  complains of constipation with gas pain  -ALEXIA     Document Type evaluation  -ALEXIA     Mode of Treatment occupational therapy  -ALEXIA      Patient Effort excellent  -ALEXIA     Symptoms Noted During/After Treatment other (see comments)  increased respirations  -ALEXIA       Row Name 04/01/25 0940          General Information    Patient Profile Reviewed yes  -ALEXIA     Prior Level of Function independent:;all household mobility;community mobility;gait;transfer;bed mobility;feeding;grooming;driving;min assist:;dressing;bathing;dependent:;cooking;cleaning  -ALEXIA     Existing Precautions/Restrictions fall;oxygen therapy device and L/min  -ALEXIA     Barriers to Rehab none identified  -ALEXIA       Row Name 04/01/25 0940          Occupational Profile    Environmental Supports and Barriers (Occupational Profile) wx in hoswer with high toilet  -ALEXIA       Row Name 04/01/25 0940          Living Environment    Current Living Arrangements home  -ALEXIA     People in Home spouse  -ALEXIA       Row Name 04/01/25 0940          Home Main Entrance    Number of Stairs, Main Entrance one  -ALEXIA     Stair Railings, Main Entrance none  -ALEXIA       Row Name 04/01/25 0940          Stairs Within Home, Primary    Stairs, Within Home, Primary all needs met on first floort  -ALEXIA       Row Name 04/01/25 0940          Cognition    Orientation Status (Cognition) oriented x 4  -ALEXIA       Row Name 04/01/25 0940          Safety Issues/Impairments Affecting Functional Mobility    Safety Issues Affecting Function (Mobility) safety precaution awareness  -ALEXIA     Impairments Affecting Function (Mobility) endurance/activity tolerance;shortness of breath  -ALEXIA               User Key  (r) = Recorded By, (t) = Taken By, (c) = Cosigned By      Initials Name Provider Type    Gina Nunes, OT Occupational Therapist                     Mobility/ADL's       Row Name 04/01/25 0941          Bed Mobility    Comment, (Bed Mobility) UIC on arrival and post session  -ALEXAI       Row Name 04/01/25 0941          Transfers    Transfers sit-stand transfer;stand-sit transfer  -ALEXIA       Row Name 04/01/25 0941          Sit-Stand Transfer     Sit-Stand [place] (Transfers) standby assist  -ALEXIA       Row Name 04/01/25 0941          Stand-Sit Transfer    Stand-Sit [place] (Transfers) standby assist  -ALEXIA       Row Name 04/01/25 0941          Functional Mobility    Functional Mobility- Ind. Level standby assist  -ALEXIA     Functional Mobility-Distance (Feet) --  greater than household distance  -ALEXIA     Functional Mobility- Safety Issues supplemental O2  -ALEXIA     Functional Mobility- Comment new use of 02  -ALEXIA       Row Name 04/01/25 0941          Activities of Daily Living    BADL Assessment/Intervention upper body dressing;lower body dressing  -       Row Name 04/01/25 0941          Upper Body Dressing Assessment/Training    [place] Level (Upper Body Dressing) don;pajama/robe;moderate assist (50% patient effort)  -ALEXIA     Position (Upper Body Dressing) unsupported standing  -       Row Name 04/01/25 0941          Lower Body Dressing Assessment/Training    [place] Level (Lower Body Dressing) don;shoes/slippers;dependent (less than 25% patient effort)  -ALEXIA     Position (Lower Body Dressing) supported sitting  -ALEXIA     Comment, (Lower Body Dressing) wife donned  -               User Key  (r) = Recorded By, (t) = Taken By, (c) = Cosigned By      Initials Name Provider Type    Gina Nunes, OT Occupational Therapist                   Obj/Interventions       Row Name 04/01/25 0942          Sensory Assessment (Somatosensory)    Sensory Assessment (Somatosensory) UE sensation intact  -     Sensory Assessment per pt. he has had some back and LE numbness priorly  -       Row Name 04/01/25 0942          Vision Assessment/Intervention    Visual Impairment/Limitations corrective lenses full-time  -       Row Name 04/01/25 0942          Range of Motion Comprehensive    General Range of Motion bilateral upper extremity ROM WFL  -       Row Name 04/01/25 0942          Strength Comprehensive (MMT)    General Manual Muscle Testing (MMT)  Assessment upper extremity strength deficits identified  -ALEXIA     Comment, General Manual Muscle Testing (MMT) Assessment BUE grossly 4 to 4+/5  -ALEXIA       Row Name 04/01/25 0942          Motor Skills    Motor Skills functional endurance  -ALEXIA     Functional Endurance impaired  -       Row Name 04/01/25 0942          Balance    Balance Assessment sitting static balance;sitting dynamic balance;sit to stand dynamic balance;standing static balance;standing dynamic balance  -ALEXIA     Static Sitting Balance independent  -ALEXIA     Dynamic Sitting Balance independent  -ALEXIA     Position, Sitting Balance unsupported;sitting in chair  -ALEXIA     Sit to Stand Dynamic Balance standby assist  -ALEXIA     Static Standing Balance standby assist  -ALEXIA     Dynamic Standing Balance standby assist  -ALEXIA     Position/Device Used, Standing Balance unsupported  -ALEXIA     Balance Interventions sit to stand  -ALEXIA               User Key  (r) = Recorded By, (t) = Taken By, (c) = Cosigned By      Initials Name Provider Type    Gina Nunes, OT Occupational Therapist                   Goals/Plan       Row Name 04/01/25 0962          Bed Mobility Goal 1 (OT)    Activity/Assistive Device (Bed Mobility Goal 1, OT) bed mobility activities, all  -ALEXIA     [place] Level/Cues Needed (Bed Mobility Goal 1, OT) independent  -ALEXIA     Time Frame (Bed Mobility Goal 1, OT) short term goal (STG);5 days  -ALEXIA     Progress/Outcomes (Bed Mobility Goal 1, OT) new goal  -ALEXIA       Row Name 04/01/25 0947          Dressing Goal 1 (OT)    Activity/Device (Dressing Goal 1, OT) lower body dressing  -ALEXIA     [place]/Cues Needed (Dressing Goal 1, OT) standby assist  -ALEXIA     Time Frame (Dressing Goal 1, OT) long term goal (LTG);10 days  -ALEXIA     Strategies/Barriers (Dressing Goal 1, OT) AE prn  -ALEXIA     Progress/Outcome (Dressing Goal 1, OT) new goal  -ALEXIA       Row Name 04/01/25 0958          Grooming Goal 1 (OT)    Activity/Device (Grooming Goal 1, OT) hair care;oral care;wash  face, hands  -ALEXIA     [place] (Grooming Goal 1, OT) standby assist  -ALEXIA     Time Frame (Grooming Goal 1, OT) short term goal (STG);5 days  -ALEXIA     Strategies/Barriers (Grooming Goal 1, OT) on room air 02 saturation 92%  -ALEXIA     Progress/Outcome (Grooming Goal 1, OT) goal ongoing  -ALEXIA       Row Name 04/01/25 0934          Therapy Assessment/Plan (OT)    Planned Therapy Interventions (OT) activity tolerance training;BADL retraining;functional balance retraining;adaptive equipment training;occupation/activity based interventions;patient/caregiver education/training;ROM/therapeutic exercise;strengthening exercise  -ALEXIA               User Key  (r) = Recorded By, (t) = Taken By, (c) = Cosigned By      Initials Name Provider Type    Gina Nunes, OT Occupational Therapist                   Clinical Impression       Row Name 04/01/25 0941          Pain Assessment    Additional Documentation Pain Scale: FACES Pre/Post-Treatment (Group)  -ALEXIA       Row Name 04/01/25 0976          Pain Scale: FACES Pre/Post-Treatment    Pain: FACES Scale, Pretreatment 2-->hurts little bit  -ALEXIA     Posttreatment Pain Rating 2-->hurts little bit  -ALEXIA     Pre/Posttreatment Pain Comment despite explaining scale several times, pt. could not rate amount, with abdominal pain from constipation  -ALEXIA       Row Name 04/01/25 0965          Plan of Care Review    Plan of Care Reviewed With patient;spouse  -ALEXIA     Progress no change  -ALEXIA     Outcome Evaluation Patient presents with impaired activity tolerance/endurance and ROM impacting BADLs warranting continued skilled O T services. Recommend home with spouse assist due to close to baseline independence with SBA transfer and movement in hallway.  -ALEXIA       Row Name 04/01/25 0993          Therapy Assessment/Plan (OT)    Patient/Family Therapy Goal Statement (OT) return to PLOF  -ALEXIA     Rehab Potential (OT) good  -ALEXIA     Criteria for Skilled Therapeutic Interventions Met (OT) yes;meets  criteria;skilled treatment is necessary  -ALEXIA     Therapy Frequency (OT) daily  -ALEXIA     Predicted Duration of Therapy Intervention (OT) 10 days  -ALEXIA       Row Name 04/01/25 0943          Therapy Plan Review/Discharge Plan (OT)    Anticipated Discharge Disposition (OT) home with assist  -ALEXIA       Row Name 04/01/25 0943          Vital Signs    Pre Systolic BP Rehab 139  -ALEXIA     Pre Treatment Diastolic BP 70  -ALEXIA     Pretreatment Heart Rate (beats/min) 96  -ALEXIA     Posttreatment Heart Rate (beats/min) 99  -ALEXIA     Pre SpO2 (%) 96  -ALEXIA     O2 Delivery Pre Treatment nasal cannula  -ALEXIA     O2 Delivery Intra Treatment nasal cannula  -ALEXIA     Post SpO2 (%) 97  -ALEXIA     O2 Delivery Post Treatment nasal cannula  -ALEXIA     Pre Patient Position Sitting  -ALEXIA     Intra Patient Position Standing  -ALEXIA     Post Patient Position Sitting  -ALEXIA       Row Name 04/01/25 0943          Positioning and Restraints    Pre-Treatment Position sitting in chair/recliner  -ALEXIA     Post Treatment Position chair  -ALEXIA     In Chair notified nsg;reclined;call light within reach;encouraged to call for assist;exit alarm on;waffle cushion;with family/caregiver;legs elevated  -ALEXIA               User Key  (r) = Recorded By, (t) = Taken By, (c) = Cosigned By      Initials Name Provider Type    Gina Nunes, OT Occupational Therapist                   Outcome Measures       Row Name 04/01/25 0948          How much help from another is currently needed...    Putting on and taking off regular lower body clothing? 2  -ALEXIA     Bathing (including washing, rinsing, and drying) 3  -ALEXIA     Toileting (which includes using toilet bed pan or urinal) 3  -ALEXIA     Putting on and taking off regular upper body clothing 3  -ALEXIA     Taking care of personal grooming (such as brushing teeth) 3  -ALEXIA     Eating meals 4  -ALEXIA     AM-PAC 6 Clicks Score (OT) 18  -ALEXIA       Row Name 04/01/25 0949          How much help from another person do you currently need...    Turning from your back to  your side while in flat bed without using bedrails? 4  -ML     Moving from lying on back to sitting on the side of a flat bed without bedrails? 4  -ML     Moving to and from a bed to a chair (including a wheelchair)? 4  -ML     Standing up from a chair using your arms (e.g., wheelchair, bedside chair)? 4  -ML     Climbing 3-5 steps with a railing? 3  -ML     To walk in hospital room? 3  -ML     AM-PAC 6 Clicks Score (PT) 22  -ML     Highest Level of Mobility Goal 7 --> Walk 25 feet or more  -ML       Row Name 04/01/25 0949 04/01/25 0948       Functional Assessment    Outcome Measure Options AM-PAC 6 Clicks Basic Mobility (PT)  -ML AM-PAC 6 Clicks Daily Activity (OT)  -ALEXIA              User Key  (r) = Recorded By, (t) = Taken By, (c) = Cosigned By      Initials Name Provider Type    Gina Nunes OT Occupational Therapist    Pia Davidson Physical Therapist                    Occupational Therapy Education       Title: PT OT SLP Therapies (In Progress)       Topic: Occupational Therapy (In Progress)       Point: ADL training (Done)       Learning Progress Summary            Patient Acceptance, E, VU,NR by  at 4/1/2025 0950    Comment: reason for consult, evaluation results, transfer safety   Family Acceptance, E, VU,NR by  at 4/1/2025 0950    Comment: reason for consult, evaluation results, transfer safety                      Point: Precautions (Done)       Learning Progress Summary            Patient Acceptance, E, VU,NR by  at 4/1/2025 0950    Comment: reason for consult, evaluation results, transfer safety   Family Acceptance, E, VU,NR by  at 4/1/2025 0950    Comment: reason for consult, evaluation results, transfer safety                                      User Key       Initials Effective Dates Name Provider Type [facility] 07/11/23 -  Gina Hough, OT Occupational Therapist OT                  OT Recommendation and Plan  Planned Therapy Interventions (OT): activity tolerance training,  BADL retraining, functional balance retraining, adaptive equipment training, occupation/activity based interventions, patient/caregiver education/training, ROM/therapeutic exercise, strengthening exercise  Therapy Frequency (OT): daily  Plan of Care Review  Plan of Care Reviewed With: patient, spouse  Progress: no change  Outcome Evaluation: Patient presents with impaired activity tolerance/endurance and ROM impacting BADLs warranting continued skilled O T services. Recommend home with spouse assist due to close to baseline independence with SBA transfer and movement in hallway.     Time Calculation:   Evaluation Complexity (OT)  Review Occupational Profile/Medical/Therapy History Complexity: brief/low complexity  Assessment, Occupational Performance/Identification of Deficit Complexity: 1-3 performance deficits  Clinical Decision Making Complexity (OT): problem focused assessment/low complexity  Overall Complexity of Evaluation (OT): low complexity     Time Calculation- OT       Row Name 04/01/25 0950             Time Calculation- OT    OT Start Time 0903  -ALEXIA      OT Received On 04/01/25  -ALEXIA      OT Goal Re-Cert Due Date 04/11/25  -ALEXIA         Untimed Charges    OT Eval/Re-eval Minutes 36  -ALEXIA         Total Minutes    Untimed Charges Total Minutes 36  -ALEXIA       Total Minutes 36  -ALEXIA                User Key  (r) = Recorded By, (t) = Taken By, (c) = Cosigned By      Initials Name Provider Type    Gina Nunes OT Occupational Therapist                  Therapy Charges for Today       Code Description Service Date Service Provider Modifiers Qty    [id number]  OT EVAL LOW COMPLEXITY 3 4/1/2025 Gina Hough OT GO 1                 Gina Hough OT  4/1/2025

## 2025-04-02 ENCOUNTER — APPOINTMENT (OUTPATIENT)
Dept: GENERAL RADIOLOGY | Facility: HOSPITAL | Age: 85
DRG: 193 | End: 2025-04-02
Payer: MEDICARE

## 2025-04-02 ENCOUNTER — TELEPHONE (OUTPATIENT)
Dept: INTERNAL MEDICINE | Facility: CLINIC | Age: 85
End: 2025-04-02

## 2025-04-02 LAB
ALBUMIN SERPL-MCNC: 3.3 G/DL (ref 3.5–5.2)
ANION GAP SERPL CALCULATED.3IONS-SCNC: 14 MMOL/L (ref 5–15)
BUN SERPL-MCNC: 62 MG/DL (ref 8–23)
BUN/CREAT SERPL: 18.9 (ref 7–25)
CALCIUM SPEC-SCNC: 8.5 MG/DL (ref 8.6–10.5)
CHLORIDE SERPL-SCNC: 104 MMOL/L (ref 98–107)
CO2 SERPL-SCNC: 22 MMOL/L (ref 22–29)
CREAT SERPL-MCNC: 3.28 MG/DL (ref 0.76–1.27)
DEPRECATED RDW RBC AUTO: 45.7 FL (ref 37–54)
EGFRCR SERPLBLD CKD-EPI 2021: 18 ML/MIN/1.73
ERYTHROCYTE [DISTWIDTH] IN BLOOD BY AUTOMATED COUNT: 13.5 % (ref 12.3–15.4)
GLUCOSE BLDC GLUCOMTR-MCNC: 123 MG/DL (ref 70–130)
GLUCOSE BLDC GLUCOMTR-MCNC: 133 MG/DL (ref 70–130)
GLUCOSE BLDC GLUCOMTR-MCNC: 141 MG/DL (ref 70–130)
GLUCOSE SERPL-MCNC: 126 MG/DL (ref 65–99)
HCT VFR BLD AUTO: 31.9 % (ref 37.5–51)
HGB BLD-MCNC: 10.5 G/DL (ref 13–17.7)
MCH RBC QN AUTO: 30.1 PG (ref 26.6–33)
MCHC RBC AUTO-ENTMCNC: 32.9 G/DL (ref 31.5–35.7)
MCV RBC AUTO: 91.4 FL (ref 79–97)
PHOSPHATE SERPL-MCNC: 3.8 MG/DL (ref 2.5–4.5)
PLATELET # BLD AUTO: 224 10*3/MM3 (ref 140–450)
PMV BLD AUTO: 9.6 FL (ref 6–12)
POTASSIUM SERPL-SCNC: 4.3 MMOL/L (ref 3.5–5.2)
RBC # BLD AUTO: 3.49 10*6/MM3 (ref 4.14–5.8)
SODIUM SERPL-SCNC: 140 MMOL/L (ref 136–145)
WBC NRBC COR # BLD AUTO: 11.04 10*3/MM3 (ref 3.4–10.8)

## 2025-04-02 PROCEDURE — 85027 COMPLETE CBC AUTOMATED: CPT | Performed by: INTERNAL MEDICINE

## 2025-04-02 PROCEDURE — 99232 SBSQ HOSP IP/OBS MODERATE 35: CPT | Performed by: INTERNAL MEDICINE

## 2025-04-02 PROCEDURE — 25010000002 HEPARIN (PORCINE) PER 1000 UNITS: Performed by: INTERNAL MEDICINE

## 2025-04-02 PROCEDURE — 80069 RENAL FUNCTION PANEL: CPT | Performed by: INTERNAL MEDICINE

## 2025-04-02 PROCEDURE — 25010000002 METHYLPREDNISOLONE PER 125 MG: Performed by: INTERNAL MEDICINE

## 2025-04-02 PROCEDURE — 71045 X-RAY EXAM CHEST 1 VIEW: CPT

## 2025-04-02 PROCEDURE — 99222 1ST HOSP IP/OBS MODERATE 55: CPT | Performed by: INTERNAL MEDICINE

## 2025-04-02 PROCEDURE — 82948 REAGENT STRIP/BLOOD GLUCOSE: CPT

## 2025-04-02 PROCEDURE — 25010000002 AMPICILLIN-SULBACTAM PER 1.5 G: Performed by: INTERNAL MEDICINE

## 2025-04-02 PROCEDURE — 94799 UNLISTED PULMONARY SVC/PX: CPT

## 2025-04-02 PROCEDURE — 25010000002 DOXYCYCLINE 100 MG RECONSTITUTED SOLUTION 1 EACH VIAL: Performed by: INTERNAL MEDICINE

## 2025-04-02 PROCEDURE — 94664 DEMO&/EVAL PT USE INHALER: CPT

## 2025-04-02 RX ORDER — DOXYCYCLINE 100 MG/1
100 CAPSULE ORAL EVERY 12 HOURS SCHEDULED
Status: COMPLETED | OUTPATIENT
Start: 2025-04-02 | End: 2025-04-04

## 2025-04-02 RX ORDER — IPRATROPIUM BROMIDE AND ALBUTEROL SULFATE 2.5; .5 MG/3ML; MG/3ML
3 SOLUTION RESPIRATORY (INHALATION) EVERY 6 HOURS PRN
Status: DISCONTINUED | OUTPATIENT
Start: 2025-04-02 | End: 2025-04-07 | Stop reason: HOSPADM

## 2025-04-02 RX ORDER — GUAIFENESIN 600 MG/1
600 TABLET, EXTENDED RELEASE ORAL EVERY 12 HOURS SCHEDULED
Status: DISCONTINUED | OUTPATIENT
Start: 2025-04-02 | End: 2025-04-07 | Stop reason: HOSPADM

## 2025-04-02 RX ORDER — PREDNISONE 20 MG/1
60 TABLET ORAL
Status: DISCONTINUED | OUTPATIENT
Start: 2025-04-03 | End: 2025-04-03

## 2025-04-02 RX ADMIN — SENNOSIDES AND DOCUSATE SODIUM 2 TABLET: 50; 8.6 TABLET ORAL at 09:11

## 2025-04-02 RX ADMIN — ISOSORBIDE MONONITRATE 60 MG: 60 TABLET, EXTENDED RELEASE ORAL at 20:15

## 2025-04-02 RX ADMIN — ASPIRIN 81 MG: 81 TABLET, CHEWABLE ORAL at 09:11

## 2025-04-02 RX ADMIN — GUAIFENESIN 600 MG: 600 TABLET, EXTENDED RELEASE ORAL at 20:15

## 2025-04-02 RX ADMIN — DOXYCYCLINE 100 MG: 100 INJECTION, POWDER, LYOPHILIZED, FOR SOLUTION INTRAVENOUS at 05:55

## 2025-04-02 RX ADMIN — SENNOSIDES AND DOCUSATE SODIUM 2 TABLET: 50; 8.6 TABLET ORAL at 20:15

## 2025-04-02 RX ADMIN — PANCRELIPASE 24000 UNITS OF LIPASE: 60000; 12000; 38000 CAPSULE, DELAYED RELEASE PELLETS ORAL at 11:57

## 2025-04-02 RX ADMIN — AMPICILLIN SODIUM AND SULBACTAM SODIUM 3 G: 2; 1 INJECTION, POWDER, FOR SOLUTION INTRAMUSCULAR; INTRAVENOUS at 16:30

## 2025-04-02 RX ADMIN — AMPICILLIN SODIUM AND SULBACTAM SODIUM 3 G: 2; 1 INJECTION, POWDER, FOR SOLUTION INTRAMUSCULAR; INTRAVENOUS at 04:47

## 2025-04-02 RX ADMIN — TERAZOSIN HYDROCHLORIDE 2 MG: 2 CAPSULE ORAL at 20:15

## 2025-04-02 RX ADMIN — SIMETHICONE 80 MG: 80 TABLET, CHEWABLE ORAL at 04:15

## 2025-04-02 RX ADMIN — IPRATROPIUM BROMIDE AND ALBUTEROL SULFATE 3 ML: 2.5; .5 SOLUTION RESPIRATORY (INHALATION) at 16:09

## 2025-04-02 RX ADMIN — TOPIRAMATE 50 MG: 25 TABLET, FILM COATED ORAL at 20:15

## 2025-04-02 RX ADMIN — GUAIFENESIN 600 MG: 600 TABLET, EXTENDED RELEASE ORAL at 09:12

## 2025-04-02 RX ADMIN — PANCRELIPASE 24000 UNITS OF LIPASE: 60000; 12000; 38000 CAPSULE, DELAYED RELEASE PELLETS ORAL at 17:57

## 2025-04-02 RX ADMIN — PANTOPRAZOLE SODIUM 40 MG: 40 TABLET, DELAYED RELEASE ORAL at 09:12

## 2025-04-02 RX ADMIN — METHYLPREDNISOLONE SODIUM SUCCINATE 60 MG: 125 INJECTION INTRAMUSCULAR; INTRAVENOUS at 11:56

## 2025-04-02 RX ADMIN — HYDRALAZINE HYDROCHLORIDE 50 MG: 50 TABLET ORAL at 09:12

## 2025-04-02 RX ADMIN — TOPIRAMATE 50 MG: 25 TABLET, FILM COATED ORAL at 09:11

## 2025-04-02 RX ADMIN — HEPARIN SODIUM 5000 UNITS: 5000 INJECTION INTRAVENOUS; SUBCUTANEOUS at 20:14

## 2025-04-02 RX ADMIN — HEPARIN SODIUM 5000 UNITS: 5000 INJECTION INTRAVENOUS; SUBCUTANEOUS at 09:12

## 2025-04-02 RX ADMIN — ACETAMINOPHEN 650 MG: 325 TABLET, FILM COATED ORAL at 04:54

## 2025-04-02 RX ADMIN — ISOSORBIDE MONONITRATE 60 MG: 60 TABLET, EXTENDED RELEASE ORAL at 09:12

## 2025-04-02 RX ADMIN — AMLODIPINE BESYLATE 5 MG: 5 TABLET ORAL at 09:13

## 2025-04-02 RX ADMIN — HYDRALAZINE HYDROCHLORIDE 50 MG: 50 TABLET ORAL at 15:41

## 2025-04-02 RX ADMIN — IPRATROPIUM BROMIDE AND ALBUTEROL SULFATE 3 ML: 2.5; .5 SOLUTION RESPIRATORY (INHALATION) at 08:06

## 2025-04-02 RX ADMIN — LUBIPROSTONE 8 MCG: 8 CAPSULE ORAL at 09:13

## 2025-04-02 RX ADMIN — IPRATROPIUM BROMIDE AND ALBUTEROL SULFATE 3 ML: 2.5; .5 SOLUTION RESPIRATORY (INHALATION) at 12:20

## 2025-04-02 RX ADMIN — PANCRELIPASE 24000 UNITS OF LIPASE: 60000; 12000; 38000 CAPSULE, DELAYED RELEASE PELLETS ORAL at 09:12

## 2025-04-02 RX ADMIN — IPRATROPIUM BROMIDE AND ALBUTEROL SULFATE 3 ML: 2.5; .5 SOLUTION RESPIRATORY (INHALATION) at 21:02

## 2025-04-02 RX ADMIN — DOXYCYCLINE 100 MG: 100 CAPSULE ORAL at 20:15

## 2025-04-02 RX ADMIN — HYDRALAZINE HYDROCHLORIDE 50 MG: 50 TABLET ORAL at 20:15

## 2025-04-02 RX ADMIN — SIMETHICONE 80 MG: 80 TABLET, CHEWABLE ORAL at 20:15

## 2025-04-02 RX ADMIN — IPRATROPIUM BROMIDE AND ALBUTEROL SULFATE 3 ML: 2.5; .5 SOLUTION RESPIRATORY (INHALATION) at 04:16

## 2025-04-02 RX ADMIN — LUBIPROSTONE 8 MCG: 8 CAPSULE ORAL at 20:15

## 2025-04-02 RX ADMIN — Medication 10 ML: at 09:13

## 2025-04-02 NOTE — PROGRESS NOTES
"[facility] at [facility]  IP Progress Note    HOSPITAL COURSE:  Patient was admitted with shortness of breath and is being treated for pneumonia.  She      CHIEF COMPLAINTS:  Shortness of breath      Subjective     Feeling much better    Objective     Blood pressure 145/75, pulse 100, temperature 97.8 °F (36.6 °C), temperature source Oral, resp. rate 18, height 162 cm (63.78\"), weight 77.1 kg (170 lb), SpO2 96%.   No intake or output data in the 24 hours ending 04/02/25 1045    PHYSICAL EXAM:  Constitutional:       General: Not in acute distress.     Appearance: Healthy appearance. Not in distress.     Neck:     JVP:Not elevated     Carotid artery: Normal    Pulmonary:      Effort: Pulmonary effort is normal.      Breath sounds: Normal breath sounds. No wheezing. No rhonchi. No rales.     Cardiovascular:      Normal rate. Regular rhythm. Normal S1. Normal S2.      Murmurs: There is 3/6 systolic murmur.      No gallop. No click. No rub.     Abdominal:      General: Bowel sounds are normal.      Palpations: Abdomen is soft.      Tenderness: There is no abdominal tenderness.    Extremities:     Pulses:Normal radial and pedal pulses     Edema:no edema    MEDICATIONS:    amLODIPine, 5 mg, Oral, Daily  ampicillin-sulbactam, 3 g, Intravenous, Q12H  aspirin, 81 mg, Oral, Daily  doxycycline, 100 mg, Oral, Q12H  guaiFENesin, 600 mg, Oral, Q12H  heparin (porcine), 5,000 Units, Subcutaneous, Q12H  hydrALAZINE, 50 mg, Oral, TID  insulin lispro, 2-7 Units, Subcutaneous, TID With Meals  ipratropium-albuterol, 3 mL, Nebulization, 4x Daily - RT  isosorbide mononitrate, 60 mg, Oral, BID  lubiprostone, 8 mcg, Oral, BID  methylPREDNISolone sodium succinate, 60 mg, Intravenous, Q12H  Pancrelipase (Lip-Prot-Amyl), 24,000 units of lipase, Oral, TID With Meals  pantoprazole, 40 mg, Oral, Daily  senna-docusate sodium, 2 tablet, Oral, BID  terazosin, 2 mg, Oral, Nightly  topiramate, 50 mg, Oral, BID          Results " from last 7 days   Lab Units 04/02/25  0400   WBC 10*3/mm3 11.04*   HEMOGLOBIN g/dL 10.5*   HEMATOCRIT % 31.9*   PLATELETS 10*3/mm3 224     Results from last 7 days   Lab Units 04/02/25  0400 04/01/25  0352 03/31/25  0852   SODIUM mmol/L 140   < > 136   POTASSIUM mmol/L 4.3   < > 4.0   CHLORIDE mmol/L 104   < > 101   CO2 mmol/L 22.0   < > 22.0   BUN mg/dL 62*   < > 44*   CREATININE mg/dL 3.28*   < > 3.14*   CALCIUM mg/dL 8.5*   < > 8.4*   BILIRUBIN mg/dL  --   --  0.2   ALK PHOS U/L  --   --  64   ALT (SGPT) U/L  --   --  22   AST (SGOT) U/L  --   --  19   GLUCOSE mg/dL 126*   < > 166*    < > = values in this interval not displayed.         Lab Results   Component Value Date    TROPONINT 162 (C) 03/30/2025                 Iron   Date Value Ref Range Status   06/29/2023 74 59 - 158 mcg/dL Final     Ferritin   Date Value Ref Range Status   06/29/2023 122.00 30.00 - 400.00 ng/mL Final     Iron Saturation (TSAT)   Date Value Ref Range Status   06/29/2023 20 20 - 50 % Final     TIBC   Date Value Ref Range Status   06/29/2023 377 298 - 536 mcg/dL Final      Hemoglobin A1C   Date Value Ref Range Status   03/04/2025 5.8 (A) 4.5 - 5.7 % Final     Magnesium   Date Value Ref Range Status   03/30/2025 2.0 1.6 - 2.4 mg/dL Final        RESULT REVIEW:    I reviewed the patient's new clinical results.    Tele: Sinus Rythym      ASSESSMENT:     Coronary artery disease  Chronic kidney disease  Hypertension  Hyperlipidemia    PLAN:     I think his main problem is his chronic kidney disease.  He is improving with his respiratory problem.  Will continue on current medications.  He should be able to go home once cleared with hospitalist.

## 2025-04-02 NOTE — PLAN OF CARE
Goal Outcome Evaluation:      Pt alert and oriented. 3 L Ncs, NSR. VSS. Around 0400, pt O2 dropped to 82% on 2 L NC while sleeping. Upon entering room, pt appeared tachypnec and complaining of SOA. Placed on 6 L NC, but remained 88-89 and symptomatic. NP notified, PRN neb treatments ordered. Pt improved after treatment. Currently on 3 L NC, will continue to wean. Bed in lowest position, call light within reach, and no other requests at this time. Care on-going.

## 2025-04-02 NOTE — PROGRESS NOTES
"    [facility] Medicine Services  PROGRESS NOTE    Patient Name: Hal Byrnes  : 1940  MRN: [id number]    Date of Admission: 3/30/2025  Primary Care Physician: Pia De La Garza MD    Subjective   Subjective     CC:  F/u resp failure    HPI:  Breathing good.  Still c/o abdominal pain.  States that feels like \"gas.\"  Reports had BM yesterday after lots of meds.        Objective   Objective     Vital Signs:   Temp:  [97.8 °F (36.6 °C)-98.3 °F (36.8 °C)] 98 °F (36.7 °C)  Heart Rate:  [] 99  Resp:  [18-26] 18  BP: (131-153)/(69-87) 138/71  Flow (L/min) (Oxygen Therapy):  [1-6] 3     Physical Exam:  Constitutional: No acute distress, awake, alert, sitting up in bed, wife at bedside  HENT: NCAT, mucous membranes moist  Respiratory: decreased BS bases  Cardiovascular: RRR, +murmur  Gastrointestinal: Positive bowel sounds, soft, nontender, nondistended  Musculoskeletal: trace bilateral ankle edema  Psychiatric: Appropriate affect, cooperative  Neurologic: Oriented x 3, CHRISTIAN, speech clear  Skin: No rashes      Results Reviewed:  LAB RESULTS:      Lab 25  0400 25  0352 25  0852 25  1418 25  1324 25  0330 25  0221   WBC 11.04* 11.13* 6.48  --  10.77  --  6.75   HEMOGLOBIN 10.5* 10.2* 11.0*  --  12.1*  --  11.2*   HEMATOCRIT 31.9* 31.1* 33.5*  --  36.9*  --  32.9*   PLATELETS 224 211 204  --  213  --  194   NEUTROS ABS  --  10.55* 6.01  --  9.64*  --  4.74   IMMATURE GRANS (ABS)  --  0.09* 0.05  --  0.14*  --  0.05   LYMPHS ABS  --  0.34* 0.40*  --  0.68*  --  1.40   MONOS ABS  --  0.14 0.02*  --  0.27  --  0.50   EOS ABS  --  0.00 0.00  --  0.02  --  0.04   MCV 91.4 90.7 90.3  --  92.0  --  89.4   LACTATE  --   --   --   --  0.7  --   --    HSTROP T  --   --   --  162* 123* 80* 80*         Lab 25  0400 25  0352 25  0852 25  1324 25  0221   SODIUM 140 137 136 140 137   POTASSIUM 4.3 4.0 4.0 3.9 3.6   CHLORIDE 104 " 101 101 100 101   CO2 22.0 23.0 22.0 25.0 25.0   ANION GAP 14.0 13.0 13.0 15.0 11.0   BUN 62* 56* 44* 36* 36*   CREATININE 3.28* 3.67* 3.14* 3.17* 3.13*   EGFR 18.0* 15.7* 18.9* 18.7* 19.0*   GLUCOSE 126* 134* 166* 154* 112*   CALCIUM 8.5* 8.4* 8.4* 8.5* 8.6   MAGNESIUM  --   --   --   --  2.0   PHOSPHORUS 3.8  --   --   --   --          Lab 04/02/25  0400 03/31/25  0852 03/30/25  1324 03/30/25  0221   TOTAL PROTEIN  --  5.9* 6.1 6.0   ALBUMIN 3.3* 3.3* 3.6 3.5   GLOBULIN  --  2.6 2.5 2.5   ALT (SGPT)  --  22 22 19   AST (SGOT)  --  19 23 17   BILIRUBIN  --  0.2 0.2 0.3   ALK PHOS  --  64 68 65         Lab 03/30/25  1418 03/30/25  1324 03/30/25  0330 03/30/25  0221   PROBNP  --  816.9  --  926.8   HSTROP T 162* 123* 80* 80*                 Lab 03/31/25  0502 03/30/25  1516 03/30/25  1337   PH, ARTERIAL 7.308* 7.287* 7.232*   PCO2, ARTERIAL 52.3* 58.4* 64.6*   PO2 ART 67.5* 121.0* 75.8*   FIO2 36 55 45   HCO3 ART 26.2* 27.9* 27.2*   BASE EXCESS ART -0.7* 0.2 -1.6*   CARBOXYHEMOGLOBIN 1.1 1.0 1.3     Brief Urine Lab Results  (Last result in the past 365 days)        Color   Clarity   Blood   Leuk Est   Nitrite   Protein   CREAT   Urine HCG        03/04/25 1057             86.6                 Microbiology Results Abnormal       None            XR Chest 1 View  Result Date: 4/2/2025  XR CHEST 1 VW Date of Exam: 4/2/2025 4:10 AM EDT Indication: shortness of breath, increase O2  requirement Comparison: Chest radiograph 3/30/2025 Findings: Heart size is normal. There is mild pulmonary vascular congestion. There is a small to moderate size right pleural effusion with right basilar atelectasis. The left lung is clear.     Impression: Impression: Small to moderate right pleural effusion with right basilar atelectasis. Electronically Signed: Ward Carvajal MD  4/2/2025 4:49 AM EDT  Workstation ID: [id number]        Results for orders placed during the hospital encounter of 02/25/25    Adult Transthoracic Echo Complete W/ Cont if  Necessary Per Protocol    Interpretation Summary    Left ventricular systolic function is normal. Calculated left ventricular EF = 62.5%    Left ventricular wall thickness is consistent with mild concentric hypertrophy.  Mild gradient is seen in the LV cavity.    The left atrial cavity is dilated.    Left atrial volume is mildly increased.    Moderate aortic valve stenosis is present.    Aortic valve mean pressure gradient is 23 mmHg with good stroke-volume.    Estimated right ventricular systolic pressure from tricuspid regurgitation is normal (<35 mmHg).      Current medications:  Scheduled Meds:amLODIPine, 5 mg, Oral, Daily  ampicillin-sulbactam, 3 g, Intravenous, Q12H  aspirin, 81 mg, Oral, Daily  doxycycline, 100 mg, Oral, Q12H  guaiFENesin, 600 mg, Oral, Q12H  heparin (porcine), 5,000 Units, Subcutaneous, Q12H  hydrALAZINE, 50 mg, Oral, TID  insulin lispro, 2-7 Units, Subcutaneous, TID With Meals  ipratropium-albuterol, 3 mL, Nebulization, 4x Daily - RT  isosorbide mononitrate, 60 mg, Oral, BID  lubiprostone, 8 mcg, Oral, BID  methylPREDNISolone sodium succinate, 60 mg, Intravenous, Q12H  Pancrelipase (Lip-Prot-Amyl), 24,000 units of lipase, Oral, TID With Meals  pantoprazole, 40 mg, Oral, Daily  senna-docusate sodium, 2 tablet, Oral, BID  terazosin, 2 mg, Oral, Nightly  topiramate, 50 mg, Oral, BID      Continuous Infusions:   PRN Meds:.  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        Brief Hospital Course to date:  Hal Byrnes is a 84 y.o. male with a history of hypertension, chronic HFpEF with moderate aortic stenosis, hyperlipidemia, CAD and CKD here with acute mixed respiratory failure    Acute mixed respiratory failure w hypoxia and hypercarbia  Pneumonia  - Cultures in process, negative flu, negative COVID  - Continue Unasyn and Doxy to cover for CAP  - transition IV steroids to prednisone 60mg today    Elevated troponin  History of HFpEF  Aortic stenosis  - Denies any chest pain, elevated troponins  - Continue aspirin  - Cardiology following  --being worked up for possible amyloid outpatient    Small to Right Pleural Effusion  --will see if IR can do thoracentesis and with ongoing w/u to r/o MM will send fluid cytology    Constipation  --bowel regimen.  Add soap suds enema if needed    Type 2 diabetes  - Continue SSI, monitor with steroids    Hyperlipidemia  HTN  --cont home meds    CKD with polycystic kidneys  - Baseline creatinine 2.5--3    Wife concerned about work up for ?MM  --wants to see heme onc here.  Has appointment with Dr. Anaya on 4/14.  D/w Dr. Anaya who will see this afternoon after clinic  --the SPEP and UPEP  March 2025 are both negative for monoclonal but increased free lamda light chains    Tremors  --on topamax for this, follows w outpatient neuro        Expected Discharge Location and Transportation: home  Expected Discharge   Expected Discharge Date: 4/3/2025; Expected Discharge Time:      VTE Prophylaxis:  Pharmacologic VTE prophylaxis orders are present.         AM-PAC 6 Clicks Score (PT): 22 (04/01/25 0034)    CODE STATUS:   Code Status and Medical Interventions: CPR (Attempt to Resuscitate); Full Support   Ordered at:  03/30/25 1822     Code Status (Patient has no pulse and is not breathing):    CPR (Attempt to Resuscitate)     Medical Interventions (Patient has pulse or is breathing):    Full Support       Bill Hightower MD  04/02/25

## 2025-04-02 NOTE — CASE MANAGEMENT/SOCIAL WORK
Continued Stay Note  [facility]     Patient Name: Hal Byrnes  MRN: [id number]  Today's Date: 4/2/2025    Admit Date: 3/30/2025    Plan: Home with family   Discharge Plan       Row Name 04/02/25 1135       Plan    Plan Home with family    Patient/Family in Agreement with Plan yes    Plan Comments I met with the patient at the beside. Patient discussed in [facility] as well. Patient was on 6L overnight but 3L now with a room air sat around 95%. RN notified that the patient needs a documented room air sat of 88% or less to qualify for home oxygen. Patient and wife denied preference on medical equipment company. I have made the referral to Alonso at [facility]. He is waiting for the room air saturation documentation to proceed with setting the patient up with oxygen. Order for oxygen in Epic. Patient plans to discharge home with spouse when medically ready. CM following.  1457: Room air saturation is in Epic. I have updated Alonso at [facility].     Final Discharge Disposition Code 01 - home or self-care                   Discharge Codes    No documentation.                 Expected Discharge Date and Time       Expected Discharge Date Expected Discharge Time    Apr 3, 2025               Terese Grissom RN

## 2025-04-02 NOTE — CONSULTS
Subjective     CHIEF COMPLAINT: Shortness of breath    HISTORY OF PRESENT ILLNESS:  The patient is a 84 y.o. male, referred by Bill Hightower MD for elevated serum free light chains.  The patient was admitted to the hospital service March 30, 2025 with heart failure shortness of breath and volume overload.  The patient does have chronic kidney disease and incidentally noted to have elevated serum free light chains.  I was consulted by Dr. Hightower to further assist in his care.  When I saw the patient today he is sitting comfortable bedside chair.  He is feeling better.  His breathing has improved.      REVIEW OF SYSTEMS:  A 14 point review of systems was performed and is negative except as noted above.    Past Medical History:   Diagnosis Date    Arthritis     Coronary artery disease     Difficulty walking     Fatigue     Hyperlipidemia     Hypertension     Low back pain     Other chest pain     SOB (shortness of breath)        No current facility-administered medications on file prior to encounter.     Current Outpatient Medications on File Prior to Encounter   Medication Sig Dispense Refill    amLODIPine (NORVASC) 5 MG tablet Take 1 tablet by mouth Daily. 90 tablet 3    aspirin 81 MG chewable tablet Chew 1 tablet Daily. OTC      dilTIAZem XR (DILACOR XR) 240 MG 24 hr capsule Take 1 capsule by mouth Daily.      furosemide (LASIX) 20 MG tablet Take 1 tablet by mouth Daily.      glycerin adult 2 g suppository Insert 1 suppository into the rectum As Needed for Constipation. 25 each 0    hydrALAZINE (APRESOLINE) 50 MG tablet Take 1 tablet by mouth 3 (Three) Times a Day. 90 tablet 3    isosorbide mononitrate (IMDUR) 60 MG 24 hr tablet TAKE 1 TABLET BY MOUTH TWICE A  tablet 3    linaclotide (Linzess) 290 MCG capsule capsule TAKE 1 CAPSULE BY MOUTH EVERY MORNING 30 MINUTES BEFORE BREAKFAST 90 capsule 3    Pancrelipase, Lip-Prot-Amyl, (Zenpep) 40152-59293 units capsule delayed-release particles Take 2 capsules by  mouth 3 (Three) Times a Day. 540 capsule 1    pantoprazole (PROTONIX) 40 MG EC tablet Take 1 tablet by mouth Daily. 90 tablet 3    terazosin (HYTRIN) 2 MG capsule Take 1 capsule by mouth Every Night. 90 capsule 1       Allergies   Allergen Reactions    Sulfa Antibiotics Irritability       Past Surgical History:   Procedure Laterality Date    APPENDECTOMY      CAROTID STENT      CATARACT EXTRACTION, BILATERAL Bilateral     COLONOSCOPY  2024    CORONARY STENT PLACEMENT      PROSTATE SURGERY      for BPH       OB History   No obstetric history on file.       Social History     Socioeconomic History    Marital status:    Tobacco Use    Smoking status: Former     Current packs/day: 0.00     Average packs/day: 1.5 packs/day for 10.0 years (15.0 ttl pk-yrs)     Types: Cigarettes     Start date: 2001     Quit date: 2011     Years since quittin.9     Passive exposure: Never    Smokeless tobacco: Never   Vaping Use    Vaping status: Never Used   Substance and Sexual Activity    Alcohol use: Never    Drug use: Not Currently     Types: Marijuana     Comment: longstanding smoking, stopped 2 weeks ago    Sexual activity: Not Currently       Family History   Problem Relation Age of Onset    Heart attack Mother     Brain cancer Sister     Heart attack Brother     Hepatitis Brother        Objective     Vitals:    25 0416 25 0459 25 0806 25 0911   BP:    145/75   BP Location:    Left arm   Patient Position:    Lying   Pulse:  100 89 100   Resp: 22  18 18   Temp:    97.8 °F (36.6 °C)   TempSrc:    Oral   SpO2:  94% 94% 96%   Weight:       Height:                            ECOG Performance Status: 2 - Symptomatic, <50% confined to bed  General: well appearing male in no acute distress  Neuro/Psych: A&O x 3, gait steady, appropriate affect, strength 5/5 in all muscle groups  HEENT: sclerae anicteric, oropharynx clear  Lymphatics: no cervical, supraclavicular, or axillary  adenopathy  Cardiovascular: regular rate and rhythm, no murmurs  Lungs: clear to auscultation bilaterally  Abdomen: soft, nontender, nondistended.  No palpable organomegaly  Extremities: no lower extremity edema  Skin: no rashes, lesions, bruising, or petechiae      No results displayed because visit has over 200 results.           XR Chest 1 View  Result Date: 4/2/2025  Narrative: XR CHEST 1 VW Date of Exam: 4/2/2025 4:10 AM EDT Indication: shortness of breath, increase O2  requirement Comparison: Chest radiograph 3/30/2025 Findings: Heart size is normal. There is mild pulmonary vascular congestion. There is a small to moderate size right pleural effusion with right basilar atelectasis. The left lung is clear.     Impression: Impression: Small to moderate right pleural effusion with right basilar atelectasis. Electronically Signed: Ward Carvajal MD  4/2/2025 4:49 AM EDT  Workstation ID: [id number]    XR Chest 1 View  Result Date: 3/30/2025  Narrative: XR CHEST 1 VW Date of Exam: 3/30/2025 1:17 PM EDT Indication: SOA triage protocol Comparison: Chest radiograph dated 3/30/2025 Findings: The cardiomediastinal silhouette is within normal limits. There is worsening right-sided bronchial wall thickening and interstitial opacities with new airspace opacity within the inferior right upper lobe. There is no pleural effusion or pneumothorax. There are degenerative changes of the thoracic spine.     Impression: Impression: Worsening right-sided bronchial wall thickening and interstitial opacities with new airspace opacity within the inferior right upper lobe. Findings are concerning for pneumonia. Electronically Signed: Luke Fajardo  3/30/2025 1:39 PM EDT  Workstation ID: [id number]    XR Chest 1 View  Result Date: 3/30/2025  Narrative: XR CHEST 1 VW Date of Exam: 3/30/2025 1:45 AM EDT Indication: SOA triage protocol Comparison: Chest radiograph 2/25/2025 Findings: There are no airspace consolidations. No pleural fluid. No  pneumothorax. The pulmonary vasculature appears within normal limits. The cardiac and mediastinal silhouette appear unremarkable. No acute osseous abnormality identified.     Impression: Impression: No active disease. Electronically Signed: Ward Carvajal MD  3/30/2025 2:24 AM EDT  Workstation ID: [id number]    PYP Imaging for Cardiac Amyloidosis  Result Date: 3/27/2025  Narrative:   The study is not suggestive of ATTR cardiac amyloidosis.   Semi-quantitative visual grading of myocardial uptake by comparison to rib uptake was grade 0 (no cardiac uptake and normal bone uptake)   There is no prior study available for comparison.        21.5 mCi MDP Oxidronate     CT Abdomen Pelvis Without Contrast  Result Date: 3/19/2025  Narrative: CT ABDOMEN PELVIS WO CONTRAST Date of Exam: 3/19/2025 4:27 PM EDT Indication: constipation, distended abdomen. Comparison: None available. Technique: Axial CT images were obtained of the abdomen and pelvis without the administration of contrast. Reconstructed coronal and sagittal images were also obtained. Automated exposure control and iterative construction methods were used. FINDINGS: Lung bases: Trace bilateral pleural fluid. Atheromatous disease of the coronary vessels. Liver:No masses. No intrahepatic biliary ductal dilatation. Spleen:No masses. No perisplenic hematoma. Pancreas:No pancreatic masses. No evidence of pancreatitis. Gallbladder and common bile duct:No evidence of cholelithiasis. No evidence of cholecystitis. Adrenal glands:No adrenal masses Kidneys and ureters: Innumerable bilateral renal cysts. No comparison imaging is available. The majority of these are renal cysts and fluid attenuating measuring up to 8.5 cm on the right. Persistent hyperdensities measuring up to approximately 0.9 cm likely represent hemorrhagic or proteinaceous cyst. There is an exophytic isodensity measuring  1.3 cm x 0.8 cm along the posterior right kidney on image #36 series 2 which is indeterminant.  Hypodensities noted involving the left kidney measuring up to 4.6 cm. Urinary bladder:No urinary bladder wall thickening. No bladder masses. Small bowel:Normal caliber small bowel. Large bowel: Colonic diverticulosis without evidence of diverticulitis. Appendix: The appendix is not identified. There is soft tissue in the right lower quadrant at the approximate expected location of the appendix measuring 3.5 cm x 2.1 cm with multiple surgical clips noted in the region. This is best seen on image #106 series 2. Surgical clips within the right inguinal canal. GENITOURINARY: Prostatic calcifications. Ascites or pneumoperitoneum:None. Adenopathy:None present Osseous structures: The pubic bones are intact. The proximal femurs are intact. Mild degenerative changes of the hips. The sacrum and sacroiliac joints are normal. Degenerative changes of the lumbar spine. Other findings: Atheromatous disease of the abdominal aorta and visualized branches.     Impression: 1.No acute findings within the abdomen or pelvis. 2.Colonic diverticulosis without evidence of diverticulitis. 3.Innumerable bilateral renal cysts as described above. There is an exophytic isodensity measuring 1.3 cm x 0.8 cm along the posterior right kidney which is indeterminant. This may represent a complex cyst. Recommend correlation with renal mass protocol CT of the abdomen and pelvis. 4.The appendix is not identified. There is soft tissue in the right lower quadrant at the approximate expected location of the appendix of the inguinal canal measuring 3.5 cm x 2.1 cm with multiple surgical clips noted in the region. This may represent postsurgical changes. Correlate with surgical history. Correlation with prior imaging recommended. Underlying mass not excluded. GI consult recommended. 5.Trace bilateral pleural fluid. Electronically Signed: Khanh King MD  3/19/2025 4:53 PM EDT  Workstation ID: [id number]      ASSESSMENT 84 so gentleman with elevated serum free  light chains    PLAN  1.  Elevated serum free light chains:  A.  I reviewed the patient chart including admission note, progress note blood results and imaging reports.  B.  I discussed with the patient that his serum free light chains were slightly elevated with ratio 1.8 and upper limit of normal at 1.7.  Furthermore his quant immunoglobulins were normal and on his serum protein to pheresis there were no clonality nor monoclonal protein detected.  C.  Explained to the patient and his wife the slight elevated serum free light chains is not unusual finding in patients chronic kidney disease.  D.  I do not have any evidence of plasma cell dyscrasia at this point.  I recommended watchful waiting and repeat his labs in 6 months.    2.  Normocytic anemia:  A.  Most likely induced by chronic kidney disease.  B.  The patient will benefit from erythropoietin replacement if his hemoglobin remains below 10.  C.  I will set him up to follow-up with me in 1 month repeat CBC BMP iron profile and possibly Procrit on the same day.      Jalen Anaya MD    4/2/2025

## 2025-04-02 NOTE — TELEPHONE ENCOUNTER
Caller: BASIL WILSON    Relationship: Emergency Contact    Best call back number: [phone]    What is the best time to reach you: AS SOON AS POSSIBLE     Who are you requesting to speak with (clinical staff, provider,  specific staff member): DOCTOR OR NURSE       What was the call regarding: THE PATIENTS WIFE WOULD LIKE TO UPDATE THE DOCTOR ON THE PATIENTS HOSPITAL STAY AND WOULD LIKE TO KNOW IF THE DOCTOR DOES ROUNDS AT THE HOSPITAL

## 2025-04-03 ENCOUNTER — APPOINTMENT (OUTPATIENT)
Dept: ULTRASOUND IMAGING | Facility: HOSPITAL | Age: 85
DRG: 193 | End: 2025-04-03
Payer: MEDICARE

## 2025-04-03 ENCOUNTER — APPOINTMENT (OUTPATIENT)
Dept: GENERAL RADIOLOGY | Facility: HOSPITAL | Age: 85
DRG: 193 | End: 2025-04-03
Payer: MEDICARE

## 2025-04-03 LAB
ALBUMIN SERPL-MCNC: 3.7 G/DL (ref 3.5–5.2)
ALBUMIN/GLOB SERPL: 2.1 G/DL
ALP SERPL-CCNC: 57 U/L (ref 39–117)
ALT SERPL W P-5'-P-CCNC: 29 U/L (ref 1–41)
ANION GAP SERPL CALCULATED.3IONS-SCNC: 9 MMOL/L (ref 5–15)
AST SERPL-CCNC: 28 U/L (ref 1–40)
BASOPHILS # BLD AUTO: 0.01 10*3/MM3 (ref 0–0.2)
BASOPHILS NFR BLD AUTO: 0.1 % (ref 0–1.5)
BILIRUB SERPL-MCNC: 0.2 MG/DL (ref 0–1.2)
BUN SERPL-MCNC: 61 MG/DL (ref 8–23)
BUN/CREAT SERPL: 18.5 (ref 7–25)
CALCIUM SPEC-SCNC: 8.4 MG/DL (ref 8.6–10.5)
CHLORIDE SERPL-SCNC: 105 MMOL/L (ref 98–107)
CO2 SERPL-SCNC: 26 MMOL/L (ref 22–29)
CREAT SERPL-MCNC: 3.3 MG/DL (ref 0.76–1.27)
D DIMER PPP FEU-MCNC: 2.66 MCGFEU/ML (ref 0–0.83)
D-LACTATE SERPL-SCNC: 0.8 MMOL/L (ref 0.5–2)
DEPRECATED RDW RBC AUTO: 45.9 FL (ref 37–54)
EGFRCR SERPLBLD CKD-EPI 2021: 17.8 ML/MIN/1.73
EOSINOPHIL # BLD AUTO: 0 10*3/MM3 (ref 0–0.4)
EOSINOPHIL NFR BLD AUTO: 0 % (ref 0.3–6.2)
ERYTHROCYTE [DISTWIDTH] IN BLOOD BY AUTOMATED COUNT: 13.6 % (ref 12.3–15.4)
GEN 5 1HR TROPONIN T REFLEX: 195 NG/L
GLOBULIN UR ELPH-MCNC: 1.8 GM/DL
GLUCOSE BLDC GLUCOMTR-MCNC: 121 MG/DL (ref 70–130)
GLUCOSE BLDC GLUCOMTR-MCNC: 132 MG/DL (ref 70–130)
GLUCOSE BLDC GLUCOMTR-MCNC: 160 MG/DL (ref 70–130)
GLUCOSE BLDC GLUCOMTR-MCNC: 98 MG/DL (ref 70–130)
GLUCOSE SERPL-MCNC: 102 MG/DL (ref 65–99)
HCT VFR BLD AUTO: 31.8 % (ref 37.5–51)
HGB BLD-MCNC: 10.5 G/DL (ref 13–17.7)
IMM GRANULOCYTES # BLD AUTO: 0.13 10*3/MM3 (ref 0–0.05)
IMM GRANULOCYTES NFR BLD AUTO: 1.2 % (ref 0–0.5)
INR PPP: 0.99 (ref 0.89–1.12)
LDH SERPL-CCNC: 243 U/L (ref 135–225)
LYMPHOCYTES # BLD AUTO: 0.69 10*3/MM3 (ref 0.7–3.1)
LYMPHOCYTES NFR BLD AUTO: 6.3 % (ref 19.6–45.3)
MAGNESIUM SERPL-MCNC: 2.5 MG/DL (ref 1.6–2.4)
MCH RBC QN AUTO: 30.3 PG (ref 26.6–33)
MCHC RBC AUTO-ENTMCNC: 33 G/DL (ref 31.5–35.7)
MCV RBC AUTO: 91.6 FL (ref 79–97)
MONOCYTES # BLD AUTO: 0.72 10*3/MM3 (ref 0.1–0.9)
MONOCYTES NFR BLD AUTO: 6.6 % (ref 5–12)
NEUTROPHILS NFR BLD AUTO: 85.8 % (ref 42.7–76)
NEUTROPHILS NFR BLD AUTO: 9.4 10*3/MM3 (ref 1.7–7)
NRBC BLD AUTO-RTO: 0 /100 WBC (ref 0–0.2)
NT-PROBNP SERPL-MCNC: 3335 PG/ML (ref 0–1800)
PHOSPHATE SERPL-MCNC: 3.8 MG/DL (ref 2.5–4.5)
PLATELET # BLD AUTO: 195 10*3/MM3 (ref 140–450)
PMV BLD AUTO: 9.1 FL (ref 6–12)
POTASSIUM SERPL-SCNC: 4.2 MMOL/L (ref 3.5–5.2)
PROT SERPL-MCNC: 5.5 G/DL (ref 6–8.5)
PROTHROMBIN TIME: 13.7 SECONDS (ref 12.2–15.3)
QT INTERVAL: 346 MS
QTC INTERVAL: 427 MS
RBC # BLD AUTO: 3.47 10*6/MM3 (ref 4.14–5.8)
SODIUM SERPL-SCNC: 140 MMOL/L (ref 136–145)
TROPONIN T % DELTA: 5
TROPONIN T NUMERIC DELTA: 9 NG/L
TROPONIN T SERPL HS-MCNC: 186 NG/L
WBC NRBC COR # BLD AUTO: 10.95 10*3/MM3 (ref 3.4–10.8)

## 2025-04-03 PROCEDURE — 80053 COMPREHEN METABOLIC PANEL: CPT | Performed by: NURSE PRACTITIONER

## 2025-04-03 PROCEDURE — 82948 REAGENT STRIP/BLOOD GLUCOSE: CPT

## 2025-04-03 PROCEDURE — 25010000002 HEPARIN (PORCINE) PER 1000 UNITS: Performed by: INTERNAL MEDICINE

## 2025-04-03 PROCEDURE — 93010 ELECTROCARDIOGRAM REPORT: CPT | Performed by: INTERNAL MEDICINE

## 2025-04-03 PROCEDURE — 94664 DEMO&/EVAL PT USE INHALER: CPT

## 2025-04-03 PROCEDURE — 25010000002 FUROSEMIDE PER 20 MG: Performed by: STUDENT IN AN ORGANIZED HEALTH CARE EDUCATION/TRAINING PROGRAM

## 2025-04-03 PROCEDURE — 94799 UNLISTED PULMONARY SVC/PX: CPT

## 2025-04-03 PROCEDURE — 93005 ELECTROCARDIOGRAM TRACING: CPT | Performed by: INTERNAL MEDICINE

## 2025-04-03 PROCEDURE — 83605 ASSAY OF LACTIC ACID: CPT | Performed by: NURSE PRACTITIONER

## 2025-04-03 PROCEDURE — 85610 PROTHROMBIN TIME: CPT | Performed by: STUDENT IN AN ORGANIZED HEALTH CARE EDUCATION/TRAINING PROGRAM

## 2025-04-03 PROCEDURE — 25010000002 AMPICILLIN-SULBACTAM PER 1.5 G: Performed by: INTERNAL MEDICINE

## 2025-04-03 PROCEDURE — 85025 COMPLETE CBC W/AUTO DIFF WBC: CPT | Performed by: NURSE PRACTITIONER

## 2025-04-03 PROCEDURE — 84100 ASSAY OF PHOSPHORUS: CPT | Performed by: INTERNAL MEDICINE

## 2025-04-03 PROCEDURE — 84484 ASSAY OF TROPONIN QUANT: CPT | Performed by: NURSE PRACTITIONER

## 2025-04-03 PROCEDURE — 83735 ASSAY OF MAGNESIUM: CPT | Performed by: NURSE PRACTITIONER

## 2025-04-03 PROCEDURE — 85379 FIBRIN DEGRADATION QUANT: CPT | Performed by: NURSE PRACTITIONER

## 2025-04-03 PROCEDURE — 92526 ORAL FUNCTION THERAPY: CPT

## 2025-04-03 PROCEDURE — 99232 SBSQ HOSP IP/OBS MODERATE 35: CPT | Performed by: INTERNAL MEDICINE

## 2025-04-03 PROCEDURE — 71045 X-RAY EXAM CHEST 1 VIEW: CPT

## 2025-04-03 PROCEDURE — 63710000001 INSULIN LISPRO (HUMAN) PER 5 UNITS: Performed by: INTERNAL MEDICINE

## 2025-04-03 PROCEDURE — 83880 ASSAY OF NATRIURETIC PEPTIDE: CPT | Performed by: NURSE PRACTITIONER

## 2025-04-03 PROCEDURE — 63710000001 PREDNISONE PER 1 MG: Performed by: INTERNAL MEDICINE

## 2025-04-03 PROCEDURE — 83615 LACTATE (LD) (LDH) ENZYME: CPT | Performed by: INTERNAL MEDICINE

## 2025-04-03 RX ORDER — NITROGLYCERIN 0.4 MG/1
0.4 TABLET SUBLINGUAL
Status: DISCONTINUED | OUTPATIENT
Start: 2025-04-03 | End: 2025-04-07 | Stop reason: HOSPADM

## 2025-04-03 RX ORDER — PREDNISONE 20 MG/1
40 TABLET ORAL
Status: COMPLETED | OUTPATIENT
Start: 2025-04-04 | End: 2025-04-04

## 2025-04-03 RX ORDER — PREDNISONE 20 MG/1
20 TABLET ORAL
Status: COMPLETED | OUTPATIENT
Start: 2025-04-05 | End: 2025-04-06

## 2025-04-03 RX ORDER — FUROSEMIDE 10 MG/ML
40 INJECTION INTRAMUSCULAR; INTRAVENOUS ONCE
Status: COMPLETED | OUTPATIENT
Start: 2025-04-03 | End: 2025-04-03

## 2025-04-03 RX ORDER — FUROSEMIDE 40 MG/1
40 TABLET ORAL DAILY
Status: DISCONTINUED | OUTPATIENT
Start: 2025-04-04 | End: 2025-04-07 | Stop reason: HOSPADM

## 2025-04-03 RX ORDER — AMOXICILLIN AND CLAVULANATE POTASSIUM 400; 57 MG/5ML; MG/5ML
250 POWDER, FOR SUSPENSION ORAL EVERY 12 HOURS SCHEDULED
Status: COMPLETED | OUTPATIENT
Start: 2025-04-03 | End: 2025-04-04

## 2025-04-03 RX ADMIN — AMPICILLIN SODIUM AND SULBACTAM SODIUM 3 G: 2; 1 INJECTION, POWDER, FOR SOLUTION INTRAMUSCULAR; INTRAVENOUS at 04:48

## 2025-04-03 RX ADMIN — DOXYCYCLINE 100 MG: 100 CAPSULE ORAL at 08:29

## 2025-04-03 RX ADMIN — HEPARIN SODIUM 5000 UNITS: 5000 INJECTION INTRAVENOUS; SUBCUTANEOUS at 21:18

## 2025-04-03 RX ADMIN — TERAZOSIN HYDROCHLORIDE 2 MG: 2 CAPSULE ORAL at 21:18

## 2025-04-03 RX ADMIN — ISOSORBIDE MONONITRATE 60 MG: 60 TABLET, EXTENDED RELEASE ORAL at 21:18

## 2025-04-03 RX ADMIN — PANCRELIPASE 24000 UNITS OF LIPASE: 60000; 12000; 38000 CAPSULE, DELAYED RELEASE PELLETS ORAL at 08:30

## 2025-04-03 RX ADMIN — HYDRALAZINE HYDROCHLORIDE 50 MG: 50 TABLET ORAL at 21:17

## 2025-04-03 RX ADMIN — NITROGLYCERIN 0.4 MG: 0.4 TABLET SUBLINGUAL at 02:32

## 2025-04-03 RX ADMIN — HYDRALAZINE HYDROCHLORIDE 50 MG: 50 TABLET ORAL at 08:30

## 2025-04-03 RX ADMIN — IPRATROPIUM BROMIDE AND ALBUTEROL SULFATE 3 ML: 2.5; .5 SOLUTION RESPIRATORY (INHALATION) at 15:51

## 2025-04-03 RX ADMIN — IPRATROPIUM BROMIDE AND ALBUTEROL SULFATE 3 ML: 2.5; .5 SOLUTION RESPIRATORY (INHALATION) at 12:53

## 2025-04-03 RX ADMIN — DOXYCYCLINE 100 MG: 100 CAPSULE ORAL at 21:17

## 2025-04-03 RX ADMIN — SENNOSIDES AND DOCUSATE SODIUM 2 TABLET: 50; 8.6 TABLET ORAL at 08:30

## 2025-04-03 RX ADMIN — TOPIRAMATE 50 MG: 25 TABLET, FILM COATED ORAL at 08:30

## 2025-04-03 RX ADMIN — HYDRALAZINE HYDROCHLORIDE 50 MG: 50 TABLET ORAL at 16:26

## 2025-04-03 RX ADMIN — GUAIFENESIN 600 MG: 600 TABLET, EXTENDED RELEASE ORAL at 21:17

## 2025-04-03 RX ADMIN — ISOSORBIDE MONONITRATE 60 MG: 60 TABLET, EXTENDED RELEASE ORAL at 08:29

## 2025-04-03 RX ADMIN — IPRATROPIUM BROMIDE AND ALBUTEROL SULFATE 3 ML: 2.5; .5 SOLUTION RESPIRATORY (INHALATION) at 20:36

## 2025-04-03 RX ADMIN — TOPIRAMATE 50 MG: 25 TABLET, FILM COATED ORAL at 21:18

## 2025-04-03 RX ADMIN — LUBIPROSTONE 8 MCG: 8 CAPSULE ORAL at 08:30

## 2025-04-03 RX ADMIN — PANTOPRAZOLE SODIUM 40 MG: 40 TABLET, DELAYED RELEASE ORAL at 08:29

## 2025-04-03 RX ADMIN — PREDNISONE 60 MG: 20 TABLET ORAL at 08:30

## 2025-04-03 RX ADMIN — AMOXICILLIN AND CLAVULANATE POTASSIUM 250 MG: 400; 57 POWDER, FOR SUSPENSION ORAL at 21:18

## 2025-04-03 RX ADMIN — FUROSEMIDE 40 MG: 10 INJECTION, SOLUTION INTRAMUSCULAR; INTRAVENOUS at 02:33

## 2025-04-03 RX ADMIN — INSULIN LISPRO 2 UNITS: 100 INJECTION, SOLUTION INTRAVENOUS; SUBCUTANEOUS at 17:38

## 2025-04-03 RX ADMIN — PANCRELIPASE 24000 UNITS OF LIPASE: 60000; 12000; 38000 CAPSULE, DELAYED RELEASE PELLETS ORAL at 17:38

## 2025-04-03 RX ADMIN — LUBIPROSTONE 8 MCG: 8 CAPSULE ORAL at 21:21

## 2025-04-03 RX ADMIN — PANCRELIPASE 24000 UNITS OF LIPASE: 60000; 12000; 38000 CAPSULE, DELAYED RELEASE PELLETS ORAL at 12:29

## 2025-04-03 RX ADMIN — SENNOSIDES AND DOCUSATE SODIUM 2 TABLET: 50; 8.6 TABLET ORAL at 21:17

## 2025-04-03 RX ADMIN — IPRATROPIUM BROMIDE AND ALBUTEROL SULFATE 3 ML: 2.5; .5 SOLUTION RESPIRATORY (INHALATION) at 08:04

## 2025-04-03 RX ADMIN — AMLODIPINE BESYLATE 5 MG: 5 TABLET ORAL at 08:30

## 2025-04-03 RX ADMIN — GUAIFENESIN 600 MG: 600 TABLET, EXTENDED RELEASE ORAL at 08:29

## 2025-04-03 NOTE — PROGRESS NOTES
DATE OF VISIT: 4/3/2025    Chief Complaint: Followup for elevated serum free light chains     SUBJECTIVE: The patient is laying comfortable in bed.  He is feeling better.  No bleeding.    REVIEW OF SYSTEMS: All the other 9 systems are reviewed by me and negative  except what is mentioned in HPI.     Past History:  Medical History: has a past medical history of Arthritis, Coronary artery disease, Difficulty walking, Fatigue, Hyperlipidemia, Hypertension, Low back pain, Other chest pain, and SOB (shortness of breath).   Surgical History: has a past surgical history that includes Coronary stent placement; Appendectomy; Prostate surgery; Cataract extraction, bilateral (Bilateral); Carotid stent; and Colonoscopy (11/2024).   Family History: family history includes Brain cancer in his sister; Heart attack in his brother and mother; Hepatitis in his brother.   Social History: reports that he quit smoking about 13 years ago. His smoking use included cigarettes. He started smoking about 23 years ago. He has a 15 pack-year smoking history. He has never been exposed to tobacco smoke. He has never used smokeless tobacco. He reports that he does not currently use drugs after having used the following drugs: Marijuana. He reports that he does not drink alcohol.    Facility-Administered Medications Prior to Admission   Medication Dose Route Frequency Provider Last Rate Last Admin    Inclisiran Sodium solution prefilled syringe 284 mg  1.5 mL Subcutaneous Q6 Months Carl Sainz MD         Medications Prior to Admission   Medication Sig Dispense Refill Last Dose/Taking    amLODIPine (NORVASC) 5 MG tablet Take 1 tablet by mouth Daily. 90 tablet 3 3/30/2025    aspirin 81 MG chewable tablet Chew 1 tablet Daily. OTC   3/30/2025 Morning    dilTIAZem XR (DILACOR XR) 240 MG 24 hr capsule Take 1 capsule by mouth Daily.   3/30/2025    furosemide (LASIX) 20 MG tablet Take 1 tablet by mouth Daily.   3/30/2025    glycerin adult 2 g  suppository Insert 1 suppository into the rectum As Needed for Constipation. 25 each 0 Past Week    hydrALAZINE (APRESOLINE) 50 MG tablet Take 1 tablet by mouth 3 (Three) Times a Day. 90 tablet 3 3/30/2025    isosorbide mononitrate (IMDUR) 60 MG 24 hr tablet TAKE 1 TABLET BY MOUTH TWICE A  tablet 3 3/30/2025    linaclotide (Linzess) 290 MCG capsule capsule TAKE 1 CAPSULE BY MOUTH EVERY MORNING 30 MINUTES BEFORE BREAKFAST 90 capsule 3 3/30/2025    Pancrelipase, Lip-Prot-Amyl, (Zenpep) 76998-52230 units capsule delayed-release particles Take 2 capsules by mouth 3 (Three) Times a Day. 540 capsule 1 3/30/2025    pantoprazole (PROTONIX) 40 MG EC tablet Take 1 tablet by mouth Daily. 90 tablet 3 3/30/2025    terazosin (HYTRIN) 2 MG capsule Take 1 capsule by mouth Every Night. 90 capsule 1 3/29/2025 Bedtime      Allergies: Sulfa antibiotics     Current Facility-Administered Medications:     acetaminophen (TYLENOL) tablet 650 mg, 650 mg, Oral, Q6H PRN, Bill Hightower MD, 650 mg at 04/02/25 0454    amLODIPine (NORVASC) tablet 5 mg, 5 mg, Oral, Daily, Kimmie Gupta MD, 5 mg at 04/03/25 0830    ampicillin-sulbactam (UNASYN) 3 g in sodium chloride 0.9 % 100 mL MBP, 3 g, Intravenous, Q12H, Kimmie Gupta MD, 3 g at 04/03/25 0448    aspirin chewable tablet 81 mg, 81 mg, Oral, Daily, Kimmie Gupta MD, 81 mg at 04/02/25 0911    sennosides-docusate (PERICOLACE) 8.6-50 MG per tablet 2 tablet, 2 tablet, Oral, BID, 2 tablet at 04/03/25 0830 **AND** polyethylene glycol (MIRALAX) packet 17 g, 17 g, Oral, Daily PRN, 17 g at 04/01/25 0825 **AND** bisacodyl (DULCOLAX) EC tablet 5 mg, 5 mg, Oral, Daily PRN, 5 mg at 04/01/25 1101 **AND** bisacodyl (DULCOLAX) suppository 10 mg, 10 mg, Rectal, Daily PRN, Jyoti Carey MD, 10 mg at 04/01/25 1848    dextrose (D50W) (25 g/50 mL) IV injection 25 g, 25 g, Intravenous, Q15 Min PRN, Kimmie Gupta MD    dextrose (GLUTOSE) oral gel 15 g, 15 g, Oral, Q15 Min PRN, Kimmie Gupta  MD    doxycycline (MONODOX) capsule 100 mg, 100 mg, Oral, Q12H, Bill Hightower MD, 100 mg at 04/03/25 0829    glucagon (GLUCAGEN) injection 1 mg, 1 mg, Intramuscular, Q15 Min PRN, Kimmie Gupta MD    guaiFENesin (MUCINEX) 12 hr tablet 600 mg, 600 mg, Oral, Q12H, Kortney Tierney, APRN, 600 mg at 04/03/25 0829    heparin (porcine) 5000 UNIT/ML injection 5,000 Units, 5,000 Units, Subcutaneous, Q12H, Kimmie Gupta MD, 5,000 Units at 04/02/25 2014    hydrALAZINE (APRESOLINE) tablet 50 mg, 50 mg, Oral, TID, Kimmie Gupta MD, 50 mg at 04/03/25 0830    Insulin Lispro (humaLOG) injection 2-7 Units, 2-7 Units, Subcutaneous, TID With Meals, Kimmie Gupta MD, 2 Units at 04/01/25 0817    ipratropium-albuterol (DUO-NEB) nebulizer solution 3 mL, 3 mL, Nebulization, 4x Daily - RT, Kimmie Gupta MD, 3 mL at 04/03/25 0804    ipratropium-albuterol (DUO-NEB) nebulizer solution 3 mL, 3 mL, Nebulization, Q6H PRN, Kortney Tierney, APRN, 3 mL at 04/02/25 0416    isosorbide mononitrate (IMDUR) 24 hr tablet 60 mg, 60 mg, Oral, BID, Kimmie Gupta MD, 60 mg at 04/03/25 0829    lubiprostone (AMITIZA) capsule 8 mcg, 8 mcg, Oral, BID, Kimmie Gupta MD, 8 mcg at 04/03/25 0830    nitroglycerin (NITROSTAT) SL tablet 0.4 mg, 0.4 mg, Sublingual, Q5 Min PRN, Carlos Hutson DO, 0.4 mg at 04/03/25 0232    pancrelipase (Lip-Prot-Amyl) (CREON) capsule 24,000 units of lipase, 24,000 units of lipase, Oral, TID With Meals, Kimmie Gupta MD, 24,000 units of lipase at 04/03/25 0830    pantoprazole (PROTONIX) EC tablet 40 mg, 40 mg, Oral, Daily, Kimmie Gupta MD, 40 mg at 04/03/25 0829    predniSONE (DELTASONE) tablet 60 mg, 60 mg, Oral, Daily With Breakfast, Bill Hightower MD, 60 mg at 04/03/25 0830    simethicone (MYLICON) chewable tablet 80 mg, 80 mg, Oral, 4x Daily PRN, Jyoti Carey MD, 80 mg at 04/02/25 2015    sodium chloride 0.9 % flush 10 mL, 10 mL, Intravenous, PRN, [place] Grant MD, 10 mL at 04/02/25 0913     "terazosin (HYTRIN) capsule 2 mg, 2 mg, Oral, Nightly, Kimmie Gupta MD, 2 mg at 04/02/25 2015    topiramate (TOPAMAX) tablet 50 mg, 50 mg, Oral, BID, Kimmie Gupta MD, 50 mg at 04/03/25 0830    PHYSICAL EXAMINATION:   /87   Pulse 87   Temp 98.4 °F (36.9 °C) (Oral)   Resp 20   Ht 162 cm (63.78\")   Wt 77.1 kg (170 lb)   SpO2 100%   BMI 29.38 kg/m²                ECOG Performance Status: 2 - Symptomatic, <50% confined to bed  GENERAL: Age appropriate. No acute distress.   NEURO/PSYCH: A&O x 3, strength 5/5 in all muscle groups  HEENT: Head atraumatic, normocephalic.   NECK: Supple. No JVD. No lymphadenopathy.   LUNGS: Clear to auscultation bilaterally. No wheezing. No rhonchi.   HEART: Regular rate and rhythm. S1, S2, no murmurs.   ABDOMEN: Soft, nontender, nondistended. Bowel sounds positive. No  hepatosplenomegaly.   EXTREMITIES: No clubbing, cyanosis, or edema.   SKIN: No rashes. No purpura.       No results displayed because visit has over 200 results.          XR Chest 1 View  Result Date: 4/3/2025  Narrative: XR CHEST 1 VW Date of Exam: 4/3/2025 1:21 AM EDT Indication: chest pain Comparison: Chest radiograph 4/2/2025 Findings: The heart is enlarged. There are diffuse bilateral interstitial densities radiating from the hilum consistent with pulmonary edema. There are no focal consolidations. There is no pneumothorax.     Impression: Impression: Cardiomegaly with pulmonary edema. Electronically Signed: Ward Carvajal MD  4/3/2025 1:31 AM EDT  Workstation ID: [id number]    XR Chest 1 View  Result Date: 4/2/2025  Narrative: XR CHEST 1 VW Date of Exam: 4/2/2025 4:10 AM EDT Indication: shortness of breath, increase O2  requirement Comparison: Chest radiograph 3/30/2025 Findings: Heart size is normal. There is mild pulmonary vascular congestion. There is a small to moderate size right pleural effusion with right basilar atelectasis. The left lung is clear.     Impression: Impression: Small to moderate " right pleural effusion with right basilar atelectasis. Electronically Signed: Ward Carvajal MD  4/2/2025 4:49 AM EDT  Workstation ID: [id number]    XR Chest 1 View  Result Date: 3/30/2025  Narrative: XR CHEST 1 VW Date of Exam: 3/30/2025 1:17 PM EDT Indication: SOA triage protocol Comparison: Chest radiograph dated 3/30/2025 Findings: The cardiomediastinal silhouette is within normal limits. There is worsening right-sided bronchial wall thickening and interstitial opacities with new airspace opacity within the inferior right upper lobe. There is no pleural effusion or pneumothorax. There are degenerative changes of the thoracic spine.     Impression: Impression: Worsening right-sided bronchial wall thickening and interstitial opacities with new airspace opacity within the inferior right upper lobe. Findings are concerning for pneumonia. Electronically Signed: Luke Fajardo  3/30/2025 1:39 PM EDT  Workstation ID: [id number]    XR Chest 1 View  Result Date: 3/30/2025  Narrative: XR CHEST 1 VW Date of Exam: 3/30/2025 1:45 AM EDT Indication: SOA triage protocol Comparison: Chest radiograph 2/25/2025 Findings: There are no airspace consolidations. No pleural fluid. No pneumothorax. The pulmonary vasculature appears within normal limits. The cardiac and mediastinal silhouette appear unremarkable. No acute osseous abnormality identified.     Impression: Impression: No active disease. Electronically Signed: Ward Carvajal MD  3/30/2025 2:24 AM EDT  Workstation ID: [id number]    P[facility] Imaging for Cardiac Amyloidosis  Result Date: 3/27/2025  Narrative:   The study is not suggestive of ATTR cardiac amyloidosis.   Semi-quantitative visual grading of myocardial uptake by comparison to rib uptake was grade 0 (no cardiac uptake and normal bone uptake)   There is no prior study available for comparison.        21.5 mCi MDP Oxidronate     CT Abdomen Pelvis Without Contrast  Result Date: 3/19/2025  Narrative: CT ABDOMEN PELVIS WO CONTRAST  Date of Exam: 3/19/2025 4:27 PM EDT Indication: constipation, distended abdomen. Comparison: None available. Technique: Axial CT images were obtained of the abdomen and pelvis without the administration of contrast. Reconstructed coronal and sagittal images were also obtained. Automated exposure control and iterative construction methods were used. FINDINGS: Lung bases: Trace bilateral pleural fluid. Atheromatous disease of the coronary vessels. Liver:No masses. No intrahepatic biliary ductal dilatation. Spleen:No masses. No perisplenic hematoma. Pancreas:No pancreatic masses. No evidence of pancreatitis. Gallbladder and common bile duct:No evidence of cholelithiasis. No evidence of cholecystitis. Adrenal glands:No adrenal masses Kidneys and ureters: Innumerable bilateral renal cysts. No comparison imaging is available. The majority of these are renal cysts and fluid attenuating measuring up to 8.5 cm on the right. Persistent hyperdensities measuring up to approximately 0.9 cm likely represent hemorrhagic or proteinaceous cyst. There is an exophytic isodensity measuring  1.3 cm x 0.8 cm along the posterior right kidney on image #36 series 2 which is indeterminant. Hypodensities noted involving the left kidney measuring up to 4.6 cm. Urinary bladder:No urinary bladder wall thickening. No bladder masses. Small bowel:Normal caliber small bowel. Large bowel: Colonic diverticulosis without evidence of diverticulitis. Appendix: The appendix is not identified. There is soft tissue in the right lower quadrant at the approximate expected location of the appendix measuring 3.5 cm x 2.1 cm with multiple surgical clips noted in the region. This is best seen on image #106 series 2. Surgical clips within the right inguinal canal. GENITOURINARY: Prostatic calcifications. Ascites or pneumoperitoneum:None. Adenopathy:None present Osseous structures: The pubic bones are intact. The proximal femurs are intact. Mild degenerative  changes of the hips. The sacrum and sacroiliac joints are normal. Degenerative changes of the lumbar spine. Other findings: Atheromatous disease of the abdominal aorta and visualized branches.     Impression: 1.No acute findings within the abdomen or pelvis. 2.Colonic diverticulosis without evidence of diverticulitis. 3.Innumerable bilateral renal cysts as described above. There is an exophytic isodensity measuring 1.3 cm x 0.8 cm along the posterior right kidney which is indeterminant. This may represent a complex cyst. Recommend correlation with renal mass protocol CT of the abdomen and pelvis. 4.The appendix is not identified. There is soft tissue in the right lower quadrant at the approximate expected location of the appendix of the inguinal canal measuring 3.5 cm x 2.1 cm with multiple surgical clips noted in the region. This may represent postsurgical changes. Correlate with surgical history. Correlation with prior imaging recommended. Underlying mass not excluded. GI consult recommended. 5.Trace bilateral pleural fluid. Electronically Signed: Khanh King MD  3/19/2025 4:53 PM EDT  Workstation ID: [id number]      ASSESSMENT: The patient is a very pleasant 84 y.o. male  with elevated serum free light chains      PLAN:  1.  Elevated serum free light chains: Both kappa and lambda slightly elevated.  Ratio 1.8.  Most likely induced by his chronic kidney disease.  No evidence of monoclonal gammopathy.  I will repeat those in 6 months.  2.  Normocytic anemia: At least partially driven by his underlying chronic kidney disease.  Patient might benefit from recommended retroperitoneal placement.  I will do further workup as an outpatient.    Okay to discharge patient home.  He will follow-up with me in 1 month with repeated labs.      Jalen Anaya MD  4/3/2025

## 2025-04-03 NOTE — THERAPY TREATMENT NOTE
Acute Care - Speech Language Pathology   Swallow Treatment Note and Discharge Summary   [place]     Patient Name: Hal Byrnes  : 1940  MRN: [id number]  Today's Date: 4/3/2025               Admit Date: 3/30/2025    Visit Dx:     ICD-10-CM ICD-9-CM   1. Pneumonia of right lung due to infectious organism, unspecified part of lung  J18.9 483.8   2. Hypoxia  R09.02 799.02   3. Acute respiratory failure with hypoxia and hypercapnia  J96.01 518.81    J96.02    4. Chronic renal failure, unspecified CKD stage  N18.9 585.9   5. COPD with acute exacerbation  J44.1 491.21   6. Acute hypoxic respiratory failure  J96.01 518.81     Patient Active Problem List   Diagnosis    Primary hypertension    Coronary artery disease    Gastroesophageal reflux disease with esophagitis without hemorrhage    Diabetes mellitus due to underlying condition, without long-term current use of insulin    Benign prostatic hyperplasia with lower urinary tract symptoms    Peripheral neuropathy    Chronic diastolic (congestive) heart failure    Psoriasis    Aortic stenosis    Lumbar spondylosis    Cervical spondylosis    Dyslipidemia    Inverse psoriasis    Ectatic abdominal aorta    Pancreatic insufficiency    Stage 3b chronic kidney disease    Lumbar radiculopathy    Multiple renal cysts    Hyperlipemia    Tremor    Drug induced constipation    Acute hypoxic respiratory failure     Past Medical History:   Diagnosis Date    Arthritis     Coronary artery disease     Difficulty walking     Fatigue     Hyperlipidemia     Hypertension     Low back pain     Other chest pain     SOB (shortness of breath)      Past Surgical History:   Procedure Laterality Date    APPENDECTOMY      CAROTID STENT      CATARACT EXTRACTION, BILATERAL Bilateral     COLONOSCOPY  2024    CORONARY STENT PLACEMENT      PROSTATE SURGERY      for BPH       SLP Recommendation and Plan  SLP Swallowing Diagnosis: swallow WFL/no suspected pharyngeal impairment (25  1030)  SLP Diet Recommendation: regular textures, thin liquids (04/03/25 1030)     SLP Rec. for Method of Medication Administration: as tolerated (04/03/25 1030)           Swallow Criteria for Skilled Therapeutic Interventions Met: no problems identified which require skilled intervention (04/03/25 1030)  Anticipated Discharge Disposition (SLP): No further SLP services warranted (04/03/25 1030)           Oral Care Recommendations: Oral Care BID/PRN, Toothbrush (04/03/25 1030)                                        Progress: improving      SWALLOW EVALUATION (Last 72 Hours)       SLP Adult Swallow Evaluation       Row Name 04/03/25 1030                   Rehab Evaluation    Document Type therapy note (daily note)  -RS        Subjective Information no complaints  -RS        Patient Observations alert;cooperative;agree to therapy  -RS        Patient/Family/Caregiver Comments/Observations wife present  -RS        Patient Effort excellent  -RS        Symptoms Noted During/After Treatment none  -RS           General Information    Patient Profile Reviewed yes  -RS           Pain    Pretreatment Pain Rating 0/10 - no pain  -RS        Posttreatment Pain Rating 0/10 - no pain  -RS           Clinical Swallow Eval    Oral Prep Phase WFL  -RS        Oral Transit WFL  -RS        Oral Residue WFL  -RS        Pharyngeal Phase no overt signs/symptoms of pharyngeal impairment  -RS        Esophageal Phase unremarkable  -RS           SLP Evaluation Clinical Impression    SLP Swallowing Diagnosis swallow WFL/no suspected pharyngeal impairment  -RS        Swallow Criteria for Skilled Therapeutic Interventions Met no problems identified which require skilled intervention  -RS           Recommendations    SLP Diet Recommendation regular textures;thin liquids  -RS        Oral Care Recommendations Oral Care BID/PRN;Toothbrush  -RS        SLP Rec. for Method of Medication Administration as tolerated  -RS        Anticipated Discharge  Disposition (SLP) No further SLP services warranted  -RS                  User Key  (r) = Recorded By, (t) = Taken By, (c) = Cosigned By      Initials Name Effective Dates    Miki Patel MS CCC-SLP 09/14/23 -                     EDUCATION  The patient has been educated in the following areas:   No suspected pharyngeal dysphagia .        SLP GOALS       Row Name 04/03/25 1030             (LTG) Patient will demonstrate functional swallow for    Diet Texture (Demonstrate functional swallow) regular textures  -RS      Liquid viscosity (Demonstrate functional swallow) thin liquids  -RS      Jennings (Demonstrate functional swallow) independently (over 90% accuracy)  -RS      Time Frame (Demonstrate functional swallow) 1 week  -RS      Progress/Outcomes (Demonstrate functional swallow) goal met  -RS         (STG) Patient will tolerate trials of    Consistencies Trialed (Tolerate trials) regular textures;thin liquids  -RS      Desired Outcome (Tolerate trials) without signs/symptoms of aspiration;with adequate oral prep/transit/clearance  -RS      Jennings (Tolerate trials) with minimal cues (75-90% accuracy)  -RS      Time Frame (Tolerate trials) 1 week  -RS      Progress/Outcomes (Tolerate trials) goal met  -RS                User Key  (r) = Recorded By, (t) = Taken By, (c) = Cosigned By      Initials Name Provider Type    Miki Patel MS CCC-SLP Speech and Language Pathologist                         Time Calculation:    Time Calculation- SLP       Row Name 04/03/25 1152             Time Calculation- SLP    SLP Start Time 1115  -RS      SLP Received On 04/03/25  -RS         Untimed Charges    [id number] Treatment Swallow Minutes 45  -RS         Total Minutes    Untimed Charges Total Minutes 45  -RS       Total Minutes 45  -RS                User Key  (r) = Recorded By, (t) = Taken By, (c) = Cosigned By      Initials Name Provider Type    Miki Patel MS CCC-SLP Speech and Language Pathologist                     Therapy Charges for Today       Code Description Service Date Service Provider Modifiers Qty    [id number] HC ST TREATMENT SWALLOW 3 4/3/2025 Miki Herring, MS CCC-SLP GN 1                 Miki Herring MS CCC-SLP  4/3/2025

## 2025-04-03 NOTE — PLAN OF CARE
Goal Outcome Evaluation:      Pt alert and oriented. 3 L NC, NSR. VSS. Around 0030, pt began to complain of squeezing chest pain after walking to and from bathroom. EKG obtained, rapid response called per NP. Critical trop of 186 and proBPN 3,335. IV lasix 40 mg given. Around 0230 pt began having similar chest pain after walking to bathroom and back to bed. Sublingual nitro given, pain subsided. Bed alarm active and audible, bed in lowest position, call light within reach, and no other requests at this time.    Critical trop of 196 at 0420. NP notified, no new orders at this time.

## 2025-04-03 NOTE — CONSULTS
NAL Consult Note    Hal Byrnes  1940  [id number]    Date of Admit:  3/30/2025  Date of Consult: 4/3/2025    Reason for Consultation: Abnormal kidney function.     History of present illness:    Patient is a 84 y.o.  Yr old male medical history of CKD stage IV, polycystic kidney disease, hypertension, dyslipidemia, CAD s/p stent, CHF with preserved EF, moderate aortic stenosis presented to hospital for worsening shortness of breath and chest discomfort.  During the hospital course patient was treated for possible pneumonia/fluid overload.  Nephrology is consulted for abnormal kidney function creatinine up to 3.3 mg/dL. [Baseline creatinine 2.4-2.8 mg/dL; patient follows up with Dr. Vicente].     On evaluation at bedside, patient reported that for last 2 weeks he is having worsening shortness of breath, per wife he has been to ER multiple times for chest discomfort and shortness of breath.  Patient reported that in last few weeks he noticed that he gained weight.  Unable to tell exactly how much.     Past Medical History:   Diagnosis Date    Arthritis     Coronary artery disease     Difficulty walking     Fatigue     Hyperlipidemia     Hypertension     Low back pain     Other chest pain     SOB (shortness of breath)        Past Surgical History:   Procedure Laterality Date    APPENDECTOMY      CAROTID STENT      CATARACT EXTRACTION, BILATERAL Bilateral     COLONOSCOPY  2024    CORONARY STENT PLACEMENT      PROSTATE SURGERY      for BPH       Social History     Socioeconomic History    Marital status:    Tobacco Use    Smoking status: Former     Current packs/day: 0.00     Average packs/day: 1.5 packs/day for 10.0 years (15.0 ttl pk-yrs)     Types: Cigarettes     Start date: 2001     Quit date: 2011     Years since quittin.9     Passive exposure: Never    Smokeless tobacco: Never   Vaping Use    Vaping status: Never Used   Substance and Sexual Activity    Alcohol use: Never    Drug  use: Not Currently     Types: Marijuana     Comment: longstanding smoking, stopped 2 weeks ago    Sexual activity: Not Currently       family history includes Brain cancer in his sister; Heart attack in his brother and mother; Hepatitis in his brother.    Allergies   Allergen Reactions    Sulfa Antibiotics Irritability       Medication:    Current Facility-Administered Medications:     acetaminophen (TYLENOL) tablet 650 mg, 650 mg, Oral, Q6H PRN, Bill Hightower MD, 650 mg at 04/02/25 0454    amLODIPine (NORVASC) tablet 5 mg, 5 mg, Oral, Daily, Kimmie Gupta MD, 5 mg at 04/03/25 0830    amoxicillin-clavulanate (AUGMENTIN) 250-62.5 MG/5ML suspension 250 mg, 250 mg, Oral, Q12H, Chadd Akins MD    aspirin chewable tablet 81 mg, 81 mg, Oral, Daily, Kimmie Gupta MD, 81 mg at 04/02/25 0911    sennosides-docusate (PERICOLACE) 8.6-50 MG per tablet 2 tablet, 2 tablet, Oral, BID, 2 tablet at 04/03/25 0830 **AND** polyethylene glycol (MIRALAX) packet 17 g, 17 g, Oral, Daily PRN, 17 g at 04/01/25 0825 **AND** bisacodyl (DULCOLAX) EC tablet 5 mg, 5 mg, Oral, Daily PRN, 5 mg at 04/01/25 1101 **AND** bisacodyl (DULCOLAX) suppository 10 mg, 10 mg, Rectal, Daily PRN, Jyoti Carey MD, 10 mg at 04/01/25 1848    dextrose (D50W) (25 g/50 mL) IV injection 25 g, 25 g, Intravenous, Q15 Min PRN, Kimmie Gupta MD    dextrose (GLUTOSE) oral gel 15 g, 15 g, Oral, Q15 Min PRN, Kimmie Gupta MD    doxycycline (MONODOX) capsule 100 mg, 100 mg, Oral, Q12H, Bill Hightower MD, 100 mg at 04/03/25 0829    glucagon (GLUCAGEN) injection 1 mg, 1 mg, Intramuscular, Q15 Min PRN, Kimmie Gupta MD    guaiFENesin (MUCINEX) 12 hr tablet 600 mg, 600 mg, Oral, Q12H, Kortney Tierney APRN, 600 mg at 04/03/25 0829    heparin (porcine) 5000 UNIT/ML injection 5,000 Units, 5,000 Units, Subcutaneous, Q12H, Kimmie Gupta MD, 5,000 Units at 04/02/25 2014    hydrALAZINE (APRESOLINE) tablet 50 mg, 50 mg, Oral, TID, Kimmie Gupta MD, 50  mg at 04/03/25 0830    Insulin Lispro (humaLOG) injection 2-7 Units, 2-7 Units, Subcutaneous, TID With Meals, Kimmie Gupta MD, 2 Units at 04/01/25 0817    ipratropium-albuterol (DUO-NEB) nebulizer solution 3 mL, 3 mL, Nebulization, 4x Daily - RT, Kimmie Gupta MD, 3 mL at 04/03/25 1253    ipratropium-albuterol (DUO-NEB) nebulizer solution 3 mL, 3 mL, Nebulization, Q6H PRN, Kortney Tierney APRN, 3 mL at 04/02/25 0416    isosorbide mononitrate (IMDUR) 24 hr tablet 60 mg, 60 mg, Oral, BID, Kimmie Gupta MD, 60 mg at 04/03/25 0829    lubiprostone (AMITIZA) capsule 8 mcg, 8 mcg, Oral, BID, Kimmie Gupta MD, 8 mcg at 04/03/25 0830    Magnesium Low Dose Replacement - Follow Nurse / BPA Driven Protocol, , Not Applicable, PRN, Chadd Akins MD    nitroglycerin (NITROSTAT) SL tablet 0.4 mg, 0.4 mg, Sublingual, Q5 Min PRN, Carlos Hutson DO, 0.4 mg at 04/03/25 0232    pancrelipase (Lip-Prot-Amyl) (CREON) capsule 24,000 units of lipase, 24,000 units of lipase, Oral, TID With Meals, Kimmie Gupta MD, 24,000 units of lipase at 04/03/25 1229    pantoprazole (PROTONIX) EC tablet 40 mg, 40 mg, Oral, Daily, Kimmie Gupta MD, 40 mg at 04/03/25 0829    [START ON 4/5/2025] predniSONE (DELTASONE) tablet 20 mg, 20 mg, Oral, Daily With Breakfast, Chadd Akins MD    [START ON 4/4/2025] predniSONE (DELTASONE) tablet 40 mg, 40 mg, Oral, Daily With Breakfast, Chadd Akins MD    simethicone (MYLICON) chewable tablet 80 mg, 80 mg, Oral, 4x Daily PRN, Jyoti Carey MD, 80 mg at 04/02/25 2015    sodium chloride 0.9 % flush 10 mL, 10 mL, Intravenous, PRN, [place]ia Grant MD, 10 mL at 04/02/25 0913    terazosin (HYTRIN) capsule 2 mg, 2 mg, Oral, Nightly, Kimmie Gupta MD, 2 mg at 04/02/25 2015    topiramate (TOPAMAX) tablet 50 mg, 50 mg, Oral, BID, Kimmie Gupta MD, 50 mg at 04/03/25 0830    Facility-Administered Medications Prior to Admission   Medication Dose Route Frequency Provider  "Last Rate Last Admin    Inclisiran Sodium solution prefilled syringe 284 mg  1.5 mL Subcutaneous Q6 Months Carl Sainz MD         Medications Prior to Admission   Medication Sig Dispense Refill Last Dose/Taking    amLODIPine (NORVASC) 5 MG tablet Take 1 tablet by mouth Daily. 90 tablet 3 3/30/2025    aspirin 81 MG chewable tablet Chew 1 tablet Daily. OTC   3/30/2025 Morning    dilTIAZem XR (DILACOR XR) 240 MG 24 hr capsule Take 1 capsule by mouth Daily.   3/30/2025    furosemide (LASIX) 20 MG tablet Take 1 tablet by mouth Daily.   3/30/2025    glycerin adult 2 g suppository Insert 1 suppository into the rectum As Needed for Constipation. 25 each 0 Past Week    hydrALAZINE (APRESOLINE) 50 MG tablet Take 1 tablet by mouth 3 (Three) Times a Day. 90 tablet 3 3/30/2025    isosorbide mononitrate (IMDUR) 60 MG 24 hr tablet TAKE 1 TABLET BY MOUTH TWICE A  tablet 3 3/30/2025    linaclotide (Linzess) 290 MCG capsule capsule TAKE 1 CAPSULE BY MOUTH EVERY MORNING 30 MINUTES BEFORE BREAKFAST 90 capsule 3 3/30/2025    Pancrelipase, Lip-Prot-Amyl, (Zenpep) 50703-02016 units capsule delayed-release particles Take 2 capsules by mouth 3 (Three) Times a Day. 540 capsule 1 3/30/2025    pantoprazole (PROTONIX) 40 MG EC tablet Take 1 tablet by mouth Daily. 90 tablet 3 3/30/2025    terazosin (HYTRIN) 2 MG capsule Take 1 capsule by mouth Every Night. 90 capsule 1 3/29/2025 Bedtime       Review of Systems:  Full review of systems reviewed and negative otherwise for acute complaints    Physical Exam:   Vital Signs   Blood pressure 156/83, pulse 96, temperature 98.1 °F (36.7 °C), temperature source Oral, resp. rate 18, height 162 cm (63.78\"), weight 77.1 kg (170 lb), SpO2 96%.     GENERAL: Awake and alert, in no acute distress.   HEENT: Normocephalic, atraumatic.     NECK: Supple  HEART: RRR; No murmur, rubs, gallops. no edema  LUNGS: Clear to auscultation bilaterally without wheezing  ABDOMEN: Soft, nontender, nondistended. "   EXT:  No edema.  :  No Brown.   SKIN: Warm and dry without rash  NEURO:  No focal neurological deficits. Strength equal bilateral  PSYCHIATRIC: Cooperative with PE    Laboratory Data  Results from last 7 days   Lab Units 04/03/25  0104 04/02/25  0400 04/01/25  0352   HEMOGLOBIN g/dL 10.5* 10.5* 10.2*   HEMATOCRIT % 31.8* 31.9* 31.1*     Results from last 7 days   Lab Units 04/03/25  0104 04/02/25  0400 04/01/25  0352 03/31/25  0852 03/30/25  1324 03/30/25  0221   SODIUM mmol/L 140 140 137 136 140 137   POTASSIUM mmol/L 4.2 4.3 4.0 4.0 3.9 3.6   CHLORIDE mmol/L 105 104 101 101 100 101   CO2 mmol/L 26.0 22.0 23.0 22.0 25.0 25.0   BUN mg/dL 61* 62* 56* 44* 36* 36*   CREATININE mg/dL 3.30* 3.28* 3.67* 3.14* 3.17* 3.13*   CALCIUM mg/dL 8.4* 8.5* 8.4* 8.4* 8.5* 8.6   PHOSPHORUS mg/dL 3.8 3.8  --   --   --   --    MAGNESIUM mg/dL 2.5*  --   --   --   --  2.0   ALBUMIN g/dL 3.7 3.3*  --  3.3* 3.6 3.5     Results from last 7 days   Lab Units 04/03/25  0104   GLUCOSE mg/dL 102*         Results from last 7 days   Lab Units 04/03/25  0104   ALK PHOS U/L 57   BILIRUBIN mg/dL 0.2   ALT (SGPT) U/L 29   AST (SGOT) U/L 28     Estimated Creatinine Clearance: 15.9 mL/min (A) (by C-G formula based on SCr of 3.3 mg/dL (H)).    Radiology:  Chest X ray:   Impression:  Small to moderate right pleural effusion with right basilar atelectasis.    Impression:  Cardiomegaly with pulmonary edema.    Impression:     CKD stage IV 2/2 polycystic kidney disease.   CHF with preserved EF  Aortic stenosis  Fluid overload  Hypertension  Anemia    PLAN: Thank you for asking us to see Ragaii Nichole Fitz, I recommend the following:     ELIAZAR on CKD stage IV:    -Creatinine 2.4 -2.8 mg/dL.   -Creatinine up to 3.3.  ELIAZAR is likely due to cardiorenal syndrome. [Chest x-ray suggestive of fluid overload, elevated proBNP]   -S/p IV Lasix 40 mg today. [At home patient on Lasix 20; will start 40 mg p.o. from tomorrow]   -Need to adjust diuretics closely  -Strict  GIL's  -Check daily weight on standing scale  -Dose meds for GFR  -Avoid nephrotoxins  -No indication for dialysis at this time    Fluid overload:   -S/p IV Lasix.  Will start 40 p.o. tomorrow.   -Strict GIL's and daily standing weight.     Anemia:  -Check iron profile.  -Might benefit from Epogen.     Thank you for the consult, will follow with you closely    High risk and critically ill patient    Vincent Akins MD  4/3/2025  13:53 EDT

## 2025-04-03 NOTE — PLAN OF CARE
Goal Outcome Evaluation:  Plan of Care Reviewed With: patient, spouse        Progress: improving       Anticipated Discharge Disposition (SLP): No further SLP services warranted          SLP Swallowing Diagnosis: swallow WFL/no suspected pharyngeal impairment (04/03/25 1030)

## 2025-04-03 NOTE — SIGNIFICANT NOTE
Rapid Response called @ 00:53 for chest pain.     The patient ambulated to use the bathroom and upon returning to bed he developed acute onset chest tightness and dyspnea, of which passively resolved once in bed resting. Currently, he states he is feeling much improved and denies any acute complaints.    The patient did not receive any interventions, but SL NTG PRN was added.    Cardiopulm physical exam unchanged from previous documentation.    EKG nonischemic  Interval labs were ordered, including troponin to compare from previous measurement.    Continue to monitor on telemetry for now.    ADDENDUM:    Lab results with mildly worsening troponin, elevated BNP, and elevated Ddimer.    - 40mg IV lasix x1 and monitor UOP  - Obtain V/Q due to CKD

## 2025-04-03 NOTE — PROGRESS NOTES
[facility] Medicine Services  PROGRESS NOTE    Patient Name: Hal Byrnes  : 1940  MRN: [id number]    Date of Admission: 3/30/2025  Primary Care Physician: Pia De La Garza MD    Subjective   Subjective     CC:  F/u resp failure    HPI:  Overnight had increase dyspnea. Improved after lasix  No current chest pain  No sputum        Objective   Objective     Vital Signs:   Temp:  [98 °F (36.7 °C)-98.4 °F (36.9 °C)] 98.4 °F (36.9 °C)  Heart Rate:  [82-99] 87  Resp:  [18-24] 20  BP: (130-153)/(68-87) 153/87  Flow (L/min) (Oxygen Therapy):  [3] 3     Physical Exam:  Constitutional: No acute distress, awake, alert, sitting up in bed, 3Lnc in place, no distress  HENT: NCAT, mucous membranes moist  Respiratory: decreased BS bases  Cardiovascular: rrr, + murmur  Gastrointestinal: BLE 1+ edema  Psychiatric: Appropriate affect, cooperative  Neurologic: Oriented x 3, CHRISTIAN, speech clear  Skin: No rashes      Results Reviewed:  LAB RESULTS:      Lab 25  0334 25  0104 25  0400 25  0352 25  0852 25  1418 25  1324 25  0330 25  0221   WBC  --  10.95* 11.04* 11.13* 6.48  --  10.77  --  6.75   HEMOGLOBIN  --  10.5* 10.5* 10.2* 11.0*  --  12.1*  --  11.2*   HEMATOCRIT  --  31.8* 31.9* 31.1* 33.5*  --  36.9*  --  32.9*   PLATELETS  --  195 224 211 204  --  213  --  194   NEUTROS ABS  --  9.40*  --  10.55* 6.01  --  9.64*  --  4.74   IMMATURE GRANS (ABS)  --  0.13*  --  0.09* 0.05  --  0.14*  --  0.05   LYMPHS ABS  --  0.69*  --  0.34* 0.40*  --  0.68*  --  1.40   MONOS ABS  --  0.72  --  0.14 0.02*  --  0.27  --  0.50   EOS ABS  --  0.00  --  0.00 0.00  --  0.02  --  0.04   MCV  --  91.6 91.4 90.7 90.3  --  92.0  --  89.4   LACTATE  --  0.8  --   --   --   --  0.7  --   --    LDH  --  243*  --   --   --   --   --   --   --    PROTIME  --  13.7  --   --   --   --   --   --   --    D DIMER QUANT  --  2.66*  --   --   --   --   --   --   --     HSTROP T 195* 186*  --   --   --  162* 123*   < > 80*    < > = values in this interval not displayed.         Lab 04/03/25  0104 04/02/25  0400 04/01/25  0352 03/31/25  0852 03/30/25  1324 03/30/25  0221   SODIUM 140 140 137 136 140 137   POTASSIUM 4.2 4.3 4.0 4.0 3.9 3.6   CHLORIDE 105 104 101 101 100 101   CO2 26.0 22.0 23.0 22.0 25.0 25.0   ANION GAP 9.0 14.0 13.0 13.0 15.0 11.0   BUN 61* 62* 56* 44* 36* 36*   CREATININE 3.30* 3.28* 3.67* 3.14* 3.17* 3.13*   EGFR 17.8* 18.0* 15.7* 18.9* 18.7* 19.0*   GLUCOSE 102* 126* 134* 166* 154* 112*   CALCIUM 8.4* 8.5* 8.4* 8.4* 8.5* 8.6   MAGNESIUM 2.5*  --   --   --   --  2.0   PHOSPHORUS 3.8 3.8  --   --   --   --          Lab 04/03/25 0104 04/02/25  0400 03/31/25  0852 03/30/25  1324 03/30/25  0221   TOTAL PROTEIN 5.5*  --  5.9* 6.1 6.0   ALBUMIN 3.7 3.3* 3.3* 3.6 3.5   GLOBULIN 1.8  --  2.6 2.5 2.5   ALT (SGPT) 29  --  22 22 19   AST (SGOT) 28  --  19 23 17   BILIRUBIN 0.2  --  0.2 0.2 0.3   ALK PHOS 57  --  64 68 65         Lab 04/03/25  0334 04/03/25  0104 03/30/25  1418 03/30/25  1324 03/30/25  0330 03/30/25  0221   PROBNP  --  3,335.0*  --  816.9  --  926.8   HSTROP T 195* 186* 162* 123* 80* 80*   PROTIME  --  13.7  --   --   --   --    INR  --  0.99  --   --   --   --                  Lab 03/31/25  0502 03/30/25  1516 03/30/25  1337   PH, ARTERIAL 7.308* 7.287* 7.232*   PCO2, ARTERIAL 52.3* 58.4* 64.6*   PO2 ART 67.5* 121.0* 75.8*   FIO2 36 55 45   HCO3 ART 26.2* 27.9* 27.2*   BASE EXCESS ART -0.7* 0.2 -1.6*   CARBOXYHEMOGLOBIN 1.1 1.0 1.3     Brief Urine Lab Results  (Last result in the past 365 days)        Color   Clarity   Blood   Leuk Est   Nitrite   Protein   CREAT   Urine HCG        03/04/25 1057             86.6                 Microbiology Results Abnormal       None            XR Chest 1 View  Result Date: 4/3/2025  XR CHEST 1 VW Date of Exam: 4/3/2025 1:21 AM EDT Indication: chest pain Comparison: Chest radiograph 4/2/2025 Findings: The heart is  enlarged. There are diffuse bilateral interstitial densities radiating from the hilum consistent with pulmonary edema. There are no focal consolidations. There is no pneumothorax.     Impression: Impression: Cardiomegaly with pulmonary edema. Electronically Signed: Ward Carvajal MD  4/3/2025 1:31 AM EDT  Workstation ID: [id number]    XR Chest 1 View  Result Date: 4/2/2025  XR CHEST 1 VW Date of Exam: 4/2/2025 4:10 AM EDT Indication: shortness of breath, increase O2  requirement Comparison: Chest radiograph 3/30/2025 Findings: Heart size is normal. There is mild pulmonary vascular congestion. There is a small to moderate size right pleural effusion with right basilar atelectasis. The left lung is clear.     Impression: Impression: Small to moderate right pleural effusion with right basilar atelectasis. Electronically Signed: Ward Carvajal MD  4/2/2025 4:49 AM EDT  Workstation ID: [id number]        Results for orders placed during the hospital encounter of 02/25/25    Adult Transthoracic Echo Complete W/ Cont if Necessary Per Protocol    Interpretation Summary    Left ventricular systolic function is normal. Calculated left ventricular EF = 62.5%    Left ventricular wall thickness is consistent with mild concentric hypertrophy.  Mild gradient is seen in the LV cavity.    The left atrial cavity is dilated.    Left atrial volume is mildly increased.    Moderate aortic valve stenosis is present.    Aortic valve mean pressure gradient is 23 mmHg with good stroke-volume.    Estimated right ventricular systolic pressure from tricuspid regurgitation is normal (<35 mmHg).      Current medications:  Scheduled Meds:amLODIPine, 5 mg, Oral, Daily  amoxicillin-clavulanate, 250 mg, Oral, Q12H  aspirin, 81 mg, Oral, Daily  doxycycline, 100 mg, Oral, Q12H  guaiFENesin, 600 mg, Oral, Q12H  heparin (porcine), 5,000 Units, Subcutaneous, Q12H  hydrALAZINE, 50 mg, Oral, TID  insulin lispro, 2-7 Units, Subcutaneous, TID With  Meals  ipratropium-albuterol, 3 mL, Nebulization, 4x Daily - RT  isosorbide mononitrate, 60 mg, Oral, BID  lubiprostone, 8 mcg, Oral, BID  Pancrelipase (Lip-Prot-Amyl), 24,000 units of lipase, Oral, TID With Meals  pantoprazole, 40 mg, Oral, Daily  [START ON 4/5/2025] predniSONE, 20 mg, Oral, Daily With Breakfast  [START ON 4/4/2025] predniSONE, 40 mg, Oral, Daily With Breakfast  senna-docusate sodium, 2 tablet, Oral, BID  terazosin, 2 mg, Oral, Nightly  topiramate, 50 mg, Oral, BID      Continuous Infusions:   PRN Meds:.  acetaminophen    senna-docusate sodium **AND** polyethylene glycol **AND** bisacodyl **AND** bisacodyl    dextrose    dextrose    glucagon (human recombinant)    ipratropium-albuterol    Magnesium Low Dose Replacement - Follow Nurse / BPA Driven Protocol    nitroglycerin    simethicone    sodium chloride    Assessment & Plan   Assessment & Plan     Active Hospital Problems    Diagnosis  POA    **Acute hypoxic respiratory failure [J96.01]  Yes    Stage 3b chronic kidney disease [N18.32]  Yes    Dyslipidemia [E78.5]  Yes    Aortic stenosis [I35.0]  Yes    Chronic diastolic (congestive) heart failure [I50.32]  Yes    Coronary artery disease [I25.10]  Yes    Diabetes mellitus due to underlying condition, without long-term current use of insulin [E08.9]  Yes    Primary hypertension [I10]  Yes    Psoriasis [L40.9]  Yes      Resolved Hospital Problems   No resolved problems to display.        Brief Hospital Course to date:  Hal Byrnes is a 84 y.o. male with a history of hypertension, chronic HFpEF with moderate aortic stenosis, hyperlipidemia, CAD and CKD here with acute mixed respiratory failure    Acute mixed respiratory failure w hypoxia and hypercarbia  -due to CHF +/- pneumonia  -on no oxygen at baseline  -on 3Lnc currently    Cannot rule out Pneumonia  - Cultures in process, negative flu, negative COVID  -5 days unasyn & po doxy; change to po augmentin x 2 more days & continue doxy  -wean  steroids (60mg 4/3, 40mg 4/4, 20mg x 2 days 4/5)  -wean oxygen as able (on 3Lnc)      A/C HFpEF  Elevated troponin  Moderate Aortic stenosis  - Denies any chest pain, elevated troponins  - Continue aspirin  - Cardiology following  -s/p lasix 40mg iv early morning 4/3 (for dyspnea), cxr c/w pulm edema; will order lasix 40mg iv repeat x 1 again this morning; asking nephrology to consult and assist regarding volum status/renal function  -discussed w/ Dr. Sainz 4/3 and he agrees w/ above plan  --being worked up for possible amyloid outpatient      Small to Right Pleural Effusion  -discussed w/ IR 4/3 and reviewed imaging, not enough fluid to safely drain. Recommends diuresis    CKD 4, with polycystic kidneys  - Baseline creatinine 2.5--3  -creatinine 3.3 worse than baseline; diuresing. Ask nephrology to see and assist w/ volume status given poor kidney function    Elevated serum light free chains  -Dr. Anaya saw here; not c/w plasma cell dyscrasia; follow up in clinic 1 month w/ repeat labs  =    Constipation  --bowel regimen.  Add soap suds enema if needed    Type 2 diabetes  - Continue SSI, (weaning steroids)    Hyperlipidemia  HTN  --cont home meds      Tremors  --on topamax for this, follows w outpatient neuro    -updated wife via phone 4/3/25; confirmed full code status at this point        Am labs: cbc,bmp,mag (follow cultures)        Expected Discharge Location and Transportation: home at discharge  Expected Discharge   Expected Discharge Date: 4/5/2025; Expected Discharge Time:      VTE Prophylaxis:  Pharmacologic VTE prophylaxis orders are present.         AM-PAC 6 Clicks Score (PT): 22 (04/01/25 9683)    CODE STATUS:   Code Status and Medical Interventions: CPR (Attempt to Resuscitate); Full Support   Ordered at: 03/30/25 5978     Code Status (Patient has no pulse and is not breathing):    CPR (Attempt to Resuscitate)     Medical Interventions (Patient has pulse or is breathing):    Full Support        Chadd Akins MD  04/03/25

## 2025-04-03 NOTE — PLAN OF CARE
11/1 called and lm for pt to call reg her labs   Ok for hub to read to patient   Please schedule labs if needed or follow ups  Ok for  to read to patient   Please schedule labs if needed or follow ups       ----- Message from Rusty Tomlinson sent at 11/1/2024  7:59 AM EDT -----  Hemoglobin A1c slightly higher at 7.4%.  LDL higher at 127.  HDL 45.  Increase pravastatin to 40 mg every evening.  Prescription sent to pharmacy.  Normal thyroid function tests.  Continue levothyroxine 100 mcg/day.     Goal Outcome Evaluation:  Plan of Care Reviewed With: patient           Outcome Evaluation: VSS NSR on tele, sats> 90% on 3LNC, No C/O pain this shift. Pt ambulated in room without complications. call light within reach, bed in lowest position. care ongoing.

## 2025-04-03 NOTE — TELEPHONE ENCOUNTER
PAUL     Spoke with Jyoti and she stated patient is currently admitted to the hospital. He has fluid in his lungs, his kidneys are not functioning well and he is also having issues with his heart. I notified Jyoti that Dr De La Garza does not perform rounds at the hospital but that Dr De La Garza can view the hospital note. I also let her know that upon discharge that patient will be scheduled for a hospital follow up. She verbalized understanding.

## 2025-04-04 LAB
ANION GAP SERPL CALCULATED.3IONS-SCNC: 12 MMOL/L (ref 5–15)
BACTERIA SPEC AEROBE CULT: NORMAL
BACTERIA SPEC AEROBE CULT: NORMAL
BUN SERPL-MCNC: 59 MG/DL (ref 8–23)
BUN/CREAT SERPL: 18.4 (ref 7–25)
CALCIUM SPEC-SCNC: 8.3 MG/DL (ref 8.6–10.5)
CHLORIDE SERPL-SCNC: 105 MMOL/L (ref 98–107)
CO2 SERPL-SCNC: 26 MMOL/L (ref 22–29)
CREAT SERPL-MCNC: 3.21 MG/DL (ref 0.76–1.27)
DEPRECATED RDW RBC AUTO: 44.8 FL (ref 37–54)
EGFRCR SERPLBLD CKD-EPI 2021: 18.4 ML/MIN/1.73
ERYTHROCYTE [DISTWIDTH] IN BLOOD BY AUTOMATED COUNT: 13.3 % (ref 12.3–15.4)
GLUCOSE BLDC GLUCOMTR-MCNC: 116 MG/DL (ref 70–130)
GLUCOSE BLDC GLUCOMTR-MCNC: 126 MG/DL (ref 70–130)
GLUCOSE BLDC GLUCOMTR-MCNC: 92 MG/DL (ref 70–130)
GLUCOSE SERPL-MCNC: 86 MG/DL (ref 65–99)
HCT VFR BLD AUTO: 31.6 % (ref 37.5–51)
HGB BLD-MCNC: 10.1 G/DL (ref 13–17.7)
IRON 24H UR-MRATE: 86 MCG/DL (ref 59–158)
IRON SATN MFR SERPL: 35 % (ref 20–50)
MAGNESIUM SERPL-MCNC: 2.4 MG/DL (ref 1.6–2.4)
MCH RBC QN AUTO: 29.4 PG (ref 26.6–33)
MCHC RBC AUTO-ENTMCNC: 32 G/DL (ref 31.5–35.7)
MCV RBC AUTO: 92.1 FL (ref 79–97)
PLATELET # BLD AUTO: 210 10*3/MM3 (ref 140–450)
PMV BLD AUTO: 9.6 FL (ref 6–12)
POTASSIUM SERPL-SCNC: 3.8 MMOL/L (ref 3.5–5.2)
RBC # BLD AUTO: 3.43 10*6/MM3 (ref 4.14–5.8)
SODIUM SERPL-SCNC: 143 MMOL/L (ref 136–145)
TIBC SERPL-MCNC: 247 MCG/DL (ref 298–536)
TRANSFERRIN SERPL-MCNC: 166 MG/DL (ref 200–360)
WBC NRBC COR # BLD AUTO: 7.7 10*3/MM3 (ref 3.4–10.8)

## 2025-04-04 PROCEDURE — 80048 BASIC METABOLIC PNL TOTAL CA: CPT | Performed by: INTERNAL MEDICINE

## 2025-04-04 PROCEDURE — 97116 GAIT TRAINING THERAPY: CPT

## 2025-04-04 PROCEDURE — 94799 UNLISTED PULMONARY SVC/PX: CPT

## 2025-04-04 PROCEDURE — 25010000002 HEPARIN (PORCINE) PER 1000 UNITS: Performed by: INTERNAL MEDICINE

## 2025-04-04 PROCEDURE — 99232 SBSQ HOSP IP/OBS MODERATE 35: CPT | Performed by: INTERNAL MEDICINE

## 2025-04-04 PROCEDURE — 85027 COMPLETE CBC AUTOMATED: CPT | Performed by: INTERNAL MEDICINE

## 2025-04-04 PROCEDURE — 84466 ASSAY OF TRANSFERRIN: CPT | Performed by: STUDENT IN AN ORGANIZED HEALTH CARE EDUCATION/TRAINING PROGRAM

## 2025-04-04 PROCEDURE — 94664 DEMO&/EVAL PT USE INHALER: CPT

## 2025-04-04 PROCEDURE — 82948 REAGENT STRIP/BLOOD GLUCOSE: CPT

## 2025-04-04 PROCEDURE — 83735 ASSAY OF MAGNESIUM: CPT | Performed by: INTERNAL MEDICINE

## 2025-04-04 PROCEDURE — 83540 ASSAY OF IRON: CPT | Performed by: STUDENT IN AN ORGANIZED HEALTH CARE EDUCATION/TRAINING PROGRAM

## 2025-04-04 PROCEDURE — 63710000001 PREDNISONE PER 1 MG: Performed by: INTERNAL MEDICINE

## 2025-04-04 RX ORDER — DIAZEPAM 2 MG/1
2 TABLET ORAL 2 TIMES DAILY PRN
Status: DISCONTINUED | OUTPATIENT
Start: 2025-04-04 | End: 2025-04-07 | Stop reason: HOSPADM

## 2025-04-04 RX ADMIN — IPRATROPIUM BROMIDE AND ALBUTEROL SULFATE 3 ML: 2.5; .5 SOLUTION RESPIRATORY (INHALATION) at 13:04

## 2025-04-04 RX ADMIN — PANCRELIPASE 24000 UNITS OF LIPASE: 60000; 12000; 38000 CAPSULE, DELAYED RELEASE PELLETS ORAL at 12:04

## 2025-04-04 RX ADMIN — SENNOSIDES AND DOCUSATE SODIUM 2 TABLET: 50; 8.6 TABLET ORAL at 21:12

## 2025-04-04 RX ADMIN — IPRATROPIUM BROMIDE AND ALBUTEROL SULFATE 3 ML: 2.5; .5 SOLUTION RESPIRATORY (INHALATION) at 07:49

## 2025-04-04 RX ADMIN — HEPARIN SODIUM 5000 UNITS: 5000 INJECTION INTRAVENOUS; SUBCUTANEOUS at 08:01

## 2025-04-04 RX ADMIN — ISOSORBIDE MONONITRATE 60 MG: 60 TABLET, EXTENDED RELEASE ORAL at 08:01

## 2025-04-04 RX ADMIN — HYDRALAZINE HYDROCHLORIDE 50 MG: 50 TABLET ORAL at 16:39

## 2025-04-04 RX ADMIN — HYDRALAZINE HYDROCHLORIDE 50 MG: 50 TABLET ORAL at 08:01

## 2025-04-04 RX ADMIN — DOXYCYCLINE 100 MG: 100 CAPSULE ORAL at 08:01

## 2025-04-04 RX ADMIN — AMLODIPINE BESYLATE 5 MG: 5 TABLET ORAL at 08:00

## 2025-04-04 RX ADMIN — AMOXICILLIN AND CLAVULANATE POTASSIUM 250 MG: 400; 57 POWDER, FOR SUSPENSION ORAL at 08:02

## 2025-04-04 RX ADMIN — DIAZEPAM 2 MG: 2 TABLET ORAL at 21:13

## 2025-04-04 RX ADMIN — LUBIPROSTONE 8 MCG: 8 CAPSULE ORAL at 21:12

## 2025-04-04 RX ADMIN — SENNOSIDES AND DOCUSATE SODIUM 2 TABLET: 50; 8.6 TABLET ORAL at 08:00

## 2025-04-04 RX ADMIN — TOPIRAMATE 50 MG: 25 TABLET, FILM COATED ORAL at 21:12

## 2025-04-04 RX ADMIN — ASPIRIN 81 MG: 81 TABLET, CHEWABLE ORAL at 08:00

## 2025-04-04 RX ADMIN — IPRATROPIUM BROMIDE AND ALBUTEROL SULFATE 3 ML: 2.5; .5 SOLUTION RESPIRATORY (INHALATION) at 19:58

## 2025-04-04 RX ADMIN — PANCRELIPASE 24000 UNITS OF LIPASE: 60000; 12000; 38000 CAPSULE, DELAYED RELEASE PELLETS ORAL at 08:01

## 2025-04-04 RX ADMIN — PREDNISONE 40 MG: 20 TABLET ORAL at 08:01

## 2025-04-04 RX ADMIN — LUBIPROSTONE 8 MCG: 8 CAPSULE ORAL at 08:01

## 2025-04-04 RX ADMIN — HEPARIN SODIUM 5000 UNITS: 5000 INJECTION INTRAVENOUS; SUBCUTANEOUS at 21:12

## 2025-04-04 RX ADMIN — HYDRALAZINE HYDROCHLORIDE 50 MG: 50 TABLET ORAL at 21:12

## 2025-04-04 RX ADMIN — ISOSORBIDE MONONITRATE 60 MG: 60 TABLET, EXTENDED RELEASE ORAL at 21:13

## 2025-04-04 RX ADMIN — AMOXICILLIN AND CLAVULANATE POTASSIUM 250 MG: 400; 57 POWDER, FOR SUSPENSION ORAL at 21:14

## 2025-04-04 RX ADMIN — TOPIRAMATE 50 MG: 25 TABLET, FILM COATED ORAL at 08:00

## 2025-04-04 RX ADMIN — GUAIFENESIN 600 MG: 600 TABLET, EXTENDED RELEASE ORAL at 08:02

## 2025-04-04 RX ADMIN — TERAZOSIN HYDROCHLORIDE 2 MG: 2 CAPSULE ORAL at 21:12

## 2025-04-04 RX ADMIN — PANCRELIPASE 24000 UNITS OF LIPASE: 60000; 12000; 38000 CAPSULE, DELAYED RELEASE PELLETS ORAL at 18:21

## 2025-04-04 RX ADMIN — PANTOPRAZOLE SODIUM 40 MG: 40 TABLET, DELAYED RELEASE ORAL at 08:01

## 2025-04-04 RX ADMIN — IPRATROPIUM BROMIDE AND ALBUTEROL SULFATE 3 ML: 2.5; .5 SOLUTION RESPIRATORY (INHALATION) at 15:58

## 2025-04-04 RX ADMIN — FUROSEMIDE 40 MG: 40 TABLET ORAL at 08:01

## 2025-04-04 RX ADMIN — GUAIFENESIN 600 MG: 600 TABLET, EXTENDED RELEASE ORAL at 21:12

## 2025-04-04 NOTE — CASE MANAGEMENT/SOCIAL WORK
Continued Stay Note  [facility]     Patient Name: Hal Byrnes  MRN: [id number]  Today's Date: 4/4/2025    Admit Date: 3/30/2025    Plan: discharge plan   Discharge Plan       Row Name 04/04/25 1728       Plan    Plan discharge plan    Plan Comments I met with pt in room regarding discharge plan. Plan is home with spouse at discharge. [facility] O2 arranged through [facility] and pt has portable O2 tank in room for transporttion home. Pt reports family will provide transportation home. CM will cont to follow    Final Discharge Disposition Code 01 - home or self-care                   Discharge Codes    No documentation.                 Expected Discharge Date and Time       Expected Discharge Date Expected Discharge Time    Apr 5, 2025               Flori Garaz RN

## 2025-04-04 NOTE — PLAN OF CARE
Goal Outcome Evaluation:  Plan of Care Reviewed With: patient        Progress: no change  Outcome Evaluation: Patient continues to progress well with PT, ambulating in hallway without AD with SBA. 1 brief standing rest break taken. O2 sat 91% on 2L O2 following ambulation. Will continue to progress as tolerated.

## 2025-04-04 NOTE — PROGRESS NOTES
[facility] Medicine Services  PROGRESS NOTE    Patient Name: Hal Byrnes  : 1940  MRN: [id number]    Date of Admission: 3/30/2025  Primary Care Physician: Pia De La Garza MD    Subjective   Subjective     CC:  F/u resp failure    HPI:  Overall dyspnea improved but persists  No chest pain  No palpitations  No n/v/d        Objective   Objective     Vital Signs:   Temp:  [98 °F (36.7 °C)-98.4 °F (36.9 °C)] 98.4 °F (36.9 °C)  Heart Rate:  [74-96] 91  Resp:  [18] 18  BP: (129-147)/(61-73) 139/70  Flow (L/min) (Oxygen Therapy):  [2-3] 2     Physical Exam:  Constitutional: No acute distress, awake, alert, sitting up in bed, supplemental nasal canula in place, no distress  HENT: NCAT, mucous membranes moist  Respiratory: decreased BS bases  Cardiovascular: rrr, murmur noted  Gastrointestinal: BLE edema  Psychiatric: Appropriate affect, cooperative  Neurologic: Oriented x 3, CHRISTIAN, speech clear  Skin: No rashes      Results Reviewed:  LAB RESULTS:      Lab 25  0330 25  0334 25  0104 25  0400 25  0352 25  0852 25  1418 25  1324 25  0330 25  0221   WBC 7.70  --  10.95* 11.04* 11.13* 6.48  --  10.77  --  6.75   HEMOGLOBIN 10.1*  --  10.5* 10.5* 10.2* 11.0*  --  12.1*  --  11.2*   HEMATOCRIT 31.6*  --  31.8* 31.9* 31.1* 33.5*  --  36.9*  --  32.9*   PLATELETS 210  --  195 224 211 204  --  213  --  194   NEUTROS ABS  --   --  9.40*  --  10.55* 6.01  --  9.64*  --  4.74   IMMATURE GRANS (ABS)  --   --  0.13*  --  0.09* 0.05  --  0.14*  --  0.05   LYMPHS ABS  --   --  0.69*  --  0.34* 0.40*  --  0.68*  --  1.40   MONOS ABS  --   --  0.72  --  0.14 0.02*  --  0.27  --  0.50   EOS ABS  --   --  0.00  --  0.00 0.00  --  0.02  --  0.04   MCV 92.1  --  91.6 91.4 90.7 90.3  --  92.0  --  89.4   LACTATE  --   --  0.8  --   --   --   --  0.7  --   --    LDH  --   --  243*  --   --   --   --   --   --   --    PROTIME  --   --  13.7  --    --   --   --   --   --   --    D DIMER QUANT  --   --  2.66*  --   --   --   --   --   --   --    HSTROP T  --  195* 186*  --   --   --  162* 123*   < > 80*    < > = values in this interval not displayed.         Lab 04/04/25 0330 04/03/25 0104 04/02/25 0400 04/01/25 0352 03/31/25 0852 03/30/25 1324 03/30/25 0221   SODIUM 143 140 140 137 136   < > 137   POTASSIUM 3.8 4.2 4.3 4.0 4.0   < > 3.6   CHLORIDE 105 105 104 101 101   < > 101   CO2 26.0 26.0 22.0 23.0 22.0   < > 25.0   ANION GAP 12.0 9.0 14.0 13.0 13.0   < > 11.0   BUN 59* 61* 62* 56* 44*   < > 36*   CREATININE 3.21* 3.30* 3.28* 3.67* 3.14*   < > 3.13*   EGFR 18.4* 17.8* 18.0* 15.7* 18.9*   < > 19.0*   GLUCOSE 86 102* 126* 134* 166*   < > 112*   CALCIUM 8.3* 8.4* 8.5* 8.4* 8.4*   < > 8.6   MAGNESIUM 2.4 2.5*  --   --   --   --  2.0   PHOSPHORUS  --  3.8 3.8  --   --   --   --     < > = values in this interval not displayed.         Lab 04/03/25 0104 04/02/25 0400 03/31/25 0852 03/30/25 1324 03/30/25 0221   TOTAL PROTEIN 5.5*  --  5.9* 6.1 6.0   ALBUMIN 3.7 3.3* 3.3* 3.6 3.5   GLOBULIN 1.8  --  2.6 2.5 2.5   ALT (SGPT) 29  --  22 22 19   AST (SGOT) 28  --  19 23 17   BILIRUBIN 0.2  --  0.2 0.2 0.3   ALK PHOS 57  --  64 68 65         Lab 04/03/25 0334 04/03/25 0104 03/30/25  1418 03/30/25  1324 03/30/25  0330 03/30/25  0221   PROBNP  --  3,335.0*  --  816.9  --  926.8   HSTROP T 195* 186* 162* 123* 80* 80*   PROTIME  --  13.7  --   --   --   --    INR  --  0.99  --   --   --   --              Lab 04/04/25  0330   IRON 86   IRON SATURATION (TSAT) 35   TIBC 247*   TRANSFERRIN 166*         Lab 03/31/25  0502 03/30/25  1516 03/30/25  1337   PH, ARTERIAL 7.308* 7.287* 7.232*   PCO2, ARTERIAL 52.3* 58.4* 64.6*   PO2 ART 67.5* 121.0* 75.8*   FIO2 36 55 45   HCO3 ART 26.2* 27.9* 27.2*   BASE EXCESS ART -0.7* 0.2 -1.6*   CARBOXYHEMOGLOBIN 1.1 1.0 1.3     Brief Urine Lab Results  (Last result in the past 365 days)        Color   Clarity   Blood   Leuk Est    Nitrite   Protein   CREAT   Urine HCG        03/04/25 1057             86.6                 Microbiology Results Abnormal       None            XR Chest 1 View  Result Date: 4/3/2025  XR CHEST 1 VW Date of Exam: 4/3/2025 1:21 AM EDT Indication: chest pain Comparison: Chest radiograph 4/2/2025 Findings: The heart is enlarged. There are diffuse bilateral interstitial densities radiating from the hilum consistent with pulmonary edema. There are no focal consolidations. There is no pneumothorax.     Impression: Impression: Cardiomegaly with pulmonary edema. Electronically Signed: Ward Carvajal MD  4/3/2025 1:31 AM EDT  Workstation ID: [id number]        Results for orders placed during the hospital encounter of 02/25/25    Adult Transthoracic Echo Complete W/ Cont if Necessary Per Protocol    Interpretation Summary    Left ventricular systolic function is normal. Calculated left ventricular EF = 62.5%    Left ventricular wall thickness is consistent with mild concentric hypertrophy.  Mild gradient is seen in the LV cavity.    The left atrial cavity is dilated.    Left atrial volume is mildly increased.    Moderate aortic valve stenosis is present.    Aortic valve mean pressure gradient is 23 mmHg with good stroke-volume.    Estimated right ventricular systolic pressure from tricuspid regurgitation is normal (<35 mmHg).      Current medications:  Scheduled Meds:amLODIPine, 5 mg, Oral, Daily  amoxicillin-clavulanate, 250 mg, Oral, Q12H  aspirin, 81 mg, Oral, Daily  furosemide, 40 mg, Oral, Daily  guaiFENesin, 600 mg, Oral, Q12H  heparin (porcine), 5,000 Units, Subcutaneous, Q12H  hydrALAZINE, 50 mg, Oral, TID  insulin lispro, 2-7 Units, Subcutaneous, TID With Meals  ipratropium-albuterol, 3 mL, Nebulization, 4x Daily - RT  isosorbide mononitrate, 60 mg, Oral, BID  lubiprostone, 8 mcg, Oral, BID  Pancrelipase (Lip-Prot-Amyl), 24,000 units of lipase, Oral, TID With Meals  pantoprazole, 40 mg, Oral, Daily  [START ON 4/5/2025]  predniSONE, 20 mg, Oral, Daily With Breakfast  senna-docusate sodium, 2 tablet, Oral, BID  terazosin, 2 mg, Oral, Nightly  topiramate, 50 mg, Oral, BID      Continuous Infusions:   PRN Meds:.  acetaminophen    senna-docusate sodium **AND** polyethylene glycol **AND** bisacodyl **AND** bisacodyl    dextrose    dextrose    diazePAM    glucagon (human recombinant)    ipratropium-albuterol    Magnesium Low Dose Replacement - Follow Nurse / BPA Driven Protocol    nitroglycerin    simethicone    sodium chloride    Assessment & Plan   Assessment & Plan     Active Hospital Problems    Diagnosis  POA    **Acute hypoxic respiratory failure [J96.01]  Yes    Stage 3b chronic kidney disease [N18.32]  Yes    Dyslipidemia [E78.5]  Yes    Aortic stenosis [I35.0]  Yes    Chronic diastolic (congestive) heart failure [I50.32]  Yes    Coronary artery disease [I25.10]  Yes    Diabetes mellitus due to underlying condition, without long-term current use of insulin [E08.9]  Yes    Primary hypertension [I10]  Yes    Psoriasis [L40.9]  Yes      Resolved Hospital Problems   No resolved problems to display.        Brief Hospital Course to date:  Hal Byrnes is a 84 y.o. male with a history of hypertension, chronic HFpEF with moderate aortic stenosis, hyperlipidemia, CAD and CKD here with acute mixed respiratory failure    Acute mixed respiratory failure w hypoxia and hypercarbia, improved  -due to CHF +/- pneumonia  -on no oxygen at baseline  - required up to 6Lnc on 4/2/25  -have weaned down to 2Lnc today, continue to wean as able (on no oxygen prior to admission); may require home oxygen at discharge    Cannot rule out Pneumonia  - Cultures in process, negative flu, negative COVID  -5 days unasyn & po doxy; change to po augmentin x 2 more days & continue doxy  -reasonably quick prednisone taper (60mg on 4/3, 40mg 4/4, 20mg x 2 days 4/5)    A/C HFpEF  Elevated troponin  Moderate Aortic stenosis  Small right pleural effusion   -I  discussed w/ IR on 4/3/25, not enough fluid to safely drain  - Denies any chest pain, elevated troponins  - Continue aspirin  - Cardiology following: I d/w Dr. Sainz on 4/3; he agreed w/ iv diuresis & nephrology consultation; continue asa, amlodipine, hydralazine, imdur. Deferring diuresis to nephrology  -s/p IV lasix on 4/3 w/ good diuresis net = 1060  --being worked up for possible amyloid outpatient    Small to Right Pleural Effusion  -discussed w/ IR 4/3 and reviewed imaging, not enough fluid to safely drain.  -continue diuresis    CKD 4, with polycystic kidneys  - Baseline creatinine 2.5--3  -creatinine 3.3 worse than baseline; diuresing. Ask nephrology to see and assist w/ volume status given poor kidney function    Elevated serum light free chains, likely due to CKD  -Dr. Anaya saw here; not c/w plasma cell dyscrasia; follow up in clinic 1 month w/ repeat labs    Constipation, improved  -had bm documented 4/4  -continue bowel regimen; prn enemas ordered; keep bowels movning    Type 2 diabetes  - Continue SSI, (weaning steroids)    Hyperlipidemia  HTN  --cont home meds    Tremors  --on topamax for this, follows w outpatient neuro    -updated wife via phone 4/3/25; confirmed full code status at this point        Am labs: cbc, bmp         Expected Discharge Location and Transportation: home at discharge (via family transport)  Expected Discharge ? 4/5/25 +/- supplemental oxygen (case management has set this up) depending on clinical course, labs, nephrology recs  Expected Discharge Date: 4/5/2025; Expected Discharge Time:      VTE Prophylaxis:  Pharmacologic VTE prophylaxis orders are present.         AM-PAC 6 Clicks Score (PT): 21 (04/04/25 2693)    CODE STATUS:   Code Status and Medical Interventions: CPR (Attempt to Resuscitate); Full Support   Ordered at: 03/30/25 4064     Code Status (Patient has no pulse and is not breathing):    CPR (Attempt to Resuscitate)     Medical Interventions (Patient has pulse or  is breathing):    Full Support       Chadd Akins MD  04/04/25

## 2025-04-04 NOTE — THERAPY TREATMENT NOTE
Patient Name: Hal Byrnes  : 1940    MRN: [id number]                              Today's Date: 2025       Admit Date: 3/30/2025    Visit Dx:     ICD-10-CM ICD-9-CM   1. Pneumonia of right lung due to infectious organism, unspecified part of lung  J18.9 483.8   2. Hypoxia  R09.02 799.02   3. Acute respiratory failure with hypoxia and hypercapnia  J96.01 518.81    J96.02    4. Chronic renal failure, unspecified CKD stage  N18.9 585.9   5. COPD with acute exacerbation  J44.1 491.21   6. Acute hypoxic respiratory failure  J96.01 518.81     Patient Active Problem List   Diagnosis    Primary hypertension    Coronary artery disease    Gastroesophageal reflux disease with esophagitis without hemorrhage    Diabetes mellitus due to underlying condition, without long-term current use of insulin    Benign prostatic hyperplasia with lower urinary tract symptoms    Peripheral neuropathy    Chronic diastolic (congestive) heart failure    Psoriasis    Aortic stenosis    Lumbar spondylosis    Cervical spondylosis    Dyslipidemia    Inverse psoriasis    Ectatic abdominal aorta    Pancreatic insufficiency    Stage 3b chronic kidney disease    Lumbar radiculopathy    Multiple renal cysts    Hyperlipemia    Tremor    Drug induced constipation    Acute hypoxic respiratory failure     Past Medical History:   Diagnosis Date    Arthritis     Coronary artery disease     Difficulty walking     Fatigue     Hyperlipidemia     Hypertension     Low back pain     Other chest pain     SOB (shortness of breath)      Past Surgical History:   Procedure Laterality Date    APPENDECTOMY      CAROTID STENT      CATARACT EXTRACTION, BILATERAL Bilateral     COLONOSCOPY  2024    CORONARY STENT PLACEMENT      PROSTATE SURGERY      for BPH      General Information       Row Name 25 1558          Physical Therapy Time and Intention    Document Type therapy note (daily note)  -NS     Mode of Treatment physical therapy  -NS       Row  Name 04/04/25 1558          General Information    Patient Profile Reviewed yes  -NS     Existing Precautions/Restrictions oxygen therapy device and L/min  -NS       Row Name 04/04/25 1558          Cognition    Orientation Status (Cognition) oriented x 4  -NS       Row Name 04/04/25 1558          Safety Issues/Impairments Affecting Functional Mobility    Impairments Affecting Function (Mobility) endurance/activity tolerance;shortness of breath  -NS               User Key  (r) = Recorded By, (t) = Taken By, (c) = Cosigned By      Initials Name Provider Type    Laura Potter PT Physical Therapist                   Mobility       Row Name 04/04/25 1558          Bed Mobility    Bed Mobility supine-sit;sit-supine  -NS     Supine-Sit [place] (Bed Mobility) modified independence  -NS     Sit-Supine [place] (Bed Mobility) modified independence  -NS       Row Name 04/04/25 1558          Sit-Stand Transfer    Sit-Stand [place] (Transfers) standby assist  -NS       Row Name 04/04/25 1558          Gait/Stairs (Locomotion)    [place] Level (Gait) standby assist  -NS     Distance in Feet (Gait) 400  -NS     Deviations/Abnormal Patterns (Gait) gait speed decreased;sue decreased  -NS     Comment, (Gait/Stairs) Patient ambulated at slow pace, requiring 1 standing rest break. O2 sat 91% on 2L O2 following ambulation.  -NS               User Key  (r) = Recorded By, (t) = Taken By, (c) = Cosigned By      Initials Name Provider Type    Laura Potter PT Physical Therapist                   Obj/Interventions       Row Name 04/04/25 1125          Balance    Balance Assessment sitting static balance;sitting dynamic balance;standing static balance;standing dynamic balance  -NS     Static Sitting Balance independent  -NS     Dynamic Sitting Balance independent  -NS     Position, Sitting Balance unsupported;sitting in chair  -NS     Static Standing Balance standby assist  -NS     Dynamic Standing Balance standby  assist  -NS     Position/Device Used, Standing Balance unsupported  -NS               User Key  (r) = Recorded By, (t) = Taken By, (c) = Cosigned By      Initials Name Provider Type    Laura Potter PT Physical Therapist                   Goals/Plan    No documentation.                  Clinical Impression       Row Name 04/04/25 1558          Pain    Pretreatment Pain Rating 0/10 - no pain  -NS     Posttreatment Pain Rating 0/10 - no pain  -NS       Row Name 04/04/25 1558          Plan of Care Review    Plan of Care Reviewed With patient  -NS     Progress no change  -NS     Outcome Evaluation Patient continues to progress well with PT, ambulating in hallway without AD with SBA. 1 brief standing rest break taken. O2 sat 91% on 2L O2 following ambulation. Will continue to progress as tolerated.  -NS       Row Name 04/04/25 1558          Vital Signs    Pre Systolic BP Rehab 129  -NS     Pre Treatment Diastolic BP 66  -NS     Pretreatment Heart Rate (beats/min) 90  -NS     Posttreatment Heart Rate (beats/min) 92  -NS     Pre SpO2 (%) 94  -NS     O2 Delivery Pre Treatment nasal cannula  -NS     Intra SpO2 (%) 91  -NS     O2 Delivery Intra Treatment nasal cannula  -NS     Post SpO2 (%) 94  -NS     O2 Delivery Post Treatment nasal cannula  -NS     Pre Patient Position Supine  -NS     Intra Patient Position Standing  -NS     Post Patient Position Sitting  -NS       Row Name 04/04/25 1558          Positioning and Restraints    Pre-Treatment Position in bed  -NS     Post Treatment Position chair  -NS     In Chair notified nsg;reclined;call light within reach;encouraged to call for assist;with nsg;waffle cushion  RN deferred alarm, PCT about to give a bath  -NS               User Key  (r) = Recorded By, (t) = Taken By, (c) = Cosigned By      Initials Name Provider Type    Laura Potter PT Physical Therapist                   Outcome Measures       Row Name 04/04/25 1558 04/04/25 0756       How much help from another  person do you currently need...    Turning from your back to your side while in flat bed without using bedrails? 4  -NS 4  -LR    Moving from lying on back to sitting on the side of a flat bed without bedrails? 4  -NS 4  -LR    Moving to and from a bed to a chair (including a wheelchair)? 3  -NS 4  -LR    Standing up from a chair using your arms (e.g., wheelchair, bedside chair)? 4  -NS 4  -LR    Climbing 3-5 steps with a railing? 3  -NS 4  -LR    To walk in hospital room? 3  -NS 4  -LR    AM-PAC 6 Clicks Score (PT) 21  -NS 24  -LR    Highest Level of Mobility Goal 6 --> Walk 10 steps or more  -NS 8 --> Walked 250 feet or more  -LR      Row Name 04/04/25 1558          Functional Assessment    Outcome Measure Options AM-PAC 6 Clicks Basic Mobility (PT)  -NS               User Key  (r) = Recorded By, (t) = Taken By, (c) = Cosigned By      Initials Name Provider Type    NS Laura Clarke, PT Physical Therapist    LR Nida Xiong RN Registered Nurse                                 Physical Therapy Education       Title: PT OT SLP Therapies (In Progress)       Topic: Physical Therapy (Done)       Point: Mobility training (Done)       Learning Progress Summary            Patient Acceptance, E, VU by NS at 4/4/2025 1642    Acceptance, E, VU by ML at 4/1/2025 0949   Family Acceptance, E, VU by ML at 4/1/2025 0949                      Point: Precautions (Done)       Learning Progress Summary            Patient Acceptance, E, VU by NS at 4/4/2025 1642    Acceptance, E, VU by ML at 4/1/2025 0949   Family Acceptance, E, VU by ML at 4/1/2025 0949                                      User Key       Initials Effective Dates Name Provider Type Discipline    NS 06/16/21 -  Laura Clarke, PT Physical Therapist PT    ML 04/22/21 -  Pia Nesbitt Physical Therapist PT                  PT Recommendation and Plan     Progress: no change  Outcome Evaluation: Patient continues to progress well with PT, ambulating in hallway without AD  with SBA. 1 brief standing rest break taken. O2 sat 91% on 2L O2 following ambulation. Will continue to progress as tolerated.     Time Calculation:         PT Charges       Row Name 04/04/25 1558             Time Calculation    Start Time 1558  -NS      PT Received On 04/04/25  -NS      PT Goal Re-Cert Due Date 04/11/25  -NS         Timed Charges    52927 - Gait Training Minutes  23  -NS         Total Minutes    Timed Charges Total Minutes 23  -NS       Total Minutes 23  -NS                User Key  (r) = Recorded By, (t) = Taken By, (c) = Cosigned By      Initials Name Provider Type    Laura Potter, JAVIER Physical Therapist                  Therapy Charges for Today       Code Description Service Date Service Provider Modifiers Qty    [id number] HC GAIT TRAINING EA 15 MIN 4/4/2025 Laura Clarke, PT GP 2            PT G-Codes  Outcome Measure Options: AM-PAC 6 Clicks Basic Mobility (PT)  AM-PAC 6 Clicks Score (PT): 21  AM-PAC 6 Clicks Score (OT): 18       Laura Clarke PT  4/4/2025

## 2025-04-05 LAB
ANION GAP SERPL CALCULATED.3IONS-SCNC: 11 MMOL/L (ref 5–15)
BUN SERPL-MCNC: 57 MG/DL (ref 8–23)
BUN/CREAT SERPL: 18.3 (ref 7–25)
CALCIUM SPEC-SCNC: 8.3 MG/DL (ref 8.6–10.5)
CHLORIDE SERPL-SCNC: 107 MMOL/L (ref 98–107)
CO2 SERPL-SCNC: 25 MMOL/L (ref 22–29)
CREAT SERPL-MCNC: 3.11 MG/DL (ref 0.76–1.27)
DEPRECATED RDW RBC AUTO: 44.7 FL (ref 37–54)
EGFRCR SERPLBLD CKD-EPI 2021: 19.1 ML/MIN/1.73
ERYTHROCYTE [DISTWIDTH] IN BLOOD BY AUTOMATED COUNT: 13.2 % (ref 12.3–15.4)
FERRITIN SERPL-MCNC: 211.3 NG/ML (ref 30–400)
GLUCOSE BLDC GLUCOMTR-MCNC: 106 MG/DL (ref 70–130)
GLUCOSE BLDC GLUCOMTR-MCNC: 131 MG/DL (ref 70–130)
GLUCOSE BLDC GLUCOMTR-MCNC: 84 MG/DL (ref 70–130)
GLUCOSE SERPL-MCNC: 77 MG/DL (ref 65–99)
HCT VFR BLD AUTO: 31.3 % (ref 37.5–51)
HGB BLD-MCNC: 10.1 G/DL (ref 13–17.7)
MCH RBC QN AUTO: 29.8 PG (ref 26.6–33)
MCHC RBC AUTO-ENTMCNC: 32.3 G/DL (ref 31.5–35.7)
MCV RBC AUTO: 92.3 FL (ref 79–97)
PLATELET # BLD AUTO: 210 10*3/MM3 (ref 140–450)
PMV BLD AUTO: 9.7 FL (ref 6–12)
POTASSIUM SERPL-SCNC: 3.9 MMOL/L (ref 3.5–5.2)
RBC # BLD AUTO: 3.39 10*6/MM3 (ref 4.14–5.8)
SODIUM SERPL-SCNC: 143 MMOL/L (ref 136–145)
WBC NRBC COR # BLD AUTO: 6.81 10*3/MM3 (ref 3.4–10.8)

## 2025-04-05 PROCEDURE — 94799 UNLISTED PULMONARY SVC/PX: CPT

## 2025-04-05 PROCEDURE — 63710000001 PREDNISONE PER 1 MG: Performed by: INTERNAL MEDICINE

## 2025-04-05 PROCEDURE — 80048 BASIC METABOLIC PNL TOTAL CA: CPT | Performed by: INTERNAL MEDICINE

## 2025-04-05 PROCEDURE — 85027 COMPLETE CBC AUTOMATED: CPT | Performed by: INTERNAL MEDICINE

## 2025-04-05 PROCEDURE — 99232 SBSQ HOSP IP/OBS MODERATE 35: CPT | Performed by: INTERNAL MEDICINE

## 2025-04-05 PROCEDURE — 25010000002 HEPARIN (PORCINE) PER 1000 UNITS: Performed by: INTERNAL MEDICINE

## 2025-04-05 PROCEDURE — 94664 DEMO&/EVAL PT USE INHALER: CPT

## 2025-04-05 PROCEDURE — 82728 ASSAY OF FERRITIN: CPT | Performed by: STUDENT IN AN ORGANIZED HEALTH CARE EDUCATION/TRAINING PROGRAM

## 2025-04-05 PROCEDURE — 97535 SELF CARE MNGMENT TRAINING: CPT

## 2025-04-05 PROCEDURE — 82948 REAGENT STRIP/BLOOD GLUCOSE: CPT

## 2025-04-05 RX ADMIN — AMLODIPINE BESYLATE 5 MG: 5 TABLET ORAL at 09:40

## 2025-04-05 RX ADMIN — TOPIRAMATE 50 MG: 25 TABLET, FILM COATED ORAL at 21:51

## 2025-04-05 RX ADMIN — GUAIFENESIN 600 MG: 600 TABLET, EXTENDED RELEASE ORAL at 09:40

## 2025-04-05 RX ADMIN — IPRATROPIUM BROMIDE AND ALBUTEROL SULFATE 3 ML: 2.5; .5 SOLUTION RESPIRATORY (INHALATION) at 16:04

## 2025-04-05 RX ADMIN — TERAZOSIN HYDROCHLORIDE 2 MG: 2 CAPSULE ORAL at 21:51

## 2025-04-05 RX ADMIN — HYDRALAZINE HYDROCHLORIDE 50 MG: 50 TABLET ORAL at 09:40

## 2025-04-05 RX ADMIN — ASPIRIN 81 MG: 81 TABLET, CHEWABLE ORAL at 09:40

## 2025-04-05 RX ADMIN — FUROSEMIDE 40 MG: 40 TABLET ORAL at 09:40

## 2025-04-05 RX ADMIN — SENNOSIDES AND DOCUSATE SODIUM 2 TABLET: 50; 8.6 TABLET ORAL at 21:51

## 2025-04-05 RX ADMIN — LUBIPROSTONE 8 MCG: 8 CAPSULE ORAL at 21:51

## 2025-04-05 RX ADMIN — SENNOSIDES AND DOCUSATE SODIUM 2 TABLET: 50; 8.6 TABLET ORAL at 09:40

## 2025-04-05 RX ADMIN — DIAZEPAM 2 MG: 2 TABLET ORAL at 21:51

## 2025-04-05 RX ADMIN — PANTOPRAZOLE SODIUM 40 MG: 40 TABLET, DELAYED RELEASE ORAL at 09:43

## 2025-04-05 RX ADMIN — HEPARIN SODIUM 5000 UNITS: 5000 INJECTION INTRAVENOUS; SUBCUTANEOUS at 09:40

## 2025-04-05 RX ADMIN — ISOSORBIDE MONONITRATE 60 MG: 60 TABLET, EXTENDED RELEASE ORAL at 09:40

## 2025-04-05 RX ADMIN — ISOSORBIDE MONONITRATE 60 MG: 60 TABLET, EXTENDED RELEASE ORAL at 21:51

## 2025-04-05 RX ADMIN — HYDRALAZINE HYDROCHLORIDE 50 MG: 50 TABLET ORAL at 21:51

## 2025-04-05 RX ADMIN — PREDNISONE 20 MG: 20 TABLET ORAL at 09:40

## 2025-04-05 RX ADMIN — IPRATROPIUM BROMIDE AND ALBUTEROL SULFATE 3 ML: 2.5; .5 SOLUTION RESPIRATORY (INHALATION) at 20:46

## 2025-04-05 RX ADMIN — IPRATROPIUM BROMIDE AND ALBUTEROL SULFATE 3 ML: 2.5; .5 SOLUTION RESPIRATORY (INHALATION) at 11:25

## 2025-04-05 RX ADMIN — LUBIPROSTONE 8 MCG: 8 CAPSULE ORAL at 09:40

## 2025-04-05 RX ADMIN — IPRATROPIUM BROMIDE AND ALBUTEROL SULFATE 3 ML: 2.5; .5 SOLUTION RESPIRATORY (INHALATION) at 07:40

## 2025-04-05 RX ADMIN — PANCRELIPASE 24000 UNITS OF LIPASE: 60000; 12000; 38000 CAPSULE, DELAYED RELEASE PELLETS ORAL at 09:40

## 2025-04-05 RX ADMIN — GUAIFENESIN 600 MG: 600 TABLET, EXTENDED RELEASE ORAL at 21:51

## 2025-04-05 RX ADMIN — HYDRALAZINE HYDROCHLORIDE 50 MG: 50 TABLET ORAL at 17:06

## 2025-04-05 RX ADMIN — HEPARIN SODIUM 5000 UNITS: 5000 INJECTION INTRAVENOUS; SUBCUTANEOUS at 21:53

## 2025-04-05 RX ADMIN — PANCRELIPASE 24000 UNITS OF LIPASE: 60000; 12000; 38000 CAPSULE, DELAYED RELEASE PELLETS ORAL at 17:06

## 2025-04-05 RX ADMIN — TOPIRAMATE 50 MG: 25 TABLET, FILM COATED ORAL at 09:40

## 2025-04-05 NOTE — PROGRESS NOTES
[facility] Medicine Services  PROGRESS NOTE    Patient Name: Hal Byrnes  : 1940  MRN: [id number]    Date of Admission: 3/30/2025  Primary Care Physician: Pia De La Garza MD    Subjective   Subjective     CC:  F/u resp failure    HPI:  Dyspnea improving but persists, especially when up ambulating to bathroom. Less leg edema  No chest pain  No sputum        Objective   Objective     Vital Signs:   Temp:  [98 °F (36.7 °C)-98.7 °F (37.1 °C)] 98.4 °F (36.9 °C)  Heart Rate:  [77-96] 77  Resp:  [18] 18  BP: (129-149)/(66-75) 141/69  Flow (L/min) (Oxygen Therapy):  [2-3] 2     Physical Exam:  Constitutional: No acute distress, awake, alert, sitting up in chair, normal work of breathing, 2Lnc  HENT: NCAT, mucous membranes moist  Respiratory: decreased BS bases  Cardiovascular:rrr, murmur noted  Gastrointestinal: 1+BLE edema 9Improved)  Psychiatric: Appropriate affect, cooperative  Neurologic: Oriented x 3, CHRISTIAN, speech clear  Skin: No rashes      Results Reviewed:  LAB RESULTS:      Lab 25  0426 25  0330 25  0334 25  0104 25  0400 25  0352 25  0852 25  1418 25  1324 25  0330 25  0221   WBC 6.81 7.70  --  10.95* 11.04* 11.13* 6.48  --  10.77  --  6.75   HEMOGLOBIN 10.1* 10.1*  --  10.5* 10.5* 10.2* 11.0*  --  12.1*  --  11.2*   HEMATOCRIT 31.3* 31.6*  --  31.8* 31.9* 31.1* 33.5*  --  36.9*  --  32.9*   PLATELETS 210 210  --  195 224 211 204  --  213  --  194   NEUTROS ABS  --   --   --  9.40*  --  10.55* 6.01  --  9.64*  --  4.74   IMMATURE GRANS (ABS)  --   --   --  0.13*  --  0.09* 0.05  --  0.14*  --  0.05   LYMPHS ABS  --   --   --  0.69*  --  0.34* 0.40*  --  0.68*  --  1.40   MONOS ABS  --   --   --  0.72  --  0.14 0.02*  --  0.27  --  0.50   EOS ABS  --   --   --  0.00  --  0.00 0.00  --  0.02  --  0.04   MCV 92.3 92.1  --  91.6 91.4 90.7 90.3  --  92.0  --  89.4   LACTATE  --   --   --  0.8  --   --   --    --  0.7  --   --    LDH  --   --   --  243*  --   --   --   --   --   --   --    PROTIME  --   --   --  13.7  --   --   --   --   --   --   --    D DIMER QUANT  --   --   --  2.66*  --   --   --   --   --   --   --    HSTROP T  --   --  195* 186*  --   --   --  162* 123*   < > 80*    < > = values in this interval not displayed.         Lab 04/05/25 0425 04/04/25 0330 04/03/25 0104 04/02/25 0400 04/01/25  0352 03/30/25  1324 03/30/25 0221   SODIUM 143 143 140 140 137   < > 137   POTASSIUM 3.9 3.8 4.2 4.3 4.0   < > 3.6   CHLORIDE 107 105 105 104 101   < > 101   CO2 25.0 26.0 26.0 22.0 23.0   < > 25.0   ANION GAP 11.0 12.0 9.0 14.0 13.0   < > 11.0   BUN 57* 59* 61* 62* 56*   < > 36*   CREATININE 3.11* 3.21* 3.30* 3.28* 3.67*   < > 3.13*   EGFR 19.1* 18.4* 17.8* 18.0* 15.7*   < > 19.0*   GLUCOSE 77 86 102* 126* 134*   < > 112*   CALCIUM 8.3* 8.3* 8.4* 8.5* 8.4*   < > 8.6   MAGNESIUM  --  2.4 2.5*  --   --   --  2.0   PHOSPHORUS  --   --  3.8 3.8  --   --   --     < > = values in this interval not displayed.         Lab 04/03/25 0104 04/02/25 0400 03/31/25  0852 03/30/25  1324 03/30/25 0221   TOTAL PROTEIN 5.5*  --  5.9* 6.1 6.0   ALBUMIN 3.7 3.3* 3.3* 3.6 3.5   GLOBULIN 1.8  --  2.6 2.5 2.5   ALT (SGPT) 29  --  22 22 19   AST (SGOT) 28  --  19 23 17   BILIRUBIN 0.2  --  0.2 0.2 0.3   ALK PHOS 57  --  64 68 65         Lab 04/03/25  0334 04/03/25  0104 03/30/25  1418 03/30/25  1324 03/30/25  0330 03/30/25  0221   PROBNP  --  3,335.0*  --  816.9  --  926.8   HSTROP T 195* 186* 162* 123* 80* 80*   PROTIME  --  13.7  --   --   --   --    INR  --  0.99  --   --   --   --              Lab 04/05/25  0425 04/04/25  0330   IRON  --  86   IRON SATURATION (TSAT)  --  35   TIBC  --  247*   TRANSFERRIN  --  166*   FERRITIN 211.30  --          Lab 03/31/25  0502 03/30/25  1516 03/30/25  1337   PH, ARTERIAL 7.308* 7.287* 7.232*   PCO2, ARTERIAL 52.3* 58.4* 64.6*   PO2 ART 67.5* 121.0* 75.8*   FIO2 36 55 45   HCO3 ART 26.2*  27.9* 27.2*   BASE EXCESS ART -0.7* 0.2 -1.6*   CARBOXYHEMOGLOBIN 1.1 1.0 1.3     Brief Urine Lab Results  (Last result in the past 365 days)        Color   Clarity   Blood   Leuk Est   Nitrite   Protein   CREAT   Urine HCG        03/04/25 1057             86.6                 Microbiology Results Abnormal       None            No radiology results from the last 24 hrs        Results for orders placed during the hospital encounter of 02/25/25    Adult Transthoracic Echo Complete W/ Cont if Necessary Per Protocol    Interpretation Summary    Left ventricular systolic function is normal. Calculated left ventricular EF = 62.5%    Left ventricular wall thickness is consistent with mild concentric hypertrophy.  Mild gradient is seen in the LV cavity.    The left atrial cavity is dilated.    Left atrial volume is mildly increased.    Moderate aortic valve stenosis is present.    Aortic valve mean pressure gradient is 23 mmHg with good stroke-volume.    Estimated right ventricular systolic pressure from tricuspid regurgitation is normal (<35 mmHg).      Current medications:  Scheduled Meds:amLODIPine, 5 mg, Oral, Daily  aspirin, 81 mg, Oral, Daily  furosemide, 40 mg, Oral, Daily  guaiFENesin, 600 mg, Oral, Q12H  heparin (porcine), 5,000 Units, Subcutaneous, Q12H  hydrALAZINE, 50 mg, Oral, TID  insulin lispro, 2-7 Units, Subcutaneous, TID With Meals  ipratropium-albuterol, 3 mL, Nebulization, 4x Daily - RT  isosorbide mononitrate, 60 mg, Oral, BID  lubiprostone, 8 mcg, Oral, BID  Pancrelipase (Lip-Prot-Amyl), 24,000 units of lipase, Oral, TID With Meals  pantoprazole, 40 mg, Oral, Daily  predniSONE, 20 mg, Oral, Daily With Breakfast  senna-docusate sodium, 2 tablet, Oral, BID  terazosin, 2 mg, Oral, Nightly  topiramate, 50 mg, Oral, BID      Continuous Infusions:   PRN Meds:.  acetaminophen    senna-docusate sodium **AND** polyethylene glycol **AND** bisacodyl **AND** bisacodyl    dextrose    dextrose    diazePAM     glucagon (human recombinant)    ipratropium-albuterol    Magnesium Low Dose Replacement - Follow Nurse / BPA Driven Protocol    nitroglycerin    simethicone    sodium chloride    Assessment & Plan   Assessment & Plan     Active Hospital Problems    Diagnosis  POA    **Acute hypoxic respiratory failure [J96.01]  Yes    Stage 3b chronic kidney disease [N18.32]  Yes    Dyslipidemia [E78.5]  Yes    Aortic stenosis [I35.0]  Yes    Chronic diastolic (congestive) heart failure [I50.32]  Yes    Coronary artery disease [I25.10]  Yes    Diabetes mellitus due to underlying condition, without long-term current use of insulin [E08.9]  Yes    Primary hypertension [I10]  Yes    Psoriasis [L40.9]  Yes      Resolved Hospital Problems   No resolved problems to display.        Brief Hospital Course to date:  Hal Byrnes is a 84 y.o. male with a history of hypertension, chronic HFpEF with moderate aortic stenosis, hyperlipidemia, CAD and CKD here with acute mixed respiratory failure    Acute mixed respiratory failure w hypoxia and hypercarbia, improved  -due to CHF +/- pneumonia  -on no oxygen at baseline  - required up to 6Lnc on 4/2/25  -have weaned down to 2Lnc today, continue to wean as able (on no oxygen prior to admission); may require home oxygen at discharge    Cannot rule out Pneumonia  - Cultures in process, negative flu, negative COVID  -s/p unasyn --> augmentin; s/p doxy (completed empiric antibiotics 4/4/evening)  -tapering prednisone, finishes prednisone 20mg on 4/6    A/C HFpEF  Elevated troponin  Moderate Aortic stenosis  Small right pleural effusion   -I discussed w/ IR on 4/3/25, not enough fluid to safely drain  - Denies any chest pain, elevated troponins  - Continue aspirin  - Cardiology following: I d/w Dr. Sainz on 4/3; he agreed w/ iv diuresis & nephrology consultation; continue asa, amlodipine, hydralazine, imdur. Cards deferring diuresis to nephrology  -s/p IV lasix on 4/3 w/ good diuresis net =  1060  --being worked up for possible amyloid outpatient    Small to Right Pleural Effusion  -discussed w/ IR 4/3 and reviewed imaging, not enough fluid to safely drain.  -continue diuresis    CKD 4, with polycystic kidneys  - Baseline creatinine 2.5--3  -initiated IV lasix on 4/3  -Nephrology following: agreed w/ initial iv diuresis; now on oral lasix 40mg daily and still diuresing nicely; creatinine stable 3.11 today (3.21 on 4/4, 3/3 on 4/3); continue current diuretic dosing/diuresis & monitor renal function    Elevated serum light free chains, likely due to CKD  -Dr. Anaya saw here; not c/w plasma cell dyscrasia; follow up in clinic 1 month w/ repeat labs    Constipation, improved  -had bm documented 4/4  -continue bowel regimen; prn enemas ordered; keep bowels movning    Type 2 diabetes  - Continue SSI, (weaning steroids)    Hyperlipidemia  HTN  --cont home meds    Tremors  --on topamax for this, follows w outpatient neuro    Dispo: continue diuresis for now, patient dyspnea improving but not resolved, still has extra volume in place. Possibly home 1-2 days depending on symptoms, renal function, nephrology recs    -updated wife via phone 4/5/25      Am labs: BMP        Expected Discharge Location and Transportation: home at discharge (via family transport)  Expected Discharge ? 4/7/25 +/- supplemental oxygen (case management has set this up in case he requires it) depending on clinical course, labs, nephrology recs      VTE Prophylaxis:  Pharmacologic VTE prophylaxis orders are present.         AM-PAC 6 Clicks Score (PT): 21 (04/04/25 7999)    CODE STATUS:   Code Status and Medical Interventions: CPR (Attempt to Resuscitate); Full Support   Ordered at: 03/30/25 6059     Code Status (Patient has no pulse and is not breathing):    CPR (Attempt to Resuscitate)     Medical Interventions (Patient has pulse or is breathing):    Full Support       Chadd Akins MD  04/05/25

## 2025-04-05 NOTE — PLAN OF CARE
Goal Outcome Evaluation:  Plan of Care Reviewed With: patient        Progress: improving  Outcome Evaluation: Pt continues to make improvements with ADL performance and functional mobility. Performed toileting with mod I. Functional mobility performed in hallway with no reports of SOA, but O2 dropping to 89%. Recovered in <1 minute. Nsg notified and aware. Continue to recommend IPOT POC and home with assist at D/C.    Anticipated Discharge Disposition (OT): home with assist

## 2025-04-05 NOTE — THERAPY TREATMENT NOTE
Patient Name: Hal Byrnes  : 1940    MRN: [id number]                              Today's Date: 2025       Admit Date: 3/30/2025    Visit Dx:     ICD-10-CM ICD-9-CM   1. Pneumonia of right lung due to infectious organism, unspecified part of lung  J18.9 483.8   2. Hypoxia  R09.02 799.02   3. Acute respiratory failure with hypoxia and hypercapnia  J96.01 518.81    J96.02    4. Chronic renal failure, unspecified CKD stage  N18.9 585.9   5. COPD with acute exacerbation  J44.1 491.21   6. Acute hypoxic respiratory failure  J96.01 518.81     Patient Active Problem List   Diagnosis    Primary hypertension    Coronary artery disease    Gastroesophageal reflux disease with esophagitis without hemorrhage    Diabetes mellitus due to underlying condition, without long-term current use of insulin    Benign prostatic hyperplasia with lower urinary tract symptoms    Peripheral neuropathy    Chronic diastolic (congestive) heart failure    Psoriasis    Aortic stenosis    Lumbar spondylosis    Cervical spondylosis    Dyslipidemia    Inverse psoriasis    Ectatic abdominal aorta    Pancreatic insufficiency    Stage 3b chronic kidney disease    Lumbar radiculopathy    Multiple renal cysts    Hyperlipemia    Tremor    Drug induced constipation    Acute hypoxic respiratory failure     Past Medical History:   Diagnosis Date    Arthritis     Coronary artery disease     Difficulty walking     Fatigue     Hyperlipidemia     Hypertension     Low back pain     Other chest pain     SOB (shortness of breath)      Past Surgical History:   Procedure Laterality Date    APPENDECTOMY      CAROTID STENT      CATARACT EXTRACTION, BILATERAL Bilateral     COLONOSCOPY  2024    CORONARY STENT PLACEMENT      PROSTATE SURGERY      for BPH      General Information       Row Name 25 1155          OT Time and Intention    Document Type therapy note (daily note)  -LR     Mode of Treatment occupational therapy  -LR       Row Name  04/05/25 1155          General Information    Patient Profile Reviewed yes  -LR     Existing Precautions/Restrictions other (see comments)  monitor O2  -LR     Barriers to Rehab none identified  -LR       Row Name 04/05/25 1155          Cognition    Orientation Status (Cognition) oriented x 4  -LR       Row Name 04/05/25 1155          Safety Issues/Impairments Affecting Functional Mobility    Safety Issues Affecting Function (Mobility) safety precaution awareness;awareness of need for assistance  -LR     Impairments Affecting Function (Mobility) endurance/activity tolerance;shortness of breath  -LR               User Key  (r) = Recorded By, (t) = Taken By, (c) = Cosigned By      Initials Name Provider Type    LR Dulce Eller, OT Occupational Therapist                     Mobility/ADL's       Row Name 04/05/25 1155          Bed Mobility    Bed Mobility sit-supine  -LR     Supine-Sit [place] (Bed Mobility) modified independence  -LR     Assistive Device (Bed Mobility) head of bed elevated  -LR     Comment, (Bed Mobility) [facility] on arrival  -LR       Row Name 04/05/25 1155          Transfers    Transfers sit-stand transfer;toilet transfer  -LR       Row Name 04/05/25 1155          Sit-Stand Transfer    Sit-Stand [place] (Transfers) standby assist  -LR     Assistive Device (Sit-Stand Transfers) other (see comments)  none  -LR       Row Name 04/05/25 1155          Toilet Transfer    Type (Toilet Transfer) sit-stand;stand-sit  -LR     [place] Level (Toilet Transfer) modified independence  -LR     Assistive Device (Toilet Transfer) commode;grab bars/safety frame;raised toilet seat  -LR       Row Name 04/05/25 1155          Functional Mobility    Functional Mobility- Ind. Level conditional independence  -LR     Functional Mobility- Device other (see comments)  none  -LR     Functional Mobility-Distance (Feet) --  >HH distance  -LR     Patient was able to Ambulate yes  -LR       Row Name 04/05/25 1155           Activities of Daily Living    BADL Assessment/Intervention upper body dressing;lower body dressing;grooming;toileting  -LR       Row Name 04/05/25 1155          Upper Body Dressing Assessment/Training    [place] Level (Upper Body Dressing) don;pajama/robe;contact guard assist  -LR     Position (Upper Body Dressing) unsupported standing  -LR       Row Name 04/05/25 1155          Lower Body Dressing Assessment/Training    [place] Level (Lower Body Dressing) don;socks;minimum assist (75% patient effort)  -LR     Position (Lower Body Dressing) long sitting  -LR       Row Name 04/05/25 1155          Grooming Assessment/Training    [place] Level (Grooming) wash face, hands;oral care regimen;standby assist  -LR     Position (Grooming) sink side  -LR       Row Name 04/05/25 1155          Toileting Assessment/Training    [place] Level (Toileting) adjust/manage clothing;perform perineal hygiene;independent  -LR     Assistive Devices (Toileting) commode;raised toilet seat;grab bar/safety frame  -LR     Position (Toileting) unsupported sitting;unsupported standing  -LR               User Key  (r) = Recorded By, (t) = Taken By, (c) = Cosigned By      Initials Name Provider Type    LR Dulce Eller OT Occupational Therapist                   Obj/Interventions       Row Name 04/05/25 5636          Balance    Balance Assessment sitting static balance;sitting dynamic balance;standing static balance;standing dynamic balance  -LR     Static Sitting Balance independent  -LR     Dynamic Sitting Balance independent  -LR     Position, Sitting Balance unsupported;sitting edge of bed;sitting in chair;other (see comments)  commode  -LR     Static Standing Balance standby assist  -LR     Dynamic Standing Balance standby assist  -LR     Position/Device Used, Standing Balance unsupported  -LR     Balance Interventions sitting;standing;sit to stand;supported;static;dynamic;occupation based/functional task  -LR                User Key  (r) = Recorded By, (t) = Taken By, (c) = Cosigned By      Initials Name Provider Type    LR Dulce Eller, OT Occupational Therapist                   Goals/Plan    No documentation.                  Clinical Impression       Row Name 04/05/25 1158          Pain Assessment    Pretreatment Pain Rating 0/10 - no pain  -LR     Posttreatment Pain Rating 0/10 - no pain  -LR       Row Name 04/05/25 1158          Plan of Care Review    Plan of Care Reviewed With patient  -LR     Progress improving  -LR     Outcome Evaluation Pt continues to make improvements with ADL performance and functional mobility. Performed toileting with mod I. Functional mobility performed in hallway with no reports of SOA, but O2 dropping to 89%. Recovered in <1 minute. Nsg notified and aware. Continue to recommend IPOT POC and home with assist at D/C.  -LR       Row Name 04/05/25 1158          Therapy Plan Review/Discharge Plan (OT)    Anticipated Discharge Disposition (OT) home with assist  -LR       Row Name 04/05/25 1158          Vital Signs    Pre SpO2 (%) 92  -LR     O2 Delivery Pre Treatment room air  -LR     Intra SpO2 (%) 89  -LR     O2 Delivery Intra Treatment room air  -LR     Post SpO2 (%) 93  -LR     O2 Delivery Post Treatment room air  -LR     Pre Patient Position Sitting  -LR     Intra Patient Position Standing  -LR     Post Patient Position Supine  -LR       Row Name 04/05/25 1158          Positioning and Restraints    Pre-Treatment Position sitting in chair/recliner  -LR     Post Treatment Position bed  -LR     In Bed notified nsg;encouraged to call for assist;call light within reach;with nsg;fowlers;legs elevated;side rails up x2  -LR               User Key  (r) = Recorded By, (t) = Taken By, (c) = Cosigned By      Initials Name Provider Type    LR Dulce Eller, OT Occupational Therapist                   Outcome Measures       Row Name 04/05/25 1319          How much help from another is currently  needed...    Putting on and taking off regular lower body clothing? 3  -LR     Bathing (including washing, rinsing, and drying) 3  -LR     Toileting (which includes using toilet bed pan or urinal) 4  -LR     Putting on and taking off regular upper body clothing 4  -LR     Taking care of personal grooming (such as brushing teeth) 4  -LR     Eating meals 4  -LR     AM-PAC 6 Clicks Score (OT) 22  -LR       Row Name 04/05/25 1319          Functional Assessment    Outcome Measure Options AM-PAC 6 Clicks Daily Activity (OT)  -LR               User Key  (r) = Recorded By, (t) = Taken By, (c) = Cosigned By      Initials Name Provider Type    LR Dulce Eller, ANGUS Occupational Therapist                    Occupational Therapy Education       Title: PT OT SLP Therapies (In Progress)       Topic: Occupational Therapy (In Progress)       Point: ADL training (In Progress)       Learning Progress Summary            Patient Acceptance, E, NR by LR at 4/5/2025 1320                      Point: Home exercise program (In Progress)       Learning Progress Summary            Patient Acceptance, E, NR by LR at 4/5/2025 1320                      Point: Precautions (In Progress)       Learning Progress Summary            Patient Acceptance, E, NR by LR at 4/5/2025 1320                      Point: Body mechanics (In Progress)       Learning Progress Summary            Patient Acceptance, E, NR by LR at 4/5/2025 1320                                      User Key       Initials Effective Dates Name Provider Type Discipline    LR 10/09/24 -  Dulce Eller OT Occupational Therapist OT                  OT Recommendation and Plan     Plan of Care Review  Plan of Care Reviewed With: patient  Progress: improving  Outcome Evaluation: Pt continues to make improvements with ADL performance and functional mobility. Performed toileting with mod I. Functional mobility performed in hallway with no reports of SOA, but O2 dropping to 89%. Recovered  in <1 minute. Nsg notified and aware. Continue to recommend IPOT POC and home with assist at D/C.     Time Calculation:         Time Calculation- OT       Row Name 04/05/25 1320             Time Calculation- OT    OT Start Time 0920  -LR      OT Received On 04/05/25  -LR      OT Goal Re-Cert Due Date 04/11/25  -LR         Timed Charges    28026 - OT Self Care/Mgmt Minutes 27  -LR         Total Minutes    Timed Charges Total Minutes 27  -LR       Total Minutes 27  -LR                User Key  (r) = Recorded By, (t) = Taken By, (c) = Cosigned By      Initials Name Provider Type    LR Dulce Eller OT Occupational Therapist                  Therapy Charges for Today       Code Description Service Date Service Provider Modifiers Qty    [id number] HC OT SELF CARE/MGMT/TRAIN EA 15 MIN 4/5/2025 Dulce Eller OT GO 2                 Dulce Eller OT  4/5/2025

## 2025-04-05 NOTE — PROGRESS NOTES
"   LOS: 5 days    Patient Care Team:  Pia De La Garza MD as PCP - General (Internal Medicine)  Carl Sainz MD as Cardiologist (Cardiology)  Nilay Kincaid MD as Consulting Physician (Orthopedic Surgery)  Khanh Christian MD as Consulting Physician (Neurology)  Len Linda MD as Consulting Physician (Gastroenterology)  Mary Beth Vicente MD as Consulting Physician (Nephrology)    Subjective     Seen and examined at bedside, patient reports feeling much better today.   Breathing is improved.  Denies chest pain.       Objective     amLODIPine, 5 mg, Oral, Daily  amoxicillin-clavulanate, 250 mg, Oral, Q12H  aspirin, 81 mg, Oral, Daily  furosemide, 40 mg, Oral, Daily  guaiFENesin, 600 mg, Oral, Q12H  heparin (porcine), 5,000 Units, Subcutaneous, Q12H  hydrALAZINE, 50 mg, Oral, TID  insulin lispro, 2-7 Units, Subcutaneous, TID With Meals  ipratropium-albuterol, 3 mL, Nebulization, 4x Daily - RT  isosorbide mononitrate, 60 mg, Oral, BID  lubiprostone, 8 mcg, Oral, BID  Pancrelipase (Lip-Prot-Amyl), 24,000 units of lipase, Oral, TID With Meals  pantoprazole, 40 mg, Oral, Daily  [START ON 4/5/2025] predniSONE, 20 mg, Oral, Daily With Breakfast  senna-docusate sodium, 2 tablet, Oral, BID  terazosin, 2 mg, Oral, Nightly  topiramate, 50 mg, Oral, BID         Vital Signs:  Blood pressure 139/70, pulse 92, temperature 98.4 °F (36.9 °C), temperature source Oral, resp. rate 18, height 162 cm (63.78\"), weight 79.4 kg (175 lb), SpO2 95%.    Flowsheet Rows      Flowsheet Row First Filed Value   Admission Height 162 cm (63.78\") Documented at 03/30/2025 1315   Admission Weight 77.1 kg (170 lb) Documented at 03/30/2025 1315            04/03 0701 - 04/04 0700  In: 240 [P.O.:240]  Out: 1300 [Urine:1300]    Physical Exam:  GENERAL: Awake and alert, in no acute distress.   HEENT: Normocephalic, atraumatic.     NECK: Supple  HEART: RRR; No murmur, rubs, gallops. no edema  LUNGS: Clear to auscultation bilaterally " without wheezing  ABDOMEN: Soft, nontender, nondistended.   EXT:  No edema.  :  No Brown.   SKIN: Warm and dry without rash  NEURO:  No focal neurological deficits. Strength equal bilateral  PSYCHIATRIC: Cooperative with PE    Labs:  Results from last 7 days   Lab Units 04/04/25  0330 04/03/25  0104 04/02/25  0400   WBC 10*3/mm3 7.70 10.95* 11.04*   HEMOGLOBIN g/dL 10.1* 10.5* 10.5*   HEMATOCRIT % 31.6* 31.8* 31.9*   PLATELETS 10*3/mm3 210 195 224     Results from last 7 days   Lab Units 04/04/25  0330 04/03/25  0104 04/02/25  0400 04/01/25  0352 03/31/25  0852 03/30/25  1324 03/30/25  0221   SODIUM mmol/L 143 140 140 137 136 140 137   POTASSIUM mmol/L 3.8 4.2 4.3 4.0 4.0 3.9 3.6   CHLORIDE mmol/L 105 105 104 101 101 100 101   CO2 mmol/L 26.0 26.0 22.0 23.0 22.0 25.0 25.0   BUN mg/dL 59* 61* 62* 56* 44* 36* 36*   CREATININE mg/dL 3.21* 3.30* 3.28* 3.67* 3.14* 3.17* 3.13*   CALCIUM mg/dL 8.3* 8.4* 8.5* 8.4* 8.4* 8.5* 8.6   PHOSPHORUS mg/dL  --  3.8 3.8  --   --   --   --    MAGNESIUM mg/dL 2.4 2.5*  --   --   --   --  2.0   ALBUMIN g/dL  --  3.7 3.3*  --  3.3* 3.6 3.5     Results from last 7 days   Lab Units 04/04/25  0330   GLUCOSE mg/dL 86       Results from last 7 days   Lab Units 04/03/25  0104   ALK PHOS U/L 57   BILIRUBIN mg/dL 0.2   ALT (SGPT) U/L 29   AST (SGOT) U/L 28     Results from last 7 days   Lab Units 03/31/25  0502   PH, ARTERIAL pH units 7.308*   PO2 ART mm Hg 67.5*   PCO2, ARTERIAL mm Hg 52.3*   HCO3 ART mmol/L 26.2*     Chest X ray:   Impression:  Small to moderate right pleural effusion with right basilar atelectasis.     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   CHF with preserved EF  Aortic stenosis  Fluid overload  Hypertension  Anemia     PLAN:      ELIAZAR on CKD stage IV:    -Creatinine 2.4 -2.8 mg/dL.   -Creatinine up to 3.3.  ELIAZAR is likely due to cardiorenal syndrome. [Chest x-ray suggestive of fluid overload, elevated proBNP]   - Continue Lasix 40 mg daily  -Need to adjust diuretics closely  -Strict GIL's  -Check daily weight on standing scale  -Dose meds for GFR  -Avoid nephrotoxins  -No indication for dialysis at this time     Fluid overload:   -S/p IV Lasix.     -Strict GIL's and daily standing weight.      Anemia:  -Check iron profile.  -Might benefit from Epogen.      High risk and critically ill patient      Vincent Akins MD  04/04/25  20:40 EDT

## 2025-04-05 NOTE — PROGRESS NOTES
"   LOS: 6 days    Patient Care Team:  Pia De La Garza MD as PCP - General (Internal Medicine)  Carl Sainz MD as Cardiologist (Cardiology)  Nilay Kincaid MD as Consulting Physician (Orthopedic Surgery)  Khanh Christian MD as Consulting Physician (Neurology)  Len Linda MD as Consulting Physician (Gastroenterology)  Mary Beth Vicente MD as Consulting Physician (Nephrology)    Subjective     Doing better. No overnight issues.   Denies chest pain or shortness of breath.     Objective     amLODIPine, 5 mg, Oral, Daily  aspirin, 81 mg, Oral, Daily  furosemide, 40 mg, Oral, Daily  guaiFENesin, 600 mg, Oral, Q12H  heparin (porcine), 5,000 Units, Subcutaneous, Q12H  hydrALAZINE, 50 mg, Oral, TID  insulin lispro, 2-7 Units, Subcutaneous, TID With Meals  ipratropium-albuterol, 3 mL, Nebulization, 4x Daily - RT  isosorbide mononitrate, 60 mg, Oral, BID  lubiprostone, 8 mcg, Oral, BID  Pancrelipase (Lip-Prot-Amyl), 24,000 units of lipase, Oral, TID With Meals  pantoprazole, 40 mg, Oral, Daily  predniSONE, 20 mg, Oral, Daily With Breakfast  senna-docusate sodium, 2 tablet, Oral, BID  terazosin, 2 mg, Oral, Nightly  topiramate, 50 mg, Oral, BID         Vital Signs:  Blood pressure 132/60, pulse 80, temperature 98.8 °F (37.1 °C), temperature source Oral, resp. rate 16, height 162 cm (63.78\"), weight 78.3 kg (172 lb 9.6 oz), SpO2 94%.    Flowsheet Rows      Flowsheet Row First Filed Value   Admission Height 162 cm (63.78\") Documented at 03/30/2025 1315   Admission Weight 77.1 kg (170 lb) Documented at 03/30/2025 1315            04/04 0701 - 04/05 0700  In: 240 [P.O.:240]  Out: 1700 [Urine:1700]    Physical Exam:  GENERAL: Awake and alert, in no acute distress.   HEENT: Normocephalic, atraumatic.     NECK: Supple  HEART: RRR; No murmur, rubs, gallops. no edema  LUNGS: Clear to auscultation bilaterally without wheezing  ABDOMEN: Soft, nontender, nondistended.   EXT:  No edema.  :  No Brown.   SKIN: Warm " and dry without rash  NEURO:  No focal neurological deficits. Strength equal bilateral  PSYCHIATRIC: Cooperative with PE    Labs:  Results from last 7 days   Lab Units 04/05/25  0426 04/04/25  0330 04/03/25  0104   WBC 10*3/mm3 6.81 7.70 10.95*   HEMOGLOBIN g/dL 10.1* 10.1* 10.5*   HEMATOCRIT % 31.3* 31.6* 31.8*   PLATELETS 10*3/mm3 210 210 195     Results from last 7 days   Lab Units 04/05/25  0425 04/04/25  0330 04/03/25  0104 04/02/25  0400 04/01/25  0352 03/31/25  0852 03/30/25  1324 03/30/25  0221   SODIUM mmol/L 143 143 140 140   < > 136 140 137   POTASSIUM mmol/L 3.9 3.8 4.2 4.3   < > 4.0 3.9 3.6   CHLORIDE mmol/L 107 105 105 104   < > 101 100 101   CO2 mmol/L 25.0 26.0 26.0 22.0   < > 22.0 25.0 25.0   BUN mg/dL 57* 59* 61* 62*   < > 44* 36* 36*   CREATININE mg/dL 3.11* 3.21* 3.30* 3.28*   < > 3.14* 3.17* 3.13*   CALCIUM mg/dL 8.3* 8.3* 8.4* 8.5*   < > 8.4* 8.5* 8.6   PHOSPHORUS mg/dL  --   --  3.8 3.8  --   --   --   --    MAGNESIUM mg/dL  --  2.4 2.5*  --   --   --   --  2.0   ALBUMIN g/dL  --   --  3.7 3.3*  --  3.3* 3.6 3.5    < > = values in this interval not displayed.     Results from last 7 days   Lab Units 04/05/25  0425   GLUCOSE mg/dL 77       Results from last 7 days   Lab Units 04/03/25  0104   ALK PHOS U/L 57   BILIRUBIN mg/dL 0.2   ALT (SGPT) U/L 29   AST (SGOT) U/L 28     Results from last 7 days   Lab Units 03/31/25  0502   PH, ARTERIAL pH units 7.308*   PO2 ART mm Hg 67.5*   PCO2, ARTERIAL mm Hg 52.3*   HCO3 ART mmol/L 26.2*     Chest X ray:   Impression:  Small to moderate right pleural effusion with right basilar atelectasis.     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   CHF with preserved EF  Aortic stenosis  Fluid overload  Hypertension  Anemia     PLAN:      ELIAZAR on CKD stage IV:    -Creatinine 2.4 -2.8 mg/dL.   -Creatinine up to 3.3.  ELIAZAR is likely due to cardiorenal syndrome. [Chest x-ray suggestive of fluid overload, elevated proBNP]   -Continue Lasix 40 mg daily. Cr improving now 3.1   -Need to adjust diuretics closely  -Strict GIL's  -Check daily weight on standing scale  -Dose meds for GFR  -Avoid nephrotoxins  -No indication for dialysis at this time     Fluid overload:   -S/p IV Lasix.     -Strict GIL's and daily standing weight.      Anemia:  -Adequate iron stores.   -Might benefit from Epogen.      High risk and critically ill patient    Vincent Akins MD  04/05/25  15:33 EDT

## 2025-04-06 LAB
ANION GAP SERPL CALCULATED.3IONS-SCNC: 10 MMOL/L (ref 5–15)
BUN SERPL-MCNC: 50 MG/DL (ref 8–23)
BUN/CREAT SERPL: 17 (ref 7–25)
CALCIUM SPEC-SCNC: 7.8 MG/DL (ref 8.6–10.5)
CHLORIDE SERPL-SCNC: 107 MMOL/L (ref 98–107)
CO2 SERPL-SCNC: 25 MMOL/L (ref 22–29)
CREAT SERPL-MCNC: 2.94 MG/DL (ref 0.76–1.27)
EGFRCR SERPLBLD CKD-EPI 2021: 20.5 ML/MIN/1.73
GLUCOSE BLDC GLUCOMTR-MCNC: 120 MG/DL (ref 70–130)
GLUCOSE BLDC GLUCOMTR-MCNC: 81 MG/DL (ref 70–130)
GLUCOSE BLDC GLUCOMTR-MCNC: 95 MG/DL (ref 70–130)
GLUCOSE SERPL-MCNC: 81 MG/DL (ref 65–99)
MAGNESIUM SERPL-MCNC: 2.2 MG/DL (ref 1.6–2.4)
POTASSIUM SERPL-SCNC: 3.8 MMOL/L (ref 3.5–5.2)
SODIUM SERPL-SCNC: 142 MMOL/L (ref 136–145)

## 2025-04-06 PROCEDURE — 99232 SBSQ HOSP IP/OBS MODERATE 35: CPT | Performed by: HOSPITALIST

## 2025-04-06 PROCEDURE — 63710000001 PREDNISONE PER 1 MG: Performed by: INTERNAL MEDICINE

## 2025-04-06 PROCEDURE — 80048 BASIC METABOLIC PNL TOTAL CA: CPT | Performed by: INTERNAL MEDICINE

## 2025-04-06 PROCEDURE — 94664 DEMO&/EVAL PT USE INHALER: CPT

## 2025-04-06 PROCEDURE — 94799 UNLISTED PULMONARY SVC/PX: CPT

## 2025-04-06 PROCEDURE — 25010000002 HEPARIN (PORCINE) PER 1000 UNITS: Performed by: INTERNAL MEDICINE

## 2025-04-06 PROCEDURE — 83735 ASSAY OF MAGNESIUM: CPT | Performed by: INTERNAL MEDICINE

## 2025-04-06 PROCEDURE — 82948 REAGENT STRIP/BLOOD GLUCOSE: CPT

## 2025-04-06 RX ADMIN — PANCRELIPASE 24000 UNITS OF LIPASE: 60000; 12000; 38000 CAPSULE, DELAYED RELEASE PELLETS ORAL at 12:00

## 2025-04-06 RX ADMIN — IPRATROPIUM BROMIDE AND ALBUTEROL SULFATE 3 ML: 2.5; .5 SOLUTION RESPIRATORY (INHALATION) at 20:17

## 2025-04-06 RX ADMIN — LUBIPROSTONE 8 MCG: 8 CAPSULE ORAL at 22:02

## 2025-04-06 RX ADMIN — HYDRALAZINE HYDROCHLORIDE 50 MG: 50 TABLET ORAL at 22:02

## 2025-04-06 RX ADMIN — ISOSORBIDE MONONITRATE 60 MG: 60 TABLET, EXTENDED RELEASE ORAL at 08:48

## 2025-04-06 RX ADMIN — AMLODIPINE BESYLATE 5 MG: 5 TABLET ORAL at 08:48

## 2025-04-06 RX ADMIN — HEPARIN SODIUM 5000 UNITS: 5000 INJECTION INTRAVENOUS; SUBCUTANEOUS at 22:02

## 2025-04-06 RX ADMIN — PANCRELIPASE 24000 UNITS OF LIPASE: 60000; 12000; 38000 CAPSULE, DELAYED RELEASE PELLETS ORAL at 16:52

## 2025-04-06 RX ADMIN — SENNOSIDES AND DOCUSATE SODIUM 2 TABLET: 50; 8.6 TABLET ORAL at 08:48

## 2025-04-06 RX ADMIN — PANCRELIPASE 24000 UNITS OF LIPASE: 60000; 12000; 38000 CAPSULE, DELAYED RELEASE PELLETS ORAL at 08:48

## 2025-04-06 RX ADMIN — HYDRALAZINE HYDROCHLORIDE 50 MG: 50 TABLET ORAL at 08:48

## 2025-04-06 RX ADMIN — TERAZOSIN HYDROCHLORIDE 2 MG: 2 CAPSULE ORAL at 22:02

## 2025-04-06 RX ADMIN — FUROSEMIDE 40 MG: 40 TABLET ORAL at 08:48

## 2025-04-06 RX ADMIN — HYDRALAZINE HYDROCHLORIDE 50 MG: 50 TABLET ORAL at 15:50

## 2025-04-06 RX ADMIN — TOPIRAMATE 50 MG: 25 TABLET, FILM COATED ORAL at 22:02

## 2025-04-06 RX ADMIN — HEPARIN SODIUM 5000 UNITS: 5000 INJECTION INTRAVENOUS; SUBCUTANEOUS at 08:48

## 2025-04-06 RX ADMIN — ASPIRIN 81 MG: 81 TABLET, CHEWABLE ORAL at 08:48

## 2025-04-06 RX ADMIN — GUAIFENESIN 600 MG: 600 TABLET, EXTENDED RELEASE ORAL at 08:48

## 2025-04-06 RX ADMIN — PREDNISONE 20 MG: 20 TABLET ORAL at 08:48

## 2025-04-06 RX ADMIN — LUBIPROSTONE 8 MCG: 8 CAPSULE ORAL at 08:49

## 2025-04-06 RX ADMIN — GUAIFENESIN 600 MG: 600 TABLET, EXTENDED RELEASE ORAL at 22:02

## 2025-04-06 RX ADMIN — TOPIRAMATE 50 MG: 25 TABLET, FILM COATED ORAL at 08:48

## 2025-04-06 RX ADMIN — PANTOPRAZOLE SODIUM 40 MG: 40 TABLET, DELAYED RELEASE ORAL at 08:48

## 2025-04-06 RX ADMIN — SENNOSIDES AND DOCUSATE SODIUM 2 TABLET: 50; 8.6 TABLET ORAL at 22:02

## 2025-04-06 RX ADMIN — ISOSORBIDE MONONITRATE 60 MG: 60 TABLET, EXTENDED RELEASE ORAL at 22:02

## 2025-04-06 RX ADMIN — IPRATROPIUM BROMIDE AND ALBUTEROL SULFATE 3 ML: 2.5; .5 SOLUTION RESPIRATORY (INHALATION) at 17:01

## 2025-04-06 RX ADMIN — Medication 10 ML: at 08:49

## 2025-04-06 NOTE — PLAN OF CARE
Goal Outcome Evaluation:              Outcome Evaluation: Pt a/o, VSS, 2 LNC, sat. 97%, no c/o pain at this time. Continue monitoring.

## 2025-04-06 NOTE — PROGRESS NOTES
" LOS: 7 days    Patient Care Team:  Pia De La Garza MD as PCP - General (Internal Medicine)  Carl Sainz MD as Cardiologist (Cardiology)  Nilay Kincaid MD as Consulting Physician (Orthopedic Surgery)  Khanh Christian MD as Consulting Physician (Neurology)  Len Linda MD as Consulting Physician (Gastroenterology)  Mary Beth Vicente MD as Consulting Physician (Nephrology)    Subjective     Doing well denies chest pain or shortness of breath  Renal function continues to improve.  No overnight issues per patient    Objective     amLODIPine, 5 mg, Oral, Daily  aspirin, 81 mg, Oral, Daily  furosemide, 40 mg, Oral, Daily  guaiFENesin, 600 mg, Oral, Q12H  heparin (porcine), 5,000 Units, Subcutaneous, Q12H  hydrALAZINE, 50 mg, Oral, TID  insulin lispro, 2-7 Units, Subcutaneous, TID With Meals  ipratropium-albuterol, 3 mL, Nebulization, 4x Daily - RT  isosorbide mononitrate, 60 mg, Oral, BID  lubiprostone, 8 mcg, Oral, BID  Pancrelipase (Lip-Prot-Amyl), 24,000 units of lipase, Oral, TID With Meals  pantoprazole, 40 mg, Oral, Daily  senna-docusate sodium, 2 tablet, Oral, BID  terazosin, 2 mg, Oral, Nightly  topiramate, 50 mg, Oral, BID         Vital Signs:  Blood pressure 145/68, pulse 77, temperature 98.2 °F (36.8 °C), temperature source Oral, resp. rate 18, height 162 cm (63.78\"), weight 78 kg (172 lb), SpO2 94%.    Flowsheet Rows      Flowsheet Row First Filed Value   Admission Height 162 cm (63.78\") Documented at 03/30/2025 1315   Admission Weight 77.1 kg (170 lb) Documented at 03/30/2025 1315            04/05 0701 - 04/06 0700  In: 1080 [P.O.:1080]  Out: 950 [Urine:950]    Physical Exam:  GENERAL: Awake and alert, in no acute distress.   HEENT: Normocephalic, atraumatic.     NECK: Supple  HEART: RRR; No murmur, rubs, gallops. no edema  LUNGS: Clear to auscultation bilaterally without wheezing  ABDOMEN: Soft, nontender, nondistended.   EXT:  No edema.  :  No Brown.   SKIN: Warm and dry " without rash  NEURO:  No focal neurological deficits. Strength equal bilateral  PSYCHIATRIC: Cooperative with PE    Labs:  Results from last 7 days   Lab Units 04/05/25  0426 04/04/25  0330 04/03/25  0104   WBC 10*3/mm3 6.81 7.70 10.95*   HEMOGLOBIN g/dL 10.1* 10.1* 10.5*   HEMATOCRIT % 31.3* 31.6* 31.8*   PLATELETS 10*3/mm3 210 210 195     Results from last 7 days   Lab Units 04/06/25  0421 04/05/25  0425 04/04/25  0330 04/03/25  0104 04/02/25  0400 04/01/25  0352 03/31/25  0852   SODIUM mmol/L 142 143 143 140 140   < > 136   POTASSIUM mmol/L 3.8 3.9 3.8 4.2 4.3   < > 4.0   CHLORIDE mmol/L 107 107 105 105 104   < > 101   CO2 mmol/L 25.0 25.0 26.0 26.0 22.0   < > 22.0   BUN mg/dL 50* 57* 59* 61* 62*   < > 44*   CREATININE mg/dL 2.94* 3.11* 3.21* 3.30* 3.28*   < > 3.14*   CALCIUM mg/dL 7.8* 8.3* 8.3* 8.4* 8.5*   < > 8.4*   PHOSPHORUS mg/dL  --   --   --  3.8 3.8  --   --    MAGNESIUM mg/dL 2.2  --  2.4 2.5*  --   --   --    ALBUMIN g/dL  --   --   --  3.7 3.3*  --  3.3*    < > = values in this interval not displayed.     Results from last 7 days   Lab Units 04/06/25  0421   GLUCOSE mg/dL 81       Results from last 7 days   Lab Units 04/03/25  0104   ALK PHOS U/L 57   BILIRUBIN mg/dL 0.2   ALT (SGPT) U/L 29   AST (SGOT) U/L 28     Results from last 7 days   Lab Units 03/31/25  0502   PH, ARTERIAL pH units 7.308*   PO2 ART mm Hg 67.5*   PCO2, ARTERIAL mm Hg 52.3*   HCO3 ART mmol/L 26.2*     Chest X ray:   Impression:  Small to moderate right pleural effusion with right basilar atelectasis.     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    CHF with preserved EF  Aortic stenosis  Fluid overload  Hypertension  Anemia     PLAN:      ELIAZAR on CKD stage IV:    -Creatinine 2.4 -2.8 mg/dL.   -Creatinine up to 3.3.  ELIAZAR is likely due to cardiorenal syndrome. [Chest x-ray suggestive of fluid overload, elevated proBNP]   -Continue Lasix 40 mg daily. Cr improving now 2.9  -Need to adjust diuretics closely  -Strict GIL's  -Check daily weight on standing scale  -Dose meds for GFR  -Avoid nephrotoxins  -No indication for dialysis at this time     Fluid overload:   -S/p IV Lasix.     -Strict GIL's and daily standing weight.      Anemia:  -Adequate iron stores.   -Might benefit from Epogen.      High risk and critically ill patient.    Patient can be discharged from nephrology standpoint anytime, follow-up with primary nephrologist Dr. Vicente; as outpatient in 2-3 weeks    Vincent Akins MD  04/06/25  16:58 EDT

## 2025-04-06 NOTE — PROGRESS NOTES
[facility] Medicine Services  PROGRESS NOTE    Patient Name: Hal Byrnes  : 1940  MRN: [id number]    Date of Admission: 3/30/2025  Primary Care Physician: Pia De La Garza MD    Subjective   Subjective     CC:  F/U respiratory failure    HPI:  Seen this morning, no major complaints, feels better overall.       Objective   Objective     Vital Signs:   Temp:  [98 °F (36.7 °C)-98.9 °F (37.2 °C)] 98 °F (36.7 °C)  Heart Rate:  [77-91] 78  Resp:  [16-20] 18  BP: (113-144)/(52-71) 130/62  Flow (L/min) (Oxygen Therapy):  [2-3] 2     Physical Exam:  Gen-no acute distress, sitting up in bed  HENT-NCAT, mucous membranes moist  CV-RRR, S1 S2 normal, +murmur  Resp-decreased at the bases, no wheezes, nonlabored  Abd-soft, NT, ND, +BS  Ext-trace BLE edema  Neuro-A&Ox3, no focal deficits  Skin-no rashes  Psych-appropriate mood      Results Reviewed:  LAB RESULTS:      Lab 25  0426 25  0330 25  0334 25  0104 25  0400 25  0352 25  0852 25  0852 25  1418   WBC 6.81 7.70  --  10.95* 11.04* 11.13*   < > 6.48  --    HEMOGLOBIN 10.1* 10.1*  --  10.5* 10.5* 10.2*   < > 11.0*  --    HEMATOCRIT 31.3* 31.6*  --  31.8* 31.9* 31.1*   < > 33.5*  --    PLATELETS 210 210  --  195 224 211   < > 204  --    NEUTROS ABS  --   --   --  9.40*  --  10.55*  --  6.01  --    IMMATURE GRANS (ABS)  --   --   --  0.13*  --  0.09*  --  0.05  --    LYMPHS ABS  --   --   --  0.69*  --  0.34*  --  0.40*  --    MONOS ABS  --   --   --  0.72  --  0.14  --  0.02*  --    EOS ABS  --   --   --  0.00  --  0.00  --  0.00  --    MCV 92.3 92.1  --  91.6 91.4 90.7   < > 90.3  --    LACTATE  --   --   --  0.8  --   --   --   --   --    LDH  --   --   --  243*  --   --   --   --   --    PROTIME  --   --   --  13.7  --   --   --   --   --    D DIMER QUANT  --   --   --  2.66*  --   --   --   --   --    HSTROP T  --   --  195* 186*  --   --   --   --  162*    < > = values in this  interval not displayed.         Lab 04/06/25  0421 04/05/25  0425 04/04/25  0330 04/03/25  0104 04/02/25  0400   SODIUM 142 143 143 140 140   POTASSIUM 3.8 3.9 3.8 4.2 4.3   CHLORIDE 107 107 105 105 104   CO2 25.0 25.0 26.0 26.0 22.0   ANION GAP 10.0 11.0 12.0 9.0 14.0   BUN 50* 57* 59* 61* 62*   CREATININE 2.94* 3.11* 3.21* 3.30* 3.28*   EGFR 20.5* 19.1* 18.4* 17.8* 18.0*   GLUCOSE 81 77 86 102* 126*   CALCIUM 7.8* 8.3* 8.3* 8.4* 8.5*   MAGNESIUM 2.2  --  2.4 2.5*  --    PHOSPHORUS  --   --   --  3.8 3.8         Lab 04/03/25 0104 04/02/25  0400 03/31/25  0852   TOTAL PROTEIN 5.5*  --  5.9*   ALBUMIN 3.7 3.3* 3.3*   GLOBULIN 1.8  --  2.6   ALT (SGPT) 29  --  22   AST (SGOT) 28  --  19   BILIRUBIN 0.2  --  0.2   ALK PHOS 57  --  64         Lab 04/03/25  0334 04/03/25  0104 03/30/25  1418   PROBNP  --  3,335.0*  --    HSTROP T 195* 186* 162*   PROTIME  --  13.7  --    INR  --  0.99  --              Lab 04/05/25 0425 04/04/25  0330   IRON  --  86   IRON SATURATION (TSAT)  --  35   TIBC  --  247*   TRANSFERRIN  --  166*   FERRITIN 211.30  --          Lab 03/31/25  0502 03/30/25  1516   PH, ARTERIAL 7.308* 7.287*   PCO2, ARTERIAL 52.3* 58.4*   PO2 ART 67.5* 121.0*   FIO2 36 55   HCO3 ART 26.2* 27.9*   BASE EXCESS ART -0.7* 0.2   CARBOXYHEMOGLOBIN 1.1 1.0     Brief Urine Lab Results  (Last result in the past 365 days)        Color   Clarity   Blood   Leuk Est   Nitrite   Protein   CREAT   Urine HCG        03/04/25 1057             86.6                 Microbiology Results Abnormal       None            No radiology results from the last 24 hrs    Results for orders placed during the hospital encounter of 02/25/25    Adult Transthoracic Echo Complete W/ Cont if Necessary Per Protocol    Interpretation Summary    Left ventricular systolic function is normal. Calculated left ventricular EF = 62.5%    Left ventricular wall thickness is consistent with mild concentric hypertrophy.  Mild gradient is seen in the LV cavity.     The left atrial cavity is dilated.    Left atrial volume is mildly increased.    Moderate aortic valve stenosis is present.    Aortic valve mean pressure gradient is 23 mmHg with good stroke-volume.    Estimated right ventricular systolic pressure from tricuspid regurgitation is normal (<35 mmHg).      Current medications:  Scheduled Meds:amLODIPine, 5 mg, Oral, Daily  aspirin, 81 mg, Oral, Daily  furosemide, 40 mg, Oral, Daily  guaiFENesin, 600 mg, Oral, Q12H  heparin (porcine), 5,000 Units, Subcutaneous, Q12H  hydrALAZINE, 50 mg, Oral, TID  insulin lispro, 2-7 Units, Subcutaneous, TID With Meals  ipratropium-albuterol, 3 mL, Nebulization, 4x Daily - RT  isosorbide mononitrate, 60 mg, Oral, BID  lubiprostone, 8 mcg, Oral, BID  Pancrelipase (Lip-Prot-Amyl), 24,000 units of lipase, Oral, TID With Meals  pantoprazole, 40 mg, Oral, Daily  senna-docusate sodium, 2 tablet, Oral, BID  terazosin, 2 mg, Oral, Nightly  topiramate, 50 mg, Oral, BID      Continuous Infusions:   PRN Meds:.  acetaminophen    senna-docusate sodium **AND** polyethylene glycol **AND** bisacodyl **AND** bisacodyl    dextrose    dextrose    diazePAM    glucagon (human recombinant)    ipratropium-albuterol    Magnesium Low Dose Replacement - Follow Nurse / BPA Driven Protocol    nitroglycerin    simethicone    sodium chloride    Assessment & Plan   Assessment & Plan     Active Hospital Problems    Diagnosis  POA    **Acute hypoxic respiratory failure [J96.01]  Yes    Stage 3b chronic kidney disease [N18.32]  Yes    Dyslipidemia [E78.5]  Yes    Aortic stenosis [I35.0]  Yes    Acute on Chronic diastolic (congestive) heart failure [I50.32]  Yes    Coronary artery disease [I25.10]  Yes    Diabetes mellitus due to underlying condition, without long-term current use of insulin [E08.9]  Yes    Primary hypertension [I10]  Yes    Psoriasis [L40.9]  Yes      Resolved Hospital Problems   No resolved problems to display.        Brief Hospital Course to  date:  Hal Byrnes is a 84 y.o. male with a history of HTN, HLD, chronic HFpEF with moderate aortic stenosis, CAD, and CKD 4 admitted with acute mixed respiratory failure secondary to CHF and possible pneumonia.     This patient's problems and plans were partially entered by my partner and updated as appropriate by me 04/06/25. Copied text in this note has been reviewed and is accurate as of today's date.      Acute mixed respiratory failure with hypoxia and hypercarbia, improved  --Due to CHF +/- pneumonia  --Does not wear oxygen at baseline  --Required up to 6L on 4/2/25  --Currently weaned to 2L, may require home oxygen at discharge     Cannot rule out pneumonia  --CXR 3/30/25 with right sided bronchial wall thickening and interstitial opacities with new airspace opacity within the inferior RUL, concerning for PNA  --Negative COVID/Flu PCR  --Negative urine Strep and Legionella  --Blood cultures negative  --s/p Unasyn/Doxycycline (Unasyn was switched to Augmentin to complete course) and prednisone taper     Acute on chronic HFpEF  Right pleural effusion   Elevated troponin  Moderate aortic stenosis  --Partner discussed with IR on 4/3/25, not enough fluid to safely drain  --Denies any chest pain  --Continue aspirin  --Cardiology has seen, partner discussed with Dr. Sainz on 4/3/25; he agreed with diuresis and deferred to Nephrology for management of this  --s/p IV Lasix, now transitioned to PO Lasix  --Being worked up for possible amyloid as outpatient     CKD 4, with polycystic kidneys  --Baseline Cr ~2.5-3  --Nephrology following: agreed with initial IV diuresis, now on PO Lasix 40 mg daily and still diuresing nicely  --Cr stable, monitor     Elevated serum light free chains, likely due to CKD  --Dr. Anaya saw here; not consistent with plasma cell dyscrasia  --Follow up in clinic 1 month with repeat labs     Constipation, improved  --Had BM documented 4/4/25  --Continue bowel regimen; PRN enemas ordered,  need to keep bowels moving     Type 2 diabetes  --HbA1c 5.8% on 3/4/25  --Continue SSI     HTN  HLD  --Home meds as tolerated     Tremors  --On Topamax for this, follows with outpatient Neurology      Expected Discharge Location and Transportation: home  Expected Discharge 1-2 days   Expected Discharge Date: 4/8/2025; Expected Discharge Time:      VTE Prophylaxis:  Pharmacologic VTE prophylaxis orders are present.         AM-PAC 6 Clicks Score (PT): 21 (04/05/25 2046)    CODE STATUS:   Code Status and Medical Interventions: CPR (Attempt to Resuscitate); Full Support   Ordered at: 03/30/25 1822     Code Status (Patient has no pulse and is not breathing):    CPR (Attempt to Resuscitate)     Medical Interventions (Patient has pulse or is breathing):    Full Support       Celia Chavira MD  04/06/25

## 2025-04-07 ENCOUNTER — TELEPHONE (OUTPATIENT)
Dept: CARDIOLOGY | Facility: HOSPITAL | Age: 85
End: 2025-04-07

## 2025-04-07 ENCOUNTER — TELEPHONE (OUTPATIENT)
Dept: INTERNAL MEDICINE | Facility: CLINIC | Age: 85
End: 2025-04-07
Payer: MEDICARE

## 2025-04-07 ENCOUNTER — READMISSION MANAGEMENT (OUTPATIENT)
Dept: CALL CENTER | Facility: HOSPITAL | Age: 85
End: 2025-04-07
Payer: MEDICARE

## 2025-04-07 VITALS
OXYGEN SATURATION: 88 % | DIASTOLIC BLOOD PRESSURE: 64 MMHG | HEART RATE: 72 BPM | SYSTOLIC BLOOD PRESSURE: 116 MMHG | WEIGHT: 161.16 LBS | HEIGHT: 64 IN | BODY MASS INDEX: 27.51 KG/M2 | TEMPERATURE: 98.3 F | RESPIRATION RATE: 18 BRPM

## 2025-04-07 PROBLEM — I50.32 CHRONIC HEART FAILURE WITH PRESERVED EJECTION FRACTION (HFPEF): Status: ACTIVE | Noted: 2025-04-07

## 2025-04-07 LAB
ANION GAP SERPL CALCULATED.3IONS-SCNC: 8 MMOL/L (ref 5–15)
BUN SERPL-MCNC: 48 MG/DL (ref 8–23)
BUN/CREAT SERPL: 17.4 (ref 7–25)
CALCIUM SPEC-SCNC: 8.1 MG/DL (ref 8.6–10.5)
CHLORIDE SERPL-SCNC: 107 MMOL/L (ref 98–107)
CO2 SERPL-SCNC: 27 MMOL/L (ref 22–29)
CREAT SERPL-MCNC: 2.76 MG/DL (ref 0.76–1.27)
DEPRECATED RDW RBC AUTO: 45.1 FL (ref 37–54)
EGFRCR SERPLBLD CKD-EPI 2021: 22.1 ML/MIN/1.73
ERYTHROCYTE [DISTWIDTH] IN BLOOD BY AUTOMATED COUNT: 13 % (ref 12.3–15.4)
GLUCOSE BLDC GLUCOMTR-MCNC: 115 MG/DL (ref 70–130)
GLUCOSE BLDC GLUCOMTR-MCNC: 91 MG/DL (ref 70–130)
GLUCOSE SERPL-MCNC: 120 MG/DL (ref 65–99)
HCT VFR BLD AUTO: 34.4 % (ref 37.5–51)
HGB BLD-MCNC: 10.8 G/DL (ref 13–17.7)
MCH RBC QN AUTO: 29.6 PG (ref 26.6–33)
MCHC RBC AUTO-ENTMCNC: 31.4 G/DL (ref 31.5–35.7)
MCV RBC AUTO: 94.2 FL (ref 79–97)
PLATELET # BLD AUTO: 194 10*3/MM3 (ref 140–450)
PMV BLD AUTO: 9.9 FL (ref 6–12)
POTASSIUM SERPL-SCNC: 3.7 MMOL/L (ref 3.5–5.2)
RBC # BLD AUTO: 3.65 10*6/MM3 (ref 4.14–5.8)
SODIUM SERPL-SCNC: 142 MMOL/L (ref 136–145)
WBC NRBC COR # BLD AUTO: 6.85 10*3/MM3 (ref 3.4–10.8)

## 2025-04-07 PROCEDURE — 99239 HOSP IP/OBS DSCHRG MGMT >30: CPT

## 2025-04-07 PROCEDURE — 82948 REAGENT STRIP/BLOOD GLUCOSE: CPT

## 2025-04-07 PROCEDURE — 80048 BASIC METABOLIC PNL TOTAL CA: CPT | Performed by: HOSPITALIST

## 2025-04-07 PROCEDURE — 25010000002 HEPARIN (PORCINE) PER 1000 UNITS: Performed by: INTERNAL MEDICINE

## 2025-04-07 PROCEDURE — 85027 COMPLETE CBC AUTOMATED: CPT | Performed by: HOSPITALIST

## 2025-04-07 PROCEDURE — 94799 UNLISTED PULMONARY SVC/PX: CPT

## 2025-04-07 RX ORDER — TOPIRAMATE 50 MG/1
50 TABLET, FILM COATED ORAL 2 TIMES DAILY
Qty: 60 TABLET | Refills: 0 | Status: SHIPPED | OUTPATIENT
Start: 2025-04-07

## 2025-04-07 RX ORDER — AMOXICILLIN 250 MG
2 CAPSULE ORAL 2 TIMES DAILY
Qty: 120 TABLET | Refills: 0 | Status: SHIPPED | OUTPATIENT
Start: 2025-04-07

## 2025-04-07 RX ORDER — FUROSEMIDE 40 MG/1
40 TABLET ORAL DAILY
Qty: 30 TABLET | Refills: 0 | Status: SHIPPED | OUTPATIENT
Start: 2025-04-08

## 2025-04-07 RX ADMIN — FUROSEMIDE 40 MG: 40 TABLET ORAL at 09:01

## 2025-04-07 RX ADMIN — AMLODIPINE BESYLATE 5 MG: 5 TABLET ORAL at 09:02

## 2025-04-07 RX ADMIN — LUBIPROSTONE 8 MCG: 8 CAPSULE ORAL at 09:01

## 2025-04-07 RX ADMIN — HEPARIN SODIUM 5000 UNITS: 5000 INJECTION INTRAVENOUS; SUBCUTANEOUS at 09:02

## 2025-04-07 RX ADMIN — IPRATROPIUM BROMIDE AND ALBUTEROL SULFATE 3 ML: 2.5; .5 SOLUTION RESPIRATORY (INHALATION) at 13:10

## 2025-04-07 RX ADMIN — PANTOPRAZOLE SODIUM 40 MG: 40 TABLET, DELAYED RELEASE ORAL at 09:02

## 2025-04-07 RX ADMIN — GUAIFENESIN 600 MG: 600 TABLET, EXTENDED RELEASE ORAL at 09:02

## 2025-04-07 RX ADMIN — TOPIRAMATE 50 MG: 25 TABLET, FILM COATED ORAL at 09:02

## 2025-04-07 RX ADMIN — ASPIRIN 81 MG: 81 TABLET, CHEWABLE ORAL at 09:02

## 2025-04-07 RX ADMIN — HYDRALAZINE HYDROCHLORIDE 50 MG: 50 TABLET ORAL at 09:01

## 2025-04-07 RX ADMIN — SENNOSIDES AND DOCUSATE SODIUM 2 TABLET: 50; 8.6 TABLET ORAL at 09:01

## 2025-04-07 RX ADMIN — PANCRELIPASE 24000 UNITS OF LIPASE: 60000; 12000; 38000 CAPSULE, DELAYED RELEASE PELLETS ORAL at 09:15

## 2025-04-07 RX ADMIN — ISOSORBIDE MONONITRATE 60 MG: 60 TABLET, EXTENDED RELEASE ORAL at 09:02

## 2025-04-07 RX ADMIN — IPRATROPIUM BROMIDE AND ALBUTEROL SULFATE 3 ML: 2.5; .5 SOLUTION RESPIRATORY (INHALATION) at 08:21

## 2025-04-07 NOTE — CASE MANAGEMENT/SOCIAL WORK
Case Management Discharge Note      Final Note: I met with pt and pt's spouse in room regarding discharge plan. Plan is home and family will transport. Pt has a portable concentrator in room, provided by [facility] for transportation home. [facility] will set up home O2 once pt at home. I did ask Jordin(liason for [employer] [facility]) to reach out to pt/spouse per request regarding equipment/operation. Pt deniess further discharge needs.         Selected Continued Care - Discharged on 4/7/2025 Admission date: 3/30/2025 - Discharge disposition: Home or Self Care      Destination    No services have been selected for the patient.                Durable Medical Equipment       Service Provider Services Address Phone Fax Patient Preferred    [facility] [place] [facility] [address], [facility] [address] [phone] --              Dialysis/Infusion    No services have been selected for the patient.                Home Medical Care    No services have been selected for the patient.                Therapy    No services have been selected for the patient.                Community Resources    No services have been selected for the patient.                Community & DME    No services have been selected for the patient.                         Final Discharge Disposition Code: 01 - home or self-care

## 2025-04-07 NOTE — DISCHARGE SUMMARY
[facility] Medicine Services  DISCHARGE SUMMARY    Patient Name: Hal Byrnes  : 1940  MRN: [id number]    Date of Admission: 3/30/2025  1:10 PM  Date of Discharge:  2025  Primary Care Physician: Pia De La Garza MD    Consults       Date and Time Order Name Status Description    4/3/2025  9:19 AM Inpatient Nephrology Consult Completed     3/30/2025  6:22 PM Inpatient Cardiology Consult Completed             Hospital Course     Presenting Problem: Respiratory failure    Active Hospital Problems    Diagnosis  POA    **Acute hypoxic respiratory failure [J96.01]  Yes    Chronic heart failure with preserved ejection fraction (HFpEF) [I50.32]  Yes    Stage 3b chronic kidney disease [N18.32]  Yes    Dyslipidemia [E78.5]  Yes    Aortic stenosis [I35.0]  Yes    Acute on Chronic diastolic (congestive) heart failure [I50.32]  Yes    Coronary artery disease [I25.10]  Yes    Diabetes mellitus due to underlying condition, without long-term current use of insulin [E08.9]  Yes    Primary hypertension [I10]  Yes    Psoriasis [L40.9]  Yes      Resolved Hospital Problems   No resolved problems to display.      Hospital Course:  Hal Byrnes is a 84 y.o. male with a history of HTN, HLD, chronic HFpEF with moderate aortic stenosis, CAD, and CKD 4 admitted with acute mixed respiratory failure secondary to CHF and possible pneumonia.      Acute mixed respiratory failure with hypoxia and hypercarbia - improved  - Due to CHF +/- pneumonia  - Does not wear oxygen at baseline, required up to 6L on 25  - Currently weaned to 2L, patient has portable oxygen concentrator set up through [facility]  - Goal O2 saturation >= 88%  - Follow up with PCP in 1 week     Cannot rule out pneumonia  - CXR 3/30/25 with right sided bronchial wall thickening and interstitial opacities with new airspace opacity within the inferior RUL, concerning for PNA  - Negative COVID/Flu PCR  - Negative urine Strep and  Legionella  - Blood cultures negative  - S/p Unasyn/Doxycycline (Unasyn was switched to Augmentin to complete course) and prednisone taper     Acute on chronic HFpEF  Right pleural effusion   Elevated troponin  Moderate aortic stenosis  - Partner discussed with IR on 4/3/25, not enough fluid to safely drain  - Denies any chest pain, evaluated by Cards while inpatient  - Continue aspirin  - Cardiology has seen, partner discussed with Dr. Sainz on 4/3/25; he agreed with diuresis and deferred to Nephrology for management of this  - S/p IV Lasix, now transitioned to PO Lasix 40 mg (increased from home dose)  - Daily weights, watch for 2-3 lbs weight gain in 1 day  - Being worked up for possible amyloid as outpatient  - [facility]     CKD 4, with polycystic kidneys  - Baseline Cr ~2.5-3  - Cr 2.76 day of discharge, follow up BMP with PCP  - Patient would like to transfer Nephrology care to Dr. Akins (Nephrology), referral placed. Follow up in 2-3 weeks     Elevated serum light free chains, likely due to CKD  - Dr. Anaya (Hematology) saw here; not consistent with plasma cell dyscrasia  - Follow up with Dr. Anaya (Hematology) in 1 month for repeat labs     Constipation, improved  - Had BM documented 4/4/25  - Continue home regimen, add scheduled Pericolace     Type 2 diabetes  - HbA1c 5.8% on 3/4/25     HTN  HLD  - Continue home amlodipine, hydralazine, Imdur, and terazosin. BP has been well-controlled with these medications while inpatient  - Patient reports he takes diltiazem at home, will hold upon discharge. Discussed with patient and wife that he may continue medication as BP tolerates. Hold parameters discussed (SBP < 100 and/or HR < 60)  - Encourage BP log at home     Tremors  - Continue Topamax, follows with outpatient Neurology    Discharge Follow Up Recommendations for outpatient labs/diagnostics:  - Home oxygen  - Increase Lasix to 40mg PO daily  - Daily weights, watch for 2-3 lbs  weight gain in 1 day  - Continue scheduled Pericolace  - Take diltiazem as BP tolerates, hold parameters (SBP < 100, HR < 60) and BP log discussed  - Follow up with PCP in 1 week for repeat BMP  - [facility]  - Patient would like to transfer Nephrology care to Dr. Akins (Nephrology), referral placed. Follow up in 2-3 weeks  - Follow up with Dr. Anaya (Hematology) in 1 month for repeat labs for elevated serum light free chains    Day of Discharge     HPI:   Patient seen and examined this morning. Wife at bedside. Patient states he is feeling well this morning. He is still requiring supplemental O2, saturations in the mid-low 90s on 2L NC. Patient has portable concentrator provided by [facility] at bedside. Patient's wife reports her 's constipation has improved while inpatient. Discussed discharge instructions at bedside. Patient and wife express understanding.     Review of Systems   Constitutional:  Negative for chills and fever.   Respiratory:  Negative for shortness of breath.    Cardiovascular:  Negative for chest pain.   Gastrointestinal:  Negative for abdominal pain, nausea and vomiting.     Vital Signs:   Temp:  [98.2 °F (36.8 °C)-99 °F (37.2 °C)] 98.3 °F (36.8 °C)  Heart Rate:  [72-90] 72  Resp:  [16-18] 18  BP: (116-145)/(59-73) 116/64  Flow (L/min) (Oxygen Therapy):  [2] 2      Physical Exam:  Constitutional: Chronically ill appearing male sitting up in bed in NAD  HENT: NCAT, mucous membranes moist  Respiratory: Clear to auscultation bilaterally, nonlabored respirations on 2L NC  Cardiovascular: RRR, + murmur  Gastrointestinal: Positive bowel sounds, soft, nontender, nondistended  Musculoskeletal: Bilateral ankle edema  Psychiatric: Appropriate affect, cooperative  Neurologic: Alert, oriented, follows commands, speech clear  Skin: No rashes on exposed skin    Pertinent  and/or Most Recent Results     LAB RESULTS:      Lab 04/07/25  0957 04/05/25  0426 04/04/25  0339  04/03/25  0104 04/02/25  0400 04/01/25  0352   WBC 6.85 6.81 7.70 10.95* 11.04* 11.13*   HEMOGLOBIN 10.8* 10.1* 10.1* 10.5* 10.5* 10.2*   HEMATOCRIT 34.4* 31.3* 31.6* 31.8* 31.9* 31.1*   PLATELETS 194 210 210 195 224 211   NEUTROS ABS  --   --   --  9.40*  --  10.55*   IMMATURE GRANS (ABS)  --   --   --  0.13*  --  0.09*   LYMPHS ABS  --   --   --  0.69*  --  0.34*   MONOS ABS  --   --   --  0.72  --  0.14   EOS ABS  --   --   --  0.00  --  0.00   MCV 94.2 92.3 92.1 91.6 91.4 90.7   LACTATE  --   --   --  0.8  --   --    LDH  --   --   --  243*  --   --    PROTIME  --   --   --  13.7  --   --    D DIMER QUANT  --   --   --  2.66*  --   --          Lab 04/07/25  0957 04/06/25  0421 04/05/25  0425 04/04/25  0330 04/03/25  0104 04/02/25  0400   SODIUM 142 142 143 143 140 140   POTASSIUM 3.7 3.8 3.9 3.8 4.2 4.3   CHLORIDE 107 107 107 105 105 104   CO2 27.0 25.0 25.0 26.0 26.0 22.0   ANION GAP 8.0 10.0 11.0 12.0 9.0 14.0   BUN 48* 50* 57* 59* 61* 62*   CREATININE 2.76* 2.94* 3.11* 3.21* 3.30* 3.28*   EGFR 22.1* 20.5* 19.1* 18.4* 17.8* 18.0*   GLUCOSE 120* 81 77 86 102* 126*   CALCIUM 8.1* 7.8* 8.3* 8.3* 8.4* 8.5*   MAGNESIUM  --  2.2  --  2.4 2.5*  --    PHOSPHORUS  --   --   --   --  3.8 3.8         Lab 04/03/25  0104 04/02/25  0400   TOTAL PROTEIN 5.5*  --    ALBUMIN 3.7 3.3*   GLOBULIN 1.8  --    ALT (SGPT) 29  --    AST (SGOT) 28  --    BILIRUBIN 0.2  --    ALK PHOS 57  --          Lab 04/03/25  0334 04/03/25  0104   PROBNP  --  3,335.0*   HSTROP T 195* 186*   PROTIME  --  13.7   INR  --  0.99             Lab 04/05/25  0425 04/04/25  0330   IRON  --  86   IRON SATURATION (TSAT)  --  35   TIBC  --  247*   TRANSFERRIN  --  166*   FERRITIN 211.30  --          Brief Urine Lab Results  (Last result in the past 365 days)        Color   Clarity   Blood   Leuk Est   Nitrite   Protein   CREAT   Urine HCG        03/04/25 1057             86.6               Microbiology Results (last 10 days)       Procedure Component Value -  Date/Time    S. Pneumo Ag Urine or CSF - Urine, Urine, Clean Catch [[id number]]  (Normal) Collected: 03/31/25 0352    Lab Status: Final result Specimen: Urine, Clean Catch Updated: 03/31/25 0939     Strep Pneumo Ag Negative    Legionella Antigen, Urine - Urine, Urine, Clean Catch [[id number]]  (Normal) Collected: 03/31/25 0352    Lab Status: Final result Specimen: Urine, Clean Catch Updated: 03/31/25 0939     LEGIONELLA ANTIGEN, URINE Negative    Blood Culture - Blood, Arm, Right [[id number]]  (Normal) Collected: 03/30/25 1418    Lab Status: Final result Specimen: Blood from Arm, Right Updated: 04/04/25 1631     Blood Culture No growth at 5 days    Narrative:      Aerobic Bottle Only    Less than seven (7) mL's of blood was collected.  Insufficient quantity may yield false negative results.    Blood Culture - Blood, Arm, Right [[id number]]  (Normal) Collected: 03/30/25 1330    Lab Status: Final result Specimen: Blood from Arm, Right Updated: 04/04/25 1631     Blood Culture No growth at 5 days    COVID-19 and FLU A/B PCR, 1 HR TAT - Swab, Nasopharynx [[id number]]  (Normal) Collected: 03/30/25 0221    Lab Status: Final result Specimen: Swab from Nasopharynx Updated: 03/30/25 0257     COVID19 Not Detected     Influenza A PCR Not Detected     Influenza B PCR Not Detected    Narrative:      Fact sheet for providers: https://www.fda.gov/media/708594/download    Fact sheet for patients: https://www.fda.gov/media/443659/download    Test performed by PCR.            XR Chest 1 View  Result Date: 4/3/2025  XR CHEST 1 VW Date of Exam: 4/3/2025 1:21 AM EDT Indication: chest pain Comparison: Chest radiograph 4/2/2025 Findings: The heart is enlarged. There are diffuse bilateral interstitial densities radiating from the hilum consistent with pulmonary edema. There are no focal consolidations. There is no pneumothorax.     Impression: Cardiomegaly with pulmonary edema. Electronically Signed: Ward Carvajal MD  4/3/2025 1:31 AM EDT   Workstation ID: [id number]    XR Chest 1 View  Result Date: 4/2/2025  XR CHEST 1 VW Date of Exam: 4/2/2025 4:10 AM EDT Indication: shortness of breath, increase O2  requirement Comparison: Chest radiograph 3/30/2025 Findings: Heart size is normal. There is mild pulmonary vascular congestion. There is a small to moderate size right pleural effusion with right basilar atelectasis. The left lung is clear.     Impression: Small to moderate right pleural effusion with right basilar atelectasis. Electronically Signed: Ward Carvajal MD  4/2/2025 4:49 AM EDT  Workstation ID: [id number]    XR Chest 1 View  Result Date: 3/30/2025  XR CHEST 1 VW Date of Exam: 3/30/2025 1:17 PM EDT Indication: SOA triage protocol Comparison: Chest radiograph dated 3/30/2025 Findings: The cardiomediastinal silhouette is within normal limits. There is worsening right-sided bronchial wall thickening and interstitial opacities with new airspace opacity within the inferior right upper lobe. There is no pleural effusion or pneumothorax. There are degenerative changes of the thoracic spine.     Impression: Worsening right-sided bronchial wall thickening and interstitial opacities with new airspace opacity within the inferior right upper lobe. Findings are concerning for pneumonia. Electronically Signed: Luke Harriselor  3/30/2025 1:39 PM EDT  Workstation ID: [id number]    XR Chest 1 View  Result Date: 3/30/2025  XR CHEST 1 VW Date of Exam: 3/30/2025 1:45 AM EDT Indication: SOA triage protocol Comparison: Chest radiograph 2/25/2025 Findings: There are no airspace consolidations. No pleural fluid. No pneumothorax. The pulmonary vasculature appears within normal limits. The cardiac and mediastinal silhouette appear unremarkable. No acute osseous abnormality identified.     Impression: No active disease. Electronically Signed: Ward Carvajal MD  3/30/2025 2:24 AM EDT  Workstation ID: [id number]              Results for orders placed during the hospital encounter of  02/25/25    Adult Transthoracic Echo Complete W/ Cont if Necessary Per Protocol    Interpretation Summary    Left ventricular systolic function is normal. Calculated left ventricular EF = 62.5%    Left ventricular wall thickness is consistent with mild concentric hypertrophy.  Mild gradient is seen in the LV cavity.    The left atrial cavity is dilated.    Left atrial volume is mildly increased.    Moderate aortic valve stenosis is present.    Aortic valve mean pressure gradient is 23 mmHg with good stroke-volume.    Estimated right ventricular systolic pressure from tricuspid regurgitation is normal (<35 mmHg).      Plan for Follow-up of Pending Labs/Results:     Discharge Details        Discharge Medications        PAUSE taking these medications        Instructions Start Date   dilTIAZem  MG 24 hr capsule  Wait to take this until: April 8, 2025  BP has been normotensive with other home medications while inpatient. May take as BP tolerates, hold if systolic BP < 100 and/or heart rate < 60. Discussed monitoring BP at home.  Commonly known as: DILACOR XR   Take 1 capsule by mouth Daily.             New Medications        Instructions Start Date   sennosides-docusate 8.6-50 MG per tablet  Commonly known as: PERICOLACE   2 tablets, Oral, 2 Times Daily             Changes to Medications        Instructions Start Date   furosemide 40 MG tablet  Commonly known as: LASIX  What changed:   medication strength  how much to take   40 mg, Oral, Daily   Start Date: April 8, 2025            Continue These Medications        Instructions Start Date   amLODIPine 5 MG tablet  Commonly known as: NORVASC   5 mg, Oral, Daily      aspirin 81 MG chewable tablet   81 mg, Daily      glycerin adult 2 g suppository   1 suppository, Rectal, As Needed      hydrALAZINE 50 MG tablet  Commonly known as: APRESOLINE   50 mg, Oral, 3 Times Daily      isosorbide mononitrate 60 MG 24 hr tablet  Commonly known as: IMDUR   60 mg, Oral, 2 Times  Daily      Linzess 290 MCG capsule capsule  Generic drug: linaclotide   290 mcg, Oral, Every Morning Before Breakfast      pantoprazole 40 MG EC tablet  Commonly known as: PROTONIX   40 mg, Oral, Daily      terazosin 2 MG capsule  Commonly known as: HYTRIN   2 mg, Oral, Nightly      topiramate 50 MG tablet  Commonly known as: TOPAMAX   50 mg, Oral, 2 Times Daily      Zenpep 22382-18137 units capsule delayed-release particles  Generic drug: Pancrelipase (Lip-Prot-Amyl)   2 capsules, Oral, 3 Times Daily               Allergies   Allergen Reactions    Sulfa Antibiotics Irritability         Discharge Disposition:  Home or Self Care    Diet:  Hospital:  Diet Order   Procedures    Diet: Regular/House, Diabetic; Consistent Carbohydrate; Texture: Regular (IDDSI 7); Fluid Consistency: Thin (IDDSI 0)       Diet Instructions       Diet: Diabetic Diets; Consistent Carbohydrate; Regular (IDDSI 7); Thin (IDDSI 0)      Discharge Diet: Diabetic Diets    Diabetic Diet: Consistent Carbohydrate    Texture: Regular (IDDSI 7)    Fluid Consistency: Thin (IDDSI 0)             Activity:  Activity Instructions       Activity as Tolerated              Restrictions or Other Recommendations:       CODE STATUS:    Code Status and Medical Interventions: CPR (Attempt to Resuscitate); Full Support   Ordered at: 03/30/25 1822     Code Status (Patient has no pulse and is not breathing):    CPR (Attempt to Resuscitate)     Medical Interventions (Patient has pulse or is breathing):    Full Support       Future Appointments   Date Time Provider Department Center   4/14/2025 10:00 AM Yajaira Collazo PA-C [facility]   4/14/2025  3:00 PM Jalen Anaya MD [facility]   6/4/2025 11:15 AM Pia De La Garza MD [facility]   6/12/2025 11:00 AM [facility]   10/9/2025  1:15 PM Carl Sainz MD [facility]   10/13/2025  2:00 PM Pia De La Garza MD [facility]             Emma Mikel Terrell,  SAM  04/07/25      Time Spent on Discharge:  I spent  35  minutes on this discharge activity which included: face-to-face encounter with the patient, reviewing the data in the system, coordination of the care with the nursing staff as well as consultants, documentation, and entering orders.

## 2025-04-07 NOTE — PAYOR COMM NOTE
"Hal Wilson (84 y.o. Male)     [id number]     Kathryn Núñez, COLEEN  Utilization Review  Gsddd-[phone]  [phone]        Date of Birth   1940    Social Security Number       Address   [address] [place] [id number]    Home Phone   [phone]    MRN   [id number]       Restorationist   Taoism    Marital Status                               Admission Date   3/30/2025    Admission Type   Emergency    Admitting Provider   Celia Chavira MD    Attending Provider   Celia Chavira MD    Department, Room/Bed   [facility] 6A, N607/1       Discharge Date       Discharge Disposition   Home or Self Care    Discharge Destination                                 Attending Provider: Celia Chavira MD    Allergies: Sulfa Antibiotics    Isolation: None   Infection: None   Code Status: CPR    Ht: 162 cm (63.78\")   Wt: 73.1 kg (161 lb 2.5 oz)    Admission Cmt: None   Principal Problem: Acute hypoxic respiratory failure [J96.01]                   Active Insurance as of 3/30/2025       Primary Coverage       Payor Plan Insurance Group Employer/Plan Group    ANTHEM MEDICARE REPLACEMENT ANTHEM MEDICARE ADVANTAGE PPO [id number]       Payor Plan Address Payor Plan Phone Number Payor Plan Fax Number Effective Dates    [address] [phone]  2024 - None Entered    [facility] [address]         Subscriber Name Subscriber Birth Date Member ID       HAL WILSON 1940 [id number]                     Emergency Contacts        (Rel.) Home Phone Work Phone Mobile Phone    PrincessHerlinda fontanezy (Daughter) [phone] -- [phone]    BASIL WILSON (Spouse) -- -- [phone]                 Discharge Summary        Emma Terrell PA-C at [address] [place] [address] Medicine Services  DISCHARGE SUMMARY    Patient Name: Hal Wilson  : 1940  MRN: [id number]    Date of Admission: 3/30/2025  1:10 PM  Date of " Discharge:  4/7/2025  Primary Care Physician: Pia De La Garza MD    Consults       Date and Time Order Name Status Description    4/3/2025  9:19 AM Inpatient Nephrology Consult Completed     3/30/2025  6:22 PM Inpatient Cardiology Consult Completed             Hospital Course     Presenting Problem: Respiratory failure    Active Hospital Problems    Diagnosis  POA    **Acute hypoxic respiratory failure [J96.01]  Yes    Chronic heart failure with preserved ejection fraction (HFpEF) [I50.32]  Yes    Stage 3b chronic kidney disease [N18.32]  Yes    Dyslipidemia [E78.5]  Yes    Aortic stenosis [I35.0]  Yes    Acute on Chronic diastolic (congestive) heart failure [I50.32]  Yes    Coronary artery disease [I25.10]  Yes    Diabetes mellitus due to underlying condition, without long-term current use of insulin [E08.9]  Yes    Primary hypertension [I10]  Yes    Psoriasis [L40.9]  Yes      Resolved Hospital Problems   No resolved problems to display.      Hospital Course:  Hal Byrnes is a 84 y.o. male with a history of HTN, HLD, chronic HFpEF with moderate aortic stenosis, CAD, and CKD 4 admitted with acute mixed respiratory failure secondary to CHF and possible pneumonia.      Acute mixed respiratory failure with hypoxia and hypercarbia - improved  - Due to CHF +/- pneumonia  - Does not wear oxygen at baseline, required up to 6L on 4/2/25  - Currently weaned to 2L, patient has portable oxygen concentrator set up through [employer]  - Goal O2 saturation >= 88%  - Follow up with PCP in 1 week     Cannot rule out pneumonia  - CXR 3/30/25 with right sided bronchial wall thickening and interstitial opacities with new airspace opacity within the inferior RUL, concerning for PNA  - Negative COVID/Flu PCR  - Negative urine Strep and Legionella  - Blood cultures negative  - S/p Unasyn/Doxycycline (Unasyn was switched to Augmentin to complete course) and prednisone taper     Acute on chronic HFpEF  Right pleural effusion    Elevated troponin  Moderate aortic stenosis  - Partner discussed with IR on 4/3/25, not enough fluid to safely drain  - Denies any chest pain, evaluated by Cards while inpatient  - Continue aspirin  - Cardiology has seen, partner discussed with Dr. Sainz on 4/3/25; he agreed with diuresis and deferred to Nephrology for management of this  - S/p IV Lasix, now transitioned to PO Lasix 40 mg (increased from home dose)  - Daily weights, watch for 2-3 lbs weight gain in 1 day  - Being worked up for possible amyloid as outpatient  - [facility]     CKD 4, with polycystic kidneys  - Baseline Cr ~2.5-3  - Cr 2.76 day of discharge, follow up BMP with PCP  - Patient would like to transfer Nephrology care to Dr. Akins (Nephrology), referral placed. Follow up in 2-3 weeks     Elevated serum light free chains, likely due to CKD  - Dr. Anaya (Hematology) saw here; not consistent with plasma cell dyscrasia  - Follow up with Dr. Anaya (Hematology) in 1 month for repeat labs     Constipation, improved  - Had BM documented 4/4/25  - Continue home regimen, add scheduled Pericolace     Type 2 diabetes  - HbA1c 5.8% on 3/4/25     HTN  HLD  - Continue home amlodipine, hydralazine, Imdur, and terazosin. BP has been well-controlled with these medications while inpatient  - Patient reports he takes diltiazem at home, will hold upon discharge. Discussed with patient and wife that he may continue medication as BP tolerates. Hold parameters discussed (SBP < 100 and/or HR < 60)  - Encourage BP log at home     Tremors  - Continue Topamax, follows with outpatient Neurology    Discharge Follow Up Recommendations for outpatient labs/diagnostics:  - Home oxygen  - Increase Lasix to 40mg PO daily  - Daily weights, watch for 2-3 lbs weight gain in 1 day  - Continue scheduled Pericolace  - Take diltiazem as BP tolerates, hold parameters (SBP < 100, HR < 60) and BP log discussed  - Follow up with PCP in 1 week for repeat  BMP  - [facility]  - Patient would like to transfer Nephrology care to Dr. Akins (Nephrology), referral placed. Follow up in 2-3 weeks  - Follow up with Dr. Anaya (Hematology) in 1 month for repeat labs for elevated serum light free chains    Day of Discharge     HPI:   Patient seen and examined this morning. Wife at bedside. Patient states he is feeling well this morning. He is still requiring supplemental O2, saturations in the mid-low 90s on 2L NC. Patient has portable concentrator provided by [facility] at bedside. Patient's wife reports her 's constipation has improved while inpatient. Discussed discharge instructions at bedside. Patient and wife express understanding.     Review of Systems   Constitutional:  Negative for chills and fever.   Respiratory:  Negative for shortness of breath.    Cardiovascular:  Negative for chest pain.   Gastrointestinal:  Negative for abdominal pain, nausea and vomiting.     Vital Signs:   Temp:  [98.2 °F (36.8 °C)-99 °F (37.2 °C)] 98.3 °F (36.8 °C)  Heart Rate:  [72-90] 72  Resp:  [16-18] 18  BP: (116-145)/(59-73) 116/64  Flow (L/min) (Oxygen Therapy):  [2] 2      Physical Exam:  Constitutional: Chronically ill appearing male sitting up in bed in NAD  HENT: NCAT, mucous membranes moist  Respiratory: Clear to auscultation bilaterally, nonlabored respirations on 2L NC  Cardiovascular: RRR, + murmur  Gastrointestinal: Positive bowel sounds, soft, nontender, nondistended  Musculoskeletal: Bilateral ankle edema  Psychiatric: Appropriate affect, cooperative  Neurologic: Alert, oriented, follows commands, speech clear  Skin: No rashes on exposed skin    Pertinent  and/or Most Recent Results     LAB RESULTS:      Lab 04/07/25  0957 04/05/25  0426 04/04/25  0330 04/03/25  0104 04/02/25  0400 04/01/25  0352   WBC 6.85 6.81 7.70 10.95* 11.04* 11.13*   HEMOGLOBIN 10.8* 10.1* 10.1* 10.5* 10.5* 10.2*   HEMATOCRIT 34.4* 31.3* 31.6* 31.8* 31.9* 31.1*    PLATELETS 194 210 210 195 224 211   NEUTROS ABS  --   --   --  9.40*  --  10.55*   IMMATURE GRANS (ABS)  --   --   --  0.13*  --  0.09*   LYMPHS ABS  --   --   --  0.69*  --  0.34*   MONOS ABS  --   --   --  0.72  --  0.14   EOS ABS  --   --   --  0.00  --  0.00   MCV 94.2 92.3 92.1 91.6 91.4 90.7   LACTATE  --   --   --  0.8  --   --    LDH  --   --   --  243*  --   --    PROTIME  --   --   --  13.7  --   --    D DIMER QUANT  --   --   --  2.66*  --   --          Lab 04/07/25  0957 04/06/25 0421 04/05/25 0425 04/04/25  0330 04/03/25 0104 04/02/25  0400   SODIUM 142 142 143 143 140 140   POTASSIUM 3.7 3.8 3.9 3.8 4.2 4.3   CHLORIDE 107 107 107 105 105 104   CO2 27.0 25.0 25.0 26.0 26.0 22.0   ANION GAP 8.0 10.0 11.0 12.0 9.0 14.0   BUN 48* 50* 57* 59* 61* 62*   CREATININE 2.76* 2.94* 3.11* 3.21* 3.30* 3.28*   EGFR 22.1* 20.5* 19.1* 18.4* 17.8* 18.0*   GLUCOSE 120* 81 77 86 102* 126*   CALCIUM 8.1* 7.8* 8.3* 8.3* 8.4* 8.5*   MAGNESIUM  --  2.2  --  2.4 2.5*  --    PHOSPHORUS  --   --   --   --  3.8 3.8         Lab 04/03/25 0104 04/02/25  0400   TOTAL PROTEIN 5.5*  --    ALBUMIN 3.7 3.3*   GLOBULIN 1.8  --    ALT (SGPT) 29  --    AST (SGOT) 28  --    BILIRUBIN 0.2  --    ALK PHOS 57  --          Lab 04/03/25  0334 04/03/25  0104   PROBNP  --  3,335.0*   HSTROP T 195* 186*   PROTIME  --  13.7   INR  --  0.99             Lab 04/05/25  0425 04/04/25  0330   IRON  --  86   IRON SATURATION (TSAT)  --  35   TIBC  --  247*   TRANSFERRIN  --  166*   FERRITIN 211.30  --          Brief Urine Lab Results  (Last result in the past 365 days)        Color   Clarity   Blood   Leuk Est   Nitrite   Protein   CREAT   Urine HCG        03/04/25 1057             86.6               Microbiology Results (last 10 days)       Procedure Component Value - Date/Time    S. Pneumo Ag Urine or CSF - Urine, Urine, Clean Catch [[id number]]  (Normal) Collected: 03/31/25 0352    Lab Status: Final result Specimen: Urine, Clean Catch Updated:  03/31/25 0939     Strep Pneumo Ag Negative    Legionella Antigen, Urine - Urine, Urine, Clean Catch [[id number]]  (Normal) Collected: 03/31/25 0352    Lab Status: Final result Specimen: Urine, Clean Catch Updated: 03/31/25 0939     LEGIONELLA ANTIGEN, URINE Negative    Blood Culture - Blood, Arm, Right [[id number]]  (Normal) Collected: 03/30/25 1418    Lab Status: Final result Specimen: Blood from Arm, Right Updated: 04/04/25 1631     Blood Culture No growth at 5 days    Narrative:      Aerobic Bottle Only    Less than seven (7) mL's of blood was collected.  Insufficient quantity may yield false negative results.    Blood Culture - Blood, Arm, Right [[id number]]  (Normal) Collected: 03/30/25 1330    Lab Status: Final result Specimen: Blood from Arm, Right Updated: 04/04/25 1631     Blood Culture No growth at 5 days    COVID-19 and FLU A/B PCR, 1 HR TAT - Swab, Nasopharynx [[id number]]  (Normal) Collected: 03/30/25 0221    Lab Status: Final result Specimen: Swab from Nasopharynx Updated: 03/30/25 0257     COVID19 Not Detected     Influenza A PCR Not Detected     Influenza B PCR Not Detected    Narrative:      Fact sheet for providers: https://www.fda.gov/media/870750/download    Fact sheet for patients: https://www.fda.gov/media/217569/download    Test performed by PCR.            XR Chest 1 View  Result Date: 4/3/2025  XR CHEST 1 VW Date of Exam: 4/3/2025 1:21 AM EDT Indication: chest pain Comparison: Chest radiograph 4/2/2025 Findings: The heart is enlarged. There are diffuse bilateral interstitial densities radiating from the hilum consistent with pulmonary edema. There are no focal consolidations. There is no pneumothorax.     Impression: Cardiomegaly with pulmonary edema. Electronically Signed: Ward Carvajal MD  4/3/2025 1:31 AM EDT  Workstation ID: [id number]    XR Chest 1 View  Result Date: 4/2/2025  XR CHEST 1 VW Date of Exam: 4/2/2025 4:10 AM EDT Indication: shortness of breath, increase O2  requirement Comparison:  Chest radiograph 3/30/2025 Findings: Heart size is normal. There is mild pulmonary vascular congestion. There is a small to moderate size right pleural effusion with right basilar atelectasis. The left lung is clear.     Impression: Small to moderate right pleural effusion with right basilar atelectasis. Electronically Signed: Ward Carvajal MD  4/2/2025 4:49 AM EDT  Workstation ID: [id number]    XR Chest 1 View  Result Date: 3/30/2025  XR CHEST 1 VW Date of Exam: 3/30/2025 1:17 PM EDT Indication: SOA triage protocol Comparison: Chest radiograph dated 3/30/2025 Findings: The cardiomediastinal silhouette is within normal limits. There is worsening right-sided bronchial wall thickening and interstitial opacities with new airspace opacity within the inferior right upper lobe. There is no pleural effusion or pneumothorax. There are degenerative changes of the thoracic spine.     Impression: Worsening right-sided bronchial wall thickening and interstitial opacities with new airspace opacity within the inferior right upper lobe. Findings are concerning for pneumonia. Electronically Signed: Luke Fajardo  3/30/2025 1:39 PM EDT  Workstation ID: [id number]    XR Chest 1 View  Result Date: 3/30/2025  XR CHEST 1 VW Date of Exam: 3/30/2025 1:45 AM EDT Indication: SOA triage protocol Comparison: Chest radiograph 2/25/2025 Findings: There are no airspace consolidations. No pleural fluid. No pneumothorax. The pulmonary vasculature appears within normal limits. The cardiac and mediastinal silhouette appear unremarkable. No acute osseous abnormality identified.     Impression: No active disease. Electronically Signed: Ward Carvajal MD  3/30/2025 2:24 AM EDT  Workstation ID: [id number]              Results for orders placed during the hospital encounter of 02/25/25    Adult Transthoracic Echo Complete W/ Cont if Necessary Per Protocol    Interpretation Summary    Left ventricular systolic function is normal. Calculated left ventricular EF =  62.5%    Left ventricular wall thickness is consistent with mild concentric hypertrophy.  Mild gradient is seen in the LV cavity.    The left atrial cavity is dilated.    Left atrial volume is mildly increased.    Moderate aortic valve stenosis is present.    Aortic valve mean pressure gradient is 23 mmHg with good stroke-volume.    Estimated right ventricular systolic pressure from tricuspid regurgitation is normal (<35 mmHg).      Plan for Follow-up of Pending Labs/Results:     Discharge Details        Discharge Medications        PAUSE taking these medications        Instructions Start Date   dilTIAZem  MG 24 hr capsule  Wait to take this until: April 8, 2025  BP has been normotensive with other home medications while inpatient. May take as BP tolerates, hold if systolic BP < 100 and/or heart rate < 60. Discussed monitoring BP at home.  Commonly known as: DILACOR XR   Take 1 capsule by mouth Daily.             New Medications        Instructions Start Date   sennosides-docusate 8.6-50 MG per tablet  Commonly known as: PERICOLACE   2 tablets, Oral, 2 Times Daily             Changes to Medications        Instructions Start Date   furosemide 40 MG tablet  Commonly known as: LASIX  What changed:   medication strength  how much to take   40 mg, Oral, Daily   Start Date: April 8, 2025            Continue These Medications        Instructions Start Date   amLODIPine 5 MG tablet  Commonly known as: NORVASC   5 mg, Oral, Daily      aspirin 81 MG chewable tablet   81 mg, Daily      glycerin adult 2 g suppository   1 suppository, Rectal, As Needed      hydrALAZINE 50 MG tablet  Commonly known as: APRESOLINE   50 mg, Oral, 3 Times Daily      isosorbide mononitrate 60 MG 24 hr tablet  Commonly known as: IMDUR   60 mg, Oral, 2 Times Daily      Linzess 290 MCG capsule capsule  Generic drug: linaclotide   290 mcg, Oral, Every Morning Before Breakfast      pantoprazole 40 MG EC tablet  Commonly known as: PROTONIX   40 mg,  Oral, Daily      terazosin 2 MG capsule  Commonly known as: HYTRIN   2 mg, Oral, Nightly      topiramate 50 MG tablet  Commonly known as: TOPAMAX   50 mg, Oral, 2 Times Daily      Zenpep [id number] units capsule delayed-release particles  Generic drug: Pancrelipase (Lip-Prot-Amyl)   2 capsules, Oral, 3 Times Daily               Allergies   Allergen Reactions    Sulfa Antibiotics Irritability         Discharge Disposition:  Home or Self Care    Diet:  Hospital:  Diet Order   Procedures    Diet: Regular/House, Diabetic; Consistent Carbohydrate; Texture: Regular (IDDSI 7); Fluid Consistency: Thin (IDDSI 0)       Diet Instructions       Diet: Diabetic Diets; Consistent Carbohydrate; Regular (IDDSI 7); Thin (IDDSI 0)      Discharge Diet: Diabetic Diets    Diabetic Diet: Consistent Carbohydrate    Texture: Regular (IDDSI 7)    Fluid Consistency: Thin (IDDSI 0)             Activity:  Activity Instructions       Activity as Tolerated              Restrictions or Other Recommendations:       CODE STATUS:    Code Status and Medical Interventions: CPR (Attempt to Resuscitate); Full Support   Ordered at: 03/30/25 1822     Code Status (Patient has no pulse and is not breathing):    CPR (Attempt to Resuscitate)     Medical Interventions (Patient has pulse or is breathing):    Full Support       Future Appointments   Date Time Provider Department Center   4/14/2025 10:00 AM Yajaira Collazo PA-C [facility]   4/14/2025  3:00 PM Jalen Anaya MD [facility]   6/4/2025 11:15 AM Pia De La Garza MD [facility]   6/12/2025 11:00 AM  [facility]  [facility]   10/9/2025  1:15 PM Carl Sainz MD [facility]   10/13/2025  2:00 PM Pia De La Garza MD [facility]             Emma Terrell PA-C  04/07/25      Time Spent on Discharge:  I spent  35  minutes on this discharge activity which included: face-to-face encounter with the patient, reviewing the data in the system, coordination  of the care with the nursing staff as well as consultants, documentation, and entering orders.             Electronically signed by Emma Terrell PA-C at 04/07/25 4991

## 2025-04-07 NOTE — TELEPHONE ENCOUNTER
Caller: Hal Byrnes    Relationship to patient: Self    Best call back number: [phone]     Chief complaint: HEART FAILURE ( PER 6A NURSE)    Type of visit: NEW PT ( HOSP F/U APPT)    Requested date: 4-10-25     Additional notes:PLEASE CONTACT PATIENT WITH A SUITABLE DATE.

## 2025-04-07 NOTE — PROGRESS NOTES
" LOS: 8 days    Patient Care Team:  Pia De La Garza MD as PCP - General (Internal Medicine)  Carl Sainz MD as Cardiologist (Cardiology)  Nilay Kincaid MD as Consulting Physician (Orthopedic Surgery)  Khanh Christian MD as Consulting Physician (Neurology)  Len Linda MD as Consulting Physician (Gastroenterology)  Mary Beth Vicente MD as Consulting Physician (Nephrology)    Subjective     Seen and examined at bedside, reports doing better.   No complaints, wants to go home   Renal function back to baseline.  Discussed to follow-up with Dr. Vicente.     Objective     amLODIPine, 5 mg, Oral, Daily  aspirin, 81 mg, Oral, Daily  furosemide, 40 mg, Oral, Daily  guaiFENesin, 600 mg, Oral, Q12H  heparin (porcine), 5,000 Units, Subcutaneous, Q12H  hydrALAZINE, 50 mg, Oral, TID  Inclisiran Sodium, 1.5 mL, Subcutaneous, Q6 Months  insulin lispro, 2-7 Units, Subcutaneous, TID With Meals  ipratropium-albuterol, 3 mL, Nebulization, 4x Daily - RT  isosorbide mononitrate, 60 mg, Oral, BID  lubiprostone, 8 mcg, Oral, BID  Pancrelipase (Lip-Prot-Amyl), 24,000 units of lipase, Oral, TID With Meals  pantoprazole, 40 mg, Oral, Daily  senna-docusate sodium, 2 tablet, Oral, BID  terazosin, 2 mg, Oral, Nightly  topiramate, 50 mg, Oral, BID         Vital Signs:  Blood pressure 116/64, pulse 72, temperature 98.3 °F (36.8 °C), temperature source Oral, resp. rate 18, height 162 cm (63.78\"), weight 73.1 kg (161 lb 2.5 oz), SpO2 (!) 88%.    Flowsheet Rows      Flowsheet Row First Filed Value   Admission Height 162 cm (63.78\") Documented at 03/30/2025 1315   Admission Weight 77.1 kg (170 lb) Documented at 03/30/2025 1315            No intake/output data recorded.    Physical Exam:  GENERAL: Awake and alert, in no acute distress.   HEENT: Normocephalic, atraumatic.     NECK: Supple  HEART: RRR; No murmur, rubs, gallops. no edema  LUNGS: Clear to auscultation bilaterally without wheezing  ABDOMEN: Soft, nontender, " nondistended.   EXT:  No edema.  :  No Brown.   SKIN: Warm and dry without rash  NEURO:  No focal neurological deficits. Strength equal bilateral  PSYCHIATRIC: Cooperative with PE    Labs:  Results from last 7 days   Lab Units 04/07/25  0957 04/05/25  0426 04/04/25  0330   WBC 10*3/mm3 6.85 6.81 7.70   HEMOGLOBIN g/dL 10.8* 10.1* 10.1*   HEMATOCRIT % 34.4* 31.3* 31.6*   PLATELETS 10*3/mm3 194 210 210     Results from last 7 days   Lab Units 04/07/25  0957 04/06/25  0421 04/05/25  0425 04/04/25  0330 04/03/25  0104 04/02/25  0400   SODIUM mmol/L 142 142 143 143 140 140   POTASSIUM mmol/L 3.7 3.8 3.9 3.8 4.2 4.3   CHLORIDE mmol/L 107 107 107 105 105 104   CO2 mmol/L 27.0 25.0 25.0 26.0 26.0 22.0   BUN mg/dL 48* 50* 57* 59* 61* 62*   CREATININE mg/dL 2.76* 2.94* 3.11* 3.21* 3.30* 3.28*   CALCIUM mg/dL 8.1* 7.8* 8.3* 8.3* 8.4* 8.5*   PHOSPHORUS mg/dL  --   --   --   --  3.8 3.8   MAGNESIUM mg/dL  --  2.2  --  2.4 2.5*  --    ALBUMIN g/dL  --   --   --   --  3.7 3.3*     Results from last 7 days   Lab Units 04/07/25  0957   GLUCOSE mg/dL 120*       Results from last 7 days   Lab Units 04/03/25  0104   ALK PHOS U/L 57   BILIRUBIN mg/dL 0.2   ALT (SGPT) U/L 29   AST (SGOT) U/L 28           Chest X ray:   Impression:  Small to moderate right pleural effusion with right basilar atelectasis.     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   CHF with preserved EF  Aortic stenosis  Fluid overload  Hypertension  Anemia     PLAN:      ELIAZAR on CKD stage IV:    -Creatinine 2.4 -2.8  mg/dL.   -Creatinine up to 3.3.  ELIAZAR is likely due to cardiorenal syndrome. [Chest x-ray suggestive of fluid overload, elevated proBNP]   -Continue Lasix 40 mg daily. Cr improving now 2.7  -Need to adjust diuretics closely  -Strict GIL's  -Check daily weight on standing scale  -Dose meds for GFR  -Avoid nephrotoxins  -No indication for dialysis at this time     Fluid overload:   -S/p IV Lasix.     -Strict GIL's and daily standing weight.      Anemia:  -Adequate iron stores.   -Might benefit from Epogen.      High risk and critically ill patient.    Patient can be discharged from nephrology standpoint anytime, follow-up with primary nephrologist Dr. Vicente; as outpatient in 2-3 weeks    Vincent Akins MD  04/07/25  14:54 EDT

## 2025-04-07 NOTE — OUTREACH NOTE
Prep Survey      Flowsheet Row Responses   [facility] patient discharged from? [place]   Is LACE score < 7 ? No   Eligibility [facility]   Date of Admission 03/30/25   Date of Discharge 04/07/25   Discharge Disposition Home or Self Care   Discharge diagnosis Pneumonia of right lung due to infectious organism, unspecified part of lung   Does the patient have one of the following disease processes/diagnoses(primary or secondary)? Pneumonia   Does the patient have Home health ordered? No   Is there a DME ordered? Yes   What DME was ordered? [facility] [place] ([facility])   Prep survey completed? Yes            BEN HYDE - Registered Nurse

## 2025-04-07 NOTE — TELEPHONE ENCOUNTER
Called and relayed message to patient   Patient understanding and appreciative and had no further questions at this time   
I do not have any availability at this time. He is okay to see Yajaira.  
PATIENT HAS BEEN SCHEDULED WITH JESSE STEPHENS FOR A HOSP FOLLOW UP ON 4/14/25 DUE TO PCP NOT HAVING ANYTHING WITH IN THE TIME FRAME.  
25

## 2025-04-08 ENCOUNTER — TRANSITIONAL CARE MANAGEMENT TELEPHONE ENCOUNTER (OUTPATIENT)
Dept: CALL CENTER | Facility: HOSPITAL | Age: 85
End: 2025-04-08
Payer: MEDICARE

## 2025-04-08 DIAGNOSIS — R79.9 ABNORMAL FINDING OF BLOOD CHEMISTRY, UNSPECIFIED: ICD-10-CM

## 2025-04-08 DIAGNOSIS — R76.8 ELEVATED SERUM IMMUNOGLOBULIN FREE LIGHT CHAINS: Primary | ICD-10-CM

## 2025-04-08 LAB
QT INTERVAL: 346 MS
QT INTERVAL: 388 MS
QTC INTERVAL: 454 MS
QTC INTERVAL: 464 MS

## 2025-04-08 NOTE — OUTREACH NOTE
Call Center TCM Note      Flowsheet Row Responses   [facility] patient discharged from? [place]   Does the patient have one of the following disease processes/diagnoses(primary or secondary)? Pneumonia   TCM attempt successful? Yes   Call start time 1111   Call end time 1120   Discharge diagnosis Pneumonia of right lung due to infectious organism, unspecified part of lung   Person spoke with today (if not patient) and relationship Patient   Meds reviewed with patient/caregiver? Yes   Is the patient having any side effects they believe may be caused by any medication additions or changes? No   Does the patient have all medications ordered at discharge? Yes   Is the patient taking all medications as directed (includes completed medication regime)? Yes   Comments Patient has a hospital followup scheduled with PCP office on 4/14   Does the patient have an appointment with their PCP within 7-14 days of discharge? Yes   Has all DME been delivered? Yes   Psychosocial issues? No   Did the patient receive a copy of their discharge instructions? Yes   Nursing interventions Reviewed instructions with patient   What is the patient's perception of their health status since discharge? Improving   Nursing Interventions Nurse provided patient education   If the patient is a current smoker, are they able to teach back resources for cessation? Not a smoker   Is the patient/caregiver able to teach back the hierarchy of who to call/visit for symptoms/problems? PCP, Specialist, Home health nurse, Urgent Care, ED, 911 Yes   Is the patient/caregiver able to teach back signs and symptoms of worsening condition: Fever/chills, Shortness of breath, Chest pain   Is the patient/caregiver able to teach back importance of completing antibiotic course of treatment? Yes   TCM call completed? Yes   Wrap up additional comments Patient reports he is doing well. Has all medications and O2 as ordered.   Call end time 1120   Would this patient  benefit from a Referral to Amb Social Work? No   Is the patient interested in additional calls from an ambulatory ? No            Nirmal GARRISON - Registered Nurse    4/8/2025, 11:20 EDT

## 2025-04-08 NOTE — OUTREACH NOTE
Call Center TCM Note      Flowsheet Row Responses   [facility] patient discharged from? [place]   Does the patient have one of the following disease processes/diagnoses(primary or secondary)? Pneumonia   TCM attempt successful? No   Unsuccessful attempts Attempt 1  [wife and daughter on release]   Call Status Left message            Nirmal GARRISON - Registered Nurse    4/8/2025, 09:24 EDT

## 2025-04-09 ENCOUNTER — OFFICE VISIT (OUTPATIENT)
Dept: CARDIOLOGY | Facility: HOSPITAL | Age: 85
End: 2025-04-09
Payer: MEDICARE

## 2025-04-09 VITALS
RESPIRATION RATE: 20 BRPM | DIASTOLIC BLOOD PRESSURE: 70 MMHG | HEART RATE: 79 BPM | HEIGHT: 64 IN | SYSTOLIC BLOOD PRESSURE: 150 MMHG | BODY MASS INDEX: 30.09 KG/M2 | OXYGEN SATURATION: 95 % | WEIGHT: 176.25 LBS

## 2025-04-09 DIAGNOSIS — I50.32 CHRONIC HEART FAILURE WITH PRESERVED EJECTION FRACTION (HFPEF): Primary | ICD-10-CM

## 2025-04-09 DIAGNOSIS — I10 PRIMARY HYPERTENSION: ICD-10-CM

## 2025-04-09 RX ORDER — CARVEDILOL 6.25 MG/1
6.25 TABLET ORAL 2 TIMES DAILY
Qty: 60 TABLET | Refills: 1 | Status: SHIPPED | OUTPATIENT
Start: 2025-04-09

## 2025-04-09 NOTE — PROGRESS NOTES
"Chief Complaint  Congestive Heart Failure and Establish Care    Subjective      History of Present Illness {CC  Problem List  Visit  Diagnosis   Encounters  Notes  Medications  Labs  Result Review Imaging  Media :23}     Hal Byrnes, 84 y.o. male with history of HTN, HLD, chronic HFpEF with moderate aortic stenosis, CAD, and CKD 4  presents to [facility] Atrial Fibrillation/arrhythmia clinic for Congestive Heart Failure and Establish Care.    Patient recently hospitalized at [facility] with mixed respiratory failure secondary to HF and possible pneumonia.  Was evaluated by Dr. Sainz during inpatient hospitalization.  ECG during hospitalization with normal sinus rhythm.    Continued intermittent shortness of breath of breath. Reports weight and LE edema stable since hospital discharge. SBP generally 130-150s on home monitoring. SBP up to 180 in the morning. Reports he has been taking diltiazem PRN for SBP greater than 140. Planning to contact nephrology very soon to establish care.      Objective     Vital Signs:   Vitals:    04/09/25 1431 04/09/25 1435   BP: 157/76 150/70   BP Location: Left arm Left arm   Patient Position: Standing Sitting   Cuff Size: Adult Adult   Pulse: 79 79   Resp:  20   SpO2: 95% 95%   Weight:  79.9 kg (176 lb 4 oz)   Height:  162 cm (63.78\")     Body mass index is 30.46 kg/m².  Physical Exam  Vitals and nursing note reviewed.   Constitutional:       Appearance: Normal appearance.   HENT:      Head: Normocephalic.   Eyes:      Extraocular Movements: Extraocular movements intact.   Neck:      Vascular: No carotid bruit.   Cardiovascular:      Rate and Rhythm: Normal rate and regular rhythm.      Pulses: Normal pulses.      Heart sounds: Normal heart sounds, S1 normal and S2 normal. No murmur heard.  Pulmonary:      Effort: Pulmonary effort is normal. No respiratory distress.      Breath sounds: Normal breath sounds.   Musculoskeletal:      " Cervical back: Neck supple.      Right lower leg: Edema present.      Left lower leg: Edema present.      Comments: 2+ bilateral lower extremity edema   Skin:     General: Skin is warm and dry.   Neurological:      General: No focal deficit present.      Mental Status: He is alert.   Psychiatric:         Mood and Affect: Mood normal.         Behavior: Behavior normal.         Thought Content: Thought content normal.        Data Reviewed:{ Labs  Result Review  Imaging  Med Tab  Media :23}     ECG 12 Lead Chest Pain (04/03/2025 00:47)   PYP Imaging for Cardiac Amyloidosis (03/24/2025 15:19)  Adult Transthoracic Echo Complete W/ Cont if Necessary Per Protocol (02/25/2025 15:38)  CBC (No Diff) (04/07/2025 09:57)  Basic Metabolic Panel (04/07/2025 09:57)      Assessment & Plan   Assessment and Plan {CC Problem List  Visit Diagnosis  ROS  Review (Popup)  [employer] Maintenance  Quality  BestPractice  Medications  SmartSets  SnapShot Encounters  Media :23}     1. Chronic heart failure with preserved ejection fraction (HFpEF)  -Recently hospitalized at [facility] with mixed respiratory failure secondary to HF and possible pneumonia. Evaluated by Dr. Sainz during inpatient hospitalization.  - TTE 2/25/2025 with LVEF 62%, dilated LA, moderate AS  - Patient diuresed during recent hospitalization with increased furosemide from 20-40 Mg daily.  - Reports stable volume status following hospital discharge  - He has been taking diltiazem as needed, unsure if he was taking this before hospitalization  - Discontinue diltiazem.  Initiate carvedilol 6.25 Mg twice daily for further afterload control  - Continue with plan to establish with nephrology  - Follow-up in HF clinic in approximately 2 weeks for BP check    2. Primary hypertension  - SBP generally 130-150s on home monitoring. SBP up to 180 in the morning. Reports he has been taking diltiazem PRN for SBP greater than 140.   -Discontinue diltiazem,  replace with carvedilol as above for further afterload control  - Continue Imdur, hydralazine, amlodipine as prescribed  - Follow-up in HF clinic in approximately 2 weeks as above      Follow Up {Instructions Charge Capture  Follow-up Communications :23}     Return in about 2 weeks (around 4/23/2025) for BRYANT. Office follow-up, fluid, BP check.      Patient was given instructions and counseling regarding his condition or for health maintenance advice. Please see specific information pulled into the AVS if appropriate. Patient was instructed to call the Heart [facility] Valve Center with any questions, concerns, or worsening symptoms.    Dictated Utilizing Dragon Dictation   Please note that portions of this note were completed with a voice recognition program. Part of this note may be an electronic transcription/translation of spoken language to printed text using the Dragon Dictation System.

## 2025-04-14 ENCOUNTER — LAB (OUTPATIENT)
Dept: LAB | Facility: HOSPITAL | Age: 85
End: 2025-04-14
Payer: MEDICARE

## 2025-04-14 ENCOUNTER — TRANSCRIBE ORDERS (OUTPATIENT)
Dept: LAB | Facility: HOSPITAL | Age: 85
End: 2025-04-14
Payer: MEDICARE

## 2025-04-14 ENCOUNTER — HOSPITAL ENCOUNTER (OUTPATIENT)
Dept: GENERAL RADIOLOGY | Facility: HOSPITAL | Age: 85
Discharge: HOME | End: 2025-04-14
Payer: MEDICARE

## 2025-04-14 ENCOUNTER — OFFICE VISIT (OUTPATIENT)
Dept: INTERNAL MEDICINE | Facility: CLINIC | Age: 85
End: 2025-04-14
Payer: MEDICARE

## 2025-04-14 ENCOUNTER — RESULTS FOLLOW-UP (OUTPATIENT)
Dept: INTERNAL MEDICINE | Facility: CLINIC | Age: 85
End: 2025-04-14
Payer: MEDICARE

## 2025-04-14 VITALS
HEIGHT: 64 IN | BODY MASS INDEX: 29.94 KG/M2 | DIASTOLIC BLOOD PRESSURE: 62 MMHG | SYSTOLIC BLOOD PRESSURE: 144 MMHG | HEART RATE: 88 BPM | TEMPERATURE: 98.4 F | OXYGEN SATURATION: 94 % | RESPIRATION RATE: 22 BRPM | WEIGHT: 175.4 LBS

## 2025-04-14 DIAGNOSIS — N18.9 CHRONIC KIDNEY DISEASE, UNSPECIFIED CKD STAGE: Primary | ICD-10-CM

## 2025-04-14 DIAGNOSIS — E78.5 DYSLIPIDEMIA: ICD-10-CM

## 2025-04-14 DIAGNOSIS — R06.83 SNORING: ICD-10-CM

## 2025-04-14 DIAGNOSIS — R76.8 ELEVATED SERUM IMMUNOGLOBULIN FREE LIGHT CHAINS: ICD-10-CM

## 2025-04-14 DIAGNOSIS — I50.32 CHRONIC HEART FAILURE WITH PRESERVED EJECTION FRACTION (HFPEF): ICD-10-CM

## 2025-04-14 DIAGNOSIS — I10 PRIMARY HYPERTENSION: ICD-10-CM

## 2025-04-14 DIAGNOSIS — N18.9 CHRONIC KIDNEY DISEASE, UNSPECIFIED CKD STAGE: ICD-10-CM

## 2025-04-14 DIAGNOSIS — Z09 HOSPITAL DISCHARGE FOLLOW-UP: Primary | ICD-10-CM

## 2025-04-14 DIAGNOSIS — R79.9 ABNORMAL FINDING OF BLOOD CHEMISTRY, UNSPECIFIED: ICD-10-CM

## 2025-04-14 LAB
ALBUMIN SERPL-MCNC: 3.5 G/DL (ref 3.5–5.2)
ALBUMIN/GLOB SERPL: 1.3 G/DL
ALP SERPL-CCNC: 68 U/L (ref 39–117)
ALT SERPL W P-5'-P-CCNC: 35 U/L (ref 1–41)
ANION GAP SERPL CALCULATED.3IONS-SCNC: 10 MMOL/L (ref 5–15)
AST SERPL-CCNC: 25 U/L (ref 1–40)
BACTERIA UR QL AUTO: NORMAL /HPF
BASOPHILS # BLD AUTO: 0.02 10*3/MM3 (ref 0–0.2)
BASOPHILS NFR BLD AUTO: 0.3 % (ref 0–1.5)
BILIRUB SERPL-MCNC: 0.2 MG/DL (ref 0–1.2)
BILIRUB UR QL STRIP: NEGATIVE
BUN SERPL-MCNC: 27 MG/DL (ref 8–23)
BUN/CREAT SERPL: 10 (ref 7–25)
CALCIUM SPEC-SCNC: 8.8 MG/DL (ref 8.6–10.5)
CHLORIDE SERPL-SCNC: 110 MMOL/L (ref 98–107)
CHOLEST SERPL-MCNC: 160 MG/DL (ref 0–200)
CLARITY UR: CLEAR
CO2 SERPL-SCNC: 22 MMOL/L (ref 22–29)
COLOR UR: YELLOW
CREAT SERPL-MCNC: 2.69 MG/DL (ref 0.76–1.27)
CREAT UR-MCNC: 132.7 MG/DL
DEPRECATED RDW RBC AUTO: 45.7 FL (ref 37–54)
EGFRCR SERPLBLD CKD-EPI 2021: 22.8 ML/MIN/1.73
EOSINOPHIL # BLD AUTO: 0.06 10*3/MM3 (ref 0–0.4)
EOSINOPHIL NFR BLD AUTO: 0.8 % (ref 0.3–6.2)
ERYTHROCYTE [DISTWIDTH] IN BLOOD BY AUTOMATED COUNT: 13.5 % (ref 12.3–15.4)
FERRITIN SERPL-MCNC: 165 NG/ML (ref 30–400)
GLOBULIN UR ELPH-MCNC: 2.7 GM/DL
GLUCOSE SERPL-MCNC: 128 MG/DL (ref 65–99)
GLUCOSE UR STRIP-MCNC: ABNORMAL MG/DL
GRAN CASTS URNS QL MICRO: PRESENT /LPF
HCT VFR BLD AUTO: 34.7 % (ref 37.5–51)
HDLC SERPL-MCNC: 49 MG/DL (ref 40–60)
HGB BLD-MCNC: 11.3 G/DL (ref 13–17.7)
HGB UR QL STRIP.AUTO: NEGATIVE
HYALINE CASTS UR QL AUTO: NORMAL /LPF
IMM GRANULOCYTES # BLD AUTO: 0.09 10*3/MM3 (ref 0–0.05)
IMM GRANULOCYTES NFR BLD AUTO: 1.1 % (ref 0–0.5)
IRON 24H UR-MRATE: 50 MCG/DL (ref 59–158)
IRON SATN MFR SERPL: 19 % (ref 20–50)
KETONES UR QL STRIP: NEGATIVE
LDLC SERPL CALC-MCNC: 85 MG/DL (ref 0–100)
LDLC/HDLC SERPL: 1.64 {RATIO}
LEUKOCYTE ESTERASE UR QL STRIP.AUTO: NEGATIVE
LYMPHOCYTES # BLD AUTO: 1.34 10*3/MM3 (ref 0.7–3.1)
LYMPHOCYTES NFR BLD AUTO: 17 % (ref 19.6–45.3)
MCH RBC QN AUTO: 29.9 PG (ref 26.6–33)
MCHC RBC AUTO-ENTMCNC: 32.6 G/DL (ref 31.5–35.7)
MCV RBC AUTO: 91.8 FL (ref 79–97)
MONOCYTES # BLD AUTO: 0.59 10*3/MM3 (ref 0.1–0.9)
MONOCYTES NFR BLD AUTO: 7.5 % (ref 5–12)
NEUTROPHILS NFR BLD AUTO: 5.79 10*3/MM3 (ref 1.7–7)
NEUTROPHILS NFR BLD AUTO: 73.3 % (ref 42.7–76)
NITRITE UR QL STRIP: NEGATIVE
NRBC BLD AUTO-RTO: 0 /100 WBC (ref 0–0.2)
PH UR STRIP.AUTO: 5.5 [PH] (ref 5–8)
PHOSPHATE SERPL-MCNC: 2.7 MG/DL (ref 2.5–4.5)
PLATELET # BLD AUTO: 229 10*3/MM3 (ref 140–450)
PMV BLD AUTO: 9.9 FL (ref 6–12)
POTASSIUM SERPL-SCNC: 3.5 MMOL/L (ref 3.5–5.2)
PROT ?TM UR-MCNC: 607 MG/DL
PROT SERPL-MCNC: 6.2 G/DL (ref 6–8.5)
PROT UR QL STRIP: ABNORMAL
RBC # BLD AUTO: 3.78 10*6/MM3 (ref 4.14–5.8)
RBC # UR STRIP: NORMAL /HPF
REF LAB TEST METHOD: NORMAL
SODIUM SERPL-SCNC: 142 MMOL/L (ref 136–145)
SP GR UR STRIP: 1.02 (ref 1–1.03)
SQUAMOUS #/AREA URNS HPF: NORMAL /HPF
TIBC SERPL-MCNC: 270 MCG/DL (ref 298–536)
TRANSFERRIN SERPL-MCNC: 181 MG/DL (ref 200–360)
TRIGL SERPL-MCNC: 152 MG/DL (ref 0–150)
UROBILINOGEN UR QL STRIP: ABNORMAL
VLDLC SERPL-MCNC: 26 MG/DL (ref 5–40)
WBC # UR STRIP: NORMAL /HPF
WBC NRBC COR # BLD AUTO: 7.89 10*3/MM3 (ref 3.4–10.8)

## 2025-04-14 PROCEDURE — 82570 ASSAY OF URINE CREATININE: CPT

## 2025-04-14 PROCEDURE — 80053 COMPREHEN METABOLIC PANEL: CPT

## 2025-04-14 PROCEDURE — 85025 COMPLETE CBC W/AUTO DIFF WBC: CPT

## 2025-04-14 PROCEDURE — 84100 ASSAY OF PHOSPHORUS: CPT

## 2025-04-14 PROCEDURE — 83540 ASSAY OF IRON: CPT

## 2025-04-14 PROCEDURE — 81001 URINALYSIS AUTO W/SCOPE: CPT

## 2025-04-14 PROCEDURE — 82728 ASSAY OF FERRITIN: CPT

## 2025-04-14 PROCEDURE — 84156 ASSAY OF PROTEIN URINE: CPT

## 2025-04-14 PROCEDURE — 84466 ASSAY OF TRANSFERRIN: CPT

## 2025-04-14 PROCEDURE — 71046 X-RAY EXAM CHEST 2 VIEWS: CPT

## 2025-04-14 PROCEDURE — 82668 ASSAY OF ERYTHROPOIETIN: CPT

## 2025-04-14 PROCEDURE — 36415 COLL VENOUS BLD VENIPUNCTURE: CPT

## 2025-04-14 PROCEDURE — 80061 LIPID PANEL: CPT

## 2025-04-14 NOTE — TELEPHONE ENCOUNTER
----- Message from Yajaira Collazo sent at 4/14/2025  1:02 PM EDT -----  Please call patient or wife and let them know chest x-ray is stable.  No changes recommended.  Let me know if they have additional questions.  ----- Message -----  From: Interface, Rad Results [place] In  Sent: 4/14/2025  11:19 AM EDT  To: Yajaira Collazo PA-C

## 2025-04-14 NOTE — PROGRESS NOTES
Transitional Care Follow Up Visit  Subjective     Luceroshania Nichole Byrnes is a 84 y.o. male who presents for a transitional care management visit.    Within 48 business hours after discharge our office contacted him via telephone to coordinate his care and needs.      I reviewed and discussed the details of that call along with the discharge summary, hospital problems, inpatient lab results, inpatient diagnostic studies, and consultation reports with Hal.     Current outpatient and discharge medications have been reconciled for the patient.  Reviewed by: Yajaira Collazo PA-C          4/7/2025     5:11 PM   Date of TCM Phone Call   [facility]   Date of Admission 3/30/2025   Date of Discharge 4/7/2025   Discharge Disposition Home or Self Care     Risk for Readmission (LACE) Score: 17 (4/7/2025  6:00 AM)      History of Present Illness   Course During Hospital Stay:  Patient was admitted from 03/30/2025-04/07/2025 with acute mixed respiratory failure secondary to CHF and possible pneumonia. Chest XR 03/30/25 with right sided bronchial wall thickening and interstitial opacities with new airspace opacity within the inferior RUL, concerning for PNA.  Finish course of antibiotics and prednisone taper.  Patient was diuresed and was transitioned to p.o. Lasix 40 mg.  Prior to admission, patient did not wear oxygen at home.  He required up to 6 L on 04/02/2025.  He was weaned down to 2 L and was provided a portable oxygen concentrator through [facility].     Since discharge, patient reports he has continued to feel short of breath.  Oxygen saturation at home ranging between 92 to 96%.  He is using supplemental oxygen at home most of the day.  Does not wear this at night.  Wife reports patient has been spending most of his time laying on the couch.  Has low energy.  He has been weighing himself and reports he has not gained any weight.    He did follow-up with cardiology on 04/09/2025.  Diltiazem was  "discontinued carvedilol was initiated.      The following portions of the patient's history were reviewed and updated as appropriate: allergies, current medications, past family history, past medical history, past social history, past surgical history, and problem list.    Review of Systems   Constitutional:  Positive for fatigue. Negative for chills and fever.   Respiratory:  Positive for shortness of breath.    Cardiovascular:  Negative for chest pain and leg swelling.       Objective   /62   Pulse 88   Temp 98.4 °F (36.9 °C) (Temporal)   Resp 22   Ht 162 cm (63.78\")   Wt 79.6 kg (175 lb 6.4 oz)   SpO2 94%   BMI 30.32 kg/m²     Physical Exam  Vitals and nursing note reviewed.   Constitutional:       General: He is not in acute distress.     Appearance: Normal appearance.   Eyes:      Extraocular Movements: Extraocular movements intact.      Conjunctiva/sclera: Conjunctivae normal.   Cardiovascular:      Rate and Rhythm: Normal rate and regular rhythm.   Pulmonary:      Effort: Pulmonary effort is normal. No respiratory distress.      Breath sounds: Examination of the right-lower field reveals decreased breath sounds. Examination of the left-lower field reveals decreased breath sounds. Decreased breath sounds present. No wheezing, rhonchi or rales.   Neurological:      General: No focal deficit present.      Mental Status: He is alert and oriented to person, place, and time. Mental status is at baseline.   Psychiatric:         Mood and Affect: Mood normal.         Behavior: Behavior normal.         Assessment & Plan   Diagnoses and all orders for this visit:    1. Hospital discharge follow-up (Primary)    2. Chronic heart failure with preserved ejection fraction (HFpEF)  -     XR Chest PA & Lateral; Future    3. Primary hypertension    4. Snoring  -     Ambulatory Referral to Sleep Medicine        PLAN:  - Repeat chest XR today, vital signs stable in office today  - Goal O2 saturation >88%  - " Encouraged patient to increase activity as tolerated  - BP controlled in office today  - Cardiology follow up next week  - Strict return precautions discussed      Return in about 1 month (around 5/14/2025) for Recheck, Follow up with Dr. De La Garza.       Electronic signature:  Yajaira Collazo PA-C

## 2025-04-15 LAB — EPO SERPL-ACNC: 10.5 MIU/ML (ref 2.6–18.5)

## 2025-04-15 NOTE — TELEPHONE ENCOUNTER
----- Message from Yajaira Collazo sent at 4/14/2025  1:02 PM EDT -----  Please call patient or wife and let them know chest x-ray is stable.  No changes recommended.  Let me know if they have additional questions.  ----- Message -----  From: Interface, Rad Results [place] In  Sent: 4/14/2025  11:19 AM EDT  To: Yajaira Collazo PA-C

## 2025-04-17 ENCOUNTER — READMISSION MANAGEMENT (OUTPATIENT)
Dept: CALL CENTER | Facility: HOSPITAL | Age: 85
End: 2025-04-17
Payer: MEDICARE

## 2025-04-17 NOTE — OUTREACH NOTE
COPD/PN Week 2 Survey      Flowsheet Row Responses   [facility] patient discharged from? [place]   Does the patient have one of the following disease processes/diagnoses(primary or secondary)? Pneumonia   Week 2 attempt successful? Yes   Call start time 1338   Call end time 1343   Discharge diagnosis Pneumonia of right lung due to infectious organism, unspecified part of lung   Person spoke with today (if not patient) and relationship Patient   Meds reviewed with patient/caregiver? Yes   Is the patient having any side effects they believe may be caused by any medication additions or changes? No   Does the patient have all medications ordered at discharge? Yes   Is the patient taking all medications as directed (includes completed medication regime)? Yes   Does the patient have a primary care provider?  Yes   Does the patient have an appointment with their PCP or specialist within 7 days of discharge? Yes   Has the patient kept scheduled appointments due by today? Yes   Psychosocial issues? No   What is the patient's perception of their health status since discharge? Same   Nursing Interventions Nurse provided patient education   If the patient is a current smoker, are they able to teach back resources for cessation? Not a smoker   Is the patient/caregiver able to teach back signs and symptoms of worsening condition: Chest pain, Fever/chills, Shortness of breath   Is the patient/caregiver able to teach back importance of completing antibiotic course of treatment? Yes   Week 2 call completed? Yes   Is the patient interested in additional calls from an ambulatory ? No   Would this patient benefit from a Referral to [facility] Social Work? No   Wrap up additional comments Pt reports he is sleeping all the time, and SOB is same. Pt advised to call PCP to report he is sleeping all the time, and SOB is not better. Pt/spouse verbalized understanding. Pt had PCP fu appt.   Call end time 1343            Kaylyn SIMENTAL  - Registered Nurse

## 2025-04-21 NOTE — PROGRESS NOTES
"[facility]  Heart Failure Clinic    Cardiologist/EP: Emeli  PCP: Pia De La Garza MD  Referring: DEEP Moran*    Chief Complaint  Congestive Heart Failure    PROBLEM LIST:  HFpEF   TTE 2/25/2025 with LVEF 62%, dilated LA, moderate AS   PYP imaging negative for amyloidosis  Hypertension  Hyperlipidemia  Aortic valve stenosis  Moderate 2/2025  CAD  NSTEMI with C 2002: PCI to RCA and left circumflex   Repeat [facility] with nonobstructive coronary artery disease in 2005, 2009, and 2018   Lexiscan 8/21: LV function at rest.  Normal myocardial perfusion   CKD stage IV      Subjective    History of Present Illness    Hal Byrnes is a 84 y.o. male who presents today for evaluation of HFpEF.    Patient recently hospitalized with mixed respiratory failure secondary to HFpEF and possible pneumonia.  Dr. Sainz evaluated while inpatient who felt a lot of his issues were related to CKD.  PYP imaging negative for amyloidosis. He notes persistent lower extremity edema which seems worse. He notes fatigue. He reports he is very restless at night and gets up a lot. He says he has a very dry mouth. He denies orthopnea or PND. No weight gain. CXR 4/14 showed resolved pulmonary edema.  He reports systolic blood pressures are running in the 140s at home      Objective     Vital Signs:   Vitals:    04/22/25 1321   BP: 150/67   BP Location: Left arm   Patient Position: Sitting   Pulse: 60   SpO2: 94%   Weight: 78 kg (172 lb)   Height: 160 cm (63\")     Body mass index is 30.47 kg/m².  Physical Exam  Vitals reviewed.   Constitutional:       Appearance: Normal appearance.   HENT:      Head: Normocephalic.   Neck:      Vascular: No carotid bruit.   Cardiovascular:      Rate and Rhythm: Normal rate and regular rhythm.      Pulses: Normal pulses.      Heart sounds: S1 normal and S2 normal. Murmur heard.      Systolic murmur is present with a grade of 2/6.   Pulmonary:      Effort: Pulmonary effort is normal. " No respiratory distress.      Breath sounds: Normal breath sounds.   Chest:      Chest wall: No tenderness.   Abdominal:      General: Abdomen is flat.      Palpations: Abdomen is soft.   Musculoskeletal:      Cervical back: Neck supple.      Right lower le+ Pitting Edema present.      Left lower le+ Pitting Edema present.   Skin:     General: Skin is warm and dry.   Neurological:      General: No focal deficit present.      Mental Status: He is alert and oriented to person, place, and time. Mental status is at baseline.   Psychiatric:         Mood and Affect: Mood normal.         Behavior: Behavior normal.         Thought Content: Thought content normal.              Data Reviewed:  Lab Results   Component Value Date    GLUCOSE 128 (H) 2025    CALCIUM 8.8 2025     2025    K 3.5 2025    CO2 22.0 2025     (H) 2025    BUN 27 (H) 2025    CREATININE 2.69 (H) 2025    EGFR 22.8 (L) 2025    BCR 10.0 2025    ANIONGAP 10.0 2025     Lab Results   Component Value Date    WBC 7.89 2025    HGB 11.3 (L) 2025    HCT 34.7 (L) 2025    MCV 91.8 2025     2025     Lab Results   Component Value Date    PROBNP 3,335.0 (H) 2025            DATE 25      ReDs lung fluid volume assessment  (Normal 25-35%) 38%                 Assessment and Plan   Chronic HFpEF  - LVEF 62%, NYHA class III ACC/AHA stage: C  - He reports worsening lower extremity edema, Reds lung fluid volume assessment is mildly elevated.  Chest x-ray last week showed resolved pulmonary edema.  I advised that he could take an extra 20 mg of Lasix for 3 days.  He is to follow-up with nephrology in 2 weeks  - Reinforced reasons to call and diuretic plan.   - ReDs Vest    2. Stage IV chronic kidney disease  - Recent creatinine 2.6-3.0  - He reports he has a follow-up in 2 weeks  - Would defer further diuretic management to nephrology due to  underlying polycystic kidney disease    3. Primary hypertension  -Uncontrolled.  Increase hydralazine to 100 mg 3 times daily  - Off calcium channel blockers due to worsening lower extremity edema  - Continue carvedilol 6.25 mg twice a day, will not increase it at this time due to the borderline heart rates  - Continue terazosin 2 mg daily  - Further blood pressure management per nephrology            I have reviewed this documentation, made edits where appropriate, and agree with the final report of ReDS data/interpretation. In addition, I have the following to add:    Lung fluid content by ReDS assessment correlates to pulmonary capillary wedge pressure (Vicente et al. JAHA 2018). In patients with LEFT-sided heart failure, elevated readings suggest that the patient MAY benefit from additional diuresis while low readings suggest that the patient MAY benefit from a reduction in diuretics; clinical correlation is recommended. Low normal and mildly elevated readings may be appropriate for some patients. In patients with RIGHT-sided heart failure, lung fluid content may be a less reliable marker for guiding diuresis.        Follow Up {Instructions Charge Capture  Follow-up Communications :23}   Return if symptoms worsen or fail to improve.    Patient was given instructions and counseling regarding his condition or for health maintenance advice. Please see specific information pulled into the AVS if appropriate.  Advised to call the Heart and Valve Center with any questions, concerns, or worsening symptoms.

## 2025-04-22 ENCOUNTER — OFFICE VISIT (OUTPATIENT)
Dept: CARDIOLOGY | Facility: HOSPITAL | Age: 85
End: 2025-04-22
Payer: MEDICARE

## 2025-04-22 VITALS
WEIGHT: 172 LBS | HEART RATE: 60 BPM | DIASTOLIC BLOOD PRESSURE: 67 MMHG | SYSTOLIC BLOOD PRESSURE: 150 MMHG | OXYGEN SATURATION: 94 % | HEIGHT: 63 IN | BODY MASS INDEX: 30.48 KG/M2

## 2025-04-22 DIAGNOSIS — N18.4 CKD (CHRONIC KIDNEY DISEASE) STAGE 4, GFR 15-29 ML/MIN: ICD-10-CM

## 2025-04-22 DIAGNOSIS — I10 PRIMARY HYPERTENSION: ICD-10-CM

## 2025-04-22 DIAGNOSIS — I50.32 CHRONIC HEART FAILURE WITH PRESERVED EJECTION FRACTION (HFPEF): Primary | ICD-10-CM

## 2025-04-22 LAB — ABSOLUTE LUNG FLUID CONTENT: 38 % (ref 20–35)

## 2025-04-22 RX ORDER — HYDRALAZINE HYDROCHLORIDE 100 MG/1
100 TABLET, FILM COATED ORAL 3 TIMES DAILY
Qty: 90 TABLET | Refills: 3 | Status: SHIPPED | OUTPATIENT
Start: 2025-04-22

## 2025-04-22 NOTE — PATIENT INSTRUCTIONS
Increase lasix to 40mg in am and 20mg at lunch for 3 days  Increase hydralazine to 100mg three times a day

## 2025-04-22 NOTE — PROGRESS NOTES
Heart Failure Clinic    Date:  04/22/25     There were no vitals filed for this visit.     Indication:  Heart Failure     Procedure:  ReDS device sensor unit applied to right side of chest and right side of back.  Appropriate positioning confirmed based off of the unit's calculation.  Chest measurement obtained with the chest size ruler.  Measurement session performed over 45 seconds.      Method of arrival:  Ambulatory    Weighing self daily:  Yes    Taking medications as prescribed:  Yes    Edema:  2+    Shortness of Air:  Yes    Number of pillows used at night:  <2    Results:  ReDS Value=      38%                    Interpretation:  36-38% - mildly elevated lung fluid content    Maureen Herring MA 04/22/25 13:24 EDT

## 2025-04-25 ENCOUNTER — READMISSION MANAGEMENT (OUTPATIENT)
Dept: CALL CENTER | Facility: HOSPITAL | Age: 85
End: 2025-04-25
Payer: MEDICARE

## 2025-04-25 NOTE — OUTREACH NOTE
COPD/PN Week 3 Survey      Flowsheet Row Responses   [facility] discharged from? [place]   Does the patient have one of the following disease processes/diagnoses(primary or secondary)? Pneumonia   Week 3 attempt successful? No   Unsuccessful attempts Attempt 1            Kaylyn Guerrero Registered Nurse

## 2025-04-29 ENCOUNTER — READMISSION MANAGEMENT (OUTPATIENT)
Dept: CALL CENTER | Facility: HOSPITAL | Age: 85
End: 2025-04-29
Payer: MEDICARE

## 2025-04-29 NOTE — OUTREACH NOTE
COPD/PN Week 3 Survey      Flowsheet Row Responses   [facility] patient discharged from? [place]   Does the patient have one of the following disease processes/diagnoses(primary or secondary)? Pneumonia   Week 3 attempt successful? Yes   Call start time 1439   Call end time 1444   Discharge diagnosis Pneumonia of right lung due to infectious organism, unspecified part of lung   Does the patient have all medications ordered at discharge? Yes   Is the patient taking all medications as directed (includes completed medication regime)? Yes   Does the patient have a primary care provider?  Yes   Comments regarding PCP PCP appt with 5/15   Has all DME been delivered? Yes   Pulse Ox monitoring Intermittent   O2 Sat comments Patient states O2 sat 93-95%   O2 Sat: education provided Sat levels, Monitoring frequency, When to seek care   Psychosocial issues? No   What is the patient's perception of their health status since discharge? Improving   Nursing Interventions Nurse provided patient education   If the patient is a current smoker, are they able to teach back resources for cessation? Not a smoker   Is the patient/caregiver able to teach back the hierarchy of who to call/visit for symptoms/problems? PCP, Specialist, Home health nurse, Urgent Care, ED, 911 Yes   Is the patient/caregiver able to teach back signs and symptoms of worsening condition: Fever/chills, Shortness of breath, Chest pain   Is the patient/caregiver able to teach back importance of completing antibiotic course of treatment? Yes   Week 3 call completed? Yes   Graduated Yes   Is the patient interested in additional calls from an ambulatory ? No   Would this patient benefit from a Referral to Amb Social Work? No   Wrap up additional comments Patient reports doing okay. No questions or concerns at this time.   Call end time 1444            BEN HYDE - Registered Nurse

## 2025-05-02 ENCOUNTER — LAB (OUTPATIENT)
Dept: LAB | Facility: HOSPITAL | Age: 85
End: 2025-05-02
Payer: MEDICARE

## 2025-05-02 ENCOUNTER — TRANSCRIBE ORDERS (OUTPATIENT)
Dept: LAB | Facility: HOSPITAL | Age: 85
End: 2025-05-02
Payer: MEDICARE

## 2025-05-02 DIAGNOSIS — I10 ESSENTIAL HYPERTENSION, MALIGNANT: ICD-10-CM

## 2025-05-02 DIAGNOSIS — N18.9 ANEMIA OF CHRONIC RENAL FAILURE, UNSPECIFIED CKD STAGE: ICD-10-CM

## 2025-05-02 DIAGNOSIS — D63.1 ANEMIA OF CHRONIC RENAL FAILURE, UNSPECIFIED CKD STAGE: ICD-10-CM

## 2025-05-02 DIAGNOSIS — E55.9 AVITAMINOSIS D: ICD-10-CM

## 2025-05-02 DIAGNOSIS — N18.4 CHRONIC KIDNEY DISEASE, STAGE IV (SEVERE): ICD-10-CM

## 2025-05-02 DIAGNOSIS — N18.4 CHRONIC KIDNEY DISEASE, STAGE IV (SEVERE): Primary | ICD-10-CM

## 2025-05-02 LAB
25(OH)D3 SERPL-MCNC: 28.6 NG/ML (ref 30–100)
ALBUMIN SERPL-MCNC: 3.6 G/DL (ref 3.5–5.2)
ALBUMIN/GLOB SERPL: 1.4 G/DL
ALP SERPL-CCNC: 76 U/L (ref 39–117)
ALT SERPL W P-5'-P-CCNC: 24 U/L (ref 1–41)
ANION GAP SERPL CALCULATED.3IONS-SCNC: 10.5 MMOL/L (ref 5–15)
AST SERPL-CCNC: 24 U/L (ref 1–40)
BACTERIA UR QL AUTO: NORMAL /HPF
BILIRUB SERPL-MCNC: <0.2 MG/DL (ref 0–1.2)
BILIRUB UR QL STRIP: NEGATIVE
BUN SERPL-MCNC: 30 MG/DL (ref 8–23)
BUN/CREAT SERPL: 11.4 (ref 7–25)
CALCIUM SPEC-SCNC: 8.5 MG/DL (ref 8.6–10.5)
CHLORIDE SERPL-SCNC: 106 MMOL/L (ref 98–107)
CLARITY UR: CLEAR
CO2 SERPL-SCNC: 22.5 MMOL/L (ref 22–29)
COLOR UR: YELLOW
CREAT SERPL-MCNC: 2.63 MG/DL (ref 0.76–1.27)
DEPRECATED RDW RBC AUTO: 44.6 FL (ref 37–54)
EGFRCR SERPLBLD CKD-EPI 2021: 23.4 ML/MIN/1.73
ERYTHROCYTE [DISTWIDTH] IN BLOOD BY AUTOMATED COUNT: 13.4 % (ref 12.3–15.4)
GLOBULIN UR ELPH-MCNC: 2.5 GM/DL
GLUCOSE SERPL-MCNC: 99 MG/DL (ref 65–99)
GLUCOSE UR STRIP-MCNC: NEGATIVE MG/DL
HCT VFR BLD AUTO: 35.5 % (ref 37.5–51)
HGB BLD-MCNC: 11.6 G/DL (ref 13–17.7)
HGB UR QL STRIP.AUTO: NEGATIVE
HYALINE CASTS UR QL AUTO: NORMAL /LPF
KETONES UR QL STRIP: NEGATIVE
LEUKOCYTE ESTERASE UR QL STRIP.AUTO: NEGATIVE
MCH RBC QN AUTO: 29.7 PG (ref 26.6–33)
MCHC RBC AUTO-ENTMCNC: 32.7 G/DL (ref 31.5–35.7)
MCV RBC AUTO: 91 FL (ref 79–97)
NITRITE UR QL STRIP: NEGATIVE
PH UR STRIP.AUTO: 6 [PH] (ref 5–8)
PLATELET # BLD AUTO: 277 10*3/MM3 (ref 140–450)
PMV BLD AUTO: 10 FL (ref 6–12)
POTASSIUM SERPL-SCNC: 3.4 MMOL/L (ref 3.5–5.2)
PROT SERPL-MCNC: 6.1 G/DL (ref 6–8.5)
PROT UR QL STRIP: ABNORMAL
RBC # BLD AUTO: 3.9 10*6/MM3 (ref 4.14–5.8)
RBC # UR STRIP: NORMAL /HPF
REF LAB TEST METHOD: NORMAL
SODIUM SERPL-SCNC: 139 MMOL/L (ref 136–145)
SP GR UR STRIP: 1.01 (ref 1–1.03)
SQUAMOUS #/AREA URNS HPF: NORMAL /HPF
URATE SERPL-MCNC: 8.1 MG/DL (ref 3.4–7)
UROBILINOGEN UR QL STRIP: ABNORMAL
WBC # UR STRIP: NORMAL /HPF
WBC NRBC COR # BLD AUTO: 6.44 10*3/MM3 (ref 3.4–10.8)

## 2025-05-02 PROCEDURE — 81001 URINALYSIS AUTO W/SCOPE: CPT

## 2025-05-02 PROCEDURE — 84156 ASSAY OF PROTEIN URINE: CPT

## 2025-05-02 PROCEDURE — 82306 VITAMIN D 25 HYDROXY: CPT

## 2025-05-02 PROCEDURE — 85027 COMPLETE CBC AUTOMATED: CPT

## 2025-05-02 PROCEDURE — 84550 ASSAY OF BLOOD/URIC ACID: CPT

## 2025-05-02 PROCEDURE — 36415 COLL VENOUS BLD VENIPUNCTURE: CPT

## 2025-05-02 PROCEDURE — 82570 ASSAY OF URINE CREATININE: CPT

## 2025-05-02 PROCEDURE — 83970 ASSAY OF PARATHORMONE: CPT

## 2025-05-02 PROCEDURE — 80053 COMPREHEN METABOLIC PANEL: CPT

## 2025-05-03 LAB
CREAT UR-MCNC: 41.4 MG/DL
PROT ?TM UR-MCNC: 144.1 MG/DL
PTH-INTACT SERPL-MCNC: 125 PG/ML (ref 15–65)

## 2025-05-05 DIAGNOSIS — I50.32 CHRONIC HEART FAILURE WITH PRESERVED EJECTION FRACTION (HFPEF): Primary | ICD-10-CM

## 2025-05-05 DIAGNOSIS — K59.03 DRUG INDUCED CONSTIPATION: ICD-10-CM

## 2025-05-05 DIAGNOSIS — R25.1 TREMOR: Chronic | ICD-10-CM

## 2025-05-05 NOTE — TELEPHONE ENCOUNTER
Last appointment: 4/14/2025-Yajaira Collazo  Next appointment: 5/15/2025    Last Refill: last filled by hospitalist. Ok to fill?

## 2025-05-05 NOTE — TELEPHONE ENCOUNTER
PATIENT'S WIFE CALLED AND IS REQUESTING A REFILL ON THE FOLLOWING MEDICATIONS:  furosemide (LASIX) 40 MG tablet       sennosides-docusate (PERICOLACE) 8.6-50 MG per tablet       topiramate (TOPAMAX) 50 MG tablet       PATIENT HAS 5-10 PILLS LEFT.    [facility] [place] [phone]      CALL BACK: [phone]

## 2025-05-06 ENCOUNTER — OFFICE VISIT (OUTPATIENT)
Dept: ONCOLOGY | Facility: CLINIC | Age: 85
End: 2025-05-06
Payer: MEDICARE

## 2025-05-06 VITALS
OXYGEN SATURATION: 94 % | HEART RATE: 68 BPM | WEIGHT: 165 LBS | SYSTOLIC BLOOD PRESSURE: 142 MMHG | BODY MASS INDEX: 29.23 KG/M2 | DIASTOLIC BLOOD PRESSURE: 65 MMHG | TEMPERATURE: 98.2 F | RESPIRATION RATE: 28 BRPM | HEIGHT: 63 IN

## 2025-05-06 DIAGNOSIS — R76.8 ELEVATED SERUM IMMUNOGLOBULIN FREE LIGHT CHAIN LEVEL: Primary | ICD-10-CM

## 2025-05-06 RX ORDER — TOPIRAMATE 50 MG/1
50 TABLET, FILM COATED ORAL 2 TIMES DAILY
Qty: 180 TABLET | Refills: 3 | Status: SHIPPED | OUTPATIENT
Start: 2025-05-06

## 2025-05-06 RX ORDER — FUROSEMIDE 40 MG/1
40 TABLET ORAL DAILY
Qty: 90 TABLET | Refills: 1 | Status: SHIPPED | OUTPATIENT
Start: 2025-05-06

## 2025-05-06 RX ORDER — DILTIAZEM HYDROCHLORIDE 240 MG/1
CAPSULE, EXTENDED RELEASE ORAL
COMMUNITY
Start: 2025-05-02

## 2025-05-06 RX ORDER — AMOXICILLIN 250 MG
2 CAPSULE ORAL 2 TIMES DAILY
Qty: 360 TABLET | Refills: 3 | Status: SHIPPED | OUTPATIENT
Start: 2025-05-06

## 2025-05-06 NOTE — PROGRESS NOTES
DATE OF VISIT: 5/6/2025    REASON FOR VISIT: Followup for elevated serum free light chains     PROBLEM LIST:  1.  Elevated serum free light chains:  A.  Induced by chronic kidney disease.  B.  No evidence of monoclonal gammopathy.  2.  Anemia of chronic kidney disease  3.  Chronic kidney disease stage IV    HISTORY OF PRESENT ILLNESS: The patient is a very pleasant 84 y.o. male  with past medical history significant for anemia chronic kidney disease diagnosed April 2025. The  patient is here today for scheduled follow-up visit.    SUBJECTIVE: The patient is here today with his wife.  All in all he is doing well.  Denies any fever chills or night sweats.  No bleeding.    Past History:  Medical History: has a past medical history of Arthritis, Cataract, CHF (congestive heart failure), Chronic kidney disease, Congenital heart disease, COPD (chronic obstructive pulmonary disease), Coronary artery disease, Difficulty walking, Fatigue, GERD (gastroesophageal reflux disease), Hernia, Hyperlipidemia, Hypertension, Lactose intolerance, Low back pain, Myocardial infarction, Other chest pain, Pancreatitis, and SOB (shortness of breath).   Surgical History: has a past surgical history that includes Coronary stent placement; Appendectomy; Prostate surgery; Cataract extraction, bilateral (Bilateral); Carotid stent; Colonoscopy (11/2024); Cardiac catheterization; and Hernia repair.   Family History: family history includes Brain cancer in his sister; Diabetes in his mother; Heart attack in his brother and mother; Heart disease in his brother; Hepatitis in his brother; Hyperlipidemia in his mother; Hypertension in his brother; Liver disease in his brother.   Social History: reports that he quit smoking about 14 years ago. His smoking use included cigarettes and pipe. He started smoking about 24 years ago. He has a 15 pack-year smoking history. He has never been exposed to tobacco smoke. He has never used smokeless tobacco. He  "reports that he does not currently use drugs after having used the following drugs: Marijuana. He reports that he does not drink alcohol.    (Not in a hospital admission)     Allergies: Sulfa antibiotics     Review of Systems   Constitutional:  Positive for fatigue.   Respiratory:  Positive for shortness of breath.    Cardiovascular:  Positive for leg swelling.   Gastrointestinal: Negative.    Musculoskeletal:  Positive for arthralgias.       PHYSICAL EXAMINATION:   /65 Comment: LUE  Pulse 68   Temp 98.2 °F (36.8 °C) (Temporal)   Resp 28   Ht 160 cm (63\")   Wt 74.8 kg (165 lb)   SpO2 94% Comment: RA  BMI 29.23 kg/m²    Pain Score    05/06/25 1140   PainSc: 0-No pain        ECOG score: 0            ECOG Performance Status: 1 - Symptomatic but completely ambulatory      General Appearance:      alert, cooperative, no apparent distress and appears stated age   Lungs:   Clear to auscultation bilaterally; respirations regular, even, and unlabored bilaterally   Heart:  Regular rate and rhythm, no murmurs appreciated   Abdomen:   Soft, non-tender, non-distended, and no organomegaly                 Lab on 05/02/2025   Component Date Value Ref Range Status    WBC 05/02/2025 6.44  3.40 - 10.80 10*3/mm3 Final    RBC 05/02/2025 3.90 (L)  4.14 - 5.80 10*6/mm3 Final    Hemoglobin 05/02/2025 11.6 (L)  13.0 - 17.7 g/dL Final    Hematocrit 05/02/2025 35.5 (L)  37.5 - 51.0 % Final    MCV 05/02/2025 91.0  79.0 - 97.0 fL Final    MCH 05/02/2025 29.7  26.6 - 33.0 pg Final    MCHC 05/02/2025 32.7  31.5 - 35.7 g/dL Final    RDW 05/02/2025 13.4  12.3 - 15.4 % Final    RDW-SD 05/02/2025 44.6  37.0 - 54.0 fl Final    MPV 05/02/2025 10.0  6.0 - 12.0 fL Final    Platelets 05/02/2025 277  140 - 450 10*3/mm3 Final    Glucose 05/02/2025 99  65 - 99 mg/dL Final    BUN 05/02/2025 30 (H)  8 - 23 mg/dL Final    Creatinine 05/02/2025 2.63 (H)  0.76 - 1.27 mg/dL Final    Sodium 05/02/2025 139  136 - 145 mmol/L Final    Potassium 05/02/2025 " 3.4 (L)  3.5 - 5.2 mmol/L Final    Chloride 05/02/2025 106  98 - 107 mmol/L Final    CO2 05/02/2025 22.5  22.0 - 29.0 mmol/L Final    Calcium 05/02/2025 8.5 (L)  8.6 - 10.5 mg/dL Final    Total Protein 05/02/2025 6.1  6.0 - 8.5 g/dL Final    Albumin 05/02/2025 3.6  3.5 - 5.2 g/dL Final    ALT (SGPT) 05/02/2025 24  1 - 41 U/L Final    AST (SGOT) 05/02/2025 24  1 - 40 U/L Final    Alkaline Phosphatase 05/02/2025 76  39 - 117 U/L Final    Total Bilirubin 05/02/2025 <0.2  0.0 - 1.2 mg/dL Final    Globulin 05/02/2025 2.5  gm/dL Final    A/G Ratio 05/02/2025 1.4  g/dL Final    BUN/Creatinine Ratio 05/02/2025 11.4  7.0 - 25.0 Final    Anion Gap 05/02/2025 10.5  5.0 - 15.0 mmol/L Final    eGFR 05/02/2025 23.4 (L)  >60.0 mL/min/1.73 Final    Uric Acid 05/02/2025 8.1 (H)  3.4 - 7.0 mg/dL Final    PTH, Intact 05/02/2025 125.0 (H)  15.0 - 65.0 pg/mL Final    25 Hydroxy, Vitamin D 05/02/2025 28.6 (L)  30.0 - 100.0 ng/ml Final    Creatinine, Urine 05/02/2025 41.4  mg/dL Final    Total Protein, Urine 05/02/2025 144.1  mg/dL Final    Color, UA 05/02/2025 Yellow  Yellow, Straw Final    Appearance, UA 05/02/2025 Clear  Clear Final    pH, UA 05/02/2025 6.0  5.0 - 8.0 Final    Specific Gravity, UA 05/02/2025 1.007  1.005 - 1.030 Final    Glucose, UA 05/02/2025 Negative  Negative Final    Ketones, UA 05/02/2025 Negative  Negative Final    Bilirubin, UA 05/02/2025 Negative  Negative Final    Blood, UA 05/02/2025 Negative  Negative Final    Protein, UA 05/02/2025 >=300 mg/dL (3+) (A)  Negative Final    Leuk Esterase, UA 05/02/2025 Negative  Negative Final    Nitrite, UA 05/02/2025 Negative  Negative Final    Urobilinogen, UA 05/02/2025 0.2 E.U./dL  0.2 - 1.0 E.U./dL Final    RBC, UA 05/02/2025 0-2  None Seen, 0-2 /HPF Final    WBC, UA 05/02/2025 0-2  None Seen, 0-2 /HPF Final    Bacteria, UA 05/02/2025 None Seen  None Seen /HPF Final    Squamous Epithelial Cells, UA 05/02/2025 0-2  None Seen, 0-2 /HPF Final    Hyaline Casts, UA 05/02/2025  3-6  None Seen /LPF Final    Methodology 05/02/2025 Automated Microscopy   Final        XR Chest PA & Lateral  Result Date: 4/14/2025  Narrative: XR CHEST PA AND LATERAL Date of Exam: 4/14/2025 11:06 AM EDT Indication: increase in shortness of breath over the last 2-3 days, recent 8 day admission for possible PNA/ HF Comparison: Chest radiograph 4/3/2025 Findings: Stable cardiomegaly. Aortic atherosclerotic disease. Blunting of the costophrenic angle suggesting small effusions. Mild left greater than right bibasilar opacities stable from prior favoring atelectasis. Vascular congestion and previously noted edema has resolved.. No pneumothorax. No worsening consolidation. Degenerative related osseous change.     Impression: Impression: 1. Resolved vascular congestion and edema. 2. Stable cardiomegaly and residual small pleural effusions. 3. Stable mild bibasilar opacities favoring atelectasis. Electronically Signed: Maxwell Cam MD  4/14/2025 11:16 AM EDT  Workstation ID: [id number]        ASSESSMENT: The patient is a very pleasant 84 y.o. male  with anemia of chronic kidney disease      PLAN:    1.  Anemia chronic kidney disease:  A.  I discussed with the patient his hemoglobin 2 weeks ago was 11.6 per  B.  The patient will benefit from recommend appropriate replacement if his hemoglobin drops below 10.    2.  Elevated serum free light chains:  A.  Reassured the patient that his ratio was slightly outside the normal range at 1.8 on March 4, 2025.  Furthermore his SPEP as well as quant immunoglobulins all came back normal.    FOLLOW UP: 6 months with CBC SPEP and serum free light chains.    Jalen Anaya MD  5/6/2025

## 2025-05-06 NOTE — TELEPHONE ENCOUNTER
Patient has specialists outside of [facility] including nephrology who has altered his diuretics. Please call patient or his wife to confirm these medications and doses before I refill.

## 2025-05-15 ENCOUNTER — OFFICE VISIT (OUTPATIENT)
Dept: INTERNAL MEDICINE | Facility: CLINIC | Age: 85
End: 2025-05-15
Payer: MEDICARE

## 2025-05-15 VITALS
HEIGHT: 63 IN | WEIGHT: 162.2 LBS | BODY MASS INDEX: 28.74 KG/M2 | HEART RATE: 61 BPM | SYSTOLIC BLOOD PRESSURE: 104 MMHG | DIASTOLIC BLOOD PRESSURE: 50 MMHG | OXYGEN SATURATION: 95 %

## 2025-05-15 DIAGNOSIS — Q61.02 MULTIPLE RENAL CYSTS: ICD-10-CM

## 2025-05-15 DIAGNOSIS — I10 PRIMARY HYPERTENSION: ICD-10-CM

## 2025-05-15 DIAGNOSIS — R93.5 ABNORMAL CT OF THE ABDOMEN: ICD-10-CM

## 2025-05-15 DIAGNOSIS — N18.4 STAGE 4 CHRONIC KIDNEY DISEASE: ICD-10-CM

## 2025-05-15 DIAGNOSIS — I50.32 CHRONIC HEART FAILURE WITH PRESERVED EJECTION FRACTION (HFPEF): Primary | ICD-10-CM

## 2025-05-15 RX ORDER — FUROSEMIDE 40 MG/1
TABLET ORAL
COMMUNITY

## 2025-05-15 RX ORDER — SPIRONOLACTONE 25 MG/1
25 TABLET ORAL DAILY
COMMUNITY
Start: 2025-05-08

## 2025-05-15 RX ORDER — HYDRALAZINE HYDROCHLORIDE 100 MG/1
100 TABLET, FILM COATED ORAL 2 TIMES DAILY
COMMUNITY

## 2025-05-15 NOTE — PROGRESS NOTES
Internal Medicine Follow Up    Chief Complaint  Hal Byrnes is a 84 y.o. male who presents today for follow up of chronic medical conditions outlined below.    Chief Complaint   Patient presents with    Hypertension     1 month follow up        HPI  Mr. Byrnes comes in today for follow up. He is here with his wife. He was hospitalized last month with what seems to have been primarily a CHF exacerbation however also treated for PNA. Discharged on 2L O2. He has since had follow up with cardiology and nephrology. Hydralazine increased to TID by cardiology and then reduced to BID last week by nephrology who also increased lasix to 40mg BID and added back spironolactone 25mg daily. He has only been taking lasix 40mg in the morning and 20mg in the afternoon. He feels this dosing is adequate. He is no longer SOA or using supplemental oxygen. He is making good urine. He denies SOA and weight is stable. He does continue to have chest pain on and off. Cardiology not planning to see him until October but wife notes plan to call for sooner appt. He will see nephrology again in June.    Hypertension  Associated symptoms: chest pain    Associated symptoms: no shortness of breath         Review of Systems  Review of Systems   Constitutional:  Negative for unexpected weight gain.   Respiratory:  Negative for shortness of breath.    Cardiovascular:  Positive for chest pain and leg swelling.   Gastrointestinal:  Negative for abdominal pain.   Genitourinary:  Negative for decreased urine volume and difficulty urinating.        Current Medications  Current Outpatient Medications on File Prior to Visit   Medication Sig Dispense Refill    aspirin 81 MG chewable tablet Chew 1 tablet Daily. OTC      carvedilol (COREG) 6.25 MG tablet Take 1 tablet by mouth 2 (Two) Times a Day. 60 tablet 1    furosemide (LASIX) 40 MG tablet Take 40mg in the morning and 20mg in the afternoon      glycerin adult 2 g suppository Insert 1 suppository into  "the rectum As Needed for Constipation. 25 each 0    hydrALAZINE (APRESOLINE) 100 MG tablet Take 1 tablet by mouth 2 (Two) Times a Day.      isosorbide mononitrate (IMDUR) 60 MG 24 hr tablet TAKE 1 TABLET BY MOUTH TWICE A  tablet 3    linaclotide (Linzess) 290 MCG capsule capsule TAKE 1 CAPSULE BY MOUTH EVERY MORNING 30 MINUTES BEFORE BREAKFAST 90 capsule 3    Pancrelipase, Lip-Prot-Amyl, (Zenpep) 36329-03510 units capsule delayed-release particles Take 2 capsules by mouth 3 (Three) Times a Day. 540 capsule 1    pantoprazole (PROTONIX) 40 MG EC tablet Take 1 tablet by mouth Daily. 90 tablet 3    spironolactone (ALDACTONE) 25 MG tablet Take 1 tablet by mouth Daily.      terazosin (HYTRIN) 2 MG capsule Take 1 capsule by mouth Every Night. 90 capsule 1    topiramate (TOPAMAX) 50 MG tablet Take 1 tablet by mouth 2 (Two) Times a Day. 180 tablet 3    [DISCONTINUED] furosemide (LASIX) 40 MG tablet Take 1 tablet by mouth Daily. 90 tablet 1    [DISCONTINUED] hydrALAZINE (APRESOLINE) 100 MG tablet Take 1 tablet by mouth 3 (Three) Times a Day. 90 tablet 3    [DISCONTINUED] dilTIAZem XR (DILACOR XR) 240 MG 24 hr capsule  (Patient not taking: Reported on 5/15/2025)      [DISCONTINUED] sennosides-docusate (PERICOLACE) 8.6-50 MG per tablet Take 2 tablets by mouth 2 (Two) Times a Day. (Patient not taking: Reported on 5/15/2025) 360 tablet 3     Current Facility-Administered Medications on File Prior to Visit   Medication Dose Route Frequency Provider Last Rate Last Admin    Inclisiran Sodium solution prefilled syringe 284 mg  1.5 mL Subcutaneous Q6 Months Carl Sainz MD           Allergies  Allergies   Allergen Reactions    Sulfa Antibiotics Irritability       Objective  Visit Vitals  /50 (BP Location: Left arm, Patient Position: Sitting, Cuff Size: Adult)   Pulse 61   Ht 160 cm (62.99\")   Wt 73.6 kg (162 lb 3.2 oz)   SpO2 95%   BMI 28.74 kg/m²        Physical Exam  Physical Exam  Vitals and nursing note reviewed. "   Constitutional:       General: He is not in acute distress.     Appearance: He is well-developed. He is not ill-appearing or toxic-appearing.   HENT:      Head: Normocephalic and atraumatic.   Eyes:      Conjunctiva/sclera: Conjunctivae normal.   Cardiovascular:      Rate and Rhythm: Normal rate and regular rhythm.      Heart sounds: Murmur (systolic) heard.   Pulmonary:      Effort: Pulmonary effort is normal. No respiratory distress.      Breath sounds: Normal breath sounds. No wheezing, rhonchi or rales.   Musculoskeletal:      Right lower leg: Edema (1+) present.      Left lower leg: Edema (1+) present.   Skin:     General: Skin is warm and dry.   Neurological:      Mental Status: He is alert and oriented to person, place, and time. Mental status is at baseline.         Results  Results for orders placed or performed in visit on 05/02/25   CBC (No Diff)    Collection Time: 05/02/25  1:27 PM    Specimen: Blood   Result Value Ref Range    WBC 6.44 3.40 - 10.80 10*3/mm3    RBC 3.90 (L) 4.14 - 5.80 10*6/mm3    Hemoglobin 11.6 (L) 13.0 - 17.7 g/dL    Hematocrit 35.5 (L) 37.5 - 51.0 %    MCV 91.0 79.0 - 97.0 fL    MCH 29.7 26.6 - 33.0 pg    MCHC 32.7 31.5 - 35.7 g/dL    RDW 13.4 12.3 - 15.4 %    RDW-SD 44.6 37.0 - 54.0 fl    MPV 10.0 6.0 - 12.0 fL    Platelets 277 140 - 450 10*3/mm3   Comprehensive Metabolic Panel    Collection Time: 05/02/25  1:27 PM    Specimen: Blood   Result Value Ref Range    Glucose 99 65 - 99 mg/dL    BUN 30 (H) 8 - 23 mg/dL    Creatinine 2.63 (H) 0.76 - 1.27 mg/dL    Sodium 139 136 - 145 mmol/L    Potassium 3.4 (L) 3.5 - 5.2 mmol/L    Chloride 106 98 - 107 mmol/L    CO2 22.5 22.0 - 29.0 mmol/L    Calcium 8.5 (L) 8.6 - 10.5 mg/dL    Total Protein 6.1 6.0 - 8.5 g/dL    Albumin 3.6 3.5 - 5.2 g/dL    ALT (SGPT) 24 1 - 41 U/L    AST (SGOT) 24 1 - 40 U/L    Alkaline Phosphatase 76 39 - 117 U/L    Total Bilirubin <0.2 0.0 - 1.2 mg/dL    Globulin 2.5 gm/dL    A/G Ratio 1.4 g/dL    BUN/Creatinine  Ratio 11.4 7.0 - 25.0    Anion Gap 10.5 5.0 - 15.0 mmol/L    eGFR 23.4 (L) >60.0 mL/min/1.73   Uric Acid    Collection Time: 05/02/25  1:27 PM    Specimen: Blood   Result Value Ref Range    Uric Acid 8.1 (H) 3.4 - 7.0 mg/dL   PTH, Intact    Collection Time: 05/02/25  1:27 PM    Specimen: Blood   Result Value Ref Range    PTH, Intact 125.0 (H) 15.0 - 65.0 pg/mL   Vitamin D 25 Hydroxy    Collection Time: 05/02/25  1:27 PM    Specimen: Blood   Result Value Ref Range    25 Hydroxy, Vitamin D 28.6 (L) 30.0 - 100.0 ng/ml   Creatinine Urine Random (kidney function) GFR component - Urine, Clean Catch    Collection Time: 05/02/25  1:27 PM    Specimen: Urine, Clean Catch   Result Value Ref Range    Creatinine, Urine 41.4 mg/dL   Protein, Urine, Random - Urine, Clean Catch    Collection Time: 05/02/25  1:27 PM    Specimen: Urine, Clean Catch   Result Value Ref Range    Total Protein, Urine 144.1 mg/dL   Urinalysis without microscopic (no culture) - Urine, Clean Catch    Collection Time: 05/02/25  1:27 PM    Specimen: Urine, Clean Catch   Result Value Ref Range    Color, UA Yellow Yellow, Straw    Appearance, UA Clear Clear    pH, UA 6.0 5.0 - 8.0    Specific Gravity, UA 1.007 1.005 - 1.030    Glucose, UA Negative Negative    Ketones, UA Negative Negative    Bilirubin, UA Negative Negative    Blood, UA Negative Negative    Protein, UA >=300 mg/dL (3+) (A) Negative    Leuk Esterase, UA Negative Negative    Nitrite, UA Negative Negative    Urobilinogen, UA 0.2 E.U./dL 0.2 - 1.0 E.U./dL   Urinalysis, Microscopic Only - Urine, Clean Catch    Collection Time: 05/02/25  1:27 PM    Specimen: Urine, Clean Catch   Result Value Ref Range    RBC, UA 0-2 None Seen, 0-2 /HPF    WBC, UA 0-2 None Seen, 0-2 /HPF    Bacteria, UA None Seen None Seen /HPF    Squamous Epithelial Cells, UA 0-2 None Seen, 0-2 /HPF    Hyaline Casts, UA 3-6 None Seen /LPF    Methodology Automated Microscopy         Assessment and Plan  Diagnoses and all orders for  this visit:    Chronic heart failure with preserved ejection fraction (HFpEF)  - following with cardiology, currently doing well with stable edema, no SOA, stable weight  - continue antihypertensives and lasix 40mg in the morning, 20mg in the afternoon. Recommend increasing to 40mg BID PRN increase in weight, edema, SOA.    Primary hypertension  - BP controlled  - continue carvedilol 6.25mg BID, lasix, hydralazine 100mg BID, imdur 60mg BID, spironolactone 25mg daily, and terazosin 2mg qhs.    Stage 4 chronic kidney disease  - gfr has been in the 20s  - following closely with nephrology    Multiple renal cysts  - known to have multiple renal cysts however on CT noted to have possible complex cyst  - CT renal protocol not possible due to CKD4  - will order renal US    Abnormal CT of the abdomen  - soft tissue density noted at site of appendix which has been surgically removed in the past. May be postsurgical changes. Noted colonoscopy 10/2024 was normal in this area.  - will make referral to GI for further evaluation. He does request new provider.     Health Maintenance  - Colonoscopy: 10/2024, possible repeat 5y  - Immunizations:COVID, tdap, shingrix discussed. RSV complete. PCV20 complete.  - Depression screening: negative 10/2024    Return in about 2 months (around 7/15/2025) for Follow up.

## 2025-05-22 ENCOUNTER — TELEPHONE (OUTPATIENT)
Dept: INTERNAL MEDICINE | Facility: CLINIC | Age: 85
End: 2025-05-22
Payer: MEDICARE

## 2025-05-22 ENCOUNTER — TELEPHONE (OUTPATIENT)
Dept: GASTROENTEROLOGY | Facility: CLINIC | Age: 85
End: 2025-05-22

## 2025-05-22 NOTE — TELEPHONE ENCOUNTER
Name: Hal Byrneshba    Relationship: Self    Best Callback Number: [phone]    Patient would like to Schedule a Follow-Up. Unable to schedule within the 3 week timeframe.     Patient is having symptoms of R93.5 (ICD-10-CM) - Abnormal CT of the abdomen. Please call patient.

## 2025-05-22 NOTE — TELEPHONE ENCOUNTER
LOOKED IN CHART AND CT IS NOT AVAILABLE. CALLED DR MCGOWAN'S OFFICE TO OBTAIN, SPOKE WITH STAFF WHO STATED THAT THEY WOULD FOLLOW UP WITH PROVIDER AND SEND AS SOON AS IT WAS AVAILABLE

## 2025-05-22 NOTE — TELEPHONE ENCOUNTER
RUEL WITH [facility] CALLED AND STATES THAT THE REFERRAL  [id number] IS FOR ABNORMAL CT OF THE ABDOMEN, BUT THERE IS NO CT IN THE CHART.    [facility] IS REQUESTING THAT WE SEND OVER A COPY OF THE CT/    RUEL W/ [facility] PHONE : [phone]    [phone]

## 2025-05-28 NOTE — TELEPHONE ENCOUNTER
RUEL WITH [facility] CALLED AND STATES THAT THE REFERRAL STATES THAT THERE IS AN ABNORMAL CT OF THE ABDOMEN.    RUEL STATES THAT SHE DOES NOT SEE THE CT RESULTS. [facility] IS REQUESTING A COPY OF THE CT RESULTS.     [facility] STATES THAT THEY ARE UNABLE TO SCHEDULE THIS PATIENT UNTIL THE PROVIDERS ARE ABLE TO REVIEW THE RESULTS.       PHONE: [phone]    FAX: [phone]

## 2025-05-28 NOTE — TELEPHONE ENCOUNTER
PATIENT RETURNED CALL AND I RELAYED MESSAGE VERBATIM ASKING IF PATIENT HAS HAD ANOTHER CT SCAN SINCE MARCH AND HE REPLIED NO AS FAR AS HE UNDERSTANDS HE HAS NOT.    ANY QUESTIONS PLEASE CALL PT.  [phone]

## 2025-05-28 NOTE — TELEPHONE ENCOUNTER
LVM for pt to return call.  HUB to relay: Please see if pt has had another CT scan since March, if so, where did he have done

## 2025-05-29 ENCOUNTER — OFFICE VISIT (OUTPATIENT)
Dept: GASTROENTEROLOGY | Facility: CLINIC | Age: 85
End: 2025-05-29
Payer: MEDICARE

## 2025-05-29 VITALS
HEIGHT: 63 IN | TEMPERATURE: 97.5 F | DIASTOLIC BLOOD PRESSURE: 68 MMHG | OXYGEN SATURATION: 95 % | WEIGHT: 161.6 LBS | HEART RATE: 67 BPM | SYSTOLIC BLOOD PRESSURE: 138 MMHG | BODY MASS INDEX: 28.63 KG/M2

## 2025-05-29 DIAGNOSIS — K58.1 IRRITABLE BOWEL SYNDROME WITH CONSTIPATION: ICD-10-CM

## 2025-05-29 DIAGNOSIS — R19.00 MASS OF SOFT TISSUE OF ABDOMEN: ICD-10-CM

## 2025-05-29 DIAGNOSIS — Z90.49 HISTORY OF APPENDECTOMY: ICD-10-CM

## 2025-05-29 DIAGNOSIS — R93.5 ABNORMAL CT OF THE ABDOMEN: Primary | ICD-10-CM

## 2025-05-29 NOTE — PROGRESS NOTES
Follow Up      Patient Name: Hal Byrnes  : 1940   MRN: [id number]     Chief Complaint:    Chief Complaint   Patient presents with    Follow-up     Health Maintenance Follow up     Constipation     Pt states that he is having trouble producing bowl movements says most times it can be over a week before he has a bowl movement and pt states even when he does he is not fully being cleared out.       History of Present Illness: Hal Byrnes is a 84 y.o. male who is here today for follow up on abnormal CT A/P. Wife is with patient for visit today.     Pt notes chronic constipation, on Linzess 290mcg daily, colace 100mg TID, fiber supplement daily. He is passing a small, incomplete stool daily and continues to experience generalized abdominal discomfort and bloating between BMs. Patient denies associated fever, chills, indigestion, nausea, vomiting, diarrhea, hematemesis, dysphagia, hematochezia, melena, unintentional weight loss or gain, dysuria, jaundice or bruising.    Pt is s/p appe, right inguinal hernia repair x 2.     CT A/P wo contrast 3/2025: No acute findings in the abdomen or pelvis. Colonic diverticulosis without acute diverticulitis. Innumerable bilateral renal cysts. Appendix is not identified. There is soft tissue in the right lower quadrant at the appropriate expected location of the appendix of the inguinal canal measuring 3.5cm x 2.1cm with multiple surgical clips noted, which may represent postsurgical changes. Underlying mass not excluded.     EGD / CSY 10/2024 with Dr. Linda. LA Grade A esophagitis. 1cm hiatal hernia. Mild antral gastritis. Normal duodenum. Bx negative for H Pylori, celiac disease, intestinal metaplasia or dysplasia. Adequate bowel prep conditions. Normal terminal ileum. 7mm polyp (tubular adenoma) in transverse colon, resected and retrieved. Diverticulosis in the sigmoid colon. Recommend repeat CSY in 5 years.     Subjective      Review of Systems:   Review  of Systems   Constitutional:  Negative for appetite change, chills, diaphoresis, fatigue, fever, unexpected weight gain and unexpected weight loss.   HENT:  Negative for drooling, facial swelling, mouth sores, nosebleeds, rhinorrhea, sore throat, swollen glands, tinnitus and trouble swallowing.    Eyes: Negative.    Respiratory:  Negative for choking, chest tightness and shortness of breath.    Gastrointestinal:  Positive for abdominal distention, abdominal pain and constipation. Negative for anal bleeding, blood in stool, diarrhea, nausea, vomiting, GERD and indigestion.   Endocrine: Negative.    Genitourinary:  Negative for dysuria, flank pain and hematuria.   All other systems reviewed and are negative.      Medications:     Current Outpatient Medications:     aspirin 81 MG chewable tablet, Chew 1 tablet Daily. OTC, Disp: , Rfl:     carvedilol (COREG) 6.25 MG tablet, Take 1 tablet by mouth 2 (Two) Times a Day., Disp: 60 tablet, Rfl: 1    furosemide (LASIX) 40 MG tablet, Take 40mg in the morning and 20mg in the afternoon, Disp: , Rfl:     glycerin adult 2 g suppository, Insert 1 suppository into the rectum As Needed for Constipation., Disp: 25 each, Rfl: 0    hydrALAZINE (APRESOLINE) 100 MG tablet, Take 1 tablet by mouth 2 (Two) Times a Day., Disp: , Rfl:     isosorbide mononitrate (IMDUR) 60 MG 24 hr tablet, TAKE 1 TABLET BY MOUTH TWICE A DAY, Disp: 180 tablet, Rfl: 3    linaclotide (Linzess) 290 MCG capsule capsule, TAKE 1 CAPSULE BY MOUTH EVERY MORNING 30 MINUTES BEFORE BREAKFAST, Disp: 90 capsule, Rfl: 3    Pancrelipase, Lip-Prot-Amyl, (Zenpep) 88994-04321 units capsule delayed-release particles, Take 2 capsules by mouth 3 (Three) Times a Day., Disp: 540 capsule, Rfl: 1    pantoprazole (PROTONIX) 40 MG EC tablet, Take 1 tablet by mouth Daily., Disp: 90 tablet, Rfl: 3    spironolactone (ALDACTONE) 25 MG tablet, Take 1 tablet by mouth Daily., Disp: , Rfl:     terazosin (HYTRIN) 2 MG capsule, Take 1 capsule by  mouth Every Night., Disp: 90 capsule, Rfl: 1    topiramate (TOPAMAX) 50 MG tablet, Take 1 tablet by mouth 2 (Two) Times a Day., Disp: 180 tablet, Rfl: 3    Current Facility-Administered Medications:     Inclisiran Sodium solution prefilled syringe 284 mg, 1.5 mL, Subcutaneous, Q6 Months, Carl Sainz MD    Allergies:   Allergies   Allergen Reactions    Sulfa Antibiotics Irritability       Social History:   Social History     Socioeconomic History    Marital status:    Tobacco Use    Smoking status: Former     Current packs/day: 0.00     Average packs/day: 1.5 packs/day for 10.0 years (15.0 ttl pk-yrs)     Types: Cigarettes, Pipe     Start date: 2001     Quit date: 2011     Years since quittin.1     Passive exposure: Never    Smokeless tobacco: Never   Vaping Use    Vaping status: Never Used   Substance and Sexual Activity    Alcohol use: Never    Drug use: Not Currently     Types: Marijuana     Comment: longstanding smoking, stopped 2 weeks ago    Sexual activity: Not Currently        Surgical History:   Past Surgical History:   Procedure Laterality Date    APPENDECTOMY      CARDIAC CATHETERIZATION      CAROTID STENT      CATARACT EXTRACTION, BILATERAL Bilateral     COLONOSCOPY  2024    CORONARY STENT PLACEMENT      HERNIA REPAIR      PROSTATE SURGERY      for BPH        Medical History:   Past Medical History:   Diagnosis Date    Arthritis     Cataract     CHF (congestive heart failure)     Chronic kidney disease     Congenital heart disease     COPD (chronic obstructive pulmonary disease)     Coronary artery disease     Difficulty walking     Fatigue     GERD (gastroesophageal reflux disease)     Hernia     Hyperlipidemia     Hypertension     Lactose intolerance     Low back pain     Myocardial infarction     Other chest pain     Pancreatitis     SOB (shortness of breath)         Objective     Physical Exam:  Vital Signs: There were no vitals filed for this visit.  There is no height  or weight on file to calculate BMI.     Physical Exam  Vitals and nursing note reviewed.   Constitutional:       General: He is not in acute distress.     Appearance: Normal appearance. He is not ill-appearing or diaphoretic.      Comments: BMI 28.63   HENT:      Head: Normocephalic and atraumatic.      Right Ear: External ear normal.      Left Ear: External ear normal.      Nose: Nose normal.      Mouth/Throat:      Mouth: Mucous membranes are moist.      Pharynx: Oropharynx is clear.   Eyes:      Conjunctiva/sclera: Conjunctivae normal.      Pupils: Pupils are equal, round, and reactive to light.   Cardiovascular:      Rate and Rhythm: Normal rate and regular rhythm.      Pulses: Normal pulses.      Heart sounds: Normal heart sounds.   Pulmonary:      Effort: Pulmonary effort is normal.      Breath sounds: Normal breath sounds.   Abdominal:      General: Abdomen is flat. There is no distension.      Tenderness: There is no abdominal tenderness. There is no guarding or rebound.   Musculoskeletal:         General: Normal range of motion.      Cervical back: Normal range of motion.   Skin:     General: Skin is warm and dry.      Coloration: Skin is not jaundiced or pale.   Neurological:      General: No focal deficit present.      Mental Status: He is alert and oriented to person, place, and time. Mental status is at baseline.   Psychiatric:         Mood and Affect: Mood normal.         Assessment / Plan      Assessment/Plan:   There are no diagnoses linked to this encounter.     Abnormal CT Abdomen / Pelvis  Soft tissue mass of abdomen  IBS-C  H/o appendectomy, inguinal hernia repair x 2   - discontinue Linzess, pt afforded samples of IBSrela 50mg BID, advised to call clinic in 1-2 weeks with sx update   - pt advised to continue adequate oral hydration, fiber intake, daily activity as tolerated   - pt advised to titrate dosage of colace PRN if stools are too loose / frequent with change of primary constipation  medication    - previous office notes, hospital records, labs, imaging, endoscopy and pathology reports reviewed with patient    - obtain CT A/P wo contrast (contrast c/i due to CKD)   - schedule for CSY if warranted, pending repeat imaging review   - case discussed with Dr. Ant Linda, who agrees with aforementioned plan    - follow up in clinic after completion of above studies   - call clinic at any time for questions or new / worsened sx      Follow Up:   Return for Next scheduled follow up.    Plan of care reviewed with the patient at the conclusion of today's visit.  Education was provided regarding diagnosis, management, and any prescribed or recommended OTC medications.  Patient verbalized understanding of and agreement with management plan.     NOTE TO PATIENT: The 21st Century Cures Act makes medical notes like these available to patients in the interest of transparency. However, be advised this is a medical document. It is intended as peer to peer communication. It is written in medical language and may contain abbreviations or verbiage that are unfamiliar. It may appear blunt or direct. Medical documents are intended to carry relevant information, facts as evident, and the clinical opinion of the practitioner.     Time Statement:   Discussed plan of care in detail with patient today. Patient verbally understands and agrees. I have spent 30 minutes reviewing available diagnostics, obtaining history, examining the patient, developing a treatment plan, and educating the patient on disease process and plan of care.     Magda Dan PA-C   [facility]

## 2025-06-02 ENCOUNTER — HOSPITAL ENCOUNTER (OUTPATIENT)
Dept: ULTRASOUND IMAGING | Facility: HOSPITAL | Age: 85
Discharge: HOME | End: 2025-06-02
Admitting: INTERNAL MEDICINE
Payer: MEDICARE

## 2025-06-02 DIAGNOSIS — Q61.02 MULTIPLE RENAL CYSTS: ICD-10-CM

## 2025-06-02 PROCEDURE — 76775 US EXAM ABDO BACK WALL LIM: CPT

## 2025-06-03 ENCOUNTER — RESULTS FOLLOW-UP (OUTPATIENT)
Dept: INTERNAL MEDICINE | Facility: CLINIC | Age: 85
End: 2025-06-03
Payer: MEDICARE

## 2025-06-03 NOTE — LETTER
Hal Byrnes  [address] [address]    June 9, 2025     Dear Mr. Brynes:    Below are the results from your recent visit:    Resulted Orders   US Renal Bilateral    Narrative    US RENAL BILATERAL    Date of Exam: 6/2/2025 2:19 PM EDT    Indication: evaluate possible complex right renal cyst seen on CT 3/2025.    Comparison: CT 3/19/2025    Technique: Grayscale and color Doppler ultrasound evaluation of the kidneys and urinary bladder was performed.        Findings:  The right kidney measures 12.4 x 6.6 x 6.7 cm in length and the left kidney measures 10.8 x 7.1 x 4.9 cm in length    Study somewhat limited by body habitus and bowel gas.    Innumerable anechoic structures compatible with simple cysts are noted within the kidneys bilaterally. Largest on the right measures 8.9 cm. The largest on the left measures 4.9 cm. Renal echogenicity is increased compatible with diffuse medical renal   disease. No hydronephrosis noted    Evaluation of bladder is limited by incomplete distention. No gross acute abnormality noted.      Impression    1. Numerous simple appearing cysts bilaterally. No definite suspicious solid or cystic lesion. Evaluation is somewhat limited. It is unclear if the lesion in question on CT isn't clearly identified by ultrasound. Renal mass protocol CT or MRI could be   considered if this will change the management of the patient    2. Increased echogenicity of liver compatible medical renal disease.    3. No hydronephrosis    4. Valuation the bladder limited by incomplete distention. No gross acute abnormality noted    Electronically Signed: Hollis Soto MD    6/2/2025 9:49 PM EDT    Workstation ID: [id number]       The test results show that there are no concerning lesions on either kidney. There are numerous cysts but there is no intervention for these.      If you have any questions or concerns, please don't hesitate to call.         Sincerely,        Pia De La Garza,  MD

## 2025-06-04 DIAGNOSIS — I10 PRIMARY HYPERTENSION: ICD-10-CM

## 2025-06-04 RX ORDER — CARVEDILOL 6.25 MG/1
6.25 TABLET ORAL 2 TIMES DAILY
Qty: 180 TABLET | Refills: 3 | Status: SHIPPED | OUTPATIENT
Start: 2025-06-04

## 2025-06-10 ENCOUNTER — TELEPHONE (OUTPATIENT)
Dept: CARDIOLOGY | Facility: CLINIC | Age: 85
End: 2025-06-10
Payer: MEDICARE

## 2025-06-10 ENCOUNTER — HOSPITAL ENCOUNTER (OUTPATIENT)
Dept: CT IMAGING | Facility: HOSPITAL | Age: 85
Discharge: HOME | End: 2025-06-10
Admitting: PHYSICIAN ASSISTANT
Payer: MEDICARE

## 2025-06-10 DIAGNOSIS — R19.00 MASS OF SOFT TISSUE OF ABDOMEN: ICD-10-CM

## 2025-06-10 DIAGNOSIS — Z90.49 HISTORY OF APPENDECTOMY: ICD-10-CM

## 2025-06-10 DIAGNOSIS — R93.5 ABNORMAL CT OF THE ABDOMEN: ICD-10-CM

## 2025-06-10 PROCEDURE — 74176 CT ABD & PELVIS W/O CONTRAST: CPT

## 2025-06-10 NOTE — TELEPHONE ENCOUNTER
----- Message from Consuelo HWANG sent at 6/10/2025  8:38 AM EDT -----  Regarding: Gordon  Please place order for Leqvio on 6/12.  Thank you!

## 2025-06-11 ENCOUNTER — TELEPHONE (OUTPATIENT)
Dept: GASTROENTEROLOGY | Facility: CLINIC | Age: 85
End: 2025-06-11

## 2025-06-11 RX ORDER — MEPERIDINE HYDROCHLORIDE 25 MG/ML
25 INJECTION INTRAMUSCULAR; INTRAVENOUS; SUBCUTANEOUS AS NEEDED
Status: CANCELLED | OUTPATIENT
Start: 2025-06-12

## 2025-06-11 RX ORDER — FAMOTIDINE 10 MG/ML
20 INJECTION, SOLUTION INTRAVENOUS AS NEEDED
Status: CANCELLED | OUTPATIENT
Start: 2025-06-12

## 2025-06-11 RX ORDER — SODIUM CHLORIDE 9 MG/ML
20 INJECTION, SOLUTION INTRAVENOUS ONCE
Status: CANCELLED | OUTPATIENT
Start: 2025-06-12

## 2025-06-11 RX ORDER — HYDROCORTISONE SODIUM SUCCINATE 100 MG/2ML
100 INJECTION INTRAMUSCULAR; INTRAVENOUS AS NEEDED
Status: CANCELLED | OUTPATIENT
Start: 2025-06-12

## 2025-06-11 RX ORDER — DIPHENHYDRAMINE HYDROCHLORIDE 50 MG/ML
50 INJECTION, SOLUTION INTRAMUSCULAR; INTRAVENOUS AS NEEDED
Status: CANCELLED | OUTPATIENT
Start: 2025-06-12

## 2025-06-11 NOTE — TELEPHONE ENCOUNTER
Hub staff attempted to follow warm transfer process and was unsuccessful     Caller: Hal Byrnes    Relationship to patient: Self    Best call back number:  [phone]    Patient is needing: PT IS CALLING TO LET [facility] KNOW THAT HE IS STILL CONSTIPATED. PLEASE CALL AND ADVISE.

## 2025-06-12 ENCOUNTER — INFUSION (OUTPATIENT)
Dept: ONCOLOGY | Facility: HOSPITAL | Age: 85
End: 2025-06-12
Payer: MEDICARE

## 2025-06-12 VITALS
RESPIRATION RATE: 20 BRPM | HEART RATE: 75 BPM | BODY MASS INDEX: 28.63 KG/M2 | TEMPERATURE: 97.9 F | SYSTOLIC BLOOD PRESSURE: 105 MMHG | DIASTOLIC BLOOD PRESSURE: 50 MMHG | HEIGHT: 63 IN

## 2025-06-12 DIAGNOSIS — E78.5 HYPERLIPIDEMIA, UNSPECIFIED HYPERLIPIDEMIA TYPE: Primary | ICD-10-CM

## 2025-06-12 DIAGNOSIS — K58.1 IRRITABLE BOWEL SYNDROME WITH CONSTIPATION: Primary | ICD-10-CM

## 2025-06-12 PROCEDURE — 25010000002 INCLISIRAN SODIUM 284 MG/1.5ML SOLUTION PREFILLED SYRINGE: Performed by: INTERNAL MEDICINE

## 2025-06-12 PROCEDURE — 96372 THER/PROPH/DIAG INJ SC/IM: CPT

## 2025-06-12 RX ORDER — FAMOTIDINE 10 MG/ML
20 INJECTION, SOLUTION INTRAVENOUS AS NEEDED
OUTPATIENT
Start: 2025-12-09

## 2025-06-12 RX ORDER — PRUCALOPRIDE 2 MG/1
2 TABLET ORAL DAILY
Qty: 90 TABLET | Refills: 3 | Status: SHIPPED | OUTPATIENT
Start: 2025-06-12

## 2025-06-12 RX ORDER — MEPERIDINE HYDROCHLORIDE 25 MG/ML
25 INJECTION INTRAMUSCULAR; INTRAVENOUS; SUBCUTANEOUS AS NEEDED
OUTPATIENT
Start: 2025-12-09

## 2025-06-12 RX ORDER — DIPHENHYDRAMINE HYDROCHLORIDE 50 MG/ML
50 INJECTION, SOLUTION INTRAMUSCULAR; INTRAVENOUS AS NEEDED
OUTPATIENT
Start: 2025-12-09

## 2025-06-12 RX ORDER — SODIUM CHLORIDE 9 MG/ML
20 INJECTION, SOLUTION INTRAVENOUS ONCE
OUTPATIENT
Start: 2025-12-09

## 2025-06-12 RX ORDER — HYDROCORTISONE SODIUM SUCCINATE 100 MG/2ML
100 INJECTION INTRAMUSCULAR; INTRAVENOUS AS NEEDED
OUTPATIENT
Start: 2025-12-09

## 2025-06-12 RX ADMIN — INCLISIRAN 284 MG: 284 INJECTION, SOLUTION SUBCUTANEOUS at 10:49

## 2025-06-12 NOTE — TELEPHONE ENCOUNTER
Called pt and he advised he does take the meds twice a day everyday. Pt states he is taking colace with it, pt also states that his stool output is very watery and very little to none sometimes it takes up to 4 days to produce a bowl movement and pt states it causes a lot of discomforting pain

## 2025-06-12 NOTE — TELEPHONE ENCOUNTER
Rx for new medication, Motegrity, sent to pharmacy. Discontinue IBSrela samples and continue colace as current.

## 2025-06-12 NOTE — TELEPHONE ENCOUNTER
Called pt and advised pt to discontinue taking the IBSRELA as Magda has sent In new script to pt pharmacy to start on. Also advised pt to continue the colace pt agrees and verbally understands.

## 2025-06-13 ENCOUNTER — PRIOR AUTHORIZATION (OUTPATIENT)
Dept: GASTROENTEROLOGY | Facility: CLINIC | Age: 85
End: 2025-06-13
Payer: MEDICARE

## 2025-06-13 NOTE — TELEPHONE ENCOUNTER
Approved today by [employer] 2017  PA Case: [id number], Status: Approved, Coverage Starts on: 3/14/2025 12:00:00 AM, Coverage Ends on: 6/13/2026 12:00:00 AM.  Effective Date: 3/14/2025  Authorization Expiration Date: 6/13/2026

## 2025-06-16 ENCOUNTER — RESULTS FOLLOW-UP (OUTPATIENT)
Dept: GASTROENTEROLOGY | Facility: CLINIC | Age: 85
End: 2025-06-16
Payer: MEDICARE

## 2025-06-16 NOTE — LETTER
June 16, 2025     Hal Byrnes  [address] [facility] [address]      Dear Mr. Byrnes:    Below are the results from your recent visit:    Resulted Orders   CT Abdomen Pelvis Without Contrast    Narrative    CT ABDOMEN PELVIS WO CONTRAST    Date of Exam: 6/10/2025 10:45 AM EDT    Indication: soft tissue mass RLQ on scan 3/2025, assess stability.    Comparison: CT abdomen and pelvis 3/19/2025    Technique: Axial CT images were obtained of the abdomen and pelvis without the administration of contrast. Reconstructed coronal and sagittal images were also obtained. Automated exposure control and iterative construction methods were used.      Findings:  LUNG BASES: The lung bases are clear. There is coronary atherosclerosis.    LIVER:  Unremarkable parenchyma without focal lesion.  BILIARY/GALLBLADDER:  Unremarkable  SPLEEN:  Unremarkable  PANCREAS:  Unremarkable  ADRENAL: Bilateral nodular thickening of the adrenal glands.  KIDNEYS: There are innumerable bilateral renal cysts including simple fluid attenuating cysts as well as bilateral hyperattenuating cysts which may represent proteinaceous or hemorrhagic cysts. There is no evidence of nephrolithiasis or obstruction.  GASTROINTESTINAL/MESENTERY:  No evidence of obstruction nor inflammation.  The appendix is not identified. There is mild sigmoid diverticulosis.  AORTA/IVC:  Normal caliber. There is aortic atherosclerosis.    RETROPERITONEUM/LYMPH NODES:  Unremarkable    REPRODUCTIVE: There are prostatic calcifications.  BLADDER: The urinary bladder is incompletely distended but thickened.    OSSEUS STRUCTURES: There is multilevel lumbar degenerative disc disease and facet joint arthropathy. There is no acute fracture or aggressive osseous lesion.    Again noted is soft tissue in the right lower quadrant measuring approximately 3.6 x 2.0 cm with multiple associated surgical clips extending into the right inguinal canal. This most likely represents  postoperative change.      Impression    Impression:    1. No acute CT abnormality of the abdomen or pelvis.  2. Stable appearance of soft tissue density in the right lower quadrant with adjacent surgical clips, findings may represent post-surgical change related to hernia repair.   3. Thickening of the urinary bladder wall may be due to cystitis.               Electronically Signed: Mercedes Lyons MD    6/13/2025 12:09 PM EDT    Workstation ID: [id number]       CT A/P reveals stable appearance of soft tissue density in the right lower quadrant, consistent with postoperative state. No further follow up imaging or endoscopy is warranted.       If you have any questions or concerns, please don't hesitate to call.         Sincerely,        Magda Dan PA-C

## 2025-06-16 NOTE — PROGRESS NOTES
Please let Mr. Byrnes know that his CT A/P reveals stable appearance of soft tissue density in the right lower quadrant, consistent with postoperative state. No further follow up imaging or endoscopy is warranted.

## 2025-06-18 ENCOUNTER — LAB (OUTPATIENT)
Dept: LAB | Facility: HOSPITAL | Age: 85
End: 2025-06-18
Payer: MEDICARE

## 2025-06-18 DIAGNOSIS — I50.32 CHRONIC HEART FAILURE WITH PRESERVED EJECTION FRACTION (HFPEF): ICD-10-CM

## 2025-06-18 DIAGNOSIS — I10 PRIMARY HYPERTENSION: ICD-10-CM

## 2025-06-18 DIAGNOSIS — R76.8 ELEVATED SERUM IMMUNOGLOBULIN FREE LIGHT CHAIN LEVEL: ICD-10-CM

## 2025-06-18 LAB
ANION GAP SERPL CALCULATED.3IONS-SCNC: 14 MMOL/L (ref 5–15)
BASOPHILS # BLD AUTO: 0.02 10*3/MM3 (ref 0–0.2)
BASOPHILS NFR BLD AUTO: 0.2 % (ref 0–1.5)
BUN SERPL-MCNC: 57.2 MG/DL (ref 8–23)
BUN/CREAT SERPL: 17.6 (ref 7–25)
CALCIUM SPEC-SCNC: 8.8 MG/DL (ref 8.6–10.5)
CHLORIDE SERPL-SCNC: 104 MMOL/L (ref 98–107)
CO2 SERPL-SCNC: 21 MMOL/L (ref 22–29)
CREAT SERPL-MCNC: 3.25 MG/DL (ref 0.76–1.27)
DEPRECATED RDW RBC AUTO: 44.5 FL (ref 37–54)
EGFRCR SERPLBLD CKD-EPI 2021: 18 ML/MIN/1.73
EOSINOPHIL # BLD AUTO: 0.07 10*3/MM3 (ref 0–0.4)
EOSINOPHIL NFR BLD AUTO: 0.9 % (ref 0.3–6.2)
ERYTHROCYTE [DISTWIDTH] IN BLOOD BY AUTOMATED COUNT: 13.7 % (ref 12.3–15.4)
GLUCOSE SERPL-MCNC: 106 MG/DL (ref 65–99)
HCT VFR BLD AUTO: 35.8 % (ref 37.5–51)
HGB BLD-MCNC: 11.9 G/DL (ref 13–17.7)
IMM GRANULOCYTES # BLD AUTO: 0.03 10*3/MM3 (ref 0–0.05)
IMM GRANULOCYTES NFR BLD AUTO: 0.4 % (ref 0–0.5)
LYMPHOCYTES # BLD AUTO: 1.89 10*3/MM3 (ref 0.7–3.1)
LYMPHOCYTES NFR BLD AUTO: 23 % (ref 19.6–45.3)
MCH RBC QN AUTO: 29.6 PG (ref 26.6–33)
MCHC RBC AUTO-ENTMCNC: 33.2 G/DL (ref 31.5–35.7)
MCV RBC AUTO: 89.1 FL (ref 79–97)
MONOCYTES # BLD AUTO: 0.56 10*3/MM3 (ref 0.1–0.9)
MONOCYTES NFR BLD AUTO: 6.8 % (ref 5–12)
NEUTROPHILS NFR BLD AUTO: 5.65 10*3/MM3 (ref 1.7–7)
NEUTROPHILS NFR BLD AUTO: 68.7 % (ref 42.7–76)
NRBC BLD AUTO-RTO: 0 /100 WBC (ref 0–0.2)
PLATELET # BLD AUTO: 270 10*3/MM3 (ref 140–450)
PMV BLD AUTO: 11 FL (ref 6–12)
POTASSIUM SERPL-SCNC: 3.2 MMOL/L (ref 3.5–5.2)
RBC # BLD AUTO: 4.02 10*6/MM3 (ref 4.14–5.8)
SODIUM SERPL-SCNC: 139 MMOL/L (ref 136–145)
WBC NRBC COR # BLD AUTO: 8.22 10*3/MM3 (ref 3.4–10.8)

## 2025-06-18 PROCEDURE — 84165 PROTEIN E-PHORESIS SERUM: CPT

## 2025-06-18 PROCEDURE — 84155 ASSAY OF PROTEIN SERUM: CPT

## 2025-06-18 PROCEDURE — 85025 COMPLETE CBC W/AUTO DIFF WBC: CPT

## 2025-06-18 PROCEDURE — 36415 COLL VENOUS BLD VENIPUNCTURE: CPT

## 2025-06-18 PROCEDURE — 80048 BASIC METABOLIC PNL TOTAL CA: CPT

## 2025-06-18 PROCEDURE — 83521 IG LIGHT CHAINS FREE EACH: CPT

## 2025-06-20 LAB
ALBUMIN SERPL ELPH-MCNC: 3.3 G/DL (ref 2.9–4.4)
ALBUMIN/GLOB SERPL: 1.2 {RATIO} (ref 0.7–1.7)
ALPHA1 GLOB SERPL ELPH-MCNC: 0.2 G/DL (ref 0–0.4)
ALPHA2 GLOB SERPL ELPH-MCNC: 0.8 G/DL (ref 0.4–1)
B-GLOBULIN SERPL ELPH-MCNC: 0.9 G/DL (ref 0.7–1.3)
GAMMA GLOB SERPL ELPH-MCNC: 1 G/DL (ref 0.4–1.8)
GLOBULIN SER CALC-MCNC: 2.8 G/DL (ref 2.2–3.9)
KAPPA LC FREE SER-MCNC: 67.4 MG/L (ref 3.3–19.4)
KAPPA LC FREE/LAMBDA FREE SER: 2.02 {RATIO} (ref 0.26–1.65)
LABORATORY COMMENT REPORT: NORMAL
LAMBDA LC FREE SERPL-MCNC: 33.4 MG/L (ref 5.7–26.3)
M PROTEIN SERPL ELPH-MCNC: NORMAL G/DL
PROT SERPL-MCNC: 6.1 G/DL (ref 6–8.5)

## 2025-07-03 RX ORDER — SPIRONOLACTONE 25 MG/1
12.5 TABLET ORAL EVERY OTHER DAY
Qty: 22 TABLET | OUTPATIENT
Start: 2025-07-03

## 2025-07-03 NOTE — TELEPHONE ENCOUNTER
Spoke with patients nephrologist office , sent lab results to them. They are going to try to do tele health with patient if possible but he does have appointment at end of the month.

## 2025-07-18 ENCOUNTER — OFFICE VISIT (OUTPATIENT)
Dept: INTERNAL MEDICINE | Facility: CLINIC | Age: 85
End: 2025-07-18
Payer: MEDICARE

## 2025-07-18 VITALS
HEIGHT: 63 IN | BODY MASS INDEX: 27.96 KG/M2 | WEIGHT: 157.8 LBS | TEMPERATURE: 97.4 F | SYSTOLIC BLOOD PRESSURE: 124 MMHG | OXYGEN SATURATION: 96 % | DIASTOLIC BLOOD PRESSURE: 64 MMHG | HEART RATE: 70 BPM

## 2025-07-18 DIAGNOSIS — R93.5 ABNORMAL CT OF THE ABDOMEN: ICD-10-CM

## 2025-07-18 DIAGNOSIS — R09.82 POST-NASAL DRIP: ICD-10-CM

## 2025-07-18 DIAGNOSIS — E11.42 DIABETIC POLYNEUROPATHY ASSOCIATED WITH TYPE 2 DIABETES MELLITUS: ICD-10-CM

## 2025-07-18 DIAGNOSIS — I10 PRIMARY HYPERTENSION: ICD-10-CM

## 2025-07-18 DIAGNOSIS — N18.4 STAGE 4 CHRONIC KIDNEY DISEASE: ICD-10-CM

## 2025-07-18 DIAGNOSIS — E08.22 DIABETES MELLITUS DUE TO UNDERLYING CONDITION WITH STAGE 4 CHRONIC KIDNEY DISEASE, WITHOUT LONG-TERM CURRENT USE OF INSULIN: Primary | ICD-10-CM

## 2025-07-18 DIAGNOSIS — N18.4 DIABETES MELLITUS DUE TO UNDERLYING CONDITION WITH STAGE 4 CHRONIC KIDNEY DISEASE, WITHOUT LONG-TERM CURRENT USE OF INSULIN: Primary | ICD-10-CM

## 2025-07-18 DIAGNOSIS — Q61.02 MULTIPLE RENAL CYSTS: ICD-10-CM

## 2025-07-18 DIAGNOSIS — H61.21 IMPACTED CERUMEN OF RIGHT EAR: ICD-10-CM

## 2025-07-18 DIAGNOSIS — H69.92 DYSFUNCTION OF LEFT EUSTACHIAN TUBE: ICD-10-CM

## 2025-07-18 LAB
EXPIRATION DATE: NORMAL
HBA1C MFR BLD: 5.7 % (ref 4.5–5.7)
Lab: NORMAL

## 2025-07-18 RX ORDER — FLUTICASONE PROPIONATE 50 MCG
1-2 SPRAY, SUSPENSION (ML) NASAL DAILY
Qty: 16 G | Refills: 0 | Status: SHIPPED | OUTPATIENT
Start: 2025-07-18

## 2025-07-18 RX ORDER — POTASSIUM CHLORIDE 1500 MG/1
20 TABLET, EXTENDED RELEASE ORAL DAILY
COMMUNITY

## 2025-07-18 NOTE — PROGRESS NOTES
Internal Medicine Follow Up    Chief Complaint  Hal Byrnes is a 85 y.o. male who presents today for follow up of chronic medical conditions outlined below.    Chief Complaint   Patient presents with    Diabetes    Post nasal drip     Ear pressure, drainage and hoarseness for at least 3+ weeks        HPI  Mr. Byrnes comes in today for follow up. He is here with his wife. He complains of burning in both feet as well as numbness. He also complains of post nasal drip and ear pressure. He has followed up with GI who now has him on motegrity for constipation. He reports BM every other day. He also had repeat CT scan with stable postsurgical changes near site of appendectomy so they do not plan any further workup. He is going to see nephrology at end of month. They have stopped spironolactone and started a potassium supplement.         Review of Systems  Review of Systems   Constitutional: Negative.    HENT:  Positive for ear pain (pressure).    Respiratory: Negative.     Cardiovascular: Negative.    Gastrointestinal:  Negative for constipation and diarrhea.   Neurological:  Positive for numbness.        Current Medications  Current Outpatient Medications on File Prior to Visit   Medication Sig Dispense Refill    aspirin 81 MG chewable tablet Chew 1 tablet Daily. OTC      carvedilol (COREG) 6.25 MG tablet Take 1 tablet by mouth 2 (Two) Times a Day. 180 tablet 3    furosemide (LASIX) 40 MG tablet Take 40mg in the morning and 20mg in the afternoon      hydrALAZINE (APRESOLINE) 100 MG tablet Take 1 tablet by mouth 2 (Two) Times a Day.      isosorbide mononitrate (IMDUR) 60 MG 24 hr tablet TAKE 1 TABLET BY MOUTH TWICE A  tablet 3    Pancrelipase, Lip-Prot-Amyl, (Zenpep) 52566-14366 units capsule delayed-release particles Take 2 capsules by mouth 3 (Three) Times a Day. 540 capsule 1    pantoprazole (PROTONIX) 40 MG EC tablet Take 1 tablet by mouth Daily. 90 tablet 3    potassium chloride ER (K-TAB) 20 MEQ tablet  "controlled-release ER tablet Take 1 tablet by mouth Daily.      Prucalopride Succinate (Motegrity) 2 MG tablet Take 1 tablet by mouth Daily. 90 tablet 3    terazosin (HYTRIN) 2 MG capsule Take 1 capsule by mouth Every Night. 90 capsule 1    topiramate (TOPAMAX) 50 MG tablet Take 1 tablet by mouth 2 (Two) Times a Day. 180 tablet 3    [DISCONTINUED] glycerin adult 2 g suppository Insert 1 suppository into the rectum As Needed for Constipation. 25 each 0    [DISCONTINUED] linaclotide (Linzess) 290 MCG capsule capsule TAKE 1 CAPSULE BY MOUTH EVERY MORNING 30 MINUTES BEFORE BREAKFAST 90 capsule 3    [DISCONTINUED] spironolactone (ALDACTONE) 25 MG tablet Take 1 tablet by mouth Daily.       Current Facility-Administered Medications on File Prior to Visit   Medication Dose Route Frequency Provider Last Rate Last Admin    Inclisiran Sodium solution prefilled syringe 284 mg  1.5 mL Subcutaneous Q6 Months Carl Sainz MD           Allergies  Allergies   Allergen Reactions    Sulfa Antibiotics Irritability       Objective  Visit Vitals  /64 (BP Location: Left arm, Patient Position: Sitting)   Pulse 70   Temp 97.4 °F (36.3 °C) (Temporal)   Ht 160 cm (63\")   Wt 71.6 kg (157 lb 12.8 oz)   SpO2 96%   BMI 27.95 kg/m²        Physical Exam  Physical Exam  Vitals and nursing note reviewed.   Constitutional:       General: He is not in acute distress.     Appearance: He is well-developed. He is not ill-appearing or toxic-appearing.   HENT:      Head: Normocephalic and atraumatic.      Right Ear: External ear normal. There is impacted cerumen.      Left Ear: Ear canal and external ear normal. There is no impacted cerumen.      Ears:      Comments: L TM retracted, no effusion     Mouth/Throat:      Mouth: Mucous membranes are moist.      Pharynx: Oropharynx is clear. No oropharyngeal exudate or posterior oropharyngeal erythema.      Comments: Small airway  Eyes:      Conjunctiva/sclera: Conjunctivae normal.   Cardiovascular:     "  Rate and Rhythm: Normal rate and regular rhythm.      Heart sounds: Murmur (systolic) heard.   Pulmonary:      Effort: Pulmonary effort is normal. No respiratory distress.      Breath sounds: Normal breath sounds. No wheezing, rhonchi or rales.   Musculoskeletal:      Right lower leg: Edema (1+) present.      Left lower leg: Edema (1+) present.   Feet:      Right foot:      Skin integrity: Dry skin present. No skin breakdown, erythema or warmth.      Left foot:      Skin integrity: Dry skin present. No skin breakdown, erythema or warmth.      Comments: Amputation of R great toe.  Skin:     General: Skin is warm and dry.   Neurological:      Mental Status: He is alert and oriented to person, place, and time. Mental status is at baseline.      Gait: Gait normal.   Psychiatric:         Mood and Affect: Mood normal.         Behavior: Behavior normal.         Results  Results for orders placed or performed in visit on 07/18/25   POC Glycosylated Hemoglobin (Hb A1C)    Collection Time: 07/18/25 11:19 AM    Specimen: Blood   Result Value Ref Range    Hemoglobin A1C 5.7 4.5 - 5.7 %    Lot Number [id number]     Expiration Date 02/21/2027         Assessment and Plan  Diagnoses and all orders for this visit:    Diabetes mellitus due to underlying condition with stage 4 chronic kidney disease, without long-term current use of insulin  - A1c 5.7  - diet controlled    Diabetic polyneuropathy associated with type 2 diabetes mellitus   - deferred treatment due to advanced renal disease and potential side effects of medications    Primary hypertension  - BP controlled  - continue carvedilol 6.25mg BID, lasix, hydralazine 100mg BID, imdur 60mg BID, and terazosin 2mg qhs.    Stage 4 chronic kidney disease  - gfr has been in the 20s, most recent 18  - spironolactone stopped by nephrology and he reports upcoming appt    Abnormal CT of the abdomen  - soft tissue density noted at site of appendix which has been surgically removed in the  past. May be postsurgical changes. Noted colonoscopy 10/2024 was normal in this area.   - repeat CT with stable appearance, likely postsurgical changes.  - GI not planning further evaluation    Multiple renal cysts  - known to have multiple renal cysts however on CT noted to have possible complex cyst  - CT renal protocol not possible due to CKD4 but renal US without concerning lesion    Post-nasal drip  Dysfunction of left eustachian tube  - start flonase    Impacted cerumen of right ear  - very deep, cannot be removed in office  - advised to use hydrogen peroxide or olive oil twice a week and if needed return to ENT     Health Maintenance  - Colonoscopy: 10/2024, possible repeat 5y  - Immunizations:COVID, tdap, shingrix discussed. RSV complete. PCV20 complete.  - Depression screening: negative 10/2024    Return for Next scheduled follow up.

## 2025-07-22 ENCOUNTER — TRANSCRIBE ORDERS (OUTPATIENT)
Dept: LAB | Facility: HOSPITAL | Age: 85
End: 2025-07-22
Payer: MEDICARE

## 2025-07-22 ENCOUNTER — LAB (OUTPATIENT)
Dept: LAB | Facility: HOSPITAL | Age: 85
End: 2025-07-22
Payer: MEDICARE

## 2025-07-22 DIAGNOSIS — R31.9 HEMATURIA SYNDROME: ICD-10-CM

## 2025-07-22 DIAGNOSIS — E11.22 TYPE 2 DIABETES MELLITUS WITH DIABETIC CHRONIC KIDNEY DISEASE, UNSPECIFIED CKD STAGE, UNSPECIFIED WHETHER LONG TERM INSULIN USE: ICD-10-CM

## 2025-07-22 DIAGNOSIS — N18.9 ANEMIA OF CHRONIC RENAL FAILURE, UNSPECIFIED CKD STAGE: ICD-10-CM

## 2025-07-22 DIAGNOSIS — I12.9 PARENCHYMAL RENAL HYPERTENSION, STAGE 1 THROUGH STAGE 4 OR UNSPECIFIED CHRONIC KIDNEY DISEASE: ICD-10-CM

## 2025-07-22 DIAGNOSIS — D63.1 ANEMIA OF CHRONIC RENAL FAILURE, UNSPECIFIED CKD STAGE: ICD-10-CM

## 2025-07-22 DIAGNOSIS — E55.9 AVITAMINOSIS D: ICD-10-CM

## 2025-07-22 DIAGNOSIS — I12.9 PARENCHYMAL RENAL HYPERTENSION, STAGE 1 THROUGH STAGE 4 OR UNSPECIFIED CHRONIC KIDNEY DISEASE: Primary | ICD-10-CM

## 2025-07-22 PROCEDURE — 84156 ASSAY OF PROTEIN URINE: CPT

## 2025-07-22 PROCEDURE — 85027 COMPLETE CBC AUTOMATED: CPT

## 2025-07-22 PROCEDURE — 80053 COMPREHEN METABOLIC PANEL: CPT

## 2025-07-22 PROCEDURE — 82728 ASSAY OF FERRITIN: CPT

## 2025-07-22 PROCEDURE — 82306 VITAMIN D 25 HYDROXY: CPT

## 2025-07-22 PROCEDURE — 36415 COLL VENOUS BLD VENIPUNCTURE: CPT

## 2025-07-22 PROCEDURE — 81001 URINALYSIS AUTO W/SCOPE: CPT

## 2025-07-22 PROCEDURE — 84550 ASSAY OF BLOOD/URIC ACID: CPT

## 2025-07-22 PROCEDURE — 83970 ASSAY OF PARATHORMONE: CPT

## 2025-07-22 PROCEDURE — 84466 ASSAY OF TRANSFERRIN: CPT

## 2025-07-22 PROCEDURE — 82570 ASSAY OF URINE CREATININE: CPT

## 2025-07-22 PROCEDURE — 83540 ASSAY OF IRON: CPT

## 2025-07-23 LAB
25(OH)D3 SERPL-MCNC: 36.9 NG/ML (ref 30–100)
ALBUMIN SERPL-MCNC: 3.9 G/DL (ref 3.5–5.2)
ALBUMIN/GLOB SERPL: 1.4 G/DL
ALP SERPL-CCNC: 71 U/L (ref 39–117)
ALT SERPL W P-5'-P-CCNC: 13 U/L (ref 1–41)
ANION GAP SERPL CALCULATED.3IONS-SCNC: 15 MMOL/L (ref 5–15)
AST SERPL-CCNC: 15 U/L (ref 1–40)
BACTERIA UR QL AUTO: NORMAL /HPF
BILIRUB SERPL-MCNC: 0.2 MG/DL (ref 0–1.2)
BILIRUB UR QL STRIP: NEGATIVE
BUN SERPL-MCNC: 27 MG/DL (ref 8–23)
BUN/CREAT SERPL: 9.9 (ref 7–25)
CALCIUM SPEC-SCNC: 8.5 MG/DL (ref 8.6–10.5)
CHLORIDE SERPL-SCNC: 96 MMOL/L (ref 98–107)
CLARITY UR: CLEAR
CO2 SERPL-SCNC: 23 MMOL/L (ref 22–29)
COLOR UR: YELLOW
CREAT SERPL-MCNC: 2.73 MG/DL (ref 0.76–1.27)
CREAT UR-MCNC: 22.1 MG/DL
DEPRECATED RDW RBC AUTO: 43.3 FL (ref 37–54)
EGFRCR SERPLBLD CKD-EPI 2021: 22.2 ML/MIN/1.73
ERYTHROCYTE [DISTWIDTH] IN BLOOD BY AUTOMATED COUNT: 12.9 % (ref 12.3–15.4)
FERRITIN SERPL-MCNC: 136 NG/ML (ref 30–400)
GLOBULIN UR ELPH-MCNC: 2.7 GM/DL
GLUCOSE SERPL-MCNC: 116 MG/DL (ref 65–99)
GLUCOSE UR STRIP-MCNC: NEGATIVE MG/DL
HCT VFR BLD AUTO: 34.9 % (ref 37.5–51)
HGB BLD-MCNC: 11.5 G/DL (ref 13–17.7)
HGB UR QL STRIP.AUTO: NEGATIVE
HYALINE CASTS UR QL AUTO: NORMAL /LPF
IRON 24H UR-MRATE: 57 MCG/DL (ref 59–158)
IRON SATN MFR SERPL: 19 % (ref 20–50)
KETONES UR QL STRIP: NEGATIVE
LEUKOCYTE ESTERASE UR QL STRIP.AUTO: NEGATIVE
MCH RBC QN AUTO: 30.6 PG (ref 26.6–33)
MCHC RBC AUTO-ENTMCNC: 33 G/DL (ref 31.5–35.7)
MCV RBC AUTO: 92.8 FL (ref 79–97)
NITRITE UR QL STRIP: NEGATIVE
PH UR STRIP.AUTO: 6.5 [PH] (ref 5–8)
PLATELET # BLD AUTO: 260 10*3/MM3 (ref 140–450)
PMV BLD AUTO: 9.9 FL (ref 6–12)
POTASSIUM SERPL-SCNC: 3.5 MMOL/L (ref 3.5–5.2)
PROT ?TM UR-MCNC: 63.8 MG/DL
PROT SERPL-MCNC: 6.6 G/DL (ref 6–8.5)
PROT UR QL STRIP: ABNORMAL
PROT/CREAT UR: 2886.9 MG/G CREA (ref 0–200)
PTH-INTACT SERPL-MCNC: 134 PG/ML (ref 15–65)
RBC # BLD AUTO: 3.76 10*6/MM3 (ref 4.14–5.8)
RBC # UR STRIP: NORMAL /HPF
REF LAB TEST METHOD: NORMAL
SODIUM SERPL-SCNC: 134 MMOL/L (ref 136–145)
SP GR UR STRIP: <=1.005 (ref 1–1.03)
SQUAMOUS #/AREA URNS HPF: NORMAL /HPF
TIBC SERPL-MCNC: 304 MCG/DL (ref 298–536)
TRANSFERRIN SERPL-MCNC: 204 MG/DL (ref 200–360)
URATE SERPL-MCNC: 8.4 MG/DL (ref 3.4–7)
UROBILINOGEN UR QL STRIP: ABNORMAL
WBC # UR STRIP: NORMAL /HPF
WBC NRBC COR # BLD AUTO: 6.08 10*3/MM3 (ref 3.4–10.8)

## 2025-08-15 DIAGNOSIS — H69.92 DYSFUNCTION OF LEFT EUSTACHIAN TUBE: ICD-10-CM

## 2025-08-15 DIAGNOSIS — R09.82 POST-NASAL DRIP: ICD-10-CM

## 2025-08-15 RX ORDER — FLUTICASONE PROPIONATE 50 MCG
SPRAY, SUSPENSION (ML) NASAL
Qty: 16 G | Refills: 1 | Status: SHIPPED | OUTPATIENT
Start: 2025-08-15

## 2025-08-19 ENCOUNTER — LAB (OUTPATIENT)
Dept: LAB | Facility: HOSPITAL | Age: 85
End: 2025-08-19
Payer: MEDICARE

## 2025-08-19 ENCOUNTER — TRANSCRIBE ORDERS (OUTPATIENT)
Dept: LAB | Facility: HOSPITAL | Age: 85
End: 2025-08-19
Payer: MEDICARE

## 2025-08-19 DIAGNOSIS — N18.4 CHRONIC KIDNEY DISEASE, STAGE IV (SEVERE): ICD-10-CM

## 2025-08-19 DIAGNOSIS — N18.4 CHRONIC KIDNEY DISEASE, STAGE IV (SEVERE): Primary | ICD-10-CM

## 2025-08-19 PROCEDURE — 36415 COLL VENOUS BLD VENIPUNCTURE: CPT

## 2025-08-19 PROCEDURE — 83735 ASSAY OF MAGNESIUM: CPT

## 2025-08-19 PROCEDURE — 80048 BASIC METABOLIC PNL TOTAL CA: CPT

## 2025-08-20 ENCOUNTER — TRANSCRIBE ORDERS (OUTPATIENT)
Dept: LAB | Facility: HOSPITAL | Age: 85
End: 2025-08-20
Payer: MEDICARE

## 2025-08-20 ENCOUNTER — LAB (OUTPATIENT)
Dept: LAB | Facility: HOSPITAL | Age: 85
End: 2025-08-20
Payer: MEDICARE

## 2025-08-20 DIAGNOSIS — N18.4 CHRONIC KIDNEY DISEASE, STAGE IV (SEVERE): Primary | ICD-10-CM

## 2025-08-20 DIAGNOSIS — N18.4 CHRONIC KIDNEY DISEASE, STAGE IV (SEVERE): ICD-10-CM

## 2025-08-20 DIAGNOSIS — I10 ESSENTIAL HYPERTENSION, MALIGNANT: ICD-10-CM

## 2025-08-20 LAB
ANION GAP SERPL CALCULATED.3IONS-SCNC: 12.7 MMOL/L (ref 5–15)
ANION GAP SERPL CALCULATED.3IONS-SCNC: 13.7 MMOL/L (ref 5–15)
BUN SERPL-MCNC: 39 MG/DL (ref 8–23)
BUN SERPL-MCNC: 42 MG/DL (ref 8–23)
BUN/CREAT SERPL: 14 (ref 7–25)
BUN/CREAT SERPL: 15.3 (ref 7–25)
CALCIUM SPEC-SCNC: 8.8 MG/DL (ref 8.6–10.5)
CALCIUM SPEC-SCNC: 9.4 MG/DL (ref 8.6–10.5)
CHLORIDE SERPL-SCNC: 102 MMOL/L (ref 98–107)
CHLORIDE SERPL-SCNC: 104 MMOL/L (ref 98–107)
CO2 SERPL-SCNC: 21.3 MMOL/L (ref 22–29)
CO2 SERPL-SCNC: 22.3 MMOL/L (ref 22–29)
CREAT SERPL-MCNC: 2.74 MG/DL (ref 0.76–1.27)
CREAT SERPL-MCNC: 2.79 MG/DL (ref 0.76–1.27)
EGFRCR SERPLBLD CKD-EPI 2021: 21.7 ML/MIN/1.73
EGFRCR SERPLBLD CKD-EPI 2021: 22.1 ML/MIN/1.73
GLUCOSE SERPL-MCNC: 106 MG/DL (ref 65–99)
GLUCOSE SERPL-MCNC: 113 MG/DL (ref 65–99)
MAGNESIUM SERPL-MCNC: 2.2 MG/DL (ref 1.6–2.4)
MAGNESIUM SERPL-MCNC: 2.4 MG/DL (ref 1.6–2.4)
POTASSIUM SERPL-SCNC: 3.9 MMOL/L (ref 3.5–5.2)
POTASSIUM SERPL-SCNC: 4.2 MMOL/L (ref 3.5–5.2)
SODIUM SERPL-SCNC: 137 MMOL/L (ref 136–145)
SODIUM SERPL-SCNC: 139 MMOL/L (ref 136–145)

## 2025-08-20 PROCEDURE — 83735 ASSAY OF MAGNESIUM: CPT

## 2025-08-20 PROCEDURE — 80048 BASIC METABOLIC PNL TOTAL CA: CPT

## 2025-08-21 DIAGNOSIS — I50.32 CHRONIC HEART FAILURE WITH PRESERVED EJECTION FRACTION (HFPEF): ICD-10-CM

## 2025-08-21 DIAGNOSIS — I10 PRIMARY HYPERTENSION: ICD-10-CM

## 2025-08-21 RX ORDER — HYDRALAZINE HYDROCHLORIDE 100 MG/1
100 TABLET, FILM COATED ORAL 2 TIMES DAILY
Qty: 180 TABLET | Refills: 3 | Status: SHIPPED | OUTPATIENT
Start: 2025-08-21

## 2025-08-21 RX ORDER — HYDRALAZINE HYDROCHLORIDE 100 MG/1
100 TABLET, FILM COATED ORAL 3 TIMES DAILY
Qty: 90 TABLET | OUTPATIENT
Start: 2025-08-21

## 2025-08-25 DIAGNOSIS — K86.89 PANCREATIC INSUFFICIENCY: ICD-10-CM

## 2025-08-25 RX ORDER — PANCRELIPASE LIPASE, PANCRELIPASE PROTEASE, PANCRELIPASE AMYLASE 20000; 63000; 84000 [USP'U]/1; [USP'U]/1; [USP'U]/1
2 CAPSULE, DELAYED RELEASE ORAL 3 TIMES DAILY
Qty: 300 CAPSULE | Refills: 0 | Status: SHIPPED | OUTPATIENT
Start: 2025-08-25

## 2025-08-30 DIAGNOSIS — I10 PRIMARY HYPERTENSION: ICD-10-CM

## 2025-08-30 RX ORDER — TERAZOSIN 2 MG/1
CAPSULE ORAL
Qty: 60 CAPSULE | Refills: 0 | Status: SHIPPED | OUTPATIENT
Start: 2025-08-30